# Patient Record
Sex: MALE | Race: WHITE | NOT HISPANIC OR LATINO | Employment: OTHER | URBAN - METROPOLITAN AREA
[De-identification: names, ages, dates, MRNs, and addresses within clinical notes are randomized per-mention and may not be internally consistent; named-entity substitution may affect disease eponyms.]

---

## 2017-01-03 ENCOUNTER — ANTICOAGULATION MONITORING (OUTPATIENT)
Dept: VASCULAR LAB | Facility: MEDICAL CENTER | Age: 67
End: 2017-01-03
Attending: NURSE PRACTITIONER
Payer: MEDICARE

## 2017-01-03 LAB
INR BLD: 3.6 (ref 0.9–1.2)
INR PPP: 3.6 (ref 2–3.5)

## 2017-01-03 PROCEDURE — 85610 PROTHROMBIN TIME: CPT

## 2017-01-03 PROCEDURE — 99212 OFFICE O/P EST SF 10 MIN: CPT

## 2017-01-03 NOTE — MR AVS SNAPSHOT
Rene Chan   1/3/2017 10:30 AM   Anticoagulation Monitoring   MRN: [id number]    Department:  Vascular Medicine   Dept Phone:  [phone]    Description:  Male : 1950   Provider:  [facility] EXAM 5           Allergies as of 1/3/2017     Allergen Noted Reactions    Penicillins 2008       Childhood; does know what the reaction was      Vital Signs     Smoking Status                   Former Smoker           Basic Information     Date Of Birth Sex Race Ethnicity Preferred Language    1950 Male White Non- English      Your appointments     2017 10:30 AM   Established Patient with [facility] EXAM 5   [facility]  (--)    [address] [address]   [phone]            2017 10:30 AM   Established Patient with IHV EXAM 4   [facility]  (--)    [address] [address]   [phone]            2017  1:00 PM   Established Patient with Patricia Damon M.D.   [facility] Med - Internal Medicine (--)    [address]  [address]  [facility] [address] [phone]           You will be receiving a confirmation call a few days before your appointment from our automated call confirmation system.              Problem List              ICD-10-CM Priority Class Noted - Resolved    Chronic anticoagulation Z79.01   2014 - Present    TIA (transient ischemic attack) G45.9   2015 - Present    CAD (coronary artery disease) I25.10   2015 - Present    S/P CABG x 3 Z95.1   2015 - Present    Mixed hyperlipidemia E78.2   2015 - Present    Hx pulmonary embolism Z86.711   2015 - Present    Deep vein thrombosis (DVT) (HCC) I82.409   2015 - Present    Acquired hypothyroidism E03.9   2016 - Present    Gout of foot M10.9   2016 - Present    Colon polyp K63.5   10/6/2016 - Present    Bipolar  disorder in full remission (HCC) F31.70   10/6/2016 - Present    Healthcare maintenance Z00.00   10/6/2016 - Present    Benign non-nodular prostatic hyperplasia with lower urinary tract symptoms N40.1   10/6/2016 - Present      Health Maintenance        Date Due Completion Dates    IMM DTaP/Tdap/Td Vaccine (1 - Tdap) 10/15/1968 ---    IMM ZOSTER VACCINE 10/15/2009 ---    IMM PNEUMOCOCCAL 65+ (ADULT) LOW/MEDIUM RISK SERIES (2 of 2 - PPSV23) 10/6/2017 10/6/2016    COLONOSCOPY 3/29/2020 3/29/2010 (Done)    Override on 3/29/2010: Done ([facility]IC)            Results     POCT Protime      Component    INR    3.6                        Current Immunizations     13-VALENT PCV PREVNAR 10/6/2016    Influenza TIV (IM) 9/5/2014, 10/10/2011    Influenza Vaccine Adult HD 10/6/2016    Pneumococcal Vaccine (UF)Historical Data 1/10/2007      Below and/or attached are the medications your provider expects you to take. Review all of your home medications and newly ordered medications with your provider and/or pharmacist. Follow medication instructions as directed by your provider and/or pharmacist. Please keep your medication list with you and share with your provider. Update the information when medications are discontinued, doses are changed, or new medications (including over-the-counter products) are added; and carry medication information at all times in the event of emergency situations     Allergies:  PENICILLINS - (reactions not documented)               Medications  Valid as of: January 03, 2017 - 10:29 AM    Generic Name Brand Name Tablet Size Instructions for use    Aspirin (Tab) aspirin 81 MG Take 162 mg by mouth every day. QD        Cholecalciferol (Cap) Vitamin D 2000 UNITS Take  by mouth.        Divalproex Sodium (Tablet Delayed Response) DEPAKOTE 250 MG TAKE ONE TABLET BY MOUTH TWICE A DAY        Hydrocodone-Acetaminophen (Tab) NORCO  MG Take 1-2 Tabs by mouth every four hours as needed for Moderate Pain ((Pain Scale  4-6) Give 1 tablet first, may give second tablet after 1 hour if pain still unrelieved).        Levothyroxine Sodium (Tab) SYNTHROID 75 MCG TAKE ONE TABLET BY MOUTH DAILY        Lisinopril (Tab) PRINIVIL 5 MG Take 1 Tab by mouth every day.        MethylPREDNISolone (Tab) MEDROL 4 MG Take 4 mg by mouth every day.        Metoprolol Tartrate (Tab) LOPRESSOR 25 MG Take 1 Tab by mouth 2 Times a Day.        Multiple Vitamins-Minerals (Tab) THERAGRAN-M  Take 1 Tab by mouth every day.        Omega-3 Fatty Acids (Cap) OMEGA 3 FA 1000 MG Take 1,400 mg by mouth every day.        Pravastatin Sodium (Tab) PRAVACHOL 80 MG TAKE ONE TABLET BY MOUTH EVERY EVENING        Tamsulosin HCl (Cap) FLOMAX 0.4 MG Take 0.4 mg by mouth every day.        Warfarin Sodium (Tab) COUMADIN 7.5 MG Take one-half to one (1/2-1) tablet daily as directed by coumadin clinic        Wheat Dextrin   Take  by mouth.        .                 Medicines prescribed today were sent to:     [facility]Y #[id number] - [place] - [address]    [address] [place] [address]    Phone: [phone] Fax: [phone]    Open 24 Hours?: No    [facility] - [facility] [address]    [address] [place] [address]    Phone: [phone] Fax: [phone]    Open 24 Hours?: No      Medication refill instructions:       If your prescription bottle indicates you have medication refills left, it is not necessary to call your provider’s office. Please contact your pharmacy and they will refill your medication.    If your prescription bottle indicates you do not have any refills left, you may request refills at any time through one of the following ways: The online [employer] system (except Urgent Care), by calling your provider’s office, or by asking your pharmacy to contact your provider’s office with a refill request. Medication refills are processed only during regular business hours and may not be available until the next business day.  Your provider may request additional information or to have a follow-up visit with you prior to refilling your medication.   *Please Note: Medication refills are assigned a new Rx number when refilled electronically. Your pharmacy may indicate that no refills were authorized even though a new prescription for the same medication is available at the pharmacy. Please request the medicine by name with the pharmacy before contacting your provider for a refill.        Warfarin Dosing Calendar   January 2017 Details    Sun Mon Tue Wed Thu Fri Sat     1               2               3   3.6   3.75 mg   See details      4      3.75 mg         5      7.5 mg         6      7.5 mg         7      7.5 mg           8      7.5 mg         9      7.5 mg         10      7.5 mg         11      3.75 mg         12      7.5 mg         13      7.5 mg         14      7.5 mg           15      7.5 mg         16      7.5 mg         17      7.5 mg         18      3.75 mg         19      7.5 mg         20      7.5 mg         21      7.5 mg           22      7.5 mg         23      7.5 mg         24      7.5 mg         25      3.75 mg         26      7.5 mg         27      7.5 mg         28      7.5 mg           29      7.5 mg         30      7.5 mg         31      7.5 mg              Date Details   01/03 This INR check   INR: 3.6       Date of next INR:  1/31/2017         How to take your warfarin dose     To take:  3.75 mg Take 0.5 of a 7.5 mg tablet.    To take:  7.5 mg Take 1 of the 7.5 mg tablets.              MyChart Status: Patient Declined

## 2017-01-03 NOTE — PROGRESS NOTES
Anticoagulation Summary as of 1/3/2017     INR goal 2.0-3.0   Selected INR 3.6! (1/3/2017)   Maintenance plan 3.75 mg (7.5 mg x 0.5) on Wed; 7.5 mg (7.5 mg x 1) all other days   Weekly total 48.75 mg   Plan last modified LAMAR Bucio (8/5/2015)   Next INR check 1/31/2017   Target end date Indefinite    Indications   DVT (deep venous thrombosis) (HCC) (Resolved) [I82.409]  Pulmonary embolism [415.19] (Resolved) [I26.99]  Deep vein thrombosis (DVT) (HCC) [I82.409]         Anticoagulation Episode Summary     INR check location [facility]    Preferred lab [facility]    Send INR reminders to     Comments       Anticoagulation Care Providers     Provider Role Specialty Phone number    Patricia Damon M.D. Referring Internal Medicine [phone]    DIANA QuinnD Responsible      [facility] Responsible  [phone]        Anticoagulation Patient Findings      Rene Handy Chan seen in clinic today  INR  supra-therapeutic.    Denies signs/symptoms of bleeding and/or thrombosis.    Denies changes to diet or medications.   Follow up appointment in 4 week(s).      0.5 tab today then continue weekly warfarin dose as noted    Sebastian Novak, DIANAD

## 2017-01-31 ENCOUNTER — ANTICOAGULATION VISIT (OUTPATIENT)
Dept: VASCULAR LAB | Facility: MEDICAL CENTER | Age: 67
End: 2017-01-31
Attending: NURSE PRACTITIONER
Payer: MEDICARE

## 2017-01-31 DIAGNOSIS — I26.99 OTHER PULMONARY EMBOLISM WITHOUT ACUTE COR PULMONALE, UNSPECIFIED CHRONICITY (HCC): ICD-10-CM

## 2017-01-31 LAB
INR BLD: 3.2 (ref 0.9–1.2)
INR PPP: 3.2 (ref 2–3.5)

## 2017-01-31 PROCEDURE — 85610 PROTHROMBIN TIME: CPT

## 2017-01-31 PROCEDURE — 99212 OFFICE O/P EST SF 10 MIN: CPT

## 2017-01-31 NOTE — PROGRESS NOTES
Anticoagulation Summary as of 1/31/2017     INR goal 2.0-3.0   Selected INR 3.2! (1/31/2017)   Maintenance plan 3.75 mg (7.5 mg x 0.5) on Wed, Sat; 7.5 mg (7.5 mg x 1) all other days   Weekly total 45 mg   Plan last modified Sebastian Novak, PREMA (1/31/2017)   Next INR check 2/28/2017   Target end date Indefinite    Indications   DVT (deep venous thrombosis) (CMS-HCC) (Resolved) [I82.409]  Pulmonary embolism [415.19] (Resolved) [I26.99]  Deep vein thrombosis (DVT) (CMS-HCC) [I82.409]         Anticoagulation Episode Summary     INR check location [facility]    Preferred lab [facility]    Send INR reminders to     Comments       Anticoagulation Care Providers     Provider Role Specialty Phone number    Patricia Damon M.D. Referring Internal Medicine [phone]    Laisha Méndez PHARMD Responsible      [facility] Anticoagulation [facility] Responsible  [phone]        Anticoagulation Patient Findings      Rene Chan seen in clinic today  INR supra-therapeutic.    Denies signs/symptoms of bleeding and/or thrombosis.    Denies changes to diet or medications.   Follow up appointment in 4 week(s).    Decrease weekly warfarin dose as noted    Sebastian Novak, PREMA

## 2017-01-31 NOTE — MR AVS SNAPSHOT
Rene Chan   2017 10:30 AM   Anticoagulation Visit   MRN: [id number]    Department:  Vascular Medicine   Dept Phone:  [phone]    Description:  Male : 1950   Provider:  [facility] EXAM 4           Allergies as of 2017     Allergen Noted Reactions    Penicillins 2008       Childhood; does know what the reaction was      Vital Signs     Smoking Status                   Former Smoker           Basic Information     Date Of Birth Sex Race Ethnicity Preferred Language    1950 Male White Non- English      Your appointments     2017  1:00 PM   Established Patient with Patricia Damon M.D.   [facility] - Internal Medicine (--)    [address]  [address]  [facility] [address] [phone]           You will be receiving a confirmation call a few days before your appointment from our automated call confirmation system.            2017 10:30 AM   Established Patient with [facility] EXAM 5   [facility] for Heart and Vascular Health  (--)    [address] [address]   [phone]              Problem List              ICD-10-CM Priority Class Noted - Resolved    Chronic anticoagulation Z79.01   2014 - Present    TIA (transient ischemic attack) G45.9   2015 - Present    CAD (coronary artery disease) I25.10   2015 - Present    S/P CABG x 3 Z95.1   2015 - Present    Mixed hyperlipidemia E78.2   2015 - Present    Hx pulmonary embolism Z86.711   2015 - Present    Deep vein thrombosis (DVT) (CMS-HCC) I82.409   2015 - Present    Acquired hypothyroidism E03.9   2016 - Present    Gout of foot M10.9   2016 - Present    Colon polyp K63.5   10/6/2016 - Present    Bipolar disorder in full remission (CMS-HCC) F31.70   10/6/2016 - Present    Healthcare maintenance Z00.00   10/6/2016 - Present    Benign non-nodular prostatic hyperplasia with lower urinary tract symptoms N40.1    10/6/2016 - Present      Health Maintenance        Date Due Completion Dates    IMM DTaP/Tdap/Td Vaccine (1 - Tdap) 10/15/1968 ---    IMM ZOSTER VACCINE 10/15/2009 ---    IMM PNEUMOCOCCAL 65+ (ADULT) LOW/MEDIUM RISK SERIES (2 of 2 - PPSV23) 10/6/2017 10/6/2016    COLONOSCOPY 3/29/2020 3/29/2010 (Done)    Override on 3/29/2010: Done ([facility]IC)            Results     POCT Protime      Component    INR    3.2                        Current Immunizations     13-VALENT PCV PREVNAR 10/6/2016    Influenza TIV (IM) 9/5/2014, 10/10/2011    Influenza Vaccine Adult HD 10/6/2016    Pneumococcal Vaccine (UF)Historical Data 1/10/2007      Below and/or attached are the medications your provider expects you to take. Review all of your home medications and newly ordered medications with your provider and/or pharmacist. Follow medication instructions as directed by your provider and/or pharmacist. Please keep your medication list with you and share with your provider. Update the information when medications are discontinued, doses are changed, or new medications (including over-the-counter products) are added; and carry medication information at all times in the event of emergency situations     Allergies:  PENICILLINS - (reactions not documented)               Medications  Valid as of: January 31, 2017 - 10:34 AM    Generic Name Brand Name Tablet Size Instructions for use    Aspirin (Tab) aspirin 81 MG Take 162 mg by mouth every day. QD        Cholecalciferol (Cap) Vitamin D 2000 UNITS Take  by mouth.        Divalproex Sodium (Tablet Delayed Response) DEPAKOTE 250 MG TAKE ONE TABLET BY MOUTH TWICE A DAY        Hydrocodone-Acetaminophen (Tab) NORCO  MG Take 1-2 Tabs by mouth every four hours as needed for Moderate Pain ((Pain Scale 4-6) Give 1 tablet first, may give second tablet after 1 hour if pain still unrelieved).        Levothyroxine Sodium (Tab) SYNTHROID 75 MCG TAKE ONE TABLET BY MOUTH DAILY        Lisinopril (Tab) PRINIVIL  5 MG Take 1 Tab by mouth every day.        MethylPREDNISolone (Tab) MEDROL 4 MG Take 4 mg by mouth every day.        Metoprolol Tartrate (Tab) LOPRESSOR 25 MG Take 1 Tab by mouth 2 Times a Day.        Multiple Vitamins-Minerals (Tab) THERAGRAN-M  Take 1 Tab by mouth every day.        Omega-3 Fatty Acids (Cap) OMEGA 3 FA 1000 MG Take 1,400 mg by mouth every day.        Pravastatin Sodium (Tab) PRAVACHOL 80 MG TAKE ONE TABLET BY MOUTH EVERY EVENING        Tamsulosin HCl (Cap) FLOMAX 0.4 MG Take 0.4 mg by mouth every day.        Warfarin Sodium (Tab) COUMADIN 7.5 MG Take one-half to one (1/2-1) tablet daily as directed by coumadin clinic        Wheat Dextrin   Take  by mouth.        .                 Medicines prescribed today were sent to:     [facility]/PHARMACY #[id number] - [place], [place] - [address]    [address] [place] [address]    Phone: [phone] Fax: [phone]    Open 24 Hours?: No    [facility] - [place], [place] - [address]    [address] [place] [address]    Phone: [phone] Fax: [phone]    Open 24 Hours?: No      Medication refill instructions:       If your prescription bottle indicates you have medication refills left, it is not necessary to call your provider’s office. Please contact your pharmacy and they will refill your medication.    If your prescription bottle indicates you do not have any refills left, you may request refills at any time through one of the following ways: The online [employer] system (except Urgent Care), by calling your provider’s office, or by asking your pharmacy to contact your provider’s office with a refill request. Medication refills are processed only during regular business hours and may not be available until the next business day. Your provider may request additional information or to have a follow-up visit with you prior to refilling your medication.   *Please Note: Medication refills are assigned a new Rx number when refilled  electronically. Your pharmacy may indicate that no refills were authorized even though a new prescription for the same medication is available at the pharmacy. Please request the medicine by name with the pharmacy before contacting your provider for a refill.        Warfarin Dosing Calendar   January 2017 Details    Sun Mon Tue Wed Thu Fri Sat     1               2               3               4               5               6               7                 8               9               10               11               12               13               14                 15               16               17               18               19               20               21                 22               23               24               25               26               27               28                 29               30               31   3.2   7.5 mg   See details           Date Details   01/31 This INR check   INR: 3.2               How to take your warfarin dose     To take:  7.5 mg Take 1 of the 7.5 mg tablets.           Warfarin Dosing Calendar   February 2017 Details    Sun Mon Tue Wed Thu Fri Sat        1      3.75 mg         2      7.5 mg         3      7.5 mg         4      3.75 mg           5      7.5 mg         6      7.5 mg         7      7.5 mg         8      3.75 mg         9      7.5 mg         10      7.5 mg         11      3.75 mg           12      7.5 mg         13      7.5 mg         14      7.5 mg         15      3.75 mg         16      7.5 mg         17      7.5 mg         18      3.75 mg           19      7.5 mg         20      7.5 mg         21      7.5 mg         22      3.75 mg         23      7.5 mg         24      7.5 mg         25      3.75 mg           26      7.5 mg         27      7.5 mg         28      7.5 mg              Date Details   No additional details    Date of next INR:  2/28/2017         How to take your warfarin dose     To take:  3.75 mg Take 0.5 of a 7.5 mg  tablet.    To take:  7.5 mg Take 1 of the 7.5 mg tablets.              MyChart Status: Patient Declined

## 2017-02-03 ENCOUNTER — HOSPITAL ENCOUNTER (OUTPATIENT)
Dept: LAB | Facility: MEDICAL CENTER | Age: 67
End: 2017-02-03
Attending: INTERNAL MEDICINE
Payer: MEDICARE

## 2017-02-03 DIAGNOSIS — F31.9 BIPOLAR DEPRESSION (HCC): ICD-10-CM

## 2017-02-03 DIAGNOSIS — M10.9 GOUT OF FOOT, UNSPECIFIED CAUSE, UNSPECIFIED CHRONICITY, UNSPECIFIED LATERALITY: ICD-10-CM

## 2017-02-03 DIAGNOSIS — I25.83 CORONARY ARTERY DISEASE DUE TO LIPID RICH PLAQUE: ICD-10-CM

## 2017-02-03 DIAGNOSIS — E78.2 MIXED HYPERLIPIDEMIA: ICD-10-CM

## 2017-02-03 DIAGNOSIS — E03.9 ACQUIRED HYPOTHYROIDISM: ICD-10-CM

## 2017-02-03 DIAGNOSIS — I25.10 CORONARY ARTERY DISEASE DUE TO LIPID RICH PLAQUE: ICD-10-CM

## 2017-02-03 LAB
ALBUMIN SERPL BCP-MCNC: 4.6 G/DL (ref 3.2–4.9)
ALBUMIN/GLOB SERPL: 1.7 G/DL
ALP SERPL-CCNC: 36 U/L (ref 30–99)
ALT SERPL-CCNC: 11 U/L (ref 2–50)
ANION GAP SERPL CALC-SCNC: 7 MMOL/L (ref 0–11.9)
AST SERPL-CCNC: 19 U/L (ref 12–45)
BILIRUB SERPL-MCNC: 1.9 MG/DL (ref 0.1–1.5)
BUN SERPL-MCNC: 12 MG/DL (ref 8–22)
CALCIUM SERPL-MCNC: 10.2 MG/DL (ref 8.5–10.5)
CHLORIDE SERPL-SCNC: 102 MMOL/L (ref 96–112)
CHOLEST SERPL-MCNC: 192 MG/DL (ref 100–199)
CO2 SERPL-SCNC: 29 MMOL/L (ref 20–33)
CREAT SERPL-MCNC: 1 MG/DL (ref 0.5–1.4)
GLOBULIN SER CALC-MCNC: 2.7 G/DL (ref 1.9–3.5)
GLUCOSE SERPL-MCNC: 92 MG/DL (ref 65–99)
HDLC SERPL-MCNC: 38 MG/DL
LDLC SERPL CALC-MCNC: 120 MG/DL
POTASSIUM SERPL-SCNC: 4.8 MMOL/L (ref 3.6–5.5)
PROT SERPL-MCNC: 7.3 G/DL (ref 6–8.2)
SODIUM SERPL-SCNC: 138 MMOL/L (ref 135–145)
TRIGL SERPL-MCNC: 169 MG/DL (ref 0–149)
TSH SERPL DL<=0.005 MIU/L-ACNC: 3.16 UIU/ML (ref 0.3–3.7)
URATE SERPL-MCNC: 8.3 MG/DL (ref 2.5–8.3)
VALPROATE SERPL-MCNC: 67.5 UG/ML (ref 50–100)

## 2017-02-03 PROCEDURE — 80053 COMPREHEN METABOLIC PANEL: CPT

## 2017-02-03 PROCEDURE — 84550 ASSAY OF BLOOD/URIC ACID: CPT

## 2017-02-03 PROCEDURE — 36415 COLL VENOUS BLD VENIPUNCTURE: CPT

## 2017-02-03 PROCEDURE — 80061 LIPID PANEL: CPT

## 2017-02-03 PROCEDURE — 80164 ASSAY DIPROPYLACETIC ACD TOT: CPT

## 2017-02-03 PROCEDURE — 84443 ASSAY THYROID STIM HORMONE: CPT

## 2017-02-05 RX ORDER — ATORVASTATIN CALCIUM 80 MG/1
80 TABLET, FILM COATED ORAL DAILY
Qty: 90 TAB | Refills: 2 | Status: SHIPPED | OUTPATIENT
Start: 2017-02-05 | End: 2017-11-10 | Stop reason: SDUPTHER

## 2017-02-05 NOTE — PROGRESS NOTES
Quick Note:    Chol increased- with CAD need lower numbers- change to Lipitor- sent to pharmacy  ______

## 2017-02-07 ENCOUNTER — OFFICE VISIT (OUTPATIENT)
Dept: INTERNAL MEDICINE | Facility: MEDICAL CENTER | Age: 67
End: 2017-02-07
Payer: MEDICARE

## 2017-02-07 VITALS
WEIGHT: 193.4 LBS | OXYGEN SATURATION: 95 % | HEIGHT: 68 IN | SYSTOLIC BLOOD PRESSURE: 110 MMHG | TEMPERATURE: 97.8 F | BODY MASS INDEX: 29.31 KG/M2 | DIASTOLIC BLOOD PRESSURE: 72 MMHG | HEART RATE: 52 BPM

## 2017-02-07 DIAGNOSIS — K63.5 COLON POLYP: ICD-10-CM

## 2017-02-07 DIAGNOSIS — M10.9 GOUT OF FOOT, UNSPECIFIED CAUSE, UNSPECIFIED CHRONICITY, UNSPECIFIED LATERALITY: ICD-10-CM

## 2017-02-07 DIAGNOSIS — H61.23 BILATERAL IMPACTED CERUMEN: ICD-10-CM

## 2017-02-07 DIAGNOSIS — F31.70 BIPOLAR DISORDER IN FULL REMISSION, MOST RECENT EPISODE UNSPECIFIED TYPE (HCC): ICD-10-CM

## 2017-02-07 DIAGNOSIS — Z00.00 HEALTHCARE MAINTENANCE: Chronic | ICD-10-CM

## 2017-02-07 DIAGNOSIS — E78.2 MIXED HYPERLIPIDEMIA: ICD-10-CM

## 2017-02-07 DIAGNOSIS — E03.9 ACQUIRED HYPOTHYROIDISM: ICD-10-CM

## 2017-02-07 DIAGNOSIS — I82.4Z9 DEEP VEIN THROMBOSIS (DVT) OF DISTAL VEIN OF LOWER EXTREMITY, UNSPECIFIED CHRONICITY, UNSPECIFIED LATERALITY (HCC): ICD-10-CM

## 2017-02-07 DIAGNOSIS — Z95.1 S/P CABG X 3: ICD-10-CM

## 2017-02-07 DIAGNOSIS — N40.1 BENIGN NON-NODULAR PROSTATIC HYPERPLASIA WITH LOWER URINARY TRACT SYMPTOMS: ICD-10-CM

## 2017-02-07 DIAGNOSIS — Z86.711 HX PULMONARY EMBOLISM: ICD-10-CM

## 2017-02-07 DIAGNOSIS — G45.9 TRANSIENT CEREBRAL ISCHEMIA, UNSPECIFIED TYPE: ICD-10-CM

## 2017-02-07 DIAGNOSIS — Z79.01 CHRONIC ANTICOAGULATION: ICD-10-CM

## 2017-02-07 PROCEDURE — 99214 OFFICE O/P EST MOD 30 MIN: CPT | Performed by: INTERNAL MEDICINE

## 2017-02-07 ASSESSMENT — PATIENT HEALTH QUESTIONNAIRE - PHQ9: CLINICAL INTERPRETATION OF PHQ2 SCORE: 0

## 2017-02-07 NOTE — ASSESSMENT & PLAN NOTE
Patient denies any new neurological symptoms no numbness, weakness, vision, swallow, or speech problems. And is taking meds as prescribed.

## 2017-02-07 NOTE — MR AVS SNAPSHOT
"        Rene Chan   2017 1:00 PM   Office Visit   MRN: [id number]    Department:  [facility] - Internal Med   Dept Phone:  [phone]    Description:  Male : 1950   Provider:  Patricia Damon M.D.           Reason for Visit     Vitamin D Deficiency 1000 units enough?       Allergies as of 2017     Allergen Noted Reactions    Penicillins 2008       Childhood; does know what the reaction was      You were diagnosed with     S/P CABG x 3   [292509]       Healthcare maintenance   [061326]       Mixed hyperlipidemia   [272.2.ICD-9-CM]       Transient cerebral ischemia, unspecified type   [6116553]       Hx pulmonary embolism   [266124]       Deep vein thrombosis (DVT) of distal vein of lower extremity, unspecified chronicity, unspecified laterality (CMS-HCC)   [9180447]       Gout of foot, unspecified cause, unspecified chronicity, unspecified laterality   [2880995]       Chronic anticoagulation   [524586]       Bipolar disorder in full remission, most recent episode unspecified type (CMS-HCC)   [4894160]       Benign non-nodular prostatic hyperplasia with lower urinary tract symptoms   [0725760]       Acquired hypothyroidism   [6086769]       Colon polyp   [476332]       Bilateral impacted cerumen   [041362]         Vital Signs     Blood Pressure Pulse Temperature Height Weight Body Mass Index    110/72 mmHg 52 36.6 °C (97.8 °F) 1.727 m (5' 8\") 87.726 kg (193 lb 6.4 oz) 29.41 kg/m2    Oxygen Saturation Smoking Status                95% Former Smoker          Basic Information     Date Of Birth Sex Race Ethnicity Preferred Language    1950 Male White Non- English      Your appointments     2017 10:30 AM   Established Patient with V EXAM 5   [facility]  (--)    [address] [place] [id number]   [phone]            Aug 14, 2017  1:00 PM   Established Patient with Patricia Damon M.D.   [facility] - Internal [facility] (--)    [address]  [address]  [place] [address] [phone]           You will be receiving a confirmation call a few days before your appointment from our automated call confirmation system.              Problem List              ICD-10-CM Priority Class Noted - Resolved    Chronic anticoagulation Z79.01   11/11/2014 - Present    TIA (transient ischemic attack) G45.9   5/26/2015 - Present    CAD (coronary artery disease) I25.10   6/4/2015 - Present    S/P CABG x 3 Z95.1   6/4/2015 - Present    Mixed hyperlipidemia E78.2   6/24/2015 - Present    Hx pulmonary embolism Z86.711   6/24/2015 - Present    Deep vein thrombosis (DVT) (CMS-HCC) I82.409   9/16/2015 - Present    Acquired hypothyroidism E03.9   9/26/2016 - Present    Gout of foot M10.9   9/26/2016 - Present    Colon polyp K63.5   10/6/2016 - Present    Bipolar disorder in full remission (CMS-HCC) F31.70   10/6/2016 - Present    Healthcare maintenance (Chronic) Z00.00   10/6/2016 - Present    Benign non-nodular prostatic hyperplasia with lower urinary tract symptoms N40.1   10/6/2016 - Present    Bilateral impacted cerumen H61.23   2/7/2017 - Present      Health Maintenance        Date Due Completion Dates    IMM DTaP/Tdap/Td Vaccine (1 - Tdap) 10/15/1968 ---    IMM ZOSTER VACCINE 10/15/2009 ---    IMM PNEUMOCOCCAL 65+ (ADULT) LOW/MEDIUM RISK SERIES (2 of 2 - PPSV23) 10/6/2017 10/6/2016    COLONOSCOPY 3/29/2020 3/29/2010            Current Immunizations     13-VALENT PCV PREVNAR 10/6/2016    Influenza TIV (IM) 9/5/2014, 10/10/2011    Influenza Vaccine Adult HD 10/6/2016    Pneumococcal Vaccine (UF)Historical Data 1/10/2007      Below and/or attached are the medications your provider expects you to take. Review all of your home medications and newly ordered medications with your provider and/or pharmacist. Follow medication instructions as directed by your provider and/or pharmacist. Please keep your medication list  with you and share with your provider. Update the information when medications are discontinued, doses are changed, or new medications (including over-the-counter products) are added; and carry medication information at all times in the event of emergency situations     Allergies:  PENICILLINS - (reactions not documented)               Medications  Valid as of: February 07, 2017 -  1:48 PM    Generic Name Brand Name Tablet Size Instructions for use    Aspirin (Tab) aspirin 81 MG Take 162 mg by mouth every day. QD        Atorvastatin Calcium (Tab) LIPITOR 80 MG Take 1 Tab by mouth every day.        Cholecalciferol (Cap) Vitamin D 2000 UNITS Take  by mouth.        Divalproex Sodium (Tablet Delayed Response) DEPAKOTE 250 MG TAKE ONE TABLET BY MOUTH TWICE A DAY        Levothyroxine Sodium (Tab) SYNTHROID 75 MCG TAKE ONE TABLET BY MOUTH DAILY        Lisinopril (Tab) PRINIVIL 5 MG Take 1 Tab by mouth every day.        Metoprolol Tartrate (Tab) LOPRESSOR 25 MG Take 1 Tab by mouth 2 Times a Day.        Multiple Vitamins-Minerals (Tab) THERAGRAN-M  Take 1 Tab by mouth every day.        Omega-3 Fatty Acids (Cap) OMEGA 3 FA 1000 MG Take 1,400 mg by mouth every day.        Tamsulosin HCl (Cap) FLOMAX 0.4 MG Take 0.4 mg by mouth every day.        Warfarin Sodium (Tab) COUMADIN 7.5 MG Take one-half to one (1/2-1) tablet daily as directed by coumadin clinic        Wheat Dextrin   Take  by mouth.        .                 Medicines prescribed today were sent to:     [id number] - [place] - [id number] [facility] [address]    Phone: [phone] Fax: [phone]    Open 24 Hours?: No    [facility] - [place], [place] - [address]    [address] [place] [address]    Phone: [phone] Fax: [phone]    Open 24 Hours?: No      Medication refill instructions:       If your prescription bottle indicates you have medication refills left, it is not necessary to call your  provider’s office. Please contact your pharmacy and they will refill your medication.    If your prescription bottle indicates you do not have any refills left, you may request refills at any time through one of the following ways: The online [employer] system (except Urgent Care), by calling your provider’s office, or by asking your pharmacy to contact your provider’s office with a refill request. Medication refills are processed only during regular business hours and may not be available until the next business day. Your provider may request additional information or to have a follow-up visit with you prior to refilling your medication.   *Please Note: Medication refills are assigned a new Rx number when refilled electronically. Your pharmacy may indicate that no refills were authorized even though a new prescription for the same medication is available at the pharmacy. Please request the medicine by name with the pharmacy before contacting your provider for a refill.        Your To Do List     Future Labs/Procedures Complete By Expires    CBC WITH DIFFERENTIAL  As directed 2/8/2018    COMP METABOLIC PANEL  As directed 2/8/2018    LIPID PROFILE  As directed 2/8/2018    TSH WITH REFLEX TO FT4  As directed 2/7/2018      Referral     A referral request has been sent to our patient care coordination department. Please allow 3-5 business days for us to process this request and contact you either by phone or mail. If you do not hear from us by the 5th business day, please call us at [phone].        Instructions    1000- 2000 of D is probably fine, also can try Co Q 10 for muscle soreness with statin.     Please get lab work done in 6 months prior to the next visit          [place] [employer] Status: Patient Declined

## 2017-02-07 NOTE — PROGRESS NOTES
Established Patient    Rene Chan is a 66 y.o. male who presents today with the following:    CC: Routine six-month follow-up. Questions regarding vitamin D replacement. Chronic issues are stable. Some decreased hearing    HPI:    Mixed hyperlipidemia  Patient denies any signs/symptoms of cardiovascular disease. Denies chest pain/pressure; denies numbness/weakness or difficulty with speech/swallowing or sudden change in vision.   Just changes to Lipitor- hx myalgias in past- but now on D      S/P CABG x 3  Patient denies chest pain/pressure with exertion. Denies palpitations, edema, orthopnea, PND, CARBALLO.      TIA (transient ischemic attack)  Patient denies any new neurological symptoms no numbness, weakness, vision, swallow, or speech problems. And is taking meds as prescribed.      Hx pulmonary embolism  Denies SOB    Deep vein thrombosis (DVT) (CMS-HCC)  No swelling,     Chronic anticoagulation  No bleeding    Bipolar disorder in full remission (CMS-HCC)  Mood stable x years on current    Benign non-nodular prostatic hyperplasia with lower urinary tract symptoms  Nocturia- better with less fluids late in radha.     Acquired hypothyroidism  Patient is feeling well on their current thyroid dose. The most recent thyroid function tests were normal. No unusual fatigue, skin changes, depression, constipation. Patient is compliant with medication. Takes medication as directed on an empty stomach.        Colon polyp  No change in BMs, no blood/tarry        ROS: As per HPI. Additional pertinent symptoms as noted below.    ROS:  Constitutional ROS: No unexpected change in weight, No weakness, No fatigue  Eye ROS: No recent significant change in vision  Ear ROS: Positive for decreased hearing, bilaterally  Pulmonary ROS: No shortness of breath, No recent change in breathing  Cardiovascular ROS: No chest pain, No edema, No palpitations, No syncope  Gastrointestinal ROS: No abdominal pain, No nausea, vomiting,  diarrhea, or constipation  Psychiatric ROS: No depression, No anxiety    Patient Active Problem List    Diagnosis Date Noted   • Bilateral impacted cerumen 02/07/2017   • Colon polyp 10/06/2016   • Bipolar disorder in full remission (CMS-HCC) 10/06/2016   • Healthcare maintenance 10/06/2016   • Benign non-nodular prostatic hyperplasia with lower urinary tract symptoms 10/06/2016   • Acquired hypothyroidism 09/26/2016   • Gout of foot 09/26/2016   • Deep vein thrombosis (DVT) (CMS-HCC) 09/16/2015   • Mixed hyperlipidemia 06/24/2015   • Hx pulmonary embolism 06/24/2015   • CAD (coronary artery disease) 06/04/2015   • S/P CABG x 3 06/04/2015   • TIA (transient ischemic attack) 05/26/2015   • Chronic anticoagulation 11/11/2014       Social History   Substance Use Topics   • Smoking status: Former Smoker -- 2.00 packs/day for 10 years     Types: Cigarettes     Quit date: 01/01/1976   • Smokeless tobacco: Never Used      Comment: quit x 32 yrs; 2ppd x 7 years   • Alcohol Use: No       Current Outpatient Prescriptions   Medication Sig Dispense Refill   • atorvastatin (LIPITOR) 80 MG tablet Take 1 Tab by mouth every day. 90 Tab 2   • levothyroxine (SYNTHROID) 75 MCG Tab TAKE ONE TABLET BY MOUTH DAILY 90 Tab 2   • divalproex (DEPAKOTE) 250 MG Tablet Delayed Response TAKE ONE TABLET BY MOUTH TWICE A  Tab 2   • tamsulosin (FLOMAX) 0.4 MG capsule Take 0.4 mg by mouth every day.  0   • metoprolol (LOPRESSOR) 25 MG Tab Take 1 Tab by mouth 2 Times a Day. 180 Tab 3   • lisinopril (PRINIVIL) 5 MG Tab Take 1 Tab by mouth every day. 90 Tab 3   • therapeutic multivitamin-minerals (THERAGRAN-M) Tab Take 1 Tab by mouth every day.     • Cholecalciferol (VITAMIN D) 2000 UNITS Cap Take  by mouth.     • warfarin (COUMADIN) 7.5 MG Tab Take one-half to one (1/2-1) tablet daily as directed by coumadin clinic 90 Tab 1   • Wheat Dextrin (BENEFIBER PO) Take  by mouth.     • docosahexanoic acid (OMEGA 3 FA) 1000 MG CAPS Take 1,400 mg by  "mouth every day.     • ASPIRIN 81 MG PO TABS Take 162 mg by mouth every day. QD       No current facility-administered medications for this visit.       /72 mmHg  Pulse 52  Temp(Src) 36.6 °C (97.8 °F)  Ht 1.727 m (5' 8\")  Wt 87.726 kg (193 lb 6.4 oz)  BMI 29.41 kg/m2  SpO2 95%    Physical Exam  General: Well developed, well nourished male, in no distress.  Eyes: Conjuntiva without any obvious injection or erythema.   Ears- canals and TMs cerumen obscured  Mouth- mucous membranes moist, no erythema  Cardiovascular: Heart is regular with no murmurs, no bruits  Lungs: Clear to auscultation bilaterally. No wheezes, rhonci or crackles heard. Respiratory effort is normal.  Abd: Soft, non-tender  Ext: No edema  Other: Patient in and out of chair, on and off exam table without problem or assistance.          Assessment and Plan      1. S/P CABG x 3  No signs/symptoms of CAD. Taking medications as prescribed.         2. Healthcare maintenance  Plan for working on some weight loss. Up-to-date with vision and dental screening.    3. Mixed hyperlipidemia  Lipids are in reasonable range. The patient to continue paying attention to diet and exercise. Patient aware to go to emergency department or call 911 if any signs of cardiovascular disease- chest pain or pressure, sudden numbness or weakness in an arm or leg, sudden loss of ability to talk or swallow.    Just changed to Lipitor due to elevated LDL. History of myalgias after Lipitor for several years in the past. Discussed vitamin D and CoQ10  - COMP METABOLIC PANEL; Future  - LIPID PROFILE; Future    4. Transient cerebral ischemia, unspecified type  Patient has no signs of recent neurovascular event. Risk factors are controlled.        5. Hx pulmonary embolism  No shortness of breath no hemoptysis    6. Deep vein thrombosis (DVT) of distal vein of lower extremity, unspecified chronicity, unspecified laterality (CMS-HCC)  No extremity edema    7. Gout of foot, " unspecified cause, unspecified chronicity, unspecified laterality  Problems only with dietary indiscretions. Has metastases on hand  - CBC WITH DIFFERENTIAL; Future    8. Chronic anticoagulation  No signs of bleeding    9. Bipolar disorder in full remission, most recent episode unspecified type (CMS-HCC)  Stable times he is on current medication    10. Benign non-nodular prostatic hyperplasia with lower urinary tract symptoms  Nocturia symptoms improved with decreased late evening fluids.    11. Acquired hypothyroidism  Thyroid labs are within range. Patient is taking meds as prescribed first thing in morning without food. And has no signs or symptoms of lower hypothyroid.      - TSH WITH REFLEX TO FT4; Future    12. Colon polyp  Colonoscopy due 2018. No signs of bleeding or bowel changes    13. Bilateral impacted cerumen  Affecting hearing will try to get ears irrigated. Has seen ENT for this in the past.      Return in about 6 months (around 8/7/2017).      Signed by: Patricia Damon M.D.    This note was created using voice recognition software. There may be unintended errors in spelling, grammar or content.

## 2017-02-07 NOTE — ASSESSMENT & PLAN NOTE
Patient is feeling well on their current thyroid dose. The most recent thyroid function tests were normal. No unusual fatigue, skin changes, depression, constipation. Patient is compliant with medication. Takes medication as directed on an empty stomach.

## 2017-02-07 NOTE — ASSESSMENT & PLAN NOTE
History same in the past. Now decreased hearing bilaterally. Has been unsuccessful trying to clean it out on his own.

## 2017-02-07 NOTE — PATIENT INSTRUCTIONS
1000- 2000 of D is probably fine, also can try Co Q 10 for muscle soreness with statin.     Please get lab work done in 6 months prior to the next visit

## 2017-02-07 NOTE — ASSESSMENT & PLAN NOTE
Patient denies chest pain/pressure with exertion. Denies palpitations, edema, orthopnea, PND, CARBALLO.

## 2017-02-07 NOTE — ASSESSMENT & PLAN NOTE
Patient denies any signs/symptoms of cardiovascular disease. Denies chest pain/pressure; denies numbness/weakness or difficulty with speech/swallowing or sudden change in vision.   Just changes to Lipitor- hx myalgias in past- but now on D

## 2017-02-16 DIAGNOSIS — Z86.718 HISTORY OF DVT (DEEP VEIN THROMBOSIS): ICD-10-CM

## 2017-02-16 RX ORDER — WARFARIN SODIUM 7.5 MG/1
TABLET ORAL
Qty: 90 TAB | Refills: 1 | Status: SHIPPED | OUTPATIENT
Start: 2017-02-16 | End: 2017-03-28 | Stop reason: SDUPTHER

## 2017-02-28 ENCOUNTER — ANTICOAGULATION VISIT (OUTPATIENT)
Dept: VASCULAR LAB | Facility: MEDICAL CENTER | Age: 67
End: 2017-02-28
Attending: INTERNAL MEDICINE
Payer: MEDICARE

## 2017-02-28 DIAGNOSIS — Z79.01 CHRONIC ANTICOAGULATION: ICD-10-CM

## 2017-02-28 LAB — INR BLD: 1.7 (ref 0.9–1.2)

## 2017-02-28 PROCEDURE — 99212 OFFICE O/P EST SF 10 MIN: CPT | Performed by: NURSE PRACTITIONER

## 2017-02-28 NOTE — PROGRESS NOTES
Anticoagulation Summary as of 2/28/2017     INR goal 2.0-3.0   Selected INR No new INR was available at the time of this encounter.   Maintenance plan 3.75 mg (7.5 mg x 0.5) on Wed; 7.5 mg (7.5 mg x 1) all other days   Weekly total 48.75 mg   Plan last modified Shweta Serna A.P.NPaul (2/28/2017)   Next INR check 3/28/2017   Target end date Indefinite    Indications   DVT (deep venous thrombosis) (CMS-HCC) (Resolved) [I82.409]  Pulmonary embolism [415.19] (Resolved) [I26.99]  Deep vein thrombosis (DVT) (CMS-HCC) [I82.409]         Anticoagulation Episode Summary     INR check location [facility]    Preferred lab [facility]    Send INR reminders to     Comments       Anticoagulation Care Providers     Provider Role Specialty Phone number    Patricia Damon M.D. Referring Internal Medicine [phone]    DIANA QuinnD Responsible      [facility] Responsible  [phone]        Anticoagulation Patient Findings    Pt is subtherapeutic today. Denies any changes in medications or diet. Med rec completed and reviewed. Patient denies any s/sx of bleeding or clotting. Will return to previous dosing schedule as he was stable on this dosage for a long time-- as outlined in calendar. Will follow-up with pt in 1 month.    Shweta PARKER

## 2017-02-28 NOTE — MR AVS SNAPSHOT
Rene Chan   2017 10:30 AM   Anticoagulation Visit   MRN: [id number]    Department:  Vascular Medicine   Dept Phone:  [phone]    Description:  Male : 1950   Provider:  [facility] EXAM 5           Allergies as of 2017     Allergen Noted Reactions    Penicillins 2008       Childhood; does know what the reaction was      Vital Signs     Smoking Status                   Former Smoker           Basic Information     Date Of Birth Sex Race Ethnicity Preferred Language    1950 Male White Non- English      Your appointments     Mar 28, 2017 10:30 AM   Established Patient with [facility] EXAM 4   [facility]  (--)    [address]  [place] [address]   [phone]            Aug 14, 2017  1:00 PM   Established Patient with Patricia Damon M.D.   [facility] (--)    [address]  [address]  [place] [id number] [phone]           You will be receiving a confirmation call a few days before your appointment from our automated call confirmation system.              Problem List              ICD-10-CM Priority Class Noted - Resolved    Chronic anticoagulation Z79.01   2014 - Present    TIA (transient ischemic attack) G45.9   2015 - Present    CAD (coronary artery disease) I25.10   2015 - Present    S/P CABG x 3 Z95.1   2015 - Present    Mixed hyperlipidemia E78.2   2015 - Present    Hx pulmonary embolism Z86.711   2015 - Present    Deep vein thrombosis (DVT) (CMS-HCC) I82.409   2015 - Present    Acquired hypothyroidism E03.9   2016 - Present    Gout of foot M10.9   2016 - Present    Colon polyp K63.5   10/6/2016 - Present    Bipolar disorder in full remission (CMS-HCC) F31.70   10/6/2016 - Present    Healthcare maintenance (Chronic) Z00.00   10/6/2016 - Present    Benign non-nodular prostatic hyperplasia with lower urinary tract  symptoms N40.1   10/6/2016 - Present    Bilateral impacted cerumen H61.23   2/7/2017 - Present      Health Maintenance        Date Due Completion Dates    IMM DTaP/Tdap/Td Vaccine (1 - Tdap) 10/15/1968 ---    IMM ZOSTER VACCINE 10/15/2009 ---    IMM PNEUMOCOCCAL 65+ (ADULT) LOW/MEDIUM RISK SERIES (2 of 2 - PPSV23) 10/6/2017 10/6/2016    COLONOSCOPY 3/29/2020 3/29/2010            Current Immunizations     13-VALENT PCV PREVNAR 10/6/2016    Influenza TIV (IM) 9/5/2014, 10/10/2011    Influenza Vaccine Adult HD 10/6/2016    Pneumococcal Vaccine (UF)Historical Data 1/10/2007      Below and/or attached are the medications your provider expects you to take. Review all of your home medications and newly ordered medications with your provider and/or pharmacist. Follow medication instructions as directed by your provider and/or pharmacist. Please keep your medication list with you and share with your provider. Update the information when medications are discontinued, doses are changed, or new medications (including over-the-counter products) are added; and carry medication information at all times in the event of emergency situations     Allergies:  PENICILLINS - (reactions not documented)               Medications  Valid as of: February 28, 2017 - 10:35 AM    Generic Name Brand Name Tablet Size Instructions for use    Aspirin (Tab) aspirin 81 MG Take 162 mg by mouth every day. QD        Atorvastatin Calcium (Tab) LIPITOR 80 MG Take 1 Tab by mouth every day.        Cholecalciferol (Cap) Vitamin D 2000 UNITS Take  by mouth.        Divalproex Sodium (Tablet Delayed Response) DEPAKOTE 250 MG TAKE ONE TABLET BY MOUTH TWICE A DAY        Levothyroxine Sodium (Tab) SYNTHROID 75 MCG TAKE ONE TABLET BY MOUTH DAILY        Lisinopril (Tab) PRINIVIL 5 MG Take 1 Tab by mouth every day.        Metoprolol Tartrate (Tab) LOPRESSOR 25 MG Take 1 Tab by mouth 2 Times a Day.        Multiple Vitamins-Minerals (Tab) THERAGRAN-M  Take 1 Tab by  mouth every day.        Omega-3 Fatty Acids (Cap) OMEGA 3 FA 1000 MG Take 1,400 mg by mouth every day.        Tamsulosin HCl (Cap) FLOMAX 0.4 MG Take 0.4 mg by mouth every day.        Warfarin Sodium (Tab) COUMADIN 7.5 MG TAKE ONE-HALF TO ONE (1/2-1) TABLET DAILY AS DIRECTED BY COUMADIN CLINIC        Wheat Dextrin   Take  by mouth.        .                 Medicines prescribed today were sent to:     [facility]Y #[id number] - [place], [place] - [address]    [address] [place] [address]    Phone: [phone] Fax: [phone]    Open 24 Hours?: No    [facility] - [place], [place] - [address]    [address] [place] [address]    Phone: [phone] Fax: [phone]    Open 24 Hours?: No      Medication refill instructions:       If your prescription bottle indicates you have medication refills left, it is not necessary to call your provider’s office. Please contact your pharmacy and they will refill your medication.    If your prescription bottle indicates you do not have any refills left, you may request refills at any time through one of the following ways: The online [employer] system (except Urgent Care), by calling your provider’s office, or by asking your pharmacy to contact your provider’s office with a refill request. Medication refills are processed only during regular business hours and may not be available until the next business day. Your provider may request additional information or to have a follow-up visit with you prior to refilling your medication.   *Please Note: Medication refills are assigned a new Rx number when refilled electronically. Your pharmacy may indicate that no refills were authorized even though a new prescription for the same medication is available at the pharmacy. Please request the medicine by name with the pharmacy before contacting your provider for a refill.        Warfarin Dosing Calendar   February 2017 Details    Sun Mon Tue Wed Thu Fri Sat         1               2               3               4                 5               6               7               8               9               10               11                 12               13               14               15               16               17               18                 19               20               21               22               23               24               25                 26               27               28      7.5 mg   See details           Date Details   02/28 This INR check               How to take your warfarin dose     To take:  7.5 mg Take 1 of the 7.5 mg tablets.           Warfarin Dosing Calendar   March 2017 Details    Sun Mon Tue Wed Thu Fri Sat        1      3.75 mg         2      7.5 mg         3      7.5 mg         4      7.5 mg           5      7.5 mg         6      7.5 mg         7      7.5 mg         8      3.75 mg         9      7.5 mg         10      7.5 mg         11      7.5 mg           12      7.5 mg         13      7.5 mg         14      7.5 mg         15      3.75 mg         16      7.5 mg         17      7.5 mg         18      7.5 mg           19      7.5 mg         20      7.5 mg         21      7.5 mg         22      3.75 mg         23      7.5 mg         24      7.5 mg         25      7.5 mg           26      7.5 mg         27      7.5 mg         28      7.5 mg         29               30               31                 Date Details   No additional details    Date of next INR:  3/28/2017         How to take your warfarin dose     To take:  3.75 mg Take 0.5 of a 7.5 mg tablet.    To take:  7.5 mg Take 1 of the 7.5 mg tablets.              MyChart Status: Patient Declined

## 2017-03-08 RX ORDER — METHYLPREDNISOLONE 4 MG/1
TABLET ORAL
Qty: 21 TAB | Refills: 0 | Status: SHIPPED | OUTPATIENT
Start: 2017-03-08 | End: 2017-08-22 | Stop reason: SDUPTHER

## 2017-03-08 NOTE — TELEPHONE ENCOUNTER
Last seen: 02/07/17 by Dr. Damon  Next appt: 08/14/17 with Dr. Damon    Was the patient seen in the last year in this department? Yes   Does patient have an active prescription for medications requested? No   Received Request Via: Pharmacy

## 2017-03-28 ENCOUNTER — ANTICOAGULATION VISIT (OUTPATIENT)
Dept: VASCULAR LAB | Facility: MEDICAL CENTER | Age: 67
End: 2017-03-28
Attending: INTERNAL MEDICINE
Payer: MEDICARE

## 2017-03-28 VITALS — HEART RATE: 57 BPM | SYSTOLIC BLOOD PRESSURE: 114 MMHG | DIASTOLIC BLOOD PRESSURE: 60 MMHG

## 2017-03-28 DIAGNOSIS — Z86.718 HISTORY OF DVT (DEEP VEIN THROMBOSIS): ICD-10-CM

## 2017-03-28 LAB — INR PPP: 2.3 (ref 2–3.5)

## 2017-03-28 PROCEDURE — 85610 PROTHROMBIN TIME: CPT

## 2017-03-28 PROCEDURE — 99211 OFF/OP EST MAY X REQ PHY/QHP: CPT

## 2017-03-28 RX ORDER — WARFARIN SODIUM 7.5 MG/1
7.5 TABLET ORAL DAILY
Qty: 90 TAB | Refills: 1 | Status: SHIPPED | OUTPATIENT
Start: 2017-03-28 | End: 2017-07-25 | Stop reason: SDUPTHER

## 2017-03-28 NOTE — MR AVS SNAPSHOT
Rene Chan   3/28/2017 10:30 AM   Anticoagulation Visit   MRN: [id number]    Department:  Vascular Medicine   Dept Phone:  [phone]    Description:  Male : 1950   Provider:  [facility] EXAM 4           Allergies as of 3/28/2017     Allergen Noted Reactions    Penicillins 2008       Childhood; does know what the reaction was      Vital Signs     Smoking Status                   Former Smoker           Basic Information     Date Of Birth Sex Race Ethnicity Preferred Language    1950 Male White Non- English      Your appointments     May 23, 2017 10:30 AM   Established Patient with [facility] EXAM 4   [facility] for [facility] and [facility]  (--)    [address]  [place] [address]   [phone]            Aug 14, 2017  1:00 PM   Established Patient with Patricia Damon M.D.   [facility] - Internal Medicine (--)    [address]  [address]  [place] [id number] [phone]           You will be receiving a confirmation call a few days before your appointment from our automated call confirmation system.              Problem List              ICD-10-CM Priority Class Noted - Resolved    Chronic anticoagulation Z79.01   2014 - Present    TIA (transient ischemic attack) G45.9   2015 - Present    CAD (coronary artery disease) I25.10   2015 - Present    S/P CABG x 3 Z95.1   2015 - Present    Mixed hyperlipidemia E78.2   2015 - Present    Hx pulmonary embolism Z86.711   2015 - Present    Deep vein thrombosis (DVT) (CMS-HCC) I82.409   2015 - Present    Acquired hypothyroidism E03.9   2016 - Present    Gout of foot M10.9   2016 - Present    Colon polyp K63.5   10/6/2016 - Present    Bipolar disorder in full remission (CMS-HCC) F31.70   10/6/2016 - Present    Healthcare maintenance (Chronic) Z00.00   10/6/2016 - Present    Benign non-nodular prostatic hyperplasia with lower urinary tract  symptoms N40.1   10/6/2016 - Present    Bilateral impacted cerumen H61.23   2/7/2017 - Present      Health Maintenance        Date Due Completion Dates    IMM DTaP/Tdap/Td Vaccine (1 - Tdap) 10/15/1968 ---    IMM ZOSTER VACCINE 10/15/2009 ---    IMM PNEUMOCOCCAL 65+ (ADULT) LOW/MEDIUM RISK SERIES (2 of 2 - PPSV23) 10/6/2017 10/6/2016    COLONOSCOPY 3/29/2020 3/29/2010            Results     POCT Protime      Component    INR    2.3                        Current Immunizations     13-VALENT PCV PREVNAR 10/6/2016    Influenza TIV (IM) 9/5/2014, 10/10/2011    Influenza Vaccine Adult HD 10/6/2016    Pneumococcal Vaccine (UF)Historical Data 1/10/2007      Below and/or attached are the medications your provider expects you to take. Review all of your home medications and newly ordered medications with your provider and/or pharmacist. Follow medication instructions as directed by your provider and/or pharmacist. Please keep your medication list with you and share with your provider. Update the information when medications are discontinued, doses are changed, or new medications (including over-the-counter products) are added; and carry medication information at all times in the event of emergency situations     Allergies:  PENICILLINS - (reactions not documented)               Medications  Valid as of: March 28, 2017 - 10:32 AM    Generic Name Brand Name Tablet Size Instructions for use    Aspirin (Tab) aspirin 81 MG Take 162 mg by mouth every day. QD        Atorvastatin Calcium (Tab) LIPITOR 80 MG Take 1 Tab by mouth every day.        Cholecalciferol (Cap) Vitamin D 2000 UNITS Take  by mouth.        Divalproex Sodium (Tablet Delayed Response) DEPAKOTE 250 MG TAKE ONE TABLET BY MOUTH TWICE A DAY        Levothyroxine Sodium (Tab) SYNTHROID 75 MCG TAKE ONE TABLET BY MOUTH DAILY        Lisinopril (Tab) PRINIVIL 5 MG Take 1 Tab by mouth every day.        MethylPREDNISolone (Tablet Therapy Pack) MEDROL DOSEPAK 4 MG TAKE 6  TABLETS ON DAY 1 AS DIRECTED ON PACKAGE AND DECREASE BY 1 TAB EACH DAY FOR A TOTAL OF 6 DAYS        Metoprolol Tartrate (Tab) LOPRESSOR 25 MG Take 1 Tab by mouth 2 Times a Day.        Multiple Vitamins-Minerals (Tab) THERAGRAN-M  Take 1 Tab by mouth every day.        Omega-3 Fatty Acids (Cap) OMEGA 3 FA 1000 MG Take 1,400 mg by mouth every day.        Tamsulosin HCl (Cap) FLOMAX 0.4 MG Take 0.4 mg by mouth every day.        Warfarin Sodium (Tab) COUMADIN 7.5 MG TAKE ONE-HALF TO ONE (1/2-1) TABLET DAILY AS DIRECTED BY COUMADIN CLINIC        Wheat Dextrin   Take  by mouth.        .                 Medicines prescribed today were sent to:     [facility]/PHARMACY #[id number] - [place], [place] - [address]    [address] [place] [address]    Phone: [phone] Fax: [phone]    Open 24 Hours?: No    [facility] - [place], [place] - [address]    [address] [facility] [address]    Phone: [phone] Fax: [phone]    Open 24 Hours?: No      Medication refill instructions:       If your prescription bottle indicates you have medication refills left, it is not necessary to call your provider’s office. Please contact your pharmacy and they will refill your medication.    If your prescription bottle indicates you do not have any refills left, you may request refills at any time through one of the following ways: The online [employer] system (except Urgent Care), by calling your provider’s office, or by asking your pharmacy to contact your provider’s office with a refill request. Medication refills are processed only during regular business hours and may not be available until the next business day. Your provider may request additional information or to have a follow-up visit with you prior to refilling your medication.   *Please Note: Medication refills are assigned a new Rx number when refilled electronically. Your pharmacy may indicate that no refills were authorized even though a new prescription for  the same medication is available at the pharmacy. Please request the medicine by name with the pharmacy before contacting your provider for a refill.        Warfarin Dosing Calendar March 2017 Details    Sun Mon Tue Wed Thu Fri Sat        1               2               3               4                 5               6               7               8               9               10               11                 12               13               14               15               16               17               18                 19               20               21               22               23               24               25                 26               27               28   2.3   7.5 mg   See details      29      3.75 mg         30      7.5 mg         31      7.5 mg           Date Details   03/28 This INR check   INR: 2.3               How to take your warfarin dose     To take:  3.75 mg Take 0.5 of a 7.5 mg tablet.    To take:  7.5 mg Take 1 of the 7.5 mg tablets.           Warfarin Dosing Calendar April 2017 Details    Sun Mon Tue Wed Thu Fri Sat           1      7.5 mg           2      7.5 mg         3      7.5 mg         4      7.5 mg         5      3.75 mg         6      7.5 mg         7      7.5 mg         8      7.5 mg           9      7.5 mg         10      7.5 mg         11      7.5 mg         12      3.75 mg         13      7.5 mg         14      7.5 mg         15      7.5 mg           16      7.5 mg         17      7.5 mg         18      7.5 mg         19      3.75 mg         20      7.5 mg         21      7.5 mg         22      7.5 mg           23      7.5 mg         24      7.5 mg         25      7.5 mg         26      3.75 mg         27      7.5 mg         28      7.5 mg         29      7.5 mg           30      7.5 mg                Date Details   No additional details            How to take your warfarin dose     To take:  3.75 mg Take 0.5 of a 7.5 mg tablet.    To take:  7.5 mg  Take 1 of the 7.5 mg tablets.           Warfarin Dosing Calendar   May 2017 Details    Sun Mon Tue Wed Thu Fri Sat      1      7.5 mg         2      7.5 mg         3      3.75 mg         4      7.5 mg         5      7.5 mg         6      7.5 mg           7      7.5 mg         8      7.5 mg         9      7.5 mg         10      3.75 mg         11      7.5 mg         12      7.5 mg         13      7.5 mg           14      7.5 mg         15      7.5 mg         16      7.5 mg         17      3.75 mg         18      7.5 mg         19      7.5 mg         20      7.5 mg           21      7.5 mg         22      7.5 mg         23      7.5 mg         24               25               26               27                 28               29               30               31                   Date Details   No additional details    Date of next INR:  5/23/2017         How to take your warfarin dose     To take:  3.75 mg Take 0.5 of a 7.5 mg tablet.    To take:  7.5 mg Take 1 of the 7.5 mg tablets.              MyChart Status: Patient Declined

## 2017-03-28 NOTE — PROGRESS NOTES
Anticoagulation Summary as of 3/28/2017     INR goal 2.0-3.0   Selected INR 2.3 (3/28/2017)   Maintenance plan 3.75 mg (7.5 mg x 0.5) on Wed; 7.5 mg (7.5 mg x 1) all other days   Weekly total 48.75 mg   Plan last modified Shweta Serna A.P.NPaul (2/28/2017)   Next INR check 5/23/2017   Target end date Indefinite    Indications   DVT (deep venous thrombosis) (CMS-HCC) (Resolved) [I82.409]  Pulmonary embolism [415.19] (Resolved) [I26.99]  Deep vein thrombosis (DVT) (CMS-HCC) [I82.409]         Anticoagulation Episode Summary     INR check location [facility]    Preferred lab [facility]    Send INR reminders to     Comments       Anticoagulation Care Providers     Provider Role Specialty Phone number    Patricia Damon M.D. Referring Internal Medicine [phone]    DIANA QuinnD Responsible      [facility] Responsible  [phone]        Anticoagulation Patient Findings      Rene Handy Chan seen in clinic today  INR  therapeutic.    Denies signs/symptoms of bleeding and/or thrombosis.    Denies changes to diet or medications.   Follow up appointment in 8 week(s).    Continue weekly warfarin dose as noted    Sebastian Novak, DIANAD

## 2017-03-29 LAB — INR BLD: 2.3 (ref 0.9–1.2)

## 2017-04-17 ENCOUNTER — OFFICE VISIT (OUTPATIENT)
Dept: URGENT CARE | Facility: CLINIC | Age: 67
End: 2017-04-17
Payer: MEDICARE

## 2017-04-17 VITALS
DIASTOLIC BLOOD PRESSURE: 68 MMHG | RESPIRATION RATE: 14 BRPM | TEMPERATURE: 98.5 F | HEART RATE: 66 BPM | OXYGEN SATURATION: 94 % | SYSTOLIC BLOOD PRESSURE: 110 MMHG | WEIGHT: 192 LBS | BODY MASS INDEX: 29.1 KG/M2 | HEIGHT: 68 IN

## 2017-04-17 DIAGNOSIS — J06.9 ACUTE URI: ICD-10-CM

## 2017-04-17 PROCEDURE — 4040F PNEUMOC VAC/ADMIN/RCVD: CPT | Performed by: NURSE PRACTITIONER

## 2017-04-17 PROCEDURE — G8419 CALC BMI OUT NRM PARAM NOF/U: HCPCS | Performed by: NURSE PRACTITIONER

## 2017-04-17 PROCEDURE — G8598 ASA/ANTIPLAT THER USED: HCPCS | Performed by: NURSE PRACTITIONER

## 2017-04-17 PROCEDURE — 1101F PT FALLS ASSESS-DOCD LE1/YR: CPT | Performed by: NURSE PRACTITIONER

## 2017-04-17 PROCEDURE — 1036F TOBACCO NON-USER: CPT | Performed by: NURSE PRACTITIONER

## 2017-04-17 PROCEDURE — 3017F COLORECTAL CA SCREEN DOC REV: CPT | Performed by: NURSE PRACTITIONER

## 2017-04-17 PROCEDURE — G8432 DEP SCR NOT DOC, RNG: HCPCS | Performed by: NURSE PRACTITIONER

## 2017-04-17 PROCEDURE — 99214 OFFICE O/P EST MOD 30 MIN: CPT | Performed by: NURSE PRACTITIONER

## 2017-04-17 RX ORDER — DOXYCYCLINE HYCLATE 100 MG/1
100 CAPSULE ORAL 2 TIMES DAILY
Qty: 20 CAP | Refills: 0 | Status: SHIPPED | OUTPATIENT
Start: 2017-04-17 | End: 2017-08-22

## 2017-04-17 ASSESSMENT — ENCOUNTER SYMPTOMS
DIARRHEA: 0
MYALGIAS: 1
VOMITING: 0
SHORTNESS OF BREATH: 0
SPUTUM PRODUCTION: 1
HEADACHES: 1
SORE THROAT: 1
COUGH: 1
WEAKNESS: 1
CHILLS: 1
NAUSEA: 0
RHINORRHEA: 1
FEVER: 1

## 2017-04-17 NOTE — PROGRESS NOTES
"Subjective:      Rene Chan is a 66 y.o. male who presents with Fever            HPI Comments: Medications, Allergies and Prior Medical Hx reviewed and updated in [facility].with patient/family today         URI   This is a new problem. The current episode started in the past 7 days. The problem has been unchanged. Maximum temperature: subjective fever. Associated symptoms include congestion, coughing, headaches, rhinorrhea and a sore throat. Pertinent negatives include no chest pain, diarrhea, ear pain, nausea or vomiting. He has tried nothing for the symptoms. The treatment provided no relief.       Review of Systems   Constitutional: Positive for fever, chills and malaise/fatigue.   HENT: Positive for congestion, rhinorrhea and sore throat. Negative for ear discharge and ear pain.    Respiratory: Positive for cough and sputum production. Negative for shortness of breath.    Cardiovascular: Negative for chest pain.   Gastrointestinal: Negative for nausea, vomiting and diarrhea.   Musculoskeletal: Positive for myalgias.   Neurological: Positive for weakness and headaches.          Objective:     /68 mmHg  Pulse 66  Temp(Src) 36.9 °C (98.5 °F)  Resp 14  Ht 1.727 m (5' 8\")  Wt 87.091 kg (192 lb)  BMI 29.20 kg/m2  SpO2 94%     Physical Exam   Constitutional: He appears well-developed and well-nourished. No distress.   HENT:   Head: Normocephalic and atraumatic.   Right Ear: Tympanic membrane and ear canal normal.   Left Ear: Tympanic membrane and ear canal normal.   Nose: Rhinorrhea present.   Mouth/Throat: Uvula is midline and mucous membranes are normal. Posterior oropharyngeal edema and posterior oropharyngeal erythema present. No oropharyngeal exudate.   Eyes: Conjunctivae are normal. Pupils are equal, round, and reactive to light.   Neck: Neck supple.   Cardiovascular: Normal rate and regular rhythm.    Murmur heard.  Pulmonary/Chest: Effort normal and breath sounds normal. No respiratory distress. " He has no wheezes.   Lymphadenopathy:     He has cervical adenopathy.   Neurological: He is alert.   Skin: Skin is warm and dry.   Psychiatric: He has a normal mood and affect. His behavior is normal.   Vitals reviewed.              Assessment/Plan:         1. Acute URI  doxycycline (VIBRAMYCIN) 100 MG Cap       Call coumadin clinic about doxycycline.     Modified Epic generated written imformation provided along with verbal instructions    Rest, Fluids, tylenol, ibuprofen,  humidified air, and otc decongestants  Pt will go to the ER for worsening or changing symptoms as discussed,   Follow-up with your primary care provider or return here if not improving in 5 - 7 days   Discharge instructions discussed with pt/family who verbalize understanding and agreement with poc

## 2017-04-17 NOTE — MR AVS SNAPSHOT
"        Rene Chan   2017 12:15 PM   Office Visit   MRN: [id number]    Department:  [facility]   Dept Phone:  [phone]    Description:  Male : 1950   Provider:  LAMAR Serra           Reason for Visit     Fever x 5 days with aches and cough and cough has been getting worse      Allergies as of 2017     Allergen Noted Reactions    Penicillins 2008       Childhood; does know what the reaction was      You were diagnosed with     Acute URI   [[id number]]         Vital Signs     Blood Pressure Pulse Temperature Respirations Height Weight    110/68 mmHg 66 36.9 °C (98.5 °F) 14 1.727 m (5' 8\") 87.091 kg (192 lb)    Body Mass Index Oxygen Saturation Smoking Status             29.20 kg/m2 94% Former Smoker         Basic Information     Date Of Birth Sex Race Ethnicity Preferred Language    1950 Male White Non- English      Your appointments     May 23, 2017 10:30 AM   Established Patient with V EXAM 4   [facility]  (--)    [address] [facility] [address]   [phone]            Aug 14, 2017  1:00 PM   Established Patient with Patricia Damon M.D.   [facility] - Internal Medicine (--)    [address] [place] [id number] [phone]           You will be receiving a confirmation call a few days before your appointment from our automated call confirmation system.              Problem List              ICD-10-CM Priority Class Noted - Resolved    Chronic anticoagulation Z79.01   2014 - Present    TIA (transient ischemic attack) G45.9   2015 - Present    CAD (coronary artery disease) I25.10   2015 - Present    S/P CABG x 3 Z95.1   2015 - Present    Mixed hyperlipidemia E78.2   2015 - Present    Hx pulmonary embolism Z86.711   2015 - Present    Deep vein thrombosis (DVT) (CMS-[facility]) I82.409   2015 - Present    Acquired " hypothyroidism E03.9   9/26/2016 - Present    Gout of foot M10.9   9/26/2016 - Present    Colon polyp K63.5   10/6/2016 - Present    Bipolar disorder in full remission (CMS-[facility]) F31.70   10/6/2016 - Present    Healthcare maintenance (Chronic) Z00.00   10/6/2016 - Present    Benign non-nodular prostatic hyperplasia with lower urinary tract symptoms N40.1   10/6/2016 - Present    Bilateral impacted cerumen H61.23   2/7/2017 - Present      Health Maintenance        Date Due Completion Dates    IMM DTaP/Tdap/Td Vaccine (1 - Tdap) 10/15/1968 ---    IMM ZOSTER VACCINE 10/15/2009 ---    IMM PNEUMOCOCCAL 65+ (ADULT) LOW/MEDIUM RISK SERIES (2 of 2 - PPSV23) 10/6/2017 10/6/2016    COLONOSCOPY 3/29/2020 3/29/2010            Current Immunizations     13-VALENT PCV PREVNAR 10/6/2016    Influenza TIV (IM) 9/5/2014, 10/10/2011    Influenza Vaccine Adult HD 10/6/2016    Pneumococcal Vaccine (UF)Historical Data 1/10/2007      Below and/or attached are the medications your provider expects you to take. Review all of your home medications and newly ordered medications with your provider and/or pharmacist. Follow medication instructions as directed by your provider and/or pharmacist. Please keep your medication list with you and share with your provider. Update the information when medications are discontinued, doses are changed, or new medications (including over-the-counter products) are added; and carry medication information at all times in the event of emergency situations     Allergies:  PENICILLINS - (reactions not documented)               Medications  Valid as of: April 17, 2017 - 12:53 PM    Generic Name Brand Name Tablet Size Instructions for use    Aspirin (Tab) aspirin 81 MG Take 162 mg by mouth every day. QD        Atorvastatin Calcium (Tab) LIPITOR 80 MG Take 1 Tab by mouth every day.        Cholecalciferol (Cap) Vitamin D 2000 UNITS Take  by mouth.        Divalproex Sodium (Tablet Delayed Response) DEPAKOTE 250 MG TAKE  ONE TABLET BY MOUTH TWICE A DAY        Doxycycline Hyclate (Cap) VIBRAMYCIN 100 MG Take 1 Cap by mouth 2 times a day.        Levothyroxine Sodium (Tab) SYNTHROID 75 MCG TAKE ONE TABLET BY MOUTH DAILY        Lisinopril (Tab) PRINIVIL 5 MG Take 1 Tab by mouth every day.        MethylPREDNISolone (Tablet Therapy Pack) MEDROL DOSEPAK 4 MG TAKE 6 TABLETS ON DAY 1 AS DIRECTED ON PACKAGE AND DECREASE BY 1 TAB EACH DAY FOR A TOTAL OF 6 DAYS        Metoprolol Tartrate (Tab) LOPRESSOR 25 MG Take 1 Tab by mouth 2 Times a Day.        Multiple Vitamins-Minerals (Tab) THERAGRAN-M  Take 1 Tab by mouth every day.        Omega-3 Fatty Acids (Cap) OMEGA 3 FA 1000 MG Take 1,400 mg by mouth every day.        Tamsulosin HCl (Cap) FLOMAX 0.4 MG Take 0.4 mg by mouth every day.        Warfarin Sodium (Tab) COUMADIN 7.5 MG Take 1 Tab by mouth every day.        Wheat Dextrin   Take  by mouth.        .                 Medicines prescribed today were sent to:     [id number] - [place] - [id number] [facility] [address]    Phone: [phone] Fax: [phone]    Open 24 Hours?: No    [facility] - [place], [place] - [address]    [address] [place] [address]    Phone: [phone] Fax: [phone]    Open 24 Hours?: No      Medication refill instructions:       If your prescription bottle indicates you have medication refills left, it is not necessary to call your provider’s office. Please contact your pharmacy and they will refill your medication.    If your prescription bottle indicates you do not have any refills left, you may request refills at any time through one of the following ways: The online [employer] system (except Urgent Care), by calling your provider’s office, or by asking your pharmacy to contact your provider’s office with a refill request. Medication refills are processed only during regular business hours and may not be available until the next business day. Your  "provider may request additional information or to have a follow-up visit with you prior to refilling your medication.   *Please Note: Medication refills are assigned a new Rx number when refilled electronically. Your pharmacy may indicate that no refills were authorized even though a new prescription for the same medication is available at the pharmacy. Please request the medicine by name with the pharmacy before contacting your provider for a refill.        Instructions    Upper Respiratory Infection, Adult  Most upper respiratory infections (URIs) are a viral infection of the air passages leading to the lungs. A URI affects the nose, throat, and upper air passages. The most common type of URI is nasopharyngitis and is typically referred to as \"the common cold.\"  URIs run their course and usually go away on their own. Most of the time, a URI does not require medical attention, but sometimes a bacterial infection in the upper airways can follow a viral infection. This is called a secondary infection. Sinus and middle ear infections are common types of secondary upper respiratory infections.  Bacterial pneumonia can also complicate a URI. A URI can worsen asthma and chronic obstructive pulmonary disease (COPD). Sometimes, these complications can require emergency medical care and may be life threatening.   CAUSES  Almost all URIs are caused by viruses. A virus is a type of germ and can spread from one person to another.   RISKS FACTORS  You may be at risk for a URI if:   · You smoke.    · You have chronic heart or lung disease.  · You have a weakened defense (immune) system.    · You are very young or very old.    · You have nasal allergies or asthma.  · You work in crowded or poorly ventilated areas.  · You work in health care facilities or schools.  SIGNS AND SYMPTOMS   Symptoms typically develop 2-3 days after you come in contact with a cold virus. Most viral URIs last 7-10 days. However, viral URIs from the " influenza virus (flu virus) can last 14-18 days and are typically more severe. Symptoms may include:   · Runny or stuffy (congested) nose.    · Sneezing.    · Cough.    · Sore throat.    · Headache.    · Fatigue.    · Fever.    · Loss of appetite.    · Pain in your forehead, behind your eyes, and over your cheekbones (sinus pain).  · Muscle aches.    DIAGNOSIS   Your health care provider may diagnose a URI by:  · Physical exam.  · Tests to check that your symptoms are not due to another condition such as:  ¨ Strep throat.  ¨ Sinusitis.  ¨ Pneumonia.  ¨ Asthma.  TREATMENT   A URI goes away on its own with time. It cannot be cured with medicines, but medicines may be prescribed or recommended to relieve symptoms. Medicines may help:  · Reduce your fever.  · Reduce your cough.  · Relieve nasal congestion.  HOME CARE INSTRUCTIONS   · Take medicines only as directed by your health care provider.    · Gargle warm saltwater or take cough drops to comfort your throat as directed by your health care provider.  · Use a warm mist humidifier or inhale steam from a shower to increase air moisture. This may make it easier to breathe.  · Drink enough fluid to keep your urine clear or pale yellow.    · Eat soups and other clear broths and maintain good nutrition.    · Rest as needed.    · Return to work when your temperature has returned to normal or as your health care provider advises. You may need to stay home longer to avoid infecting others. You can also use a face mask and careful hand washing to prevent spread of the virus.  · Increase the usage of your inhaler if you have asthma.    · Do not use any tobacco products, including cigarettes, chewing tobacco, or electronic cigarettes. If you need help quitting, ask your health care provider.  PREVENTION   The best way to protect yourself from getting a cold is to practice good hygiene.   · Avoid oral or hand contact with people with cold symptoms.    · Wash your hands often if  contact occurs.    There is no clear evidence that vitamin C, vitamin E, echinacea, or exercise reduces the chance of developing a cold. However, it is always recommended to get plenty of rest, exercise, and practice good nutrition.   SEEK MEDICAL CARE IF:   · You are getting worse rather than better.    · Your symptoms are not controlled by medicine.    · You have chills.  · You have worsening shortness of breath.  · You have brown or red mucus.  · You have yellow or brown nasal discharge.  · You have pain in your face, especially when you bend forward.  · You have a fever.  · You have swollen neck glands.  · You have pain while swallowing.  · You have white areas in the back of your throat.  SEEK IMMEDIATE MEDICAL CARE IF:   · You have severe or persistent:  ¨ Headache.  ¨ Ear pain.  ¨ Sinus pain.  ¨ Chest pain.  · You have chronic lung disease and any of the following:  ¨ Wheezing.  ¨ Prolonged cough.  ¨ Coughing up blood.  ¨ A change in your usual mucus.  · You have a stiff neck.  · You have changes in your:  ¨ Vision.  ¨ Hearing.  ¨ Thinking.  ¨ Mood.  MAKE SURE YOU:   · Understand these instructions.  · Will watch your condition.  · Will get help right away if you are not doing well or get worse.     This information is not intended to replace advice given to you by your health care provider. Make sure you discuss any questions you have with your health care provider.     Document Released: 06/13/2002 Document Revised: 05/03/2016 Document Reviewed: 03/25/2015  [employer] Patient Education ©2016 [employer]            MyChart Status: Patient Declined

## 2017-05-23 ENCOUNTER — ANTICOAGULATION VISIT (OUTPATIENT)
Dept: VASCULAR LAB | Facility: MEDICAL CENTER | Age: 67
End: 2017-05-23
Attending: INTERNAL MEDICINE
Payer: MEDICARE

## 2017-05-23 VITALS — SYSTOLIC BLOOD PRESSURE: 126 MMHG | DIASTOLIC BLOOD PRESSURE: 64 MMHG | HEART RATE: 69 BPM

## 2017-05-23 DIAGNOSIS — I82.90 DEEP VEIN THROMBOSIS (DVT) OF NON-EXTREMITY VEIN, UNSPECIFIED CHRONICITY: ICD-10-CM

## 2017-05-23 LAB — INR PPP: 2.3 (ref 2–3.5)

## 2017-05-23 PROCEDURE — 85610 PROTHROMBIN TIME: CPT

## 2017-05-23 PROCEDURE — 99211 OFF/OP EST MAY X REQ PHY/QHP: CPT | Performed by: NURSE PRACTITIONER

## 2017-05-23 NOTE — PROGRESS NOTES
OP Anticoagulation Service Note    Date: 5/23/2017  Blood Pressure : 126/64 mmHg  Pulse: 69    Plan:  Pt is therapeutic.  Denies any s/s of bleeding or clotting.  Denies any changes in medications or diet. Will continue warfarin dosing as follows.  Follow up appointment in 9 week(s).       Anticoagulation Summary as of 5/23/2017     INR goal 2.0-3.0   Selected INR 2.3 (5/23/2017)   Maintenance plan 3.75 mg (7.5 mg x 0.5) on Wed; 7.5 mg (7.5 mg x 1) all other days   Weekly total 48.75 mg   Plan last modified Shweta Serna, A.P.N. (2/28/2017)   Next INR check 7/25/2017   Target end date Indefinite    Indications   DVT (deep venous thrombosis) (CMS-HCC) (Resolved) [I82.409]  Pulmonary embolism [415.19] (Resolved) [I26.99]  Deep vein thrombosis (DVT) (CMS-HCC) [I82.409]         Anticoagulation Episode Summary     INR check location [facility]    Preferred lab [facility]    Send INR reminders to     Comments       Anticoagulation Care Providers     Provider Role Specialty Phone number    Patricia Damon M.D. Referring Internal Medicine [phone]    DIANA QuinnD Responsible      [facility] Responsible  [phone]            ELENA Cook  [facility]

## 2017-05-23 NOTE — MR AVS SNAPSHOT
Rene Chan   2017 10:30 AM   Anticoagulation Visit   MRN: [id number]    Department:  Vascular Medicine   Dept Phone:  [phone]    Description:  Male : 1950   Provider:  [facility] EXAM 4           Allergies as of 2017     Allergen Noted Reactions    Penicillins 2008       Childhood; does know what the reaction was      You were diagnosed with     Deep vein thrombosis (DVT) of non-extremity vein, unspecified chronicity   [[id number]]         Vital Signs     Blood Pressure Pulse Smoking Status             126/64 mmHg 69 Former Smoker         Basic Information     Date Of Birth Sex Race Ethnicity Preferred Language    1950 Male White Non- English      Your appointments     2017 10:30 AM   Established Patient with [facility] EXAM 4   [facility]  (--)    [address] [address]   [phone]            Aug 14, 2017  1:00 PM   Established Patient with Patricia Damon M.D.   [facility] Internal Medicine (--)    [address] [place] [address]  [address]  [facility] [id number] [phone]           You will be receiving a confirmation call a few days before your appointment from our automated call confirmation system.              Problem List              ICD-10-CM Priority Class Noted - Resolved    Chronic anticoagulation Z79.01   2014 - Present    TIA (transient ischemic attack) G45.9   2015 - Present    CAD (coronary artery disease) I25.10   2015 - Present    S/P CABG x 3 Z95.1   2015 - Present    Mixed hyperlipidemia E78.2   2015 - Present    Hx pulmonary embolism Z86.711   2015 - Present    Deep vein thrombosis (DVT) (CMS-HCC) I82.409   2015 - Present    Acquired hypothyroidism E03.9   2016 - Present    Gout of foot M10.9   2016 - Present    Colon polyp K63.5   10/6/2016 - Present    Bipolar disorder in full remission (CMS-HCC) F31.70   10/6/2016 -  Present    Healthcare maintenance (Chronic) Z00.00   10/6/2016 - Present    Benign non-nodular prostatic hyperplasia with lower urinary tract symptoms N40.1   10/6/2016 - Present    Bilateral impacted cerumen H61.23   2/7/2017 - Present      Health Maintenance        Date Due Completion Dates    IMM DTaP/Tdap/Td Vaccine (1 - Tdap) 10/15/1968 ---    IMM ZOSTER VACCINE 10/15/2009 ---    IMM PNEUMOCOCCAL 65+ (ADULT) LOW/MEDIUM RISK SERIES (2 of 2 - PPSV23) 10/6/2017 10/6/2016    COLONOSCOPY 3/29/2020 3/29/2010            Results     POCT Protime      Component    INR    2.3                        Current Immunizations     13-VALENT PCV PREVNAR 10/6/2016    Influenza TIV (IM) 9/5/2014, 10/10/2011    Influenza Vaccine Adult HD 10/6/2016    Pneumococcal Vaccine (UF)Historical Data 1/10/2007      Below and/or attached are the medications your provider expects you to take. Review all of your home medications and newly ordered medications with your provider and/or pharmacist. Follow medication instructions as directed by your provider and/or pharmacist. Please keep your medication list with you and share with your provider. Update the information when medications are discontinued, doses are changed, or new medications (including over-the-counter products) are added; and carry medication information at all times in the event of emergency situations     Allergies:  PENICILLINS - (reactions not documented)               Medications  Valid as of: May 23, 2017 - 10:37 AM    Generic Name Brand Name Tablet Size Instructions for use    Aspirin (Tab) aspirin 81 MG Take 162 mg by mouth every day. QD        Atorvastatin Calcium (Tab) LIPITOR 80 MG Take 1 Tab by mouth every day.        Cholecalciferol (Cap) Vitamin D 2000 UNITS Take  by mouth.        Divalproex Sodium (Tablet Delayed Response) DEPAKOTE 250 MG TAKE ONE TABLET BY MOUTH TWICE A DAY        Doxycycline Hyclate (Cap) VIBRAMYCIN 100 MG Take 1 Cap by mouth 2 times a day.         Levothyroxine Sodium (Tab) SYNTHROID 75 MCG TAKE ONE TABLET BY MOUTH DAILY        Lisinopril (Tab) PRINIVIL 5 MG Take 1 Tab by mouth every day.        MethylPREDNISolone (Tablet Therapy Pack) MEDROL DOSEPAK 4 MG TAKE 6 TABLETS ON DAY 1 AS DIRECTED ON PACKAGE AND DECREASE BY 1 TAB EACH DAY FOR A TOTAL OF 6 DAYS        Metoprolol Tartrate (Tab) LOPRESSOR 25 MG Take 1 Tab by mouth 2 Times a Day.        Multiple Vitamins-Minerals (Tab) THERAGRAN-M  Take 1 Tab by mouth every day.        Omega-3 Fatty Acids (Cap) OMEGA 3 FA 1000 MG Take 1,400 mg by mouth every day.        Tamsulosin HCl (Cap) FLOMAX 0.4 MG Take 0.4 mg by mouth every day.        Warfarin Sodium (Tab) COUMADIN 7.5 MG Take 1 Tab by mouth every day.        Wheat Dextrin   Take  by mouth.        .                 Medicines prescribed today were sent to:     [facility] #[id number] - [place], [place] - [address]    [address] [place] [address]    Phone: [phone] Fax: [phone]    Open 24 Hours?: No    [facility] - [place], [place] - [address]    [address] [place] [address]    Phone: [phone] Fax: [phone]    Open 24 Hours?: No      Medication refill instructions:       If your prescription bottle indicates you have medication refills left, it is not necessary to call your provider’s office. Please contact your pharmacy and they will refill your medication.    If your prescription bottle indicates you do not have any refills left, you may request refills at any time through one of the following ways: The online [employer] system (except Urgent Care), by calling your provider’s office, or by asking your pharmacy to contact your provider’s office with a refill request. Medication refills are processed only during regular business hours and may not be available until the next business day. Your provider may request additional information or to have a follow-up visit with you prior to refilling your medication.   *Please  Note: Medication refills are assigned a new Rx number when refilled electronically. Your pharmacy may indicate that no refills were authorized even though a new prescription for the same medication is available at the pharmacy. Please request the medicine by name with the pharmacy before contacting your provider for a refill.        Warfarin Dosing Calendar   May 2017 Details    Sun Mon Tue Wed Thu Fri Sat      1               2               3               4               5               6                 7               8               9               10               11               12               13                 14               15               16               17               18               19               20                 21               22               23   2.3   7.5 mg   See details      24      3.75 mg         25      7.5 mg         26      7.5 mg         27      7.5 mg           28      7.5 mg         29      7.5 mg         30      7.5 mg         31      3.75 mg             Date Details   05/23 This INR check   INR: 2.3               How to take your warfarin dose     To take:  3.75 mg Take 0.5 of a 7.5 mg tablet.    To take:  7.5 mg Take 1 of the 7.5 mg tablets.           Warfarin Dosing Calendar   June 2017 Details    Sun Mon Tue Wed Thu Fri Sat         1      7.5 mg         2      7.5 mg         3      7.5 mg           4      7.5 mg         5      7.5 mg         6      7.5 mg         7      3.75 mg         8      7.5 mg         9      7.5 mg         10      7.5 mg           11      7.5 mg         12      7.5 mg         13      7.5 mg         14      3.75 mg         15      7.5 mg         16      7.5 mg         17      7.5 mg           18      7.5 mg         19      7.5 mg         20      7.5 mg         21      3.75 mg         22      7.5 mg         23      7.5 mg         24      7.5 mg           25      7.5 mg         26      7.5 mg         27      7.5 mg         28      3.75 mg          29      7.5 mg         30      7.5 mg           Date Details   No additional details            How to take your warfarin dose     To take:  3.75 mg Take 0.5 of a 7.5 mg tablet.    To take:  7.5 mg Take 1 of the 7.5 mg tablets.           Warfarin Dosing Calendar   July 2017 Details    Sun Mon Tue Wed Thu Fri Sat           1      7.5 mg           2      7.5 mg         3      7.5 mg         4      7.5 mg         5      3.75 mg         6      7.5 mg         7      7.5 mg         8      7.5 mg           9      7.5 mg         10      7.5 mg         11      7.5 mg         12      3.75 mg         13      7.5 mg         14      7.5 mg         15      7.5 mg           16      7.5 mg         17      7.5 mg         18      7.5 mg         19      3.75 mg         20      7.5 mg         21      7.5 mg         22      7.5 mg           23      7.5 mg         24      7.5 mg         25      7.5 mg         26               27               28               29                 30               31                     Date Details   No additional details    Date of next INR:  7/25/2017         How to take your warfarin dose     To take:  3.75 mg Take 0.5 of a 7.5 mg tablet.    To take:  7.5 mg Take 1 of the 7.5 mg tablets.              [employer] Access Code: [email or web address]  Expires: 6/22/2017 10:37 AM    [employer]  A secure, online tool to manage your health information     [employer]® is a secure, online tool that connects you to your personalized health information from the privacy of your home -- day or night - making it very easy for you to manage your healthcare. Once the activation process is completed, you can even access your medical information using the [employer] sayda, which is available for free in the [employer] Sayda store or Google Play store.     [employer] provides the following levels of access (as shown below):   My Chart Features   [facility] Doctor [facility]  Specialists [facility]  Urgent  Care [facility]  Primary  Care  Doctor   Email your healthcare team securely and privately 24/7 X X X    Manage appointments: schedule your next appointment; view details of past/upcoming appointments X      Request prescription refills. X      View recent personal medical records, including lab and immunizations X X X X   View health record, including health history, allergies, medications X X X X   Read reports about your outpatient visits, procedures, consult and ER notes X X X X   See your discharge summary, which is a recap of your hospital and/or ER visit that includes your diagnosis, lab results, and care plan. X X       How to register for [employer]:  1. Go to  [email or web address].  2. Click on the Sign Up Now box, which takes you to the New Member Sign Up page. You will need to provide the following information:  a. Enter your [employer] Access Code exactly as it appears at the top of this page. (You will not need to use this code after you’ve completed the sign-up process. If you do not sign up before the expiration date, you must request a new code.)   b. Enter your date of birth.   c. Enter your home email address.   d. Click Submit, and follow the next screen’s instructions.  3. Create a Play[employer] ID. This will be your [employer] login ID and cannot be changed, so think of one that is secure and easy to remember.  4. Create a [employer] password. You can change your password at any time.  5. Enter your Password Reset Question and Answer. This can be used at a later time if you forget your password.   6. Enter your e-mail address. This allows you to receive e-mail notifications when new information is available in [employer].  7. Click Sign Up. You can now view your health information.    For assistance activating your [employer] account, call [phone]

## 2017-05-24 LAB — INR BLD: 2.3 (ref 0.9–1.2)

## 2017-06-28 ENCOUNTER — TELEPHONE (OUTPATIENT)
Dept: CARDIOLOGY | Facility: MEDICAL CENTER | Age: 67
End: 2017-06-28

## 2017-06-28 DIAGNOSIS — Z00.00 HEALTHCARE MAINTENANCE: Chronic | ICD-10-CM

## 2017-06-28 DIAGNOSIS — R07.89 CHEST TIGHTNESS: ICD-10-CM

## 2017-06-28 NOTE — TELEPHONE ENCOUNTER
----- Message from Ta Wright M.D. sent at 6/28/2017  9:01 AM PDT -----  Regarding: RE: Problem with chest tightness  agreed   ----- Message -----     From: Ilene Braden R.N.     Sent: 6/27/2017   3:53 PM       To: Ta Wright M.D.  Subject: FW: Problem with chest tightness                 He was due back in April for his yearly FV.  He does not have appointment.  TMT?  And scheduled with APN next available which will be end of July and ER precautions?  Thanks.  ----- Message -----     From: Mary Hernandez     Sent: 6/27/2017   2:19 PM       To: Ilene Braden R.N.  Subject: Problem with chest tightness                     CW/Vaishnavi    Patient has been having chest tightness with exertion and wants to discuss having a Treadmill. He can be reached at [phone].      
TMT scheduled and sent to schedulers with note to also schedule FV with APN.  Patient informed and he will avoid exertion until testing is complete.  He denies chest tightness at rest.  
cough

## 2017-06-30 ENCOUNTER — TELEPHONE (OUTPATIENT)
Dept: CARDIOLOGY | Facility: MEDICAL CENTER | Age: 67
End: 2017-06-30

## 2017-06-30 ENCOUNTER — NON-PROVIDER VISIT (OUTPATIENT)
Dept: CARDIOLOGY | Facility: MEDICAL CENTER | Age: 67
End: 2017-06-30
Attending: INTERNAL MEDICINE
Payer: MEDICARE

## 2017-06-30 VITALS
SYSTOLIC BLOOD PRESSURE: 126 MMHG | OXYGEN SATURATION: 93 % | DIASTOLIC BLOOD PRESSURE: 60 MMHG | HEIGHT: 68 IN | BODY MASS INDEX: 28.19 KG/M2 | HEART RATE: 70 BPM | WEIGHT: 186 LBS

## 2017-06-30 DIAGNOSIS — Z00.00 HEALTHCARE MAINTENANCE: Chronic | ICD-10-CM

## 2017-06-30 DIAGNOSIS — R07.89 CHEST TIGHTNESS: ICD-10-CM

## 2017-06-30 DIAGNOSIS — Z95.1 S/P CABG X 3: ICD-10-CM

## 2017-06-30 LAB — TREADMILL STRESS: NORMAL

## 2017-06-30 PROCEDURE — 93015 CV STRESS TEST SUPVJ I&R: CPT | Performed by: INTERNAL MEDICINE

## 2017-06-30 NOTE — TELEPHONE ENCOUNTER
----- Message from Ta Wright M.D. sent at 6/30/2017  3:18 PM PDT -----  Stress test looks good, please let him know     Thank you

## 2017-07-13 ENCOUNTER — OFFICE VISIT (OUTPATIENT)
Dept: CARDIOLOGY | Facility: MEDICAL CENTER | Age: 67
End: 2017-07-13
Payer: MEDICARE

## 2017-07-13 VITALS
DIASTOLIC BLOOD PRESSURE: 62 MMHG | WEIGHT: 194.2 LBS | OXYGEN SATURATION: 94 % | BODY MASS INDEX: 29.43 KG/M2 | HEART RATE: 64 BPM | HEIGHT: 68 IN | SYSTOLIC BLOOD PRESSURE: 118 MMHG

## 2017-07-13 DIAGNOSIS — Z86.711 HX PULMONARY EMBOLISM: ICD-10-CM

## 2017-07-13 DIAGNOSIS — I77.9 CAROTID DISEASE, BILATERAL (HCC): ICD-10-CM

## 2017-07-13 DIAGNOSIS — I25.10 CORONARY ARTERY DISEASE DUE TO LIPID RICH PLAQUE: ICD-10-CM

## 2017-07-13 DIAGNOSIS — E78.2 MIXED HYPERLIPIDEMIA: ICD-10-CM

## 2017-07-13 DIAGNOSIS — I25.83 CORONARY ARTERY DISEASE DUE TO LIPID RICH PLAQUE: ICD-10-CM

## 2017-07-13 DIAGNOSIS — Z95.1 S/P CABG X 3: ICD-10-CM

## 2017-07-13 DIAGNOSIS — I82.90 DEEP VEIN THROMBOSIS (DVT) OF NON-EXTREMITY VEIN, UNSPECIFIED CHRONICITY: ICD-10-CM

## 2017-07-13 DIAGNOSIS — G45.9 TRANSIENT CEREBRAL ISCHEMIA, UNSPECIFIED TYPE: ICD-10-CM

## 2017-07-13 DIAGNOSIS — Z79.01 CHRONIC ANTICOAGULATION: ICD-10-CM

## 2017-07-13 PROCEDURE — 99214 OFFICE O/P EST MOD 30 MIN: CPT | Performed by: NURSE PRACTITIONER

## 2017-07-13 ASSESSMENT — ENCOUNTER SYMPTOMS
FEVER: 0
COUGH: 0
DIZZINESS: 0
ABDOMINAL PAIN: 0
SHORTNESS OF BREATH: 1
PALPITATIONS: 0
CLAUDICATION: 0
PND: 0
ORTHOPNEA: 0
MYALGIAS: 0

## 2017-07-13 NOTE — Clinical Note
[facility]   [address]  [place] [address]  Phone: [phone]  Fax: [phone]              Rene Chan  1950    Encounter Date: 7/13/2017    KAROL Villeda          PROGRESS NOTE:  Subjective:   Rene Chan is a 66 y.o. male who presents today for yearly follow up.    He is a patient of Dr. Wright in our office. 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    He is overall doing well. He continues to have some mild chest discomfort and shortness of breath with exertion. He had a recent treadmill stress test that was negative for ischemia. He thinks it may be due to being out of shape.    He does have some lower extremity edema that is mild and chronic for him.    He has had no episodes of palpitations, dizziness/lightheadedness, or orthopnea.    Past Medical History   Diagnosis Date   • Hypothyroidism    • Psychiatric disorder      bipolar   • CAD (coronary artery disease) 6/4/2015   • Arrhythmia 2011     tia   • Colon polyps    • Gout    • Diabetes mellitus, type II (CMS-HCC)    • Hammer toe    • Impaired fasting glucose    • Bipolar disorder (CMS-HCC)    • Hyperlipidemia    • CAD (coronary artery disease)    • Hypertension    • Clotting disorder (CMS-HCC)      protein S elevation in '12 with recurrent DVT and PE-coumadin lifelong   • Anticoagulation monitoring, special range    • Carotid artery occlusion      L 60-79% occluded; R 59% occluded per pt report; 2011     Past Surgical History   Procedure Laterality Date   • Other abdominal surgery       umbilical hernia repair 2000, appy age 13   • Other orthopedic surgery  1976     L wrist repair    • Recovery  6/1/2015     Procedure:  CATH LAB  C w/POSS MARKIE ICD; 794.31;  Surgeon: Recoveryonly Surgery;  [place] [facility];  Service:    •  Multiple coronary artery bypass endo vein harvest  6/4/2015     Procedure: MULTIPLE CORONARY ARTERY BYPASS Grafting  x 3   ENDO VEIN HARVEST right leg;  Surgeon: Christophe Lemus M.D.;  Location: SURGERY [facility];  Service:    • Appendectomy       Family History   Problem Relation Age of Onset   • Heart Disease Sister 50   • Stroke Sister 55     CVA   • Heart Failure Father      History   Smoking status   • Former Smoker -- 2.00 packs/day for 10 years   • Types: Cigarettes   • Quit date: 01/01/1976   Smokeless tobacco   • Never Used     Comment: quit x 32 yrs; 2ppd x 7 years     Allergies   Allergen Reactions   • Penicillins      Childhood; does know what the reaction was     Outpatient Encounter Prescriptions as of 7/13/2017   Medication Sig Dispense Refill   • warfarin (COUMADIN) 7.5 MG Tab Take 1 Tab by mouth every day. 90 Tab 1   • atorvastatin (LIPITOR) 80 MG tablet Take 1 Tab by mouth every day. 90 Tab 2   • levothyroxine (SYNTHROID) 75 MCG Tab TAKE ONE TABLET BY MOUTH DAILY 90 Tab 2   • divalproex (DEPAKOTE) 250 MG Tablet Delayed Response TAKE ONE TABLET BY MOUTH TWICE A  Tab 2   • metoprolol (LOPRESSOR) 25 MG Tab Take 1 Tab by mouth 2 Times a Day. 180 Tab 3   • lisinopril (PRINIVIL) 5 MG Tab Take 1 Tab by mouth every day. 90 Tab 3   • therapeutic multivitamin-minerals (THERAGRAN-M) Tab Take 1 Tab by mouth every day.     • Cholecalciferol (VITAMIN D) 2000 UNITS Cap Take  by mouth.     • Wheat Dextrin (BENEFIBER PO) Take  by mouth.     • docosahexanoic acid (OMEGA 3 FA) 1000 MG CAPS Take 1,400 mg by mouth every day.     • ASPIRIN 81 MG PO TABS Take 162 mg by mouth every day. QD     • doxycycline (VIBRAMYCIN) 100 MG Cap Take 1 Cap by mouth 2 times a day. 20 Cap 0   • MethylPREDNISolone (MEDROL DOSEPAK) 4 MG Tablet Therapy Pack TAKE 6 TABLETS ON DAY 1 AS DIRECTED ON PACKAGE AND DECREASE BY 1 TAB EACH DAY FOR A TOTAL OF 6 DAYS 21 Tab 0   • tamsulosin (FLOMAX) 0.4 MG capsule Take 0.4 mg by mouth  "every day.  0     No facility-administered encounter medications on file as of 7/13/2017.     Review of Systems   Constitutional: Negative for fever and malaise/fatigue.   Respiratory: Positive for shortness of breath. Negative for cough.         Exertional mild   Cardiovascular: Positive for leg swelling. Negative for chest pain, palpitations, orthopnea, claudication and PND.   Gastrointestinal: Negative for abdominal pain.   Musculoskeletal: Negative for myalgias.   Neurological: Negative for dizziness.   All other systems reviewed and are negative.       Objective:   /62 mmHg  Pulse 64  Ht 1.727 m (5' 8\")  Wt 88.089 kg (194 lb 3.2 oz)  BMI 29.54 kg/m2  SpO2 94%    Physical Exam   Constitutional: He is oriented to person, place, and time. He appears well-developed and well-nourished. No distress.   HENT:   Head: Normocephalic and atraumatic.   Eyes: EOM are normal.   Neck: Normal range of motion. No JVD present.   Cardiovascular: Normal rate, regular rhythm, normal heart sounds and intact distal pulses.    No murmur heard.  Pulses:       Carotid pulses are on the right side with bruit, and on the left side with bruit.  Pulmonary/Chest: Effort normal and breath sounds normal. No respiratory distress. He has no wheezes. He has no rales.   Abdominal: Soft. Bowel sounds are normal.   Musculoskeletal: Normal range of motion. He exhibits edema.   Mild bilateral LE edema   Neurological: He is alert and oriented to person, place, and time.   Skin: Skin is warm and dry.   Psychiatric: He has a normal mood and affect.   Nursing note and vitals reviewed.    2012 ECHO CONCLUSIONS  Bubble study performed.  No patent foramen ovale appears to be present.  Moderate concentric left ventricular hypertrophy.  Normal left ventricular systolic function.  Grade I diastolic dysfunction is present.  Left ventricular ejection fraction is 65% to 70%.  Enlarged right ventricular size.  Otherwise normal.     2015 CAROTID DUPLEX " CONCLUSIONS   Moderate bilateral internal carotid artery stenosis (50-69%).    Flow within both subclavian arteries appears to be within normal limits.    Right vertebral artery could not be visualized.  Left normal.    2017 TREADMILL STRESS   Stress ECG normal ECG response to exercise, no ischemic changes  noted  Frequent PVC noted  Impression: Negative stress ECG for ischemia at a good workload    Lab Results   Component Value Date/Time    CHOLESTEROL, 02/03/2017 08:33 AM    * 02/03/2017 08:33 AM    HDL 38* 02/03/2017 08:33 AM    TRIGLYCERIDES 169* 02/03/2017 08:33 AM       Lab Results   Component Value Date/Time    SODIUM 138 02/03/2017 08:33 AM    POTASSIUM 4.8 02/03/2017 08:33 AM    CHLORIDE 102 02/03/2017 08:33 AM    CO2 29 02/03/2017 08:33 AM    GLUCOSE 92 02/03/2017 08:33 AM    BUN 12 02/03/2017 08:33 AM    CREATININE 1.00 02/03/2017 08:33 AM     Lab Results   Component Value Date/Time    ALKALINE PHOSPHATASE 36 02/03/2017 08:33 AM    AST(SGOT) 19 02/03/2017 08:33 AM    ALT(SGPT) 11 02/03/2017 08:33 AM    TOTAL BILIRUBIN 1.9* 02/03/2017 08:33 AM      Assessment:     1. Coronary artery disease due to lipid rich plaque     2. Chronic anticoagulation     3. Deep vein thrombosis (DVT) of non-extremity vein, unspecified chronicity     4. Hx pulmonary embolism     5. Mixed hyperlipidemia  CAROTID DUPLEX   6. S/P CABG x 3     7. Transient cerebral ischemia, unspecified type     8. Carotid disease, bilateral (CMS-HCC)  CAROTID DUPLEX     Medical Decision Making:  Today's Assessment / Status / Plan:     1. CAD with CABG X3, angina and CARBALLO with exertion remains but mild. Recent negative nuclear imaging. Consider daily anti-anginal if wanted by patient. Continue ASA, metoprolol, lisinopril, and lipitor.    2. DVT and PE with protein S elevation in '12, recurrence. Lifelong coumadin. Coumadin per [facility] with no signs of bleeding.    3. HLD, moderate control. Started on lipitor this year. Repeating labs next  month. LDL goal <70 with significant vascular disease. Consider switch to crestor or PCSK9 inhibitors if LDL not at goal with lipitor.     4. TIA with carotid disease, repeat duplex. If >70%, consider vascular consult. Continue statin and ASA.    If leg swelling continues, consider diuretic therapy and repeat echo for structure and function    FU in clinic in 12 months with CW or sooner for worsening symptoms    Patient verbalizes understanding and agrees with the plan of care.     Collaborating MD: Gucci Siddiqui MD        No Recipients

## 2017-07-13 NOTE — MR AVS SNAPSHOT
"        Rene Chan   2017 10:00 AM   Office Visit   MRN: [id number]    Department:  [facility]   Dept Phone:  [phone]    Description:  Male : 1950   Provider:  KAROL Villeda           Reason for Visit     Follow-Up PP      Allergies as of 2017     Allergen Noted Reactions    Penicillins 2008       Childhood; does know what the reaction was      You were diagnosed with     Coronary artery disease due to lipid rich plaque   [5998162]       Chronic anticoagulation   [991329]       Deep vein thrombosis (DVT) of non-extremity vein, unspecified chronicity   [9851111]       Hx pulmonary embolism   [975720]       Mixed hyperlipidemia   [272.2.ICD-9-CM]       S/P CABG x 3   [104956]       Transient cerebral ischemia, unspecified type   [3600307]       Carotid disease, bilateral (CMS-HCC)   [059688]         Vital Signs     Blood Pressure Pulse Height Weight Body Mass Index Oxygen Saturation    118/62 mmHg 64 1.727 m (5' 8\") 88.089 kg (194 lb 3.2 oz) 29.54 kg/m2 94%    Smoking Status                   Former Smoker           Basic Information     Date Of Birth Sex Race Ethnicity Preferred Language    1950 Male White Non- English      Your appointments     2017  9:15 AM   CAROTID DUPLEX with VASCULAR LAB [facility] EXAM 8   NON-INVASIVE LAB [facility] ([facility])    [address] [id number] [phone]            2017 10:30 AM   Established Patient with [facility] EXAM 4   [facility] [place]  (--)    [address] [address]   [phone]            Aug 14, 2017  1:00 PM   Established Patient with Patricia Damon M.D.   [facility] / [facility] - Internal Medicine (--)    [address]  [address]  [place] [id number] [phone]           You will be receiving a confirmation call a few days before your appointment from our automated call confirmation system.      "      Problem List              ICD-10-CM Priority Class Noted - Resolved    Chronic anticoagulation Z79.01 Medium  11/11/2014 - Present    TIA (transient ischemic attack) G45.9 Medium  5/26/2015 - Present    CAD (coronary artery disease) I25.10 Medium  6/4/2015 - Present    S/P CABG x 3 Z95.1 Medium  6/4/2015 - Present    Mixed hyperlipidemia E78.2 Medium  6/24/2015 - Present    Hx pulmonary embolism Z86.711 Medium  6/24/2015 - Present    Deep vein thrombosis (DVT) (CMS-HCC) I82.409 Medium  9/16/2015 - Present    Acquired hypothyroidism E03.9 Low  9/26/2016 - Present    Gout of foot M10.9 Low  9/26/2016 - Present    Colon polyp K63.5 Low  10/6/2016 - Present    Bipolar disorder in full remission (CMS-HCC) F31.70 Low  10/6/2016 - Present    Healthcare maintenance (Chronic) Z00.00 Low  10/6/2016 - Present    Benign non-nodular prostatic hyperplasia with lower urinary tract symptoms N40.1 Low  10/6/2016 - Present    Bilateral impacted cerumen H61.23 Low  2/7/2017 - Present    Carotid disease, bilateral (CMS-HCC) I77.9 Medium  7/13/2017 - Present      Health Maintenance        Date Due Completion Dates    IMM DTaP/Tdap/Td Vaccine (1 - Tdap) 10/15/1968 ---    IMM ZOSTER VACCINE 10/15/2009 ---    IMM INFLUENZA (1) 9/1/2017 10/6/2016, 9/5/2014, 10/10/2011    IMM PNEUMOCOCCAL 65+ (ADULT) LOW/MEDIUM RISK SERIES (2 of 2 - PPSV23) 10/6/2017 10/6/2016    COLONOSCOPY 3/29/2020 3/29/2010            Current Immunizations     13-VALENT PCV PREVNAR 10/6/2016    Influenza TIV (IM) 9/5/2014, 10/10/2011    Influenza Vaccine Adult HD 10/6/2016    Pneumococcal Vaccine (UF)Historical Data 1/10/2007      Below and/or attached are the medications your provider expects you to take. Review all of your home medications and newly ordered medications with your provider and/or pharmacist. Follow medication instructions as directed by your provider and/or pharmacist. Please keep your medication list with you and share with your provider. Update  the information when medications are discontinued, doses are changed, or new medications (including over-the-counter products) are added; and carry medication information at all times in the event of emergency situations     Allergies:  PENICILLINS - (reactions not documented)               Medications  Valid as of: July 13, 2017 - 10:44 AM    Generic Name Brand Name Tablet Size Instructions for use    Aspirin (Tab) aspirin 81 MG Take 162 mg by mouth every day. QD        Atorvastatin Calcium (Tab) LIPITOR 80 MG Take 1 Tab by mouth every day.        Cholecalciferol (Cap) Vitamin D 2000 UNITS Take  by mouth.        Divalproex Sodium (Tablet Delayed Response) DEPAKOTE 250 MG TAKE ONE TABLET BY MOUTH TWICE A DAY        Doxycycline Hyclate (Cap) VIBRAMYCIN 100 MG Take 1 Cap by mouth 2 times a day.        Levothyroxine Sodium (Tab) SYNTHROID 75 MCG TAKE ONE TABLET BY MOUTH DAILY        Lisinopril (Tab) PRINIVIL 5 MG Take 1 Tab by mouth every day.        MethylPREDNISolone (Tablet Therapy Pack) MEDROL DOSEPAK 4 MG TAKE 6 TABLETS ON DAY 1 AS DIRECTED ON PACKAGE AND DECREASE BY 1 TAB EACH DAY FOR A TOTAL OF 6 DAYS        Metoprolol Tartrate (Tab) LOPRESSOR 25 MG Take 1 Tab by mouth 2 Times a Day.        Multiple Vitamins-Minerals (Tab) THERAGRAN-M  Take 1 Tab by mouth every day.        Omega-3 Fatty Acids (Cap) OMEGA 3 FA 1000 MG Take 1,400 mg by mouth every day.        Tamsulosin HCl (Cap) FLOMAX 0.4 MG Take 0.4 mg by mouth every day.        Warfarin Sodium (Tab) COUMADIN 7.5 MG Take 1 Tab by mouth every day.        Wheat Dextrin   Take  by mouth.        .                 Medicines prescribed today were sent to:     [id number] - [place] - [address]    [address] [place] [address]    Phone: [phone] Fax: [phone]    Open 24 Hours?: No    [facility] - [place], [place] - [address]    [address] [place] [address]    Phone: [phone] Fax: [phone]    Open 24  Hours?: No      Medication refill instructions:       If your prescription bottle indicates you have medication refills left, it is not necessary to call your provider’s office. Please contact your pharmacy and they will refill your medication.    If your prescription bottle indicates you do not have any refills left, you may request refills at any time through one of the following ways: The online [employer] system (except Urgent Care), by calling your provider’s office, or by asking your pharmacy to contact your provider’s office with a refill request. Medication refills are processed only during regular business hours and may not be available until the next business day. Your provider may request additional information or to have a follow-up visit with you prior to refilling your medication.   *Please Note: Medication refills are assigned a new Rx number when refilled electronically. Your pharmacy may indicate that no refills were authorized even though a new prescription for the same medication is available at the pharmacy. Please request the medicine by name with the pharmacy before contacting your provider for a refill.        Your To Do List     Future Labs/Procedures Complete By Expires    CAROTID DUPLEX  As directed 1/13/2018         [employer] Access Code: [email or web address]  Expires: 7/30/2017  1:45 PM    [employer]  A secure, online tool to manage your health information     [employer]® is a secure, online tool that connects you to your personalized health information from the privacy of your home -- day or night - making it very easy for you to manage your healthcare. Once the activation process is completed, you can even access your medical information using the [employer] sayda, which is available for free in the Apple Sayda store or Google Play store.     [employer] provides the following levels of access (as shown below):   My Chart Features   [facility] Doctor [facility]  Specialists  [facility]  Urgent  Care [facility]  Primary Care  Doctor   Email your healthcare team securely and privately 24/7 X X X    Manage appointments: schedule your next appointment; view details of past/upcoming appointments X      Request prescription refills. X      View recent personal medical records, including lab and immunizations X X X X   View health record, including health history, allergies, medications X X X X   Read reports about your outpatient visits, procedures, consult and ER notes X X X X   See your discharge summary, which is a recap of your hospital and/or ER visit that includes your diagnosis, lab results, and care plan. X X       How to register for [employer]:  1. Go to  [email or web address].  2. Click on the Sign Up Now box, which takes you to the New Member Sign Up page. You will need to provide the following information:  a. Enter your [employer] Access Code exactly as it appears at the top of this page. (You will not need to use this code after you’ve completed the sign-up process. If you do not sign up before the expiration date, you must request a new code.)   b. Enter your date of birth.   c. Enter your home email address.   d. Click Submit, and follow the next screen’s instructions.  3. Create a [employer] ID. This will be your [employer] login ID and cannot be changed, so think of one that is secure and easy to remember.  4. Create a [employer] password. You can change your password at any time.  5. Enter your Password Reset Question and Answer. This can be used at a later time if you forget your password.   6. Enter your e-mail address. This allows you to receive e-mail notifications when new information is available in [employer].  7. Click Sign Up. You can now view your health information.    For assistance activating your [employer] account, call [phone]

## 2017-07-13 NOTE — PROGRESS NOTES
Subjective:   Rene Chan is a 66 y.o. male who presents today for yearly follow up.    He is a patient of Dr. Wright in our office. 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    He is overall doing well. He continues to have some mild chest discomfort and shortness of breath with exertion. He had a recent treadmill stress test that was negative for ischemia. He thinks it may be due to being out of shape.    He does have some lower extremity edema that is mild and chronic for him.    He has had no episodes of palpitations, dizziness/lightheadedness, or orthopnea.    Past Medical History   Diagnosis Date   • Hypothyroidism    • Psychiatric disorder      bipolar   • CAD (coronary artery disease) 6/4/2015   • Arrhythmia 2011     tia   • Colon polyps    • Gout    • Diabetes mellitus, type II (CMS-[facility])    • Hammer toe    • Impaired fasting glucose    • Bipolar disorder (CMS-[facility])    • Hyperlipidemia    • CAD (coronary artery disease)    • Hypertension    • Clotting disorder (CMS-[facility])      protein S elevation in '12 with recurrent DVT and PE-coumadin lifelong   • Anticoagulation monitoring, special range    • Carotid artery occlusion      L 60-79% occluded; R 59% occluded per pt report; 2011     Past Surgical History   Procedure Laterality Date   • Other abdominal surgery       umbilical hernia repair 2000, appy age 13   • Other orthopedic surgery  1976     L wrist repair    • Recovery  6/1/2015     Procedure:  CATH LAB  [facility] w[facility]POSS [facility] ICD; 794.31;  Surgeon: [facility];  [place] [facility]  Service:    • Multiple coronary artery bypass endo vein harvest  6/4/2015     Procedure: MULTIPLE CORONARY ARTERY BYPASS Grafting  x 3   ENDO VEIN HARVEST right leg;  Surgeon: Christophe Lemus M.D.;  Location: SURGERY [facility];  Service:    • Appendectomy       Family History    Problem Relation Age of Onset   • Heart Disease Sister 50   • Stroke Sister 55     CVA   • Heart Failure Father      History   Smoking status   • Former Smoker -- 2.00 packs/day for 10 years   • Types: Cigarettes   • Quit date: 01/01/1976   Smokeless tobacco   • Never Used     Comment: quit x 32 yrs; 2ppd x 7 years     Allergies   Allergen Reactions   • Penicillins      Childhood; does know what the reaction was     Outpatient Encounter Prescriptions as of 7/13/2017   Medication Sig Dispense Refill   • warfarin (COUMADIN) 7.5 MG Tab Take 1 Tab by mouth every day. 90 Tab 1   • atorvastatin (LIPITOR) 80 MG tablet Take 1 Tab by mouth every day. 90 Tab 2   • levothyroxine (SYNTHROID) 75 MCG Tab TAKE ONE TABLET BY MOUTH DAILY 90 Tab 2   • divalproex (DEPAKOTE) 250 MG Tablet Delayed Response TAKE ONE TABLET BY MOUTH TWICE A  Tab 2   • metoprolol (LOPRESSOR) 25 MG Tab Take 1 Tab by mouth 2 Times a Day. 180 Tab 3   • lisinopril (PRINIVIL) 5 MG Tab Take 1 Tab by mouth every day. 90 Tab 3   • therapeutic multivitamin-minerals (THERAGRAN-M) Tab Take 1 Tab by mouth every day.     • Cholecalciferol (VITAMIN D) 2000 UNITS Cap Take  by mouth.     • Wheat Dextrin (BENEFIBER PO) Take  by mouth.     • docosahexanoic acid (OMEGA 3 FA) 1000 MG CAPS Take 1,400 mg by mouth every day.     • ASPIRIN 81 MG PO TABS Take 162 mg by mouth every day. QD     • doxycycline (VIBRAMYCIN) 100 MG Cap Take 1 Cap by mouth 2 times a day. 20 Cap 0   • MethylPREDNISolone (MEDROL DOSEPAK) 4 MG Tablet Therapy Pack TAKE 6 TABLETS ON DAY 1 AS DIRECTED ON PACKAGE AND DECREASE BY 1 TAB EACH DAY FOR A TOTAL OF 6 DAYS 21 Tab 0   • tamsulosin (FLOMAX) 0.4 MG capsule Take 0.4 mg by mouth every day.  0     No facility-administered encounter medications on file as of 7/13/2017.     Review of Systems   Constitutional: Negative for fever and malaise/fatigue.   Respiratory: Positive for shortness of breath. Negative for cough.         Exertional mild  "  Cardiovascular: Positive for leg swelling. Negative for chest pain, palpitations, orthopnea, claudication and PND.   Gastrointestinal: Negative for abdominal pain.   Musculoskeletal: Negative for myalgias.   Neurological: Negative for dizziness.   All other systems reviewed and are negative.       Objective:   /62 mmHg  Pulse 64  Ht 1.727 m (5' 8\")  Wt 88.089 kg (194 lb 3.2 oz)  BMI 29.54 kg/m2  SpO2 94%    Physical Exam   Constitutional: He is oriented to person, place, and time. He appears well-developed and well-nourished. No distress.   HENT:   Head: Normocephalic and atraumatic.   Eyes: EOM are normal.   Neck: Normal range of motion. No JVD present.   Cardiovascular: Normal rate, regular rhythm, normal heart sounds and intact distal pulses.    No murmur heard.  Pulses:       Carotid pulses are on the right side with bruit, and on the left side with bruit.  Pulmonary/Chest: Effort normal and breath sounds normal. No respiratory distress. He has no wheezes. He has no rales.   Abdominal: Soft. Bowel sounds are normal.   Musculoskeletal: Normal range of motion. He exhibits edema.   Mild bilateral LE edema   Neurological: He is alert and oriented to person, place, and time.   Skin: Skin is warm and dry.   Psychiatric: He has a normal mood and affect.   Nursing note and vitals reviewed.    2012 ECHO CONCLUSIONS  Bubble study performed.  No patent foramen ovale appears to be present.  Moderate concentric left ventricular hypertrophy.  Normal left ventricular systolic function.  Grade I diastolic dysfunction is present.  Left ventricular ejection fraction is 65% to 70%.  Enlarged right ventricular size.  Otherwise normal.     2015 CAROTID DUPLEX CONCLUSIONS   Moderate bilateral internal carotid artery stenosis (50-69%).    Flow within both subclavian arteries appears to be within normal limits.    Right vertebral artery could not be visualized.  Left normal.    2017 TREADMILL STRESS   Stress ECG normal ECG " response to exercise, no ischemic changes  noted  Frequent PVC noted  Impression: Negative stress ECG for ischemia at a good workload    Lab Results   Component Value Date/Time    CHOLESTEROL, 02/03/2017 08:33 AM    * 02/03/2017 08:33 AM    HDL 38* 02/03/2017 08:33 AM    TRIGLYCERIDES 169* 02/03/2017 08:33 AM       Lab Results   Component Value Date/Time    SODIUM 138 02/03/2017 08:33 AM    POTASSIUM 4.8 02/03/2017 08:33 AM    CHLORIDE 102 02/03/2017 08:33 AM    CO2 29 02/03/2017 08:33 AM    GLUCOSE 92 02/03/2017 08:33 AM    BUN 12 02/03/2017 08:33 AM    CREATININE 1.00 02/03/2017 08:33 AM     Lab Results   Component Value Date/Time    ALKALINE PHOSPHATASE 36 02/03/2017 08:33 AM    AST(SGOT) 19 02/03/2017 08:33 AM    ALT(SGPT) 11 02/03/2017 08:33 AM    TOTAL BILIRUBIN 1.9* 02/03/2017 08:33 AM      Assessment:     1. Coronary artery disease due to lipid rich plaque     2. Chronic anticoagulation     3. Deep vein thrombosis (DVT) of non-extremity vein, unspecified chronicity     4. Hx pulmonary embolism     5. Mixed hyperlipidemia  CAROTID DUPLEX   6. S/P CABG x 3     7. Transient cerebral ischemia, unspecified type     8. Carotid disease, bilateral (CMS-HCC)  CAROTID DUPLEX     Medical Decision Making:  Today's Assessment / Status / Plan:     1. CAD with CABG X3, angina and CARBALLO with exertion remains but mild. Recent negative nuclear imaging. Consider daily anti-anginal if wanted by patient. Continue ASA, metoprolol, lisinopril, and lipitor.    2. DVT and PE with protein S elevation in '12, recurrence. Lifelong coumadin. Coumadin per [facility] with no signs of bleeding.    3. HLD, moderate control. Started on lipitor this year. Repeating labs next month. LDL goal <70 with significant vascular disease. Consider switch to crestor or PCSK9 inhibitors if LDL not at goal with lipitor.     4. TIA with carotid disease, repeat duplex. If >70%, consider vascular consult. Continue statin and ASA.    If leg swelling  continues, consider diuretic therapy and repeat echo for structure and function    FU in clinic in 12 months with CW or sooner for worsening symptoms    Patient verbalizes understanding and agrees with the plan of care.     Collaborating MD: Gucci Siddiqui MD

## 2017-07-18 DIAGNOSIS — I10 ESSENTIAL HYPERTENSION: ICD-10-CM

## 2017-07-18 RX ORDER — LISINOPRIL 5 MG/1
5 TABLET ORAL DAILY
Qty: 90 TAB | Refills: 3 | Status: SHIPPED | OUTPATIENT
Start: 2017-07-18 | End: 2018-07-16 | Stop reason: SDUPTHER

## 2017-07-24 ENCOUNTER — HOSPITAL ENCOUNTER (OUTPATIENT)
Dept: RADIOLOGY | Facility: MEDICAL CENTER | Age: 67
End: 2017-07-24
Attending: NURSE PRACTITIONER
Payer: MEDICARE

## 2017-07-24 DIAGNOSIS — I77.9 CAROTID DISEASE, BILATERAL (HCC): ICD-10-CM

## 2017-07-24 DIAGNOSIS — E78.2 MIXED HYPERLIPIDEMIA: ICD-10-CM

## 2017-07-24 PROCEDURE — 93880 EXTRACRANIAL BILAT STUDY: CPT | Mod: 26 | Performed by: INTERNAL MEDICINE

## 2017-07-24 PROCEDURE — 93880 EXTRACRANIAL BILAT STUDY: CPT

## 2017-07-25 ENCOUNTER — TELEPHONE (OUTPATIENT)
Dept: CARDIOLOGY | Facility: MEDICAL CENTER | Age: 67
End: 2017-07-25

## 2017-07-25 ENCOUNTER — ANTICOAGULATION VISIT (OUTPATIENT)
Dept: VASCULAR LAB | Facility: MEDICAL CENTER | Age: 67
End: 2017-07-25
Attending: INTERNAL MEDICINE
Payer: MEDICARE

## 2017-07-25 DIAGNOSIS — I82.90 DEEP VEIN THROMBOSIS (DVT) OF NON-EXTREMITY VEIN, UNSPECIFIED CHRONICITY: ICD-10-CM

## 2017-07-25 DIAGNOSIS — Z86.718 HISTORY OF DVT (DEEP VEIN THROMBOSIS): ICD-10-CM

## 2017-07-25 LAB
INR BLD: 2.5 (ref 0.9–1.2)
INR PPP: 2.5 (ref 2–3.5)

## 2017-07-25 PROCEDURE — 99212 OFFICE O/P EST SF 10 MIN: CPT

## 2017-07-25 PROCEDURE — 85610 PROTHROMBIN TIME: CPT

## 2017-07-25 RX ORDER — WARFARIN SODIUM 7.5 MG/1
7.5 TABLET ORAL DAILY
Qty: 90 TAB | Refills: 1 | Status: SHIPPED | OUTPATIENT
Start: 2017-07-25 | End: 2018-01-09 | Stop reason: SDUPTHER

## 2017-07-25 NOTE — TELEPHONE ENCOUNTER
----- Message from KAROL Villeda sent at 7/24/2017  1:05 PM PDT -----  Carotids remain moderate. If patient starts to have stroke like symptoms, visual changes, or any issues-refer to vascular, or if he wants we can refer him now to keep an eye on his stenosis. SC

## 2017-07-25 NOTE — PROGRESS NOTES
Anticoagulation Summary as of 7/25/2017     INR goal 2.0-3.0   Selected INR 2.5 (7/25/2017)   Maintenance plan 3.75 mg (7.5 mg x 0.5) on Wed; 7.5 mg (7.5 mg x 1) all other days   Weekly total 48.75 mg   Plan last modified Shweta Serna A.P.NPaul (2/28/2017)   Next INR check 10/17/2017   Target end date Indefinite    Indications   DVT (deep venous thrombosis) (CMS-HCC) (Resolved) [I82.409]  Pulmonary embolism [415.19] (Resolved) [I26.99]  Deep vein thrombosis (DVT) (CMS-HCC) [I82.409]         Anticoagulation Episode Summary     INR check location [facility]    [facility]    Send INR reminders to     Comments       Anticoagulation Care Providers     Provider Role Specialty Phone number    Patricia Damon M.D. Referring Internal Medicine [phone]    DIANA QuinnD Responsible      [facility] Responsible  [phone]        Anticoagulation Patient Findings      Current Outpatient Prescriptions on File Prior to Visit   Medication Sig Dispense Refill   • lisinopril (PRINIVIL) 5 MG Tab Take 1 Tab by mouth every day. 90 Tab 3   • doxycycline (VIBRAMYCIN) 100 MG Cap Take 1 Cap by mouth 2 times a day. 20 Cap 0   • warfarin (COUMADIN) 7.5 MG Tab Take 1 Tab by mouth every day. 90 Tab 1   • MethylPREDNISolone (MEDROL DOSEPAK) 4 MG Tablet Therapy Pack TAKE 6 TABLETS ON DAY 1 AS DIRECTED ON PACKAGE AND DECREASE BY 1 TAB EACH DAY FOR A TOTAL OF 6 DAYS 21 Tab 0   • atorvastatin (LIPITOR) 80 MG tablet Take 1 Tab by mouth every day. 90 Tab 2   • levothyroxine (SYNTHROID) 75 MCG Tab TAKE ONE TABLET BY MOUTH DAILY 90 Tab 2   • divalproex (DEPAKOTE) 250 MG Tablet Delayed Response TAKE ONE TABLET BY MOUTH TWICE A  Tab 2   • tamsulosin (FLOMAX) 0.4 MG capsule Take 0.4 mg by mouth every day.  0   • metoprolol (LOPRESSOR) 25 MG Tab Take 1 Tab by mouth 2 Times a Day. 180 Tab 3   • therapeutic multivitamin-minerals (THERAGRAN-M) Tab Take 1 Tab by mouth every day.     •  Cholecalciferol (VITAMIN D) 2000 UNITS Cap Take  by mouth.     • Wheat Dextrin (BENEFIBER PO) Take  by mouth.     • docosahexanoic acid (OMEGA 3 FA) 1000 MG CAPS Take 1,400 mg by mouth every day.     • ASPIRIN 81 MG PO TABS Take 162 mg by mouth every day. QD       No current facility-administered medications on file prior to visit.       Lab Results   Component Value Date/Time    SODIUM 138 02/03/2017 08:33 AM    POTASSIUM 4.8 02/03/2017 08:33 AM    CHLORIDE 102 02/03/2017 08:33 AM    CO2 29 02/03/2017 08:33 AM    GLUCOSE 92 02/03/2017 08:33 AM    BUN 12 02/03/2017 08:33 AM    CREATININE 1.00 02/03/2017 08:33 AM          Rene Chan seen in clinic today  INR  therapeutic.    Denies signs/symptoms of bleeding and/or thrombosis.    Denies changes to diet or medications.   Follow up appointment in 12 week(s).      Continue weekly warfarin dose as noted       Sebastian Novak, PHARMD

## 2017-07-25 NOTE — TELEPHONE ENCOUNTER
Called patient, advised him of carotid duplex result and PRIMO PARKER's note. Patient verbalized understanding, he does not want to be referred to vascular surgery at this time.    TALON AHUMADA

## 2017-07-25 NOTE — MR AVS SNAPSHOT
Rene Chan   2017 10:30 AM   Anticoagulation Visit   MRN: [id number]    Department:  Vascular Medicine   Dept Phone:  [phone]    Description:  Male : 1950   Provider:  [facility] EXAM 4           Allergies as of 2017     Allergen Noted Reactions    Penicillins 2008       Childhood; does know what the reaction was      You were diagnosed with     Deep vein thrombosis (DVT) of non-extremity vein, unspecified chronicity   [9600555]         Vital Signs     Smoking Status                   Former Smoker           Basic Information     Date Of Birth Sex Race Ethnicity Preferred Language    1950 Male White Non- English      Your appointments     Aug 14, 2017  1:00 PM   Established Patient with Patricia Damon M.D.   [facility] / [facility] Med - Internal Medicine (--)    [address] [place] [address]  [facility] [id number] [phone]           You will be receiving a confirmation call a few days before your appointment from our automated call confirmation system.            Oct 17, 2017 10:30 AM   Established Patient with [facility] EXAM 4   [facility] [place] for Heart and Vascular Health  (--)    [address] [facility] [address]   [phone]              Problem List              ICD-10-CM Priority Class Noted - Resolved    Chronic anticoagulation Z79.01 Medium  2014 - Present    TIA (transient ischemic attack) G45.9 Medium  2015 - Present    CAD (coronary artery disease) I25.10 Medium  2015 - Present    S/P CABG x 3 Z95.1 Medium  2015 - Present    Mixed hyperlipidemia E78.2 Medium  2015 - Present    Hx pulmonary embolism Z86.711 Medium  2015 - Present    Deep vein thrombosis (DVT) (CMS-[facility]) I82.409 Medium  2015 - Present    Acquired hypothyroidism E03.9 Low  2016 - Present    Gout of foot M10.9 Low  2016 - Present    Colon polyp K63.5 Low  10/6/2016 - Present    Bipolar disorder in full remission  (CMS-HCC) F31.70 Low  10/6/2016 - Present    Healthcare maintenance (Chronic) Z00.00 Low  10/6/2016 - Present    Benign non-nodular prostatic hyperplasia with lower urinary tract symptoms N40.1 Low  10/6/2016 - Present    Bilateral impacted cerumen H61.23 Low  2/7/2017 - Present    Carotid disease, bilateral (CMS-HCC) I77.9 Medium  7/13/2017 - Present      Health Maintenance        Date Due Completion Dates    IMM DTaP/Tdap/Td Vaccine (1 - Tdap) 10/15/1968 ---    IMM ZOSTER VACCINE 10/15/2009 ---    IMM INFLUENZA (1) 9/1/2017 10/6/2016, 9/5/2014, 10/10/2011    IMM PNEUMOCOCCAL 65+ (ADULT) LOW/MEDIUM RISK SERIES (2 of 2 - PPSV23) 10/6/2017 10/6/2016    COLONOSCOPY 3/29/2020 3/29/2010            Results     POCT Protime      Component    INR    2.5                        Current Immunizations     13-VALENT PCV PREVNAR 10/6/2016    Influenza TIV (IM) 9/5/2014, 10/10/2011    Influenza Vaccine Adult HD 10/6/2016    Pneumococcal Vaccine (UF)Historical Data 1/10/2007      Below and/or attached are the medications your provider expects you to take. Review all of your home medications and newly ordered medications with your provider and/or pharmacist. Follow medication instructions as directed by your provider and/or pharmacist. Please keep your medication list with you and share with your provider. Update the information when medications are discontinued, doses are changed, or new medications (including over-the-counter products) are added; and carry medication information at all times in the event of emergency situations     Allergies:  PENICILLINS - (reactions not documented)               Medications  Valid as of: July 25, 2017 - 10:31 AM    Generic Name Brand Name Tablet Size Instructions for use    Aspirin (Tab) aspirin 81 MG Take 162 mg by mouth every day. QD        Atorvastatin Calcium (Tab) LIPITOR 80 MG Take 1 Tab by mouth every day.        Cholecalciferol (Cap) Vitamin D 2000 UNITS Take  by mouth.         Divalproex Sodium (Tablet Delayed Response) DEPAKOTE 250 MG TAKE ONE TABLET BY MOUTH TWICE A DAY        Doxycycline Hyclate (Cap) VIBRAMYCIN 100 MG Take 1 Cap by mouth 2 times a day.        Levothyroxine Sodium (Tab) SYNTHROID 75 MCG TAKE ONE TABLET BY MOUTH DAILY        Lisinopril (Tab) PRINIVIL 5 MG Take 1 Tab by mouth every day.        MethylPREDNISolone (Tablet Therapy Pack) MEDROL DOSEPAK 4 MG TAKE 6 TABLETS ON DAY 1 AS DIRECTED ON PACKAGE AND DECREASE BY 1 TAB EACH DAY FOR A TOTAL OF 6 DAYS        Metoprolol Tartrate (Tab) LOPRESSOR 25 MG Take 1 Tab by mouth 2 Times a Day.        Multiple Vitamins-Minerals (Tab) THERAGRAN-M  Take 1 Tab by mouth every day.        Omega-3 Fatty Acids (Cap) OMEGA 3 FA 1000 MG Take 1,400 mg by mouth every day.        Tamsulosin HCl (Cap) FLOMAX 0.4 MG Take 0.4 mg by mouth every day.        Warfarin Sodium (Tab) COUMADIN 7.5 MG Take 1 Tab by mouth every day.        Wheat Dextrin   Take  by mouth.        .                 Medicines prescribed today were sent to:     [facility]/PHARMACY #[id number] - [place], [place] - [address]    [address] [address]    Phone: [phone] Fax: [phone]    Open 24 Hours?: No    [facility] - [place], [place] - [address]    [address] [place] [address]    Phone: [phone] Fax: [phone]    Open 24 Hours?: No      Medication refill instructions:       If your prescription bottle indicates you have medication refills left, it is not necessary to call your provider’s office. Please contact your pharmacy and they will refill your medication.    If your prescription bottle indicates you do not have any refills left, you may request refills at any time through one of the following ways: The online Uzabase system (except Urgent Care), by calling your provider’s office, or by asking your pharmacy to contact your provider’s office with a refill request. Medication refills are processed only during regular business  hours and may not be available until the next business day. Your provider may request additional information or to have a follow-up visit with you prior to refilling your medication.   *Please Note: Medication refills are assigned a new Rx number when refilled electronically. Your pharmacy may indicate that no refills were authorized even though a new prescription for the same medication is available at the pharmacy. Please request the medicine by name with the pharmacy before contacting your provider for a refill.        Warfarin Dosing Calendar   July 2017 Details    Sun Mon Tue Wed Thu Fri Sat           1                 2               3               4               5               6               7               8                 9               10               11               12               13               14               15                 16               17               18               19               20               21               22                 23               24               25   2.5   7.5 mg   See details      26      3.75 mg         27      7.5 mg         28      7.5 mg         29      7.5 mg           30      7.5 mg         31      7.5 mg               Date Details   07/25 This INR check   INR: 2.5               How to take your warfarin dose     To take:  3.75 mg Take 0.5 of a 7.5 mg tablet.    To take:  7.5 mg Take 1 of the 7.5 mg tablets.           Warfarin Dosing Calendar   August 2017 Details    Sun Mon Tue Wed Thu Fri Sat       1      7.5 mg         2      3.75 mg         3      7.5 mg         4      7.5 mg         5      7.5 mg           6      7.5 mg         7      7.5 mg         8      7.5 mg         9      3.75 mg         10      7.5 mg         11      7.5 mg         12      7.5 mg           13      7.5 mg         14      7.5 mg         15      7.5 mg         16      3.75 mg         17      7.5 mg         18      7.5 mg         19      7.5 mg           20      7.5 mg          21      7.5 mg         22      7.5 mg         23      3.75 mg         24      7.5 mg         25      7.5 mg         26      7.5 mg           27      7.5 mg         28      7.5 mg         29      7.5 mg         30      3.75 mg         31      7.5 mg            Date Details   No additional details            How to take your warfarin dose     To take:  3.75 mg Take 0.5 of a 7.5 mg tablet.    To take:  7.5 mg Take 1 of the 7.5 mg tablets.           Warfarin Dosing Calendar September 2017 Details    Sun Mon Tue Wed Thu Fri Sat          1      7.5 mg         2      7.5 mg           3      7.5 mg         4      7.5 mg         5      7.5 mg         6      3.75 mg         7      7.5 mg         8      7.5 mg         9      7.5 mg           10      7.5 mg         11      7.5 mg         12      7.5 mg         13      3.75 mg         14      7.5 mg         15      7.5 mg         16      7.5 mg           17      7.5 mg         18      7.5 mg         19      7.5 mg         20      3.75 mg         21      7.5 mg         22      7.5 mg         23      7.5 mg           24      7.5 mg         25      7.5 mg         26      7.5 mg         27      3.75 mg         28      7.5 mg         29      7.5 mg         30      7.5 mg          Date Details   No additional details            How to take your warfarin dose     To take:  3.75 mg Take 0.5 of a 7.5 mg tablet.    To take:  7.5 mg Take 1 of the 7.5 mg tablets.           Warfarin Dosing Calendar October 2017 Details    Sun Mon Tue Wed Thu Fri Sat     1      7.5 mg         2      7.5 mg         3      7.5 mg         4      3.75 mg         5      7.5 mg         6      7.5 mg         7      7.5 mg           8      7.5 mg         9      7.5 mg         10      7.5 mg         11      3.75 mg         12      7.5 mg         13      7.5 mg         14      7.5 mg           15      7.5 mg         16      7.5 mg         17      7.5 mg         18               19               20                21                 22               23               24               25               26               27               28                 29               30               31                    Date Details   No additional details    Date of next INR:  10/17/2017         How to take your warfarin dose     To take:  3.75 mg Take 0.5 of a 7.5 mg tablet.    To take:  7.5 mg Take 1 of the 7.5 mg tablets.              Cogito Access Code: [email or web address]  Expires: 7/30/2017  1:45 PM    Cogito  A secure, online tool to manage your health information     [employer]’s Cogito® is a secure, online tool that connects you to your personalized health information from the privacy of your home -- day or night - making it very easy for you to manage your healthcare. Once the activation process is completed, you can even access your medical information using the Cogito sayda, which is available for free in the Apple Sayda store or Google Play store.     Cogito provides the following levels of access (as shown below):   My Chart Features   [facility] Primary Care Doctor [facility]  Specialists [facility]  Urgent  [facility]  Primary Care  Doctor   Email your healthcare team securely and privately 24/7 X X X    Manage appointments: schedule your next appointment; view details of past/upcoming appointments X      Request prescription refills. X      View recent personal medical records, including lab and immunizations X X X X   View health record, including health history, allergies, medications X X X X   Read reports about your outpatient visits, procedures, consult and ER notes X X X X   See your discharge summary, which is a recap of your hospital and/or ER visit that includes your diagnosis, lab results, and care plan. X X       How to register for Cogito:  1. Go to  [email or web address].  2. Click on the Sign Up Now box, which takes you to the New Member Sign Up page. You will need to provide the following  information:  a. Enter your Together Mobile Access Code exactly as it appears at the top of this page. (You will not need to use this code after you’ve completed the sign-up process. If you do not sign up before the expiration date, you must request a new code.)   b. Enter your date of birth.   c. Enter your home email address.   d. Click Submit, and follow the next screen’s instructions.  3. Create a Together Mobile ID. This will be your Together Mobile login ID and cannot be changed, so think of one that is secure and easy to remember.  4. Create a Together Mobile password. You can change your password at any time.  5. Enter your Password Reset Question and Answer. This can be used at a later time if you forget your password.   6. Enter your e-mail address. This allows you to receive e-mail notifications when new information is available in Together Mobile.  7. Click Sign Up. You can now view your health information.    For assistance activating your Together Mobile account, call [phone]

## 2017-08-10 DIAGNOSIS — I10 ESSENTIAL HYPERTENSION: ICD-10-CM

## 2017-08-16 ENCOUNTER — HOSPITAL ENCOUNTER (OUTPATIENT)
Dept: LAB | Facility: MEDICAL CENTER | Age: 67
End: 2017-08-16
Attending: INTERNAL MEDICINE
Payer: MEDICARE

## 2017-08-16 ENCOUNTER — TELEPHONE (OUTPATIENT)
Dept: CARDIOLOGY | Facility: MEDICAL CENTER | Age: 67
End: 2017-08-16

## 2017-08-16 ENCOUNTER — TELEPHONE (OUTPATIENT)
Dept: INTERNAL MEDICINE | Facility: MEDICAL CENTER | Age: 67
End: 2017-08-16

## 2017-08-16 DIAGNOSIS — M10.9 GOUT OF FOOT, UNSPECIFIED CAUSE, UNSPECIFIED CHRONICITY, UNSPECIFIED LATERALITY: ICD-10-CM

## 2017-08-16 DIAGNOSIS — E03.9 ACQUIRED HYPOTHYROIDISM: ICD-10-CM

## 2017-08-16 DIAGNOSIS — E78.2 MIXED HYPERLIPIDEMIA: ICD-10-CM

## 2017-08-16 LAB
ALBUMIN SERPL BCP-MCNC: 3.5 G/DL (ref 3.2–4.9)
ALBUMIN/GLOB SERPL: 1.3 G/DL
ALP SERPL-CCNC: 37 U/L (ref 30–99)
ALT SERPL-CCNC: 15 U/L (ref 2–50)
ANION GAP SERPL CALC-SCNC: 7 MMOL/L (ref 0–11.9)
AST SERPL-CCNC: 15 U/L (ref 12–45)
BASOPHILS # BLD AUTO: 1 % (ref 0–1.8)
BASOPHILS # BLD: 0.07 K/UL (ref 0–0.12)
BILIRUB SERPL-MCNC: 1 MG/DL (ref 0.1–1.5)
BUN SERPL-MCNC: 16 MG/DL (ref 8–22)
CALCIUM SERPL-MCNC: 9.1 MG/DL (ref 8.5–10.5)
CHLORIDE SERPL-SCNC: 106 MMOL/L (ref 96–112)
CHOLEST SERPL-MCNC: 133 MG/DL (ref 100–199)
CO2 SERPL-SCNC: 26 MMOL/L (ref 20–33)
CREAT SERPL-MCNC: 0.76 MG/DL (ref 0.5–1.4)
EOSINOPHIL # BLD AUTO: 0.16 K/UL (ref 0–0.51)
EOSINOPHIL NFR BLD: 2.2 % (ref 0–6.9)
ERYTHROCYTE [DISTWIDTH] IN BLOOD BY AUTOMATED COUNT: 46.2 FL (ref 35.9–50)
GFR SERPL CREATININE-BSD FRML MDRD: >60 ML/MIN/1.73 M 2
GLOBULIN SER CALC-MCNC: 2.7 G/DL (ref 1.9–3.5)
GLUCOSE SERPL-MCNC: 98 MG/DL (ref 65–99)
HCT VFR BLD AUTO: 46.1 % (ref 42–52)
HDLC SERPL-MCNC: 34 MG/DL
HGB BLD-MCNC: 15.3 G/DL (ref 14–18)
IMM GRANULOCYTES # BLD AUTO: 0.03 K/UL (ref 0–0.11)
IMM GRANULOCYTES NFR BLD AUTO: 0.4 % (ref 0–0.9)
LDLC SERPL CALC-MCNC: 66 MG/DL
LYMPHOCYTES # BLD AUTO: 2.14 K/UL (ref 1–4.8)
LYMPHOCYTES NFR BLD: 29.3 % (ref 22–41)
MCH RBC QN AUTO: 31.4 PG (ref 27–33)
MCHC RBC AUTO-ENTMCNC: 33.2 G/DL (ref 33.7–35.3)
MCV RBC AUTO: 94.5 FL (ref 81.4–97.8)
MONOCYTES # BLD AUTO: 0.83 K/UL (ref 0–0.85)
MONOCYTES NFR BLD AUTO: 11.4 % (ref 0–13.4)
NEUTROPHILS # BLD AUTO: 4.07 K/UL (ref 1.82–7.42)
NEUTROPHILS NFR BLD: 55.7 % (ref 44–72)
NRBC # BLD AUTO: 0 K/UL
NRBC BLD AUTO-RTO: 0 /100 WBC
PLATELET # BLD AUTO: 242 K/UL (ref 164–446)
PMV BLD AUTO: 11.6 FL (ref 9–12.9)
POTASSIUM SERPL-SCNC: 4.4 MMOL/L (ref 3.6–5.5)
PROT SERPL-MCNC: 6.2 G/DL (ref 6–8.2)
RBC # BLD AUTO: 4.88 M/UL (ref 4.7–6.1)
SODIUM SERPL-SCNC: 139 MMOL/L (ref 135–145)
TRIGL SERPL-MCNC: 163 MG/DL (ref 0–149)
TSH SERPL DL<=0.005 MIU/L-ACNC: 2.5 UIU/ML (ref 0.3–3.7)
WBC # BLD AUTO: 7.3 K/UL (ref 4.8–10.8)

## 2017-08-16 PROCEDURE — 84443 ASSAY THYROID STIM HORMONE: CPT

## 2017-08-16 PROCEDURE — 80061 LIPID PANEL: CPT

## 2017-08-16 PROCEDURE — 85025 COMPLETE CBC W/AUTO DIFF WBC: CPT

## 2017-08-16 PROCEDURE — 36415 COLL VENOUS BLD VENIPUNCTURE: CPT

## 2017-08-16 PROCEDURE — 80053 COMPREHEN METABOLIC PANEL: CPT

## 2017-08-16 NOTE — TELEPHONE ENCOUNTER
----- Message from Ta Wright M.D. sent at 8/16/2017  3:25 PM PDT -----  Labs look good, please let him know     Thank you

## 2017-08-22 ENCOUNTER — OFFICE VISIT (OUTPATIENT)
Dept: INTERNAL MEDICINE | Facility: MEDICAL CENTER | Age: 67
End: 2017-08-22
Payer: MEDICARE

## 2017-08-22 VITALS
TEMPERATURE: 98.1 F | SYSTOLIC BLOOD PRESSURE: 104 MMHG | HEART RATE: 54 BPM | OXYGEN SATURATION: 93 % | DIASTOLIC BLOOD PRESSURE: 62 MMHG | WEIGHT: 196 LBS | HEIGHT: 68 IN | BODY MASS INDEX: 29.7 KG/M2

## 2017-08-22 DIAGNOSIS — M10.9 GOUT OF FOOT, UNSPECIFIED CAUSE, UNSPECIFIED CHRONICITY, UNSPECIFIED LATERALITY: ICD-10-CM

## 2017-08-22 DIAGNOSIS — I82.90 DEEP VEIN THROMBOSIS (DVT) OF NON-EXTREMITY VEIN, UNSPECIFIED CHRONICITY: ICD-10-CM

## 2017-08-22 DIAGNOSIS — G45.9 TRANSIENT CEREBRAL ISCHEMIA, UNSPECIFIED TYPE: ICD-10-CM

## 2017-08-22 DIAGNOSIS — Z86.711 HX PULMONARY EMBOLISM: ICD-10-CM

## 2017-08-22 DIAGNOSIS — Z00.00 HEALTHCARE MAINTENANCE: Chronic | ICD-10-CM

## 2017-08-22 DIAGNOSIS — E78.5 DYSLIPIDEMIA: ICD-10-CM

## 2017-08-22 DIAGNOSIS — I25.83 CORONARY ARTERY DISEASE DUE TO LIPID RICH PLAQUE: ICD-10-CM

## 2017-08-22 DIAGNOSIS — I77.9 CAROTID DISEASE, BILATERAL (HCC): ICD-10-CM

## 2017-08-22 DIAGNOSIS — I25.10 CORONARY ARTERY DISEASE DUE TO LIPID RICH PLAQUE: ICD-10-CM

## 2017-08-22 DIAGNOSIS — Z79.01 CHRONIC ANTICOAGULATION: ICD-10-CM

## 2017-08-22 DIAGNOSIS — D12.6 ADENOMATOUS POLYP OF COLON, UNSPECIFIED PART OF COLON: ICD-10-CM

## 2017-08-22 DIAGNOSIS — N40.1 BENIGN NON-NODULAR PROSTATIC HYPERPLASIA WITH LOWER URINARY TRACT SYMPTOMS: ICD-10-CM

## 2017-08-22 DIAGNOSIS — F31.70 BIPOLAR DISORDER IN FULL REMISSION, MOST RECENT EPISODE UNSPECIFIED TYPE (HCC): ICD-10-CM

## 2017-08-22 DIAGNOSIS — E03.9 ACQUIRED HYPOTHYROIDISM: ICD-10-CM

## 2017-08-22 PROCEDURE — 99214 OFFICE O/P EST MOD 30 MIN: CPT | Performed by: INTERNAL MEDICINE

## 2017-08-22 RX ORDER — METHYLPREDNISOLONE 4 MG/1
TABLET ORAL
Qty: 21 TAB | Refills: 0
Start: 2017-08-22 | End: 2018-05-31

## 2017-08-22 ASSESSMENT — PATIENT HEALTH QUESTIONNAIRE - PHQ9: CLINICAL INTERPRETATION OF PHQ2 SCORE: 0

## 2017-08-22 NOTE — PATIENT INSTRUCTIONS
Colon is due 2018  Get pneumovax with flu shot this fall  Get shingles vaccine  Ask Dr Wright about murmur...

## 2017-08-22 NOTE — ASSESSMENT & PLAN NOTE
Nrl stress test 6/17  Patient denies chest pain/pressure with exertion. Denies palpitations, edema, orthopnea, PND, CARBALLO.

## 2017-08-22 NOTE — ASSESSMENT & PLAN NOTE
Weight up 10 lbs in last year.   Sees eye doc/dentist  Planning to be better about exercise- just joined gym  Diet good.

## 2017-08-22 NOTE — MR AVS SNAPSHOT
"        Rene Chan   2017 3:00 PM   Office Visit   MRN: [id number]    Department:  [facility] Med - Internal Med   Dept Phone:  [phone]    Description:  Male : 1950   Provider:  Patricia Damon M.D.           Reason for Visit     Annual Exam           Allergies as of 2017     Allergen Noted Reactions    Penicillins 2008       Childhood; does know what the reaction was      You were diagnosed with     Coronary artery disease due to lipid rich plaque   [9439063]       Dyslipidemia   [849245]       Carotid disease, bilateral (CMS-HCC)   [596435]       Acquired hypothyroidism   [2618427]       Bipolar disorder in full remission, most recent episode unspecified type (CMS-HCC)   [5463837]       Deep vein thrombosis (DVT) of non-extremity vein, unspecified chronicity   [0981837]       Hx pulmonary embolism   [798563]       Healthcare maintenance   [062627]       Chronic anticoagulation   [874470]       Transient cerebral ischemia, unspecified type   [7338921]       Gout of foot, unspecified cause, unspecified chronicity, unspecified laterality   [0950629]       Benign non-nodular prostatic hyperplasia with lower urinary tract symptoms   [9410139]       Adenomatous polyp of colon, unspecified part of colon   [9352874]         Vital Signs     Blood Pressure Pulse Temperature Height Weight Body Mass Index    104/62 mmHg 54 36.7 °C (98.1 °F) 1.727 m (5' 8\") 88.905 kg (196 lb) 29.81 kg/m2    Oxygen Saturation Smoking Status                93% Former Smoker          Basic Information     Date Of Birth Sex Race Ethnicity Preferred Language    1950 Male White Non- English      Your appointments     Oct 17, 2017 10:30 AM   Established Patient with [facility] EXAM 4   [facility] [place] for Heart and Vascular Health  (--)    [address] [facility] [facility] [address]   [phone]            2018  2:20 PM   Established Patient with Patricia Damon M.D.   [facility] / [facility] Med - Internal Medicine (--)    [address]  [address]  [facility] [address]   [phone]           You will be receiving a confirmation call a few days before your appointment from our automated call confirmation system.            Aug 28, 2018  1:00 PM   Established Patient with Patricia Damon M.D.   [facility] / Critical access hospital - Internal Medicine (--)    [address]  [address]  [facility] [id number] [phone]           You will be receiving a confirmation call a few days before your appointment from our automated call confirmation system.              Problem List              ICD-10-CM Priority Class Noted - Resolved    Chronic anticoagulation Z79.01 Medium  11/11/2014 - Present    TIA (transient ischemic attack) G45.9 Medium  5/26/2015 - Present    CAD (coronary artery disease) I25.10 Medium  6/4/2015 - Present    S/P CABG x 3 Z95.1 Medium  6/4/2015 - Present    Dyslipidemia E78.5 Medium  6/24/2015 - Present    Hx pulmonary embolism Z86.711 Medium  6/24/2015 - Present    Deep vein thrombosis (DVT) (CMS-HCC) I82.409 Medium  9/16/2015 - Present    Acquired hypothyroidism E03.9 Low  9/26/2016 - Present    Gout of foot M10.9 Low  9/26/2016 - Present    Adenomatous polyp of colon D12.6 Low  10/6/2016 - Present    Bipolar disorder in full remission (CMS-HCC) F31.70 Low  10/6/2016 - Present    Healthcare maintenance (Chronic) Z00.00 Low  10/6/2016 - Present    Benign non-nodular prostatic hyperplasia with lower urinary tract symptoms N40.1 Low  10/6/2016 - Present    Bilateral impacted cerumen H61.23 Low  2/7/2017 - Present    Carotid disease, bilateral (CMS-HCC) I77.9 Medium  7/13/2017 - Present      Health Maintenance        Date Due Completion Dates    IMM DTaP/Tdap/Td Vaccine (1 - Tdap) 10/15/1968 ---    IMM ZOSTER VACCINE 10/15/2009 ---    IMM INFLUENZA (1) 9/1/2017 10/6/2016, 9/5/2014, 10/10/2011    IMM PNEUMOCOCCAL 65+ (ADULT) LOW/MEDIUM RISK SERIES (2 of 2 - PPSV23) 10/6/2017  10/6/2016    COLONOSCOPY 3/29/2020 3/29/2010            Current Immunizations     13-VALENT PCV PREVNAR 10/6/2016    Influenza TIV (IM) 9/5/2014, 10/10/2011    Influenza Vaccine Adult HD 10/6/2016    Pneumococcal Vaccine (UF)Historical Data 1/10/2007      Below and/or attached are the medications your provider expects you to take. Review all of your home medications and newly ordered medications with your provider and/or pharmacist. Follow medication instructions as directed by your provider and/or pharmacist. Please keep your medication list with you and share with your provider. Update the information when medications are discontinued, doses are changed, or new medications (including over-the-counter products) are added; and carry medication information at all times in the event of emergency situations     Allergies:  PENICILLINS - (reactions not documented)               Medications  Valid as of: August 22, 2017 -  4:12 PM    Generic Name Brand Name Tablet Size Instructions for use    Aspirin (Tab) aspirin 81 MG Take 162 mg by mouth every day. QD        Atorvastatin Calcium (Tab) LIPITOR 80 MG Take 1 Tab by mouth every day.        Cholecalciferol (Cap) Vitamin D 2000 units Take  by mouth.        Divalproex Sodium (Tablet Delayed Response) DEPAKOTE 250 MG TAKE ONE TABLET BY MOUTH TWICE A DAY        Levothyroxine Sodium (Tab) SYNTHROID 75 MCG TAKE ONE TABLET BY MOUTH DAILY        Lisinopril (Tab) PRINIVIL 5 MG Take 1 Tab by mouth every day.        MethylPREDNISolone (Tablet Therapy Pack) MEDROL DOSEPAK 4 MG TAKE 6 TABLETS ON DAY 1 AS DIRECTED ON PACKAGE AND DECREASE BY 1 TAB EACH DAY FOR A TOTAL OF 6 DAYS        Metoprolol Tartrate (Tab) LOPRESSOR 25 MG Take 1 Tab by mouth 2 Times a Day.        Multiple Vitamins-Minerals (Tab) THERAGRAN-M  Take 1 Tab by mouth every day.        Omega-3 Fatty Acids (Cap) OMEGA 3 FA 1000 MG Take 1,400 mg by mouth every day.        Warfarin Sodium (Tab) COUMADIN 7.5 MG Take 1 Tab by  mouth every day.        .                 Medicines prescribed today were sent to:     [facility]/PHARMACY #[id number] - [place], [place] - [address]    [address] [place] [address]    Phone: [phone] Fax: [phone]    Open 24 Hours?: No    [facility] - [place], [place] - [address]    [address] [place] [address]    Phone: [phone] Fax: [phone]    Open 24 Hours?: No      Medication refill instructions:       If your prescription bottle indicates you have medication refills left, it is not necessary to call your provider’s office. Please contact your pharmacy and they will refill your medication.    If your prescription bottle indicates you do not have any refills left, you may request refills at any time through one of the following ways: The online FlowCo system (except Urgent Care), by calling your provider’s office, or by asking your pharmacy to contact your provider’s office with a refill request. Medication refills are processed only during regular business hours and may not be available until the next business day. Your provider may request additional information or to have a follow-up visit with you prior to refilling your medication.   *Please Note: Medication refills are assigned a new Rx number when refilled electronically. Your pharmacy may indicate that no refills were authorized even though a new prescription for the same medication is available at the pharmacy. Please request the medicine by name with the pharmacy before contacting your provider for a refill.        Instructions    Colon is due 2018  Get pneumovax with flu shot this fall  Get shingles vaccine  Ask Dr Wright about murmur...          FlowCo Access Code: [email or web address]  Expires: 9/15/2017  8:34 AM    FlowCo  A secure, online tool to manage your health information     [employer]’s FlowCo® is a secure, online tool that connects you to your personalized health information from the privacy of  your home -- day or night - making it very easy for you to manage your healthcare. Once the activation process is completed, you can even access your medical information using the Focus IP sayda, which is available for free in the Apple Sayda store or Google Play store.     Focus IP provides the following levels of access (as shown below):   My Chart Features   [facility] Primary Care Doctor [facility]  Specialists [facility]  Urgent  Care Non-[facility]  Primary Care  Doctor   Email your healthcare team securely and privately 24/7 X X X    Manage appointments: schedule your next appointment; view details of past/upcoming appointments X      Request prescription refills. X      View recent personal medical records, including lab and immunizations X X X X   View health record, including health history, allergies, medications X X X X   Read reports about your outpatient visits, procedures, consult and ER notes X X X X   See your discharge summary, which is a recap of your hospital and/or ER visit that includes your diagnosis, lab results, and care plan. X X       How to register for Focus IP:  1. Go to  [email or web address].  2. Click on the Sign Up Now box, which takes you to the New Member Sign Up page. You will need to provide the following information:  a. Enter your Focus IP Access Code exactly as it appears at the top of this page. (You will not need to use this code after you’ve completed the sign-up process. If you do not sign up before the expiration date, you must request a new code.)   b. Enter your date of birth.   c. Enter your home email address.   d. Click Submit, and follow the next screen’s instructions.  3. Create a Focus IP ID. This will be your Focus IP login ID and cannot be changed, so think of one that is secure and easy to remember.  4. Create a Focus IP password. You can change your password at any time.  5. Enter your Password Reset Question and Answer. This can be used at a later time if you forget your  password.   6. Enter your e-mail address. This allows you to receive e-mail notifications when new information is available in MyLikes.  7. Click Sign Up. You can now view your health information.    For assistance activating your MyLikes account, call [phone]

## 2017-08-22 NOTE — PROGRESS NOTES
Established Patient    Rene Chan is a 66 y.o. male who presents today with the following:    CC: Here for annual visit. No acute complaints. Review of chronic medical problems. Aware weight is up over last year    HPI:    CAD (coronary artery disease)  Nrl stress test 6/17  Patient denies chest pain/pressure with exertion. Denies palpitations, edema, orthopnea, PND, CARBALLO.      Dyslipidemia  Patient denies any signs/symptoms of cardiovascular disease. Denies chest pain/pressure; denies numbness/weakness or difficulty with speech/swallowing or sudden change in vision.       Carotid disease, bilateral (CMS-HCC)  Patient denies any new neurological symptoms no numbness, weakness, vision, swallow, or speech problems. And is taking meds as prescribed.      Acquired hypothyroidism  Patient is feeling well on their current thyroid dose. The most recent thyroid function tests were normal. No unusual fatigue, skin changes, depression, constipation. Patient is compliant with medication. Takes medication as directed on an empty stomach.        Bipolar disorder in full remission (CMS-HCC)  Stable x years  Mood stable. Patient denies SI/HI thoughts.       Deep vein thrombosis (DVT) (CMS-HCC)  Denies edema    Hx pulmonary embolism  No SOB, CARBALLO    Healthcare maintenance  Weight up 10 lbs in last year.   Sees eye doc/dentist  Planning to be better about exercise- just joined gym  Diet good.     Chronic anticoagulation  No bleeding/bruising    TIA (transient ischemic attack)  Patient denies any new neurological symptoms no numbness, weakness, vision, swallow, or speech problems. And is taking meds as prescribed.      Gout of foot  Rare attacks- last    Benign non-nodular prostatic hyperplasia with lower urinary tract symptoms  No change in urinary symptom.     Adenomatous polyp of colon  No blood or BM changes.        ROS: As per HPI. Additional pertinent symptoms as noted below.    ROS:  Constitutional ROS: No  unexpected change in weight, No weakness, No fatigue  Eye ROS: No recent significant change in vision  Pulmonary ROS: No shortness of breath, No recent change in breathing  Cardiovascular ROS: No chest pain, No edema, No palpitations, No syncope  Gastrointestinal ROS: No abdominal pain, No nausea, vomiting, diarrhea, or constipation  Psychiatric ROS: No depression, No anxiety    Patient Active Problem List    Diagnosis Date Noted   • Carotid disease, bilateral (CMS-HCC) 07/13/2017     Priority: Medium   • Deep vein thrombosis (DVT) (CMS-HCC) 09/16/2015     Priority: Medium   • Dyslipidemia 06/24/2015     Priority: Medium   • Hx pulmonary embolism 06/24/2015     Priority: Medium   • CAD (coronary artery disease) 06/04/2015     Priority: Medium   • S/P CABG x 3 06/04/2015     Priority: Medium   • TIA (transient ischemic attack) 05/26/2015     Priority: Medium   • Chronic anticoagulation 11/11/2014     Priority: Medium   • Bilateral impacted cerumen 02/07/2017     Priority: Low   • Adenomatous polyp of colon 10/06/2016     Priority: Low   • Bipolar disorder in full remission (CMS-HCC) 10/06/2016     Priority: Low   • Healthcare maintenance 10/06/2016     Priority: Low   • Benign non-nodular prostatic hyperplasia with lower urinary tract symptoms 10/06/2016     Priority: Low   • Acquired hypothyroidism 09/26/2016     Priority: Low   • Gout of foot 09/26/2016     Priority: Low       Social History   Substance Use Topics   • Smoking status: Former Smoker -- 2.00 packs/day for 10 years     Types: Cigarettes     Quit date: 01/01/1976   • Smokeless tobacco: Never Used      Comment: quit x 32 yrs; 2ppd x 7 years   • Alcohol Use: No       Current Outpatient Prescriptions   Medication Sig Dispense Refill   • MethylPREDNISolone (MEDROL DOSEPAK) 4 MG Tablet Therapy Pack TAKE 6 TABLETS ON DAY 1 AS DIRECTED ON PACKAGE AND DECREASE BY 1 TAB EACH DAY FOR A TOTAL OF 6 DAYS 21 Tab 0   • metoprolol (LOPRESSOR) 25 MG Tab Take 1 Tab by  "mouth 2 Times a Day. 180 Tab 3   • warfarin (COUMADIN) 7.5 MG Tab Take 1 Tab by mouth every day. 90 Tab 1   • lisinopril (PRINIVIL) 5 MG Tab Take 1 Tab by mouth every day. 90 Tab 3   • atorvastatin (LIPITOR) 80 MG tablet Take 1 Tab by mouth every day. 90 Tab 2   • levothyroxine (SYNTHROID) 75 MCG Tab TAKE ONE TABLET BY MOUTH DAILY 90 Tab 2   • divalproex (DEPAKOTE) 250 MG Tablet Delayed Response TAKE ONE TABLET BY MOUTH TWICE A  Tab 2   • therapeutic multivitamin-minerals (THERAGRAN-M) Tab Take 1 Tab by mouth every day.     • Cholecalciferol (VITAMIN D) 2000 UNITS Cap Take  by mouth.     • docosahexanoic acid (OMEGA 3 FA) 1000 MG CAPS Take 1,400 mg by mouth every day.     • ASPIRIN 81 MG PO TABS Take 162 mg by mouth every day. QD       No current facility-administered medications for this visit.       /62 mmHg  Pulse 54  Temp(Src) 36.7 °C (98.1 °F)  Ht 1.727 m (5' 8\")  Wt 88.905 kg (196 lb)  BMI 29.81 kg/m2  SpO2 93%    Physical Exam  General: Well developed, well nourished male, in no distress.  Eyes: Conjuntiva without any obvious injection or erythema.   Ears- canals and TMs clear  Mouth- mucous membranes moist, no erythema  Cardiovascular: Heart is regular with 2/6 systolic murmur, best along left upper sternal border, no bruits  Lungs: Clear to auscultation bilaterally. No wheezes, rhonci or crackles heard. Respiratory effort is normal.  Abd: Soft, non-tender  Ext: No edema  Other: Patient in and out of chair, on and off exam table without problem or assistance.          Assessment and Plan    Recent labs were reviewed.  Recent imaging was reviewed  Consultant notes since last visit here were reviewed.       1. Coronary artery disease due to lipid rich plaque  No signs/symptoms of CAD. Taking medications as prescribed.   Of note most of the cardiology notes do not discuss a heart murmur. With the exception of one. Patient has a rare flutter sensation without any associated symptoms., But no " exercise intolerance syncope presyncope, shortness of breath, etc. I have asked him to discuss the heart murmur with his cardiologist at his next visit. I don't think anything needs to be done acutely. He has had some trace MR and trace TR in the past on an echocardiogram      2. Dyslipidemia  Lipids are in reasonable range. The patient to continue paying attention to diet and exercise. Patient aware to go to emergency department or call 911 if any signs of cardiovascular disease- chest pain or pressure, sudden numbness or weakness in an arm or leg, sudden loss of ability to talk or swallow.        3. Carotid disease, bilateral (CMS-[facility])  Less than 70% stenosis. Being followed by cardiology    4. Acquired hypothyroidism  Thyroid labs are within range. Patient is taking meds as prescribed first thing in morning without food. And has no signs or symptoms of lower hypothyroid.      5. Bipolar disorder in full remission, most recent episode unspecified type (CMS-HCC)    Has been stable for many years on current meds    6. Deep vein thrombosis (DVT) of non-extremity vein, unspecified chronicity  Trace edema in the right lower extremity but more since his ankle surgery many years ago. Otherwise no calf pain or swelling    7. Hx pulmonary embolism  No shortness of breath or dyspnea on exertion    8. Healthcare maintenance  Routine screening is up-to-date.  Did discuss vaccines.    9. Chronic anticoagulation  No bleeding or bruising    10. Transient cerebral ischemia, unspecified type  Patient has no signs of recent neurovascular event. Risk factors are controlled.        11. Gout of foot, unspecified cause, unspecified chronicity, unspecified laterality  Rare episodes with dietary indiscretion. Often just one of the Medrol tablets resolves symptoms    12. Benign non-nodular prostatic hyperplasia with lower urinary tract symptoms  Patient stopped the Flomax and no change in his symptoms and denies any significant  problems    13. Adenomatous polyp of colon, unspecified part of colon  Colonoscopy due next year. No blood in his stools or change in bowel habits      Return in about 6 months (around 2/22/2018).      Signed by: Patricia Damon M.D.    This note was created using voice recognition software. There may be unintended errors in spelling, grammar or content.

## 2017-10-17 ENCOUNTER — ANTICOAGULATION VISIT (OUTPATIENT)
Dept: VASCULAR LAB | Facility: MEDICAL CENTER | Age: 67
End: 2017-10-17
Attending: INTERNAL MEDICINE
Payer: MEDICARE

## 2017-10-17 VITALS — HEART RATE: 67 BPM | SYSTOLIC BLOOD PRESSURE: 116 MMHG | DIASTOLIC BLOOD PRESSURE: 48 MMHG

## 2017-10-17 DIAGNOSIS — Z79.01 CHRONIC ANTICOAGULATION: ICD-10-CM

## 2017-10-17 LAB — INR PPP: 2.6 (ref 2–3.5)

## 2017-10-17 PROCEDURE — 99211 OFF/OP EST MAY X REQ PHY/QHP: CPT | Mod: 25

## 2017-10-17 PROCEDURE — 90662 IIV NO PRSV INCREASED AG IM: CPT

## 2017-10-17 PROCEDURE — 85610 PROTHROMBIN TIME: CPT

## 2017-10-17 PROCEDURE — 90471 IMMUNIZATION ADMIN: CPT

## 2017-10-17 NOTE — PROGRESS NOTES
Anticoagulation Summary  As of 10/17/2017    INR goal:   2.0-3.0   TTR:   81.1 % (2.3 y)   Today's INR:   2.6   Maintenance plan:   3.75 mg (7.5 mg x 0.5) on Wed; 7.5 mg (7.5 mg x 1) all other days   Weekly total:   48.75 mg   Plan last modified:   Shweta Serna A.P.NPaul (2/28/2017)   Next INR check:   1/9/2018   Target end date:   Indefinite    Indications    DVT (deep venous thrombosis) (CMS-HCC) (Resolved) [I82.409]  Pulmonary embolism [415.19] (Resolved) [I26.99]  Deep vein thrombosis (DVT) (CMS-HCC) [I82.409]             Anticoagulation Episode Summary     IN[facility] check location:   [facility]    Preferred lab:   [facility]    Send INR reminders to:       Comments:         Anticoagulation Care Providers     Provider Role Specialty Phone number    Patricia Damon M.D. Referring Internal Medicine [phone]    Jona QuinnD Responsible      [facility] Responsible  [phone]        Anticoagulation Patient Findings      HPI:  Rene Chan seen in clinic today, on anticoagulation therapy with warfarin for DVT.   Changes to current medical/health status since last appt: none.   Denies signs/symptoms of bleeding and/or thrombosis since the last appt.    Denies any interval changes to diet  Denies any interval changes to medications since last appt.   Denies any complications or cost restrictions with current therapy.   BP recorded in vitals.  Confirmed dosing regimen.   Pt consented to receiving immunization today.     A/P   INR  therapeutic.   Pt is to continue with current warfarin dosing regimen.     Follow up appointment in 12 week(s).    Hong Brooks, JonaD

## 2017-10-18 LAB — INR BLD: 2.6 (ref 0.9–1.2)

## 2017-11-10 RX ORDER — ATORVASTATIN CALCIUM 80 MG/1
80 TABLET, FILM COATED ORAL DAILY
Qty: 90 TAB | Refills: 0 | Status: SHIPPED | OUTPATIENT
Start: 2017-11-10 | End: 2018-02-09 | Stop reason: SDUPTHER

## 2017-11-10 NOTE — TELEPHONE ENCOUNTER
Last seen: 08/22/17 by Dr. Damon  Next appt: 02/27/18 with Dr. Damon    Was the patient seen in the last year in this department? Yes   Does patient have an active prescription for medications requested? No   Received Request Via: Pharmacy

## 2017-11-15 DIAGNOSIS — Z86.718 HISTORY OF DVT (DEEP VEIN THROMBOSIS): ICD-10-CM

## 2018-01-09 ENCOUNTER — ANTICOAGULATION VISIT (OUTPATIENT)
Dept: VASCULAR LAB | Facility: MEDICAL CENTER | Age: 68
End: 2018-01-09
Attending: INTERNAL MEDICINE
Payer: MEDICARE

## 2018-01-09 VITALS — DIASTOLIC BLOOD PRESSURE: 56 MMHG | SYSTOLIC BLOOD PRESSURE: 126 MMHG | HEART RATE: 64 BPM

## 2018-01-09 DIAGNOSIS — Z86.718 HISTORY OF DVT (DEEP VEIN THROMBOSIS): ICD-10-CM

## 2018-01-09 DIAGNOSIS — I82.409 ACUTE DEEP VEIN THROMBOSIS (DVT) OF LOWER EXTREMITY, UNSPECIFIED LATERALITY, UNSPECIFIED VEIN (HCC): ICD-10-CM

## 2018-01-09 LAB — INR PPP: 2.7 (ref 2–3.5)

## 2018-01-09 PROCEDURE — 85610 PROTHROMBIN TIME: CPT

## 2018-01-09 PROCEDURE — 99211 OFF/OP EST MAY X REQ PHY/QHP: CPT

## 2018-01-09 RX ORDER — WARFARIN SODIUM 7.5 MG/1
TABLET ORAL
Qty: 90 TAB | Refills: 1 | Status: SHIPPED | OUTPATIENT
Start: 2018-01-09 | End: 2018-05-30 | Stop reason: SDUPTHER

## 2018-01-09 NOTE — PROGRESS NOTES
Anticoagulation Summary  As of 1/9/2018    INR goal:   2.0-3.0   TTR:   82.8 % (2.6 y)   Today's INR:   2.7   Maintenance plan:   3.75 mg (7.5 mg x 0.5) on Wed; 7.5 mg (7.5 mg x 1) all other days   Weekly total:   48.75 mg   Plan last modified:   Shweta Serna A.P.NPaul (2/28/2017)   Next INR check:   4/3/2018   Target end date:   Indefinite    Indications    DVT (deep venous thrombosis) (CMS-HCC) (Resolved) [I82.409]  Pulmonary embolism [415.19] (Resolved) [I26.99]  Deep vein thrombosis (DVT) (CMS-HCC) [I82.409]             Anticoagulation Episode Summary     [facility] check location[facility]   [facility]    Preferred lab:   [facility]    Send INR reminders to:       Comments:         Anticoagulation Care Providers     Provider Role Specialty Phone number    Patricia Damon M.D. Referring Internal Medicine [phone]    Jona QuinnD Responsible      [facility]tico[facility] Responsible  [phone]        Anticoagulation Patient Findings      HPI:  Rene Chan seen in clinic today, on anticoagulation therapy with warfarin for DVT.   Changes to current medical/health status since last appt: none.   Denies signs/symptoms of bleeding and/or thrombosis since the last appt.    Denies any interval changes to diet  Denies any interval changes to medications since last appt.   Denies any complications or cost restrictions with current therapy.   BP recorded in vitals.  Confirmed dosing regimen.     A/P   INR  therapeutic.   Pt is to continue with current warfarin dosing regimen.     Follow up appointment in 12 week(s).    Hong Brooks, JonaD

## 2018-01-10 LAB — INR BLD: 2.7 (ref 0.9–1.2)

## 2018-02-09 RX ORDER — ATORVASTATIN CALCIUM 80 MG/1
TABLET, FILM COATED ORAL
Qty: 90 TAB | Refills: 0 | Status: SHIPPED | OUTPATIENT
Start: 2018-02-09 | End: 2018-05-07 | Stop reason: SDUPTHER

## 2018-03-12 ENCOUNTER — OFFICE VISIT (OUTPATIENT)
Dept: INTERNAL MEDICINE | Facility: MEDICAL CENTER | Age: 68
End: 2018-03-12
Payer: MEDICARE

## 2018-03-12 VITALS
SYSTOLIC BLOOD PRESSURE: 114 MMHG | DIASTOLIC BLOOD PRESSURE: 66 MMHG | BODY MASS INDEX: 29.12 KG/M2 | HEART RATE: 54 BPM | TEMPERATURE: 97.2 F | WEIGHT: 192.13 LBS | OXYGEN SATURATION: 96 % | HEIGHT: 68 IN

## 2018-03-12 DIAGNOSIS — L57.0 ACTINIC KERATOSES: ICD-10-CM

## 2018-03-12 DIAGNOSIS — R01.1 MURMUR: ICD-10-CM

## 2018-03-12 DIAGNOSIS — Z79.01 CHRONIC ANTICOAGULATION: ICD-10-CM

## 2018-03-12 DIAGNOSIS — E03.9 ACQUIRED HYPOTHYROIDISM: ICD-10-CM

## 2018-03-12 DIAGNOSIS — Z23 NEED FOR PNEUMOCOCCAL VACCINATION: ICD-10-CM

## 2018-03-12 DIAGNOSIS — I25.10 CORONARY ARTERY DISEASE INVOLVING NATIVE CORONARY ARTERY OF NATIVE HEART WITHOUT ANGINA PECTORIS: ICD-10-CM

## 2018-03-12 DIAGNOSIS — Z13.6 SCREENING FOR AAA (ABDOMINAL AORTIC ANEURYSM): ICD-10-CM

## 2018-03-12 DIAGNOSIS — F31.70 BIPOLAR DISORDER IN FULL REMISSION, MOST RECENT EPISODE UNSPECIFIED TYPE (HCC): ICD-10-CM

## 2018-03-12 DIAGNOSIS — M10.071 IDIOPATHIC GOUT OF RIGHT FOOT, UNSPECIFIED CHRONICITY: ICD-10-CM

## 2018-03-12 DIAGNOSIS — D12.6 ADENOMATOUS POLYP OF COLON, UNSPECIFIED PART OF COLON: ICD-10-CM

## 2018-03-12 DIAGNOSIS — I82.401 DEEP VEIN THROMBOSIS (DVT) OF RIGHT LOWER EXTREMITY, UNSPECIFIED CHRONICITY, UNSPECIFIED VEIN (HCC): ICD-10-CM

## 2018-03-12 DIAGNOSIS — N40.1 BENIGN NON-NODULAR PROSTATIC HYPERPLASIA WITH LOWER URINARY TRACT SYMPTOMS: ICD-10-CM

## 2018-03-12 PROCEDURE — 90732 PPSV23 VACC 2 YRS+ SUBQ/IM: CPT | Performed by: INTERNAL MEDICINE

## 2018-03-12 PROCEDURE — G0009 ADMIN PNEUMOCOCCAL VACCINE: HCPCS | Performed by: INTERNAL MEDICINE

## 2018-03-12 PROCEDURE — 99214 OFFICE O/P EST MOD 30 MIN: CPT | Mod: 25 | Performed by: INTERNAL MEDICINE

## 2018-03-12 NOTE — PATIENT INSTRUCTIONS
I recommend the Shingrix vaccine series to prevent shingles.     I recommend a TDAP (tetanus, diptheria and whooping cough/pertussis vaccine).    Please get the vaccines at a local pharmacy.

## 2018-03-12 NOTE — NON-PROVIDER
Rene Chan is a 67 y.o. male here for a non-provider visit for:   PNEUMOVAX (PPSV23)    Reason for immunization: Overdue/Provider Recommended  Immunization records indicate need for vaccine: Yes, confirmed with Epic  Minimum interval has been met for this vaccine: Yes  ABN completed: Not Indicated    Order and dose verified by: GRISEL MANTILLA Dated  04/24/15 was given to patient: Yes  IAC Questionnaire abnormal. Questionnaire reviewed and administration of injection approved by provider: Dr Thornton    Patient tolerated injection and no adverse effects were observed or reported: Yes    Pt scheduled for next dose in series: Not Indicated

## 2018-03-12 NOTE — PROGRESS NOTES
New Patient to Establish    Reason to establish: New patient to establish    CC: establish care  Chief Complaint   Patient presents with   • Establish Care   • Heart Murmur         HPI:   History was obtained from the patient and a medical record review.     This is 67-year-old male with multiple past medical history including coronary artery disease status post CABG, pulmonary embolism, DVTs, on chronic anticoagulation, and acquired hypothyroidism who is here to establish care. Patient is independent of ADLs and IADLs. No history of falls. He lives with the wife at home.    Today patient has no specific complaints, denies chest pain, shortness of breath, fatigue, orthopnea, paroxysmal nocturnal dyspnea, and palpitations. He  did admit to lower extremity edema which is chronic, long-standing, he uses compression stockings which slightly improves the swelling. Lower extremity edema usually improves in the morning,  increase as  day goes by, and also worse on prolonged sitting. 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  There hasn't been any lower extremity pain, or change in color.     Patient has history of coronary artery disease status post CABG ×3. He is currently on appropriate medications. He routinely sees his cardiologist.  Patient also has a history of DVTs and pulmonary embolism. This occurred about 11 years ago and he attributed this to prolonged sitting when he lost his job at the time. The DVT was in right lower extremity and subsequent PE. He was on anticoagulation for appropriate amount of time and discontinued it. He later developed right upper extremity DVT and was restarted on anticoagulation. He's been on anticoagulation for the past 11 years. His INR has been stable and as a result the routine check of INR is 8 weeks. The last INR was in January 2018  and it was in therapeutic range. Next INR check  Is in 2 weeks. Patient denies history of easy bruisability or bleeding.  Patient also has history of bipolar disorder which is currently stable. He is on divalproex.    ROS:  A complete 14-system review of systems was obtained and is otherwise negative except as stated in the history of present illness, below, and/or the pre-visit questionnaire.      Constitutional: No fever, no chills,  Respiratory: No cough, no hemoptysis,  Cardiovascular: No chest pain, no palpitations, no orthopnea, no PND, + lower extremity swelling  GI  : No nausea, no vomiting, no abdominal pain, no diarrhea, no constipation, no hematochezia  : No dysuria, no urgency, no hesitancy, no nocturia, no frequency, no hematuria  Endocrine: No polyuria, no polydipsia,  Hematology: No easy bruisability, no bleeding  Musculoskeletal: No joint swelling, no joint redness,  Neuro: No seizures, no numbness, no tingling sensation, no extremity weakness, no change in vision, no hearing impairment  Psychiatry: no depression, no suicidal ideation        Past Medical History:   Diagnosis Date   • Anticoagulation monitoring, special range    • Arrhythmia 2011    tia   • Bipolar disorder (CMS-HCC)    • CAD (coronary artery disease) 6/4/2015   • CAD (coronary artery disease)    • Carotid artery occlusion     L 60-79% occluded; R 59% occluded per pt report; 2011   • Clotting disorder (CMS-[facility])     protein S elevation in '12 with recurrent DVT and PE-coumadin lifelong   • Colon polyps    • Diabetes mellitus, type II (CMS-[facility])    • Gout    • Hammer toe    • Hyperlipidemia    • Hypertension    • Hypothyroidism    • Impaired fasting glucose    • Psychiatric disorder     bipolar       Current Outpatient Prescriptions   Medication Sig Dispense Refill   • atorvastatin (LIPITOR) 80 MG tablet TAKE 1 TABLET BY MOUTH EVERY DAY 90 Tab 0   • warfarin (COUMADIN) 7.5 MG Tab TAKE ONE-HALF OR ONE TABLET BY MOUTH DAILY AS  INSTRUCTED. 90 Tab 1   • levothyroxine (SYNTHROID) 75 MCG Tab TAKE ONE TABLET BY MOUTH DAILY 90 Tab 2   • divalproex (DEPAKOTE) 250 MG Tablet Delayed Response TAKE ONE TABLET BY MOUTH TWICE A  Tab 1   • MethylPREDNISolone (MEDROL DOSEPAK) 4 MG Tablet Therapy Pack TAKE 6 TABLETS ON DAY 1 AS DIRECTED ON PACKAGE AND DECREASE BY 1 TAB EACH DAY FOR A TOTAL OF 6 DAYS 21 Tab 0   • metoprolol (LOPRESSOR) 25 MG Tab Take 1 Tab by mouth 2 Times a Day. 180 Tab 3   • lisinopril (PRINIVIL) 5 MG Tab Take 1 Tab by mouth every day. 90 Tab 3   • therapeutic multivitamin-minerals (THERAGRAN-M) Tab Take 1 Tab by mouth every day.     • Cholecalciferol (VITAMIN D) 2000 UNITS Cap Take  by mouth.     • docosahexanoic acid (OMEGA 3 FA) 1000 MG CAPS Take 1,400 mg by mouth every day.     • ASPIRIN 81 MG PO TABS Take 162 mg by mouth every day. QD       No current facility-administered medications for this visit.        Allergies as of 03/12/2018 - Reviewed 03/12/2018   Allergen Reaction Noted   • Penicillins  07/08/2008       Social History     Social History   • Marital status:      Spouse name: N/A   • Number of children: N/A   • Years of education: N/A     Occupational History   • Not on file.     Social History Main Topics   • Smoking status: Former Smoker     Packs/day: 2.00     Years: 10.00     Types: Cigarettes     Quit date: 1/1/1976   • Smokeless tobacco: Never Used      Comment: quit x 32 yrs; 2ppd x 7 years   • Alcohol use No   • Drug use: No      Comment: IV drug use in teens   • Sexual activity: Not on file     Other Topics Concern   • Not on file     Social History Narrative    Lives with wife    Retired cam    Does security part time    ADLs and IADLs intact       Family History   Problem Relation Age of Onset   • Heart Disease Sister 50   • Stroke Sister 55     CVA   • Heart Failure Father        Past Surgical History:   Procedure Laterality Date   • MULTIPLE CORONARY ARTERY BYPASS ENDO VEIN HARVEST   "6/4/2015    Procedure: MULTIPLE CORONARY ARTERY BYPASS Grafting  x 3   ENDO VEIN HARVEST right leg;  Surgeon: Christophe Lemus M.D.;  Location: SURGERY [facility];  Service:    • RECOVERY  6/1/2015    Procedure:  CATH LAB  [facility] w/POSS MARKIE ICD; 794.31;  Surgeon: Recoveryonly Surgery;  Location: SURGERY PRE-POST PROC UNIT [facility];  Service:    • OTHER ORTHOPEDIC SURGERY  1976    L wrist repair    • APPENDECTOMY     • OTHER ABDOMINAL SURGERY      umbilical hernia repair 2000, appy age 13         /65   Pulse (!) 54   Temp 36.2 °C (97.2 °F)   Ht 1.727 m (5' 8\")   Wt 87.1 kg (192 lb 2 oz)   SpO2 96%   BMI 29.21 kg/m²     Physical Exam  General:Elderly male, not in distress,   HEENT: Normocephalic, atraumatic, no jaundice or scleral icterus, no pallor, No cervical lymphadenopathy, no thyromegaly,  No tonsillar exudate, no throat erythyema,  PERLL, EOMI,  Respiratory:lungs clear to auscultation b/l, breath sounds vesicular, air entry adequate b/l,no wheezing, rales or crackles  Cardiac:Regular, rate and rhythm, , S1/S2 present, systolic murmur in aortic area  GI: abdomen non distended, soft, non tender, no organomegaly, bowel sounds normoactive  Neuro: AAOX4,   Msk/Extremities: radial, dorsalis pedis pulses 2+b/l, no joint swelling or redness, ROM intact, 2+ b/l lower  extremity edema  Skin: scaly rash in sun exposed areas and face    Note: I have reviewed all pertinent labs and diagnostic tests associated with this visit with specific comments listed under the assessment and plan below    Anticoagulation Visit on 01/09/2018   Component Date Value Ref Range Status   • INR 01/09/2018 2.7   Final   • POC INR 01/09/2018 2.7* 0.9 - 1.2 Final    Comment: INR - Non-therapeutic Reference Range: 0.9-1.2  INR - Therapeutic Reference Range: 2.0-4.0     Anticoagulation Visit on 10/17/2017   Component Date Value Ref Range Status   • INR 10/17/2017 2.6   Final   • POC INR 10/17/2017 2.6* 0.9 - 1.2 Final    Comment: " INR - Non-therapeutic Reference Range: 0.9-1.2  INR - Therapeutic Reference Range: 2.0-4.0     Hospital Outpatient Visit on 08/16/2017   Component Date Value Ref Range Status   • Sodium 08/16/2017 139  135 - 145 mmol/L Final   • Potassium 08/16/2017 4.4  3.6 - 5.5 mmol/L Final   • Chloride 08/16/2017 106  96 - 112 mmol/L Final   • Co2 08/16/2017 26  20 - 33 mmol/L Final   • Anion Gap 08/16/2017 7.0  0.0 - 11.9 Final   • Glucose 08/16/2017 98  65 - 99 mg/dL Final   • Bun 08/16/2017 16  8 - 22 mg/dL Final   • Creatinine 08/16/2017 0.76  0.50 - 1.40 mg/dL Final   • Calcium 08/16/2017 9.1  8.5 - 10.5 mg/dL Final   • AST(SGOT) 08/16/2017 15  12 - 45 U/L Final   • ALT(SGPT) 08/16/2017 15  2 - 50 U/L Final   • Alkaline Phosphatase 08/16/2017 37  30 - 99 U/L Final   • Total Bilirubin 08/16/2017 1.0  0.1 - 1.5 mg/dL Final   • Albumin 08/16/2017 3.5  3.2 - 4.9 g/dL Final   • Total Protein 08/16/2017 6.2  6.0 - 8.2 g/dL Final   • Globulin 08/16/2017 2.7  1.9 - 3.5 g/dL Final   • A-G Ratio 08/16/2017 1.3  g/dL Final   • Cholesterol,Tot 08/16/2017 133  100 - 199 mg/dL Final   • Triglycerides 08/16/2017 163* 0 - 149 mg/dL Final   • HDL 08/16/2017 34* >=40 mg/dL Final   • LDL 08/16/2017 66  <100 mg/dL Final   • WBC 08/16/2017 7.3  4.8 - 10.8 K/uL Final   • RBC 08/16/2017 4.88  4.70 - 6.10 M/uL Final   • Hemoglobin 08/16/2017 15.3  14.0 - 18.0 g/dL Final   • Hematocrit 08/16/2017 46.1  42.0 - 52.0 % Final   • MCV 08/16/2017 94.5  81.4 - 97.8 fL Final   • MCH 08/16/2017 31.4  27.0 - 33.0 pg Final   • MCHC 08/16/2017 33.2* 33.7 - 35.3 g/dL Final   • RDW 08/16/2017 46.2  35.9 - 50.0 fL Final   • Platelet Count 08/16/2017 242  164 - 446 K/uL Final   • MPV 08/16/2017 11.6  9.0 - 12.9 fL Final   • Neutrophils-Polys 08/16/2017 55.70  44.00 - 72.00 % Final   • Lymphocytes 08/16/2017 29.30  22.00 - 41.00 % Final   • Monocytes 08/16/2017 11.40  0.00 - 13.40 % Final   • Eosinophils 08/16/2017 2.20  0.00 - 6.90 % Final   • Basophils 08/16/2017  1.00  0.00 - 1.80 % Final   • Immature Granulocytes 08/16/2017 0.40  0.00 - 0.90 % Final   • Nucleated RBC 08/16/2017 0.00  /100 WBC Final   • Neutrophils (Absolute) 08/16/2017 4.07  1.82 - 7.42 K/uL Final   • Lymphs (Absolute) 08/16/2017 2.14  1.00 - 4.80 K/uL Final   • Monos (Absolute) 08/16/2017 0.83  0.00 - 0.85 K/uL Final   • Eos (Absolute) 08/16/2017 0.16  0.00 - 0.51 K/uL Final   • Baso (Absolute) 08/16/2017 0.07  0.00 - 0.12 K/uL Final   • Immature Granulocytes (abs) 08/16/2017 0.03  0.00 - 0.11 K/uL Final   • NRBC (Absolute) 08/16/2017 0.00  K/uL Final   • TSH 08/16/2017 2.500  0.300 - 3.700 uIU/mL Final   • GFR If  08/16/2017 >60  >60 mL/min/1.73 m 2 Final   • GFR If Non  08/16/2017 >60  >60 mL/min/1.73 m 2 Final   Anticoagulation Visit on 07/25/2017   Component Date Value Ref Range Status   • INR 07/25/2017 2.5   Final   • POC INR 07/25/2017 2.5* 0.9 - 1.2 Final    Comment: INR - Non-therapeutic Reference Range: 0.9-1.2  INR - Therapeutic Reference Range: 2.0-4.0     Anticoagulation Visit on 05/23/2017   Component Date Value Ref Range Status   • INR 05/23/2017 2.3   Final   • POC INR 05/23/2017 2.3* 0.9 - 1.2 Final    Comment: INR - Non-therapeutic Reference Range: 0.9-1.2  INR - Therapeutic Reference Range: 2.0-4.0     Anticoagulation Visit on 03/28/2017   Component Date Value Ref Range Status   • INR 03/28/2017 2.3   Final   • POC INR 03/28/2017 2.3* 0.9 - 1.2 Final    Comment: INR - Non-therapeutic Reference Range: 0.9-1.2  INR - Therapeutic Reference Range: 2.0-4.0       Carotid US July 2017  CONCLUSIONS   Moderate bilateral internal carotid artery stenosis (50-69%).    Bilateral subclavian and vertebral artery waveforms are antegrade and    waveforms are normal in character and velocity.     Assessment and Plan   67-year-old male with multiple past medical history including coronary artery disease status post CABG, pulmonary embolism, DVTs, on chronic anticoagulation,  and acquired hypothyroidism who is here to establish care, and  independent of ADLs and IADLs w/ no  history of falls.    1. Deep vein thrombosis (DVT) of right lower extremity, unspecified chronicity, unspecified vein (CMS-HCC)  2. Hx of PE  3. Chronic anticoagulation  Has been on warfarin for past 11 yrs, no hx of bleeding  INR has been stable and patient is on 8 week INR schedule  -next INR in 2 weeks      4. Coronary artery disease involving native coronary artery of native heart without angina pectoris    Bradycardia, asymptomatic  No chest pain, palpitations.  He has lower extremity swelling, which is chronic and he uses compression stockings. Wores on prolong standing and sitting. Lungs exam clear  He has systolic ejection in right sternal area, grade 3/6  -continue lisinopril, Lipitor, ASA, BB  -Will do echo to assess the murmur(last echo was 2012)  -will monitor HR and decrease BB if symptomatic  -to continue cardiology follow up  -[facility]    4. Bipolar disorder in full remission, most recent episode unspecified type (CMS-HCC)  Currently asymptomatioc  Continue divalproex  -will check levels of valproic acid     5. Idiopathic gout of right foot, unspecified chronicity  Currently asymptomatic. No evidence of tophi formation  Patient is currently not on colchicine, or allopurinol.  He takes methylprednisolone during flares. Last flare was one year ago  -Will do uric acid level    6. Acquired hypothyroidism  Currently asymptomatic and will continue Synthroid    7. Benign non-nodular prostatic hyperplasia with lower urinary tract symptoms  Patient denies obstructive voiding or irritative urinary symptoms  We'll continue to monitor    8.  transient cerebral ischemias  Currently no residual symptoms. And we'll continue aspirin.    9. Adenomatous polyp of colon, unspecified part of colon  Last colonoscopy was in 2015,  Patient is due for colonoscopy this year, and will be referred to GI next visit    10. Actinic  keratoses  Multiple scaly rashes in sun exposed areas of faces  Referral to dermatology next visit    11. Need for vaccination  Patient received PPSV- 23 vaccine today    12. Healthcare maintenance  He is up-to-date with flu shot, pneumonia vaccines,   Encouraged to get shingrix and Tdap at pharmacy  Colonoscopy referral next visit  Dermatology referral next visit if he has not been able to get himself in, as he did state he will   AAA screen    Followup: No Follow-up on file.  To see Lefty to est next available  Cj Thornton did original note.   Signed by: Ernestine Rm M.D.

## 2018-04-02 ENCOUNTER — HOSPITAL ENCOUNTER (OUTPATIENT)
Dept: CARDIOLOGY | Facility: MEDICAL CENTER | Age: 68
End: 2018-04-02
Attending: STUDENT IN AN ORGANIZED HEALTH CARE EDUCATION/TRAINING PROGRAM
Payer: MEDICARE

## 2018-04-02 LAB
LV EJECT FRACT  99904: 60
LV EJECT FRACT MOD 2C 99903: 65.42
LV EJECT FRACT MOD 4C 99902: 58.68
LV EJECT FRACT MOD BP 99901: 62.38

## 2018-04-02 PROCEDURE — 93306 TTE W/DOPPLER COMPLETE: CPT

## 2018-04-03 ENCOUNTER — ANTICOAGULATION VISIT (OUTPATIENT)
Dept: VASCULAR LAB | Facility: MEDICAL CENTER | Age: 68
End: 2018-04-03
Attending: INTERNAL MEDICINE
Payer: MEDICARE

## 2018-04-03 DIAGNOSIS — Z86.718 HISTORY OF DVT (DEEP VEIN THROMBOSIS): ICD-10-CM

## 2018-04-03 LAB
INR BLD: 3.7 (ref 0.9–1.2)
INR PPP: 3.7 (ref 2–3.5)

## 2018-04-03 PROCEDURE — 99212 OFFICE O/P EST SF 10 MIN: CPT

## 2018-04-03 PROCEDURE — 85610 PROTHROMBIN TIME: CPT

## 2018-04-03 NOTE — PROGRESS NOTES
OP Anticoagulation Service Note    Date: 4/3/2018  There were no vitals filed for this visit.    Anticoagulation Summary  As of 4/3/2018    INR goal:   2.0-3.0   TTR:   78.5 % (2.8 y)   Today's INR:   3.7!   Maintenance plan:   3.75 mg (7.5 mg x 0.5) on Wed; 7.5 mg (7.5 mg x 1) all other days   Weekly total:   48.75 mg   Plan last modified:   Shweta Serna APaulP.NPaul (2/28/2017)   Next INR check:   5/15/2018   Target end date:   Indefinite    Indications    DVT (deep venous thrombosis) (CMS-HCC) (Resolved) [I82.409]  Pulmonary embolism [415.19] (Resolved) [I26.99]  Deep vein thrombosis (DVT) (CMS-HCC) [I82.409]             Anticoagulation Episode Summary     INR check location:   [facility]    Preferred lab:   [facility]    Send INR reminders to:       Comments:         Anticoagulation Care Providers     Provider Role Specialty Phone number    Patricia Damon M.D. Referring Internal Medicine [phone]    Jona QuinnD Responsible      [facility] Responsible  [phone]        Anticoagulation Patient Findings      HPI:   Rene Chan seen in clinic today, they are here today for a INR check on anticoagulation therapy with warfarin because they have a pmh of a dvt    The reason for today's visit is to prevent morbidity and mortality from a blood clot  and to reduce the risk of bleeding while on a anticoagulant.     Reason for today's visit (per our collaborative practice policy) is because their last INR was 2.7  on  1/9.   Intervention at the last visit:  None needed     Additional education provided today regarding reducing bleed risk and dietary constraints:  About bleed risk     Any upcoming procedures:   none    Confirmed warfarin dosing regimen  Interval Changes with foods rich in vitamin K: No  Interval Changes in ETOH:   No  Interval Changes in smoking status:  No  Interval Changes in medication:  No   Cost restriction:  No    S/S of bleeding or  bruising:  No  Signs/symptoms  thrombosis since the last appt:  No  Bleed risk is:  moderate,     3 vitals included with today's appt :No  (BP, HR, weight, ht, RR)     Assessment:   INR  supra-therapeutic.     Possible reason(s) for INR today:   unknown, he might be eating less greens.     Intervention required today to prevent:    They are at a increased risk of bleeding because INR above goal.    They have a TTR of 78.5  which is not at target (TTR target/goal is 100%) and requires close follow up to prevent a adverse event (the lower the TTR the higher risk of clots, strokes, or bleeding).       Plan:  Hold tonight then continue weekly warfarin dose as noted      Follow up:  Follow up appointment in 6 week(s)       Other info:  Pt educated to contact our clinic with any changes in medications or s/s of bleeding or thrombosis    CHEST guidelines recommend frequent INR monitoring at regular intervals (a few days up to a max of 12 weeks) to ensure they are on the proper dose of warfarin and not having any complications from therapy.  INRs can dramatically change over a short time period due to diet, medications, and medical conditions.

## 2018-04-04 ENCOUNTER — PATIENT OUTREACH (OUTPATIENT)
Dept: HEALTH INFORMATION MANAGEMENT | Facility: OTHER | Age: 68
End: 2018-04-04

## 2018-04-04 NOTE — PROGRESS NOTES
Attempt #:1    WebIZ Checked & Epic Updated: yes  HealthConnect Verified: yes  Verify PCP: yes    Communication Preference Obtained: yes     Review Care Team: yes    Annual Wellness Visit Scheduling  1. Scheduling Status: wants to discuss awv with pcp        Care Gap Scheduling (Attempt to Schedule EACH Overdue Care Gap!)  Health Maintenance Due   Topic Date Due   • Annual Wellness Visit  1950   • IMM DTaP/Tdap/Td Vaccine (1 - Tdap) 10/15/1968   • IMM ZOSTER VACCINE  10/15/2009         Prolify Activation: already active  Prolify Sayda: no  Virtual Visits: no  Opt In to Text Messages: no

## 2018-04-19 NOTE — PROGRESS NOTES
Outcome: Left Message    Please transfer to Patient Outreach Team at [phone] when patient returns call.    Attempt # 2

## 2018-04-24 ENCOUNTER — OFFICE VISIT (OUTPATIENT)
Dept: CARDIOLOGY | Facility: MEDICAL CENTER | Age: 68
End: 2018-04-24
Payer: MEDICARE

## 2018-04-24 VITALS
DIASTOLIC BLOOD PRESSURE: 70 MMHG | WEIGHT: 203 LBS | BODY MASS INDEX: 30.77 KG/M2 | HEIGHT: 68 IN | SYSTOLIC BLOOD PRESSURE: 110 MMHG | OXYGEN SATURATION: 94 % | HEART RATE: 68 BPM

## 2018-04-24 DIAGNOSIS — Z86.711 HX PULMONARY EMBOLISM: ICD-10-CM

## 2018-04-24 DIAGNOSIS — I35.0 MILD AORTIC STENOSIS: Chronic | ICD-10-CM

## 2018-04-24 DIAGNOSIS — Z79.01 CHRONIC ANTICOAGULATION: ICD-10-CM

## 2018-04-24 DIAGNOSIS — Z95.1 S/P CABG X 3: ICD-10-CM

## 2018-04-24 DIAGNOSIS — I25.83 CORONARY ARTERY DISEASE DUE TO LIPID RICH PLAQUE: ICD-10-CM

## 2018-04-24 DIAGNOSIS — I25.10 CORONARY ARTERY DISEASE DUE TO LIPID RICH PLAQUE: ICD-10-CM

## 2018-04-24 DIAGNOSIS — G45.8 OTHER SPECIFIED TRANSIENT CEREBRAL ISCHEMIAS: ICD-10-CM

## 2018-04-24 PROCEDURE — 99203 OFFICE O/P NEW LOW 30 MIN: CPT | Performed by: INTERNAL MEDICINE

## 2018-04-24 NOTE — PATIENT INSTRUCTIONS
Please look into the following diets and incorporate them into your diet    FOR TREATMENT OR PREVENTION OF CORONARY ARTERY DISEASE    [facility]    Dr Russell - [place] over [place] (book and documentary)    Dr Manny Rodriguez's Cardiologist    FOR TREATMENT OF BLOOD PRESSURE  DASH DIET - American Heart Association for treatment of HYPERTENSION    KEEP YOUR SODIUM EQUAL TO CALORIES AND NO MORE THAN DOUBLE THE CALORIES FOR A LOW SALT DIET    FOR TREATMENT OF BAD CHOLESTEROL/FATS  REDUCE PROCESSED SUGAR AS MUCH AS POSSIBLE  INCREASE WHOLE GRAINS/VEGETABLES    Lowering total cholesterol and LDL (bad) cholesterol:  - Eat leaner cuts of meat, or eliminate altogether if possible red meat, and frequently substitute fish or chicken.  - Limit saturated fat to no more than 7-10% of total calories no more than 10 g per day is recommended. Some sources of saturated fat include butter, animal fats, hydrogenated vegetable fats and oils, many desserts, whole milk dairy products.  - Replaced saturated fats with polyunsaturated fats and monounsaturated fats. Foods high in monounsaturated fat include nuts, although well, canola oil, avocados, and olives  Limited transfer at (count and processed foods) and replaced with fresh fruits and vegetables  - Recommend nonfat dairy products  - Increase substantially the amount of soluble fiber intake (, legumes such as beans, fruit whole grains.  - Consider nutritional supplements: plant sterile spreads such as Benecol, fish oil,  flaxseed oil, omega-3 acids capsules 1000 mg twice a day,  or viscous fiber such as Metamucil  - Attain ideal weight and regular exercise (at least 30 minutes per day of walking)    Lowering triglycerides:  - Reduce intake of simple sugar: Desserts, candy, pastries, honey, sodas, sugared cereals, yogurt, Gatorade, sports bars, canned fruit, smoothies, fruit juice, coffee drinks  - Reduced intake of refined starches:) Refined  Posta  - Reduce or abstain from alcohol  - Increase omega-3 fatty acids: [place], [place]out, Mackerel, [place], [place] tuna and supplements  - Attain ideal weight and regular exercise (at least 30 minutes per day of walking)      Elevating HDL (good) cholesterol:  - Increase physical activity  - Seasoned foods with garlic and onions  - Increase omega-3 fatty acids and supplements as listed above  - Incorporating appropriate amounts of monounsaturated fats such as nuts, olive oil, canola oil, avocados, olives  - Stop smoking  - Attain ideal weight and regular exercise (at least 30 minutes per day of walking)

## 2018-04-26 NOTE — PROGRESS NOTES
1. Attempt #: 3    2. HealthConnect Verified: yes    3. Verify PCP: yes    5.  Reviewed/Updated the following with patient:       •   Communication Preference Obtained? YES    6. GoodThreads Activation: sent activation code    7. GoodThreads Sayda: no    8. Annual Wellness Visit Scheduling  Scheduling Status:Scheduled      9. Care Gap Scheduling (Attempt to Schedule EACH Overdue Care Gap!)     Health Maintenance Due   Topic Date Due   • Annual Wellness Visit  1950   • IMM DTaP/Tdap/Td Vaccine (1 - Tdap) 10/15/1968        Scheduled patient for Annual Wellness Visit  Patient prefers to discuss Immunizations: TDAP with PCP.    10. Patient was advised: “This is a free wellness visit. The provider will screen for medical conditions to help you stay healthy. If you have other concerns to address you may be asked to discuss these at a separate visit or there may be an additional fee.”

## 2018-04-27 ASSESSMENT — ENCOUNTER SYMPTOMS
CHILLS: 0
WEAKNESS: 0
SHORTNESS OF BREATH: 0
DIZZINESS: 0
SORE THROAT: 0
FALLS: 0
ABDOMINAL PAIN: 0
CLAUDICATION: 0
BLURRED VISION: 0
FOCAL WEAKNESS: 0
COUGH: 0
PND: 0
PALPITATIONS: 0
NAUSEA: 0
BRUISES/BLEEDS EASILY: 0
FEVER: 0

## 2018-04-28 NOTE — PROGRESS NOTES
Chief Complaint   Patient presents with   • Coronary Artery Disease     follow up echo results       Subjective:   Rene Chan is a 67 y.o. 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   • Colon polyps    • Diabetes mellitus, type II (HCC)    • Gout    • Hammer toe    • Hyperlipidemia    • Hypertension    • Hypothyroidism    • Impaired fasting glucose    • Mild aortic stenosis - echo 4/2018    • Psychiatric disorder     bipolar     Past Surgical History:   Procedure Laterality Date   • MULTIPLE CORONARY ARTERY BYPASS ENDO VEIN HARVEST  6/4/2015    Procedure: MULTIPLE CORONARY ARTERY BYPASS Grafting  x 3   ENDO VEIN HARVEST right leg;  Surgeon: Christophe Lemus M.D.;  Location: SURGERY [facility];  Service:    • RECOVERY  6/1/2015    Procedure:  C[facility] LAB  [facility]94.31;  Surgeon: Recoveryonly Surgery;  [place] [facility] [place]:    • OTHER ORTHOPEDIC SURGERY  1976    L wrist repair    • APPENDECTOMY     • OTHER ABDOMINAL SURGERY      umbilical hernia repair 2000, appy age 13     Family History   Problem Relation Age of Onset   • Heart Disease Sister 50   • Stroke Sister 55     CVA   • Heart Failure Father      Social History     Social History   • Marital status:      Spouse name: N/A   • Number of children: N/A   • Years of education: N/A     Occupational History   • Not on file.     Social History Main Topics   • Smoking status: Former Smoker     Packs/day: 2.00      Years: 10.00     Types: Cigarettes     Quit date: 1/1/1976   • Smokeless tobacco: Never Used      Comment: quit x 32 yrs; 2ppd x 7 years   • Alcohol use No   • Drug use: No      Comment: IV drug use in teens   • Sexual activity: Not on file     Other Topics Concern   • Not on file     Social History Narrative    Lives with wife    Retired cam    Does security part time    ADLs and IADLs intact     Allergies   Allergen Reactions   • Penicillins      Childhood; does know what the reaction was     Outpatient Encounter Prescriptions as of 4/24/2018   Medication Sig Dispense Refill   • divalproex (DEPAKOTE) 250 MG Tablet Delayed Response TAKE ONE TABLET BY MOUTH TWICE A  Tab 3   • atorvastatin (LIPITOR) 80 MG tablet TAKE 1 TABLET BY MOUTH EVERY DAY 90 Tab 0   • warfarin (COUMADIN) 7.5 MG Tab TAKE ONE-HALF OR ONE TABLET BY MOUTH DAILY AS INSTRUCTED. 90 Tab 1   • levothyroxine (SYNTHROID) 75 MCG Tab TAKE ONE TABLET BY MOUTH DAILY 90 Tab 2   • metoprolol (LOPRESSOR) 25 MG Tab Take 1 Tab by mouth 2 Times a Day. 180 Tab 3   • lisinopril (PRINIVIL) 5 MG Tab Take 1 Tab by mouth every day. 90 Tab 3   • therapeutic multivitamin-minerals (THERAGRAN-M) Tab Take 1 Tab by mouth every day.     • Cholecalciferol (VITAMIN D) 2000 UNITS Cap Take  by mouth.     • docosahexanoic acid (OMEGA 3 FA) 1000 MG CAPS Take 1,400 mg by mouth every day.     • ASPIRIN 81 MG PO TABS Take 162 mg by mouth every day. QD     • MethylPREDNISolone (MEDROL DOSEPAK) 4 MG Tablet Therapy Pack TAKE 6 TABLETS ON DAY 1 AS DIRECTED ON PACKAGE AND DECREASE BY 1 TAB EACH DAY FOR A TOTAL OF 6 DAYS (Patient taking differently: as needed. TAKE 6 TABLETS ON DAY 1 AS DIRECTED ON PACKAGE AND DECREASE BY 1 TAB EACH DAY FOR A TOTAL OF 6 DAYS) 21 Tab 0     No facility-administered encounter medications on file as of 4/24/2018.      Review of Systems   Constitutional: Negative for chills and fever.   HENT: Negative for sore throat.    Eyes: Negative for blurred  "vision.   Respiratory: Negative for cough and shortness of breath.    Cardiovascular: Negative for chest pain, palpitations, claudication, leg swelling and PND.   Gastrointestinal: Negative for abdominal pain and nausea.   Musculoskeletal: Negative for falls and joint pain.   Skin: Negative for rash.   Neurological: Negative for dizziness, focal weakness and weakness.   Endo/Heme/Allergies: Does not bruise/bleed easily.        Objective:   /70   Pulse 68   Ht 1.727 m (5' 8\")   Wt 92.1 kg (203 lb)   SpO2 94%   BMI 30.87 kg/m²     Physical Exam   Constitutional: No distress.   HENT:   Mouth/Throat: Oropharynx is clear and moist. No oropharyngeal exudate.   Eyes: No scleral icterus.   Neck: No JVD present.   Cardiovascular: Normal rate and normal heart sounds.  Exam reveals no gallop and no friction rub.    No murmur heard.  Pulmonary/Chest: No respiratory distress. He has no wheezes. He has no rales.   Abdominal: Soft. Bowel sounds are normal.   Musculoskeletal: He exhibits no edema.   Neurological: He is alert.   Skin: No rash noted. He is not diaphoretic.   Psychiatric: He has a normal mood and affect.       Assessment:     1. Hx pulmonary embolism     2. S/P CABG x 3     3. Coronary artery disease due to lipid rich plaque     4. Chronic anticoagulation     5. Other specified transient cerebral ischemias     6. Mild aortic stenosis - echo 4/2018         Medical Decision Making:  Today's Assessment / Status / Plan:     It was my pleasure to meet with Mr. Chan.    He has been doing well is on chronic anti-coagulation with prior history of DVT PE that was recurrent    I will see Mr. Chan back in 1 year time and encouraged him to follow up with us over the phone or e-mail using my MyChart as issues arise.    It is my pleasure to participate in the care of Mr. Chan.  Please do not hesitate to contact me with questions or concerns.    Ta Wright MD PhD FAC  Cardiologist [facility]

## 2018-05-15 ENCOUNTER — ANTICOAGULATION VISIT (OUTPATIENT)
Dept: VASCULAR LAB | Facility: MEDICAL CENTER | Age: 68
End: 2018-05-15
Attending: INTERNAL MEDICINE
Payer: MEDICARE

## 2018-05-15 DIAGNOSIS — I82.90 DEEP VEIN THROMBOSIS (DVT) OF NON-EXTREMITY VEIN, UNSPECIFIED CHRONICITY: ICD-10-CM

## 2018-05-15 LAB — INR PPP: 3.1 (ref 2–3.5)

## 2018-05-15 PROCEDURE — 99212 OFFICE O/P EST SF 10 MIN: CPT

## 2018-05-15 PROCEDURE — 85610 PROTHROMBIN TIME: CPT

## 2018-05-15 NOTE — PROGRESS NOTES
OP Anticoagulation Service Note    Date: 5/15/2018  There were no vitals filed for this visit.    Anticoagulation Summary  As of 5/15/2018    INR goal:   2.0-3.0   TTR:   75.4 % (2.9 y)   Today's INR:   3.1!   Warfarin maintenance plan:   3.75 mg (7.5 mg x 0.5) on Wed; 7.5 mg (7.5 mg x 1) all other days   Weekly warfarin total:   48.75 mg   Plan last modified:   Shweta Serna A.P.NPaul (2/28/2017)   Next INR check:   6/26/2018   Target end date:   Indefinite    Indications    DVT (deep venous thrombosis) (HCC) (Resolved) [I82.409]  Pulmonary embolism [415.19] (Resolved) [I26.99]  Deep vein thrombosis (DVT) (HCC) [I82.409]             Anticoagulation Episode Summary     INR check location:   [facility]    Preferred lab:   [facility]    Send INR reminders to:       Comments:         Anticoagulation Care Providers     Provider Role Specialty Phone number    Patricia Damon M.D. Referring Internal Medicine [phone]    Jona QuinnD Responsible      [facility] Responsible  [phone]        Anticoagulation Patient Findings      HPI:   Rene Chan seen in clinic today, they are here today for a INR check on anticoagulation therapy with warfarin because they have a PMH of DVT     The reason for today's visit is to prevent morbidity and mortality from a blood clot  and to reduce the risk of bleeding while on a anticoagulant.     Reason for today's visit (per our collaborative practice policy) is because their last INR was 3.7  on  4/3/2018.   Intervention at the last visit:  Held the dose     Additional education provided today regarding reducing bleed risk and dietary constraints:  About diet     Any upcoming procedures:   none    Confirmed warfarin dosing regimen  Interval Changes with foods rich in vitamin K: No  Interval Changes in ETOH:   No  Interval Changes in smoking status:  No  Interval Changes in medication:  No   Cost restriction:  No    S/S of bleeding  or bruising:  No  Signs/symptoms  thrombosis since the last appt:  No  Bleed risk is:  moderate,     3 vitals included with today's appt :No  (BP, HR, weight, ht, RR)     Assessment:   INR  supra-therapeutic.     Possible reason(s) for INR today:   might be diet     Intervention required today to prevent:    They are at a increased risk of bleeding because INR above goal.    They have a TTR of 75.4  which is not at target (TTR target/goal is 100%) and requires close follow up to prevent a adverse event (the lower the TTR the higher risk of clots, strokes, or bleeding).       Plan:  5mg today then continue weekly warfarin dose as noted      Follow up:  Follow up appointment in 6 week(s)       Other info:  Pt educated to contact our clinic with any changes in medications or s/s of bleeding or thrombosis    CHEST guidelines recommend frequent INR monitoring at regular intervals (a few days up to a max of 12 weeks) to ensure they are on the proper dose of warfarin and not having any complications from therapy.  INRs can dramatically change over a short time period due to diet, medications, and medical conditions.

## 2018-05-24 ENCOUNTER — HOSPITAL ENCOUNTER (EMERGENCY)
Facility: MEDICAL CENTER | Age: 68
End: 2018-05-24
Attending: EMERGENCY MEDICINE
Payer: MEDICARE

## 2018-05-24 VITALS
BODY MASS INDEX: 29.7 KG/M2 | SYSTOLIC BLOOD PRESSURE: 134 MMHG | HEART RATE: 47 BPM | DIASTOLIC BLOOD PRESSURE: 59 MMHG | OXYGEN SATURATION: 97 % | RESPIRATION RATE: 16 BRPM | WEIGHT: 195.33 LBS | TEMPERATURE: 96.6 F

## 2018-05-24 LAB
ALBUMIN SERPL BCP-MCNC: 4.3 G/DL (ref 3.2–4.9)
ALBUMIN/GLOB SERPL: 1.7 G/DL
ALP SERPL-CCNC: 37 U/L (ref 30–99)
ALT SERPL-CCNC: 18 U/L (ref 2–50)
ANION GAP SERPL CALC-SCNC: 8 MMOL/L (ref 0–11.9)
APTT PPP: 31.2 SEC (ref 24.7–36)
AST SERPL-CCNC: 21 U/L (ref 12–45)
BASOPHILS # BLD AUTO: 0.7 % (ref 0–1.8)
BASOPHILS # BLD: 0.05 K/UL (ref 0–0.12)
BILIRUB SERPL-MCNC: 1.6 MG/DL (ref 0.1–1.5)
BUN SERPL-MCNC: 14 MG/DL (ref 8–22)
CALCIUM SERPL-MCNC: 8.9 MG/DL (ref 8.5–10.5)
CHLORIDE SERPL-SCNC: 103 MMOL/L (ref 96–112)
CO2 SERPL-SCNC: 26 MMOL/L (ref 20–33)
CREAT SERPL-MCNC: 0.81 MG/DL (ref 0.5–1.4)
EOSINOPHIL # BLD AUTO: 0.12 K/UL (ref 0–0.51)
EOSINOPHIL NFR BLD: 1.7 % (ref 0–6.9)
ERYTHROCYTE [DISTWIDTH] IN BLOOD BY AUTOMATED COUNT: 44.5 FL (ref 35.9–50)
GLOBULIN SER CALC-MCNC: 2.6 G/DL (ref 1.9–3.5)
GLUCOSE SERPL-MCNC: 77 MG/DL (ref 65–99)
HCT VFR BLD AUTO: 44.3 % (ref 42–52)
HGB BLD-MCNC: 15.3 G/DL (ref 14–18)
IMM GRANULOCYTES # BLD AUTO: 0.03 K/UL (ref 0–0.11)
IMM GRANULOCYTES NFR BLD AUTO: 0.4 % (ref 0–0.9)
INR PPP: 2.42 (ref 0.87–1.13)
LYMPHOCYTES # BLD AUTO: 2.16 K/UL (ref 1–4.8)
LYMPHOCYTES NFR BLD: 31.3 % (ref 22–41)
MCH RBC QN AUTO: 31.6 PG (ref 27–33)
MCHC RBC AUTO-ENTMCNC: 34.5 G/DL (ref 33.7–35.3)
MCV RBC AUTO: 91.5 FL (ref 81.4–97.8)
MONOCYTES # BLD AUTO: 0.68 K/UL (ref 0–0.85)
MONOCYTES NFR BLD AUTO: 9.8 % (ref 0–13.4)
NEUTROPHILS # BLD AUTO: 3.87 K/UL (ref 1.82–7.42)
NEUTROPHILS NFR BLD: 56.1 % (ref 44–72)
NRBC # BLD AUTO: 0 K/UL
NRBC BLD-RTO: 0 /100 WBC
PLATELET # BLD AUTO: 257 K/UL (ref 164–446)
PMV BLD AUTO: 10 FL (ref 9–12.9)
POTASSIUM SERPL-SCNC: 4.1 MMOL/L (ref 3.6–5.5)
PROT SERPL-MCNC: 6.9 G/DL (ref 6–8.2)
PROTHROMBIN TIME: 26 SEC (ref 12–14.6)
RBC # BLD AUTO: 4.84 M/UL (ref 4.7–6.1)
SODIUM SERPL-SCNC: 137 MMOL/L (ref 135–145)
WBC # BLD AUTO: 6.9 K/UL (ref 4.8–10.8)

## 2018-05-24 PROCEDURE — 99284 EMERGENCY DEPT VISIT MOD MDM: CPT

## 2018-05-24 PROCEDURE — 80053 COMPREHEN METABOLIC PANEL: CPT

## 2018-05-24 PROCEDURE — 85025 COMPLETE CBC W/AUTO DIFF WBC: CPT

## 2018-05-24 PROCEDURE — 93971 EXTREMITY STUDY: CPT

## 2018-05-24 PROCEDURE — 85730 THROMBOPLASTIN TIME PARTIAL: CPT

## 2018-05-24 PROCEDURE — 85610 PROTHROMBIN TIME: CPT

## 2018-05-24 ASSESSMENT — PAIN SCALES - WONG BAKER: WONGBAKER_NUMERICALRESPONSE: HURTS A LITTLE MORE

## 2018-05-24 ASSESSMENT — LIFESTYLE VARIABLES: DO YOU DRINK ALCOHOL: NO

## 2018-05-24 ASSESSMENT — PAIN SCALES - GENERAL: PAINLEVEL_OUTOF10: 6

## 2018-05-24 NOTE — DISCHARGE INSTRUCTIONS
Deep Vein Thrombosis  A deep vein thrombosis (DVT) is a blood clot (thrombus) that usually occurs in a deep, larger vein of the lower leg or the pelvis, or in an upper extremity such as the arm. These are dangerous and can lead to serious and even life-threatening complications if the clot travels to the lungs.  A DVT can damage the valves in your leg veins so that instead of flowing upward, the blood pools in the lower leg. This is called post-thrombotic syndrome, and it can result in pain, swelling, discoloration, and sores on the leg.  What are the causes?  A DVT is caused by the formation of a blood clot in your leg, pelvis, or arm. Usually, several things contribute to the formation of blood clots. A clot may develop when:  · Your blood flow slows down.  · Your vein becomes damaged in some way.  · You have a condition that makes your blood clot more easily.  What increases the risk?  A DVT is more likely to develop in:  · People who are older, especially over 60 years of age.  · People who are overweight (obese).  · People who sit or lie still for a long time, such as during long-distance travel (over 4 hours), bed rest, hospitalization, or during recovery from certain medical conditions like a stroke.  · People who do not engage in much physical activity (sedentary lifestyle).  · People who have chronic breathing disorders.  · People who have a personal or family history of blood clots or blood clotting disease.  · People who have peripheral vascular disease (PVD), diabetes, or some types of cancer.  · People who have heart disease, especially if the person had a recent heart attack or has congestive heart failure.  · People who have neurological diseases that affect the legs (leg paresis).  · People who have had a traumatic injury, such as breaking a hip or leg.  · People who have recently had major or lengthy surgery, especially on the hip, knee, or abdomen.  · People who have had a central line placed  inside a large vein.  · People who take medicines that contain the hormone estrogen. These include birth control pills and hormone replacement therapy.  · Pregnancy or during childbirth or the postpartum period.  · Long plane flights (over 8 hours).  What are the signs or symptoms?     Symptoms of a DVT can include:  · Swelling of your leg or arm, especially if one side is much worse.  · Warmth and redness of your leg or arm, especially if one side is much worse.  · Pain in your arm or leg. If the clot is in your leg, symptoms may be more noticeable or worse when you stand or walk.  · A feeling of pins and needles, if the clot is in the arm.  The symptoms of a DVT that has traveled to the lungs (pulmonary embolism, PE) usually start suddenly and include:  · Shortness of breath while active or at rest.  · Coughing or coughing up blood or blood-tinged mucus.  · Chest pain that is often worse with deep breaths.  · Rapid or irregular heartbeat.  · Feeling light-headed or dizzy.  · Fainting.  · Feeling anxious.  · Sweating.  There may also be pain and swelling in a leg if that is where the blood clot started.  These symptoms may represent a serious problem that is an emergency. Do not wait to see if the symptoms will go away. Get medical help right away. Call your local emergency services (911 in the [place] Do not drive yourself to the hospital.   How is this diagnosed?  Your health care provider will take a medical history and perform a physical exam. You may also have other tests, including:  · Blood tests to assess the clotting properties of your blood.  · Imaging tests, such as CT, ultrasound, MRI, X-ray, and other tests to see if you have clots anywhere in your body.  How is this treated?  After a DVT is identified, it can be treated. The type of treatment that you receive depends on many factors, such as the cause of your DVT, your risk for bleeding or developing more clots, and other medical conditions that you  have. Sometimes, a combination of treatments is necessary. Treatment options may be combined and include:  · Monitoring the blood clot with ultrasound.  · Taking medicines by mouth, such as newer blood thinners (anticoagulants), thrombolytics, or warfarin.  · Taking anticoagulant medicine by injection or through an IV tube.  · Wearing compression stockings or using different types of devices.  · Surgery (rare) to remove the blood clot or to place a filter in your abdomen to stop the blood clot from traveling to your lungs.  Treatments for a DVT are often divided into immediate treatment and long-term treatment (up to 3 months after DVT). You can work with your health care provider to choose the treatment program that is best for you.  Follow these instructions at home:  If you are taking a newer oral anticoagulant:  · Take the medicine every single day at the same time each day.  · Understand what foods and drugs interact with this medicine.  · Understand that there are no regular blood tests required when using this medicine.  · Understand the side effects of this medicine, including excessive bruising or bleeding. Ask your health care provider or pharmacist about other possible side effects.  If you are taking warfarin:  · Understand how to take warfarin and know which foods can affect how warfarin works in your body.  · Understand that it is dangerous to take too much or too little warfarin. Too much warfarin increases the risk of bleeding. Too little warfarin continues to allow the risk for blood clots.  · Follow your PT and INR blood testing schedule. The PT and INR results allow your health care provider to adjust your dose of warfarin. It is very important that you have your PT and INR tested as often as told by your health care provider.  · Avoid major changes in your diet, or tell your health care provider before you change your diet. Arrange a visit with a registered dietitian to answer your questions.  Many foods, especially foods that are high in vitamin K, can interfere with warfarin and affect the PT and INR results. Eat a consistent amount of foods that are high in vitamin K, such as:  ¨ Spinach, kale, broccoli, cabbage, markel greens, turnip greens, [place] sprouts, peas, cauliflower, seaweed, and parsley.  ¨ Beef liver and pork liver.  ¨ Green tea.  ¨ Soybean oil.  · Tell your health care provider about any and all medicines, vitamins, and supplements that you take, including aspirin and other over-the-counter anti-inflammatory medicines. Be especially cautious with aspirin and anti-inflammatory medicines. Do not take those before you ask your health care provider if it is safe to do so. This is important because many medicines can interfere with warfarin and affect the PT and INR results.  · Do not start or stop taking any over-the-counter or prescription medicine unless your health care provider or pharmacist tells you to do so.  If you take warfarin, you will also need to do these things:  · Hold pressure over cuts for longer than usual.  · Tell your dentist and other health care providers that you are taking warfarin before you have any procedures in which bleeding may occur.  · Avoid alcohol or drink very small amounts. Tell your health care provider if you change your alcohol intake.  · Do not use tobacco products, including cigarettes, chewing tobacco, and e-cigarettes. If you need help quitting, ask your health care provider.  · Avoid contact sports.  General instructions  · Take over-the-counter and prescription medicines only as told by your health care provider. Anticoagulant medicines can have side effects, including easy bruising and difficulty stopping bleeding. If you are prescribed an anticoagulant, you will also need to do these things:  ¨ Hold pressure over cuts for longer than usual.  ¨ Tell your dentist and other health care providers that you are taking anticoagulants before you have  any procedures in which bleeding may occur.  ¨ Avoid contact sports.  · Wear a medical alert bracelet or carry a medical alert card that says you have had a PE.  · Ask your health care provider how soon you can go back to your normal activities. Stay active to prevent new blood clots from forming.  · Make sure to exercise while traveling or when you have been sitting or standing for a long period of time. It is very important to exercise. Exercise your legs by walking or by tightening and relaxing your leg muscles often. Take frequent walks.  · Wear compression stockings as told by your health care provider to help prevent more blood clots from forming.  · Do not use tobacco products, including cigarettes, chewing tobacco, and e-cigarettes. If you need help quitting, ask your health care provider.  · Keep all follow-up appointments with your health care provider. This is important.  How is this prevented?  Take these actions to decrease your risk of developing another DVT:  · Exercise regularly. For at least 30 minutes every day, engage in:  ¨ Activity that involves moving your arms and legs.  ¨ Activity that encourages good blood flow through your body by increasing your heart rate.  · Exercise your arms and legs every hour during long-distance travel (over 4 hours). Drink plenty of water and avoid drinking alcohol while traveling.  · Avoid sitting or lying in bed for long periods of time without moving your legs.  · Maintain a weight that is appropriate for your height. Ask your health care provider what weight is healthy for you.  · If you are a woman who is over 35 years of age, avoid unnecessary use of medicines that contain estrogen. These include birth control pills.  · Do not smoke, especially if you take estrogen medicines. If you need help quitting, ask your health care provider.  If you are hospitalized, prevention measures may include:  · Early walking after surgery, as soon as your health care provider  says that it is safe.  · Receiving anticoagulants to prevent blood clots. If you cannot take anticoagulants, other options may be available, such as wearing compression stockings or using different types of devices.  Get help right away if:  · You have new or increased pain, swelling, or redness in an arm or leg.  · You have numbness or tingling in an arm or leg.  · You have shortness of breath while active or at rest.  · You have chest pain.  · You have a rapid or irregular heartbeat.  · You feel light-headed or dizzy.  · You cough up blood.  · You notice blood in your vomit, bowel movement, or urine.  These symptoms may represent a serious problem that is an emergency. Do not wait to see if the symptoms will go away. Get medical help right away. Call your local emergency services (911 in the [place] Do not drive yourself to the hospital.   This information is not intended to replace advice given to you by your health care provider. Make sure you discuss any questions you have with your health care provider.  Document Released: 12/18/2006 Document Revised: 05/25/2017 Document Reviewed: 04/13/2016  [employer] Interactive Patient Education © 2017 [employer]

## 2018-05-24 NOTE — ED PROVIDER NOTES
ED Provider Note    CHIEF COMPLAINT  Chief Complaint   Patient presents with   • Leg Pain       HPI  Rene Chan is a 67 y.o. male who presents for evaluation of left leg pain and swelling over the past 2-3 days. The patient denies any trauma. He has a notable history of DVTs as well as a clotting disorder. He has protein S syndrome. He is guarding on Coumadin. He has experienced blood clots in the contralateral leg but never a blood clot left leg. He denies any pleuritic chest pain shortness of breath. He says he feels like the same way he did when he was 1st diagnosed with a DVT. He has had his Coumadin level checked recently and was noted to be 3.2 around 10 days ago. He has no other symptoms no recent trauma no numbness weakness or tingling    REVIEW OF SYSTEMS  See HPI for further details. No high fevers chills muscles weight loss All other systems are negative.     PAST MEDICAL HISTORY  Past Medical History:   Diagnosis Date   • CAD (coronary artery disease) 6/4/2015   • Arrhythmia 2011    tia   • Anticoagulation monitoring, special range    • Bipolar disorder (HCC)    • CAD (coronary artery disease)    • Carotid artery occlusion     L 60-79% occluded; R 59% occluded per pt report; 2011   • Clotting disorder (HCC)     protein S elevation in '12 with recurrent DVT and PE-coumadin lifelong   • Colon polyps    • Diabetes mellitus, type II (HCC)    • Gout    • Hammer toe    • Hyperlipidemia    • Hypertension    • Hypothyroidism    • Impaired fasting glucose    • Mild aortic stenosis - echo 4/2018    • Psychiatric disorder     bipolar       FAMILY HISTORY  History of clotting disorder    SOCIAL HISTORY  Social History     Social History   • Marital status:      Spouse name: N/A   • Number of children: N/A   • Years of education: N/A     Social History Main Topics   • Smoking status: Former Smoker     Packs/day: 2.00     Years: 10.00     Types: Cigarettes     Quit date: 1/1/1976   • Smokeless tobacco:  Never Used      Comment: quit x 32 yrs; 2ppd x 7 years   • Alcohol use No   • Drug use: No      Comment: IV drug use in teens   • Sexual activity: Not on file     Other Topics Concern   • Not on file     Social History Narrative    Lives with wife    Retired cam    Does security part time    ADLs and IADLs intact     Former smoker  SURGICAL HISTORY  Past Surgical History:   Procedure Laterality Date   • MULTIPLE CORONARY ARTERY BYPASS ENDO VEIN HARVEST  6/4/2015    Procedure: MULTIPLE CORONARY ARTERY BYPASS Grafting  x 3   ENDO VEIN HARVEST right leg;  Surgeon: Christophe Lemus M.D.;  Location: SURGERY [facility];  Service:    • RECOVERY  6/1/2015    Procedure:  [facility];  Surgeon: Recoveryonly Surgery;  [place] [facility] [place]  Service:    • OTHER ORTHOPEDIC SURGERY  1976    L wrist repair    • APPENDECTOMY     • OTHER ABDOMINAL SURGERY      umbilical hernia repair 2000, appy age 13       CURRENT MEDICATIONS  Home Medications     Reviewed by Isidro Dominguez R.N. (Registered Nurse) on 05/24/18 at 1344  Med List Status: Complete   Medication Last Dose Status   ASPIRIN 81 MG PO TABS 4/24/2018 Active   atorvastatin (LIPITOR) 80 MG tablet  Active   Cholecalciferol (VITAMIN D) 2000 UNITS Cap 4/24/2018 Active   divalproex (DEPAKOTE) 250 MG Tablet Delayed Response 4/24/2018 Active   docosahexanoic acid (OMEGA 3 FA) 1000 MG CAPS 4/24/2018 Active   levothyroxine (SYNTHROID) 75 MCG Tab 4/24/2018 Active   lisinopril (PRINIVIL) 5 MG Tab 4/24/2018 Active   MethylPREDNISolone (MEDROL DOSEPAK) 4 MG Tablet Therapy Pack 3/12/2018 Active   metoprolol (LOPRESSOR) 25 MG Tab 4/24/2018 Active   therapeutic multivitamin-minerals (THERAGRAN-M) Tab 4/24/2018 Active   warfarin (COUMADIN) 7.5 MG Tab 4/24/2018 Active                ALLERGIES  Allergies   Allergen Reactions   • Penicillins      Childhood; does know what the reaction was       PHYSICAL EXAM  VITAL SIGNS: BP  134/59   Pulse (!) 50   Temp 36.8 °C (98.3 °F)   Resp 16   Wt 88.6 kg (195 lb 5.2 oz)   SpO2 94%   BMI 29.70 kg/m²  Room air O2: 94    Constitutional: Well developed, Well nourished, No acute distress, Non-toxic appearance.   HENT: Normocephalic, Atraumatic, Bilateral external ears normal, Oropharynx moist, No oral exudates, Nose normal.   Eyes: PERRLA, EOMI, Conjunctiva normal, No discharge.   Neck: Normal range of motion, No tenderness, Supple, No stridor.   Cardiovascular: Normal heart rate, Normal rhythm, No murmurs, No rubs, No gallops.   Thorax & Lungs: Normal breath sounds, No respiratory distress, No wheezing, No chest tenderness.   Abdomen: Bowel sounds normal, Soft, No tenderness, No masses, No pulsatile masses.   Skin: Warm, Dry, No erythema, No rash.   Extremities: Left lower extremity is notable for 2+ pitting edema there is tenderness in the calf no palpable cords positive Homans sign herself pedal pulses intact capillary refill is normal compartments are soft no erythema no warmth   Neurologic: Alert & oriented x 3, Normal motor function, Normal sensory function, No focal deficits noted.   Psychiatric: Affect normal, Judgment normal, Mood normal.     PROCEDURES:   Left lower extremity venous duplex imaging.    The following venous structures were evaluated: common femoral, profunda    femoral, greater saphenous, femoral, popliteal , peroneal and posterior    tibial veins.    Serial compression, augmentation maneuvers,  color and spectral Doppler    flow evaluations were performed.      FINDINGS:   Left lower extremity -   Chronic, non occlusive thrombus visualized in the distal popliteal vein    with significant reflux demonstrated.     All other veins of the left lower extremity demonstrate normal flow    dynamics with no evidence of deep vein thrombosis or superficial    thrombophlebitis.      The peroneal and posterior tibial veins are difficult to assess for    compressibility, but flow  response to augmentation is demonstrated.      The lateral, mid calf, area of pain was evaluated and appears unremarkable.    Color filling is demonstrated in the anterior tibial and peroneal veins.     Prior study from 09/30/2008 is available for comparison.    RADIOLOGY/PROCEDURES  Results for orders placed or performed during the hospital encounter of 05/24/18   CBC WITH DIFFERENTIAL   Result Value Ref Range    WBC 6.9 4.8 - 10.8 K/uL    RBC 4.84 4.70 - 6.10 M/uL    Hemoglobin 15.3 14.0 - 18.0 g/dL    Hematocrit 44.3 42.0 - 52.0 %    MCV 91.5 81.4 - 97.8 fL    MCH 31.6 27.0 - 33.0 pg    MCHC 34.5 33.7 - 35.3 g/dL    RDW 44.5 35.9 - 50.0 fL    Platelet Count 257 164 - 446 K/uL    MPV 10.0 9.0 - 12.9 fL    Neutrophils-Polys 56.10 44.00 - 72.00 %    Lymphocytes 31.30 22.00 - 41.00 %    Monocytes 9.80 0.00 - 13.40 %    Eosinophils 1.70 0.00 - 6.90 %    Basophils 0.70 0.00 - 1.80 %    Immature Granulocytes 0.40 0.00 - 0.90 %    Nucleated RBC 0.00 /100 WBC    Neutrophils (Absolute) 3.87 1.82 - 7.42 K/uL    Lymphs (Absolute) 2.16 1.00 - 4.80 K/uL    Monos (Absolute) 0.68 0.00 - 0.85 K/uL    Eos (Absolute) 0.12 0.00 - 0.51 K/uL    Baso (Absolute) 0.05 0.00 - 0.12 K/uL    Immature Granulocytes (abs) 0.03 0.00 - 0.11 K/uL    NRBC (Absolute) 0.00 K/uL   COMP METABOLIC PANEL   Result Value Ref Range    Sodium 137 135 - 145 mmol/L    Potassium 4.1 3.6 - 5.5 mmol/L    Chloride 103 96 - 112 mmol/L    Co2 26 20 - 33 mmol/L    Anion Gap 8.0 0.0 - 11.9    Glucose 77 65 - 99 mg/dL    Bun 14 8 - 22 mg/dL    Creatinine 0.81 0.50 - 1.40 mg/dL    Calcium 8.9 8.5 - 10.5 mg/dL    AST(SGOT) 21 12 - 45 U/L    ALT(SGPT) 18 2 - 50 U/L    Alkaline Phosphatase 37 30 - 99 U/L    Total Bilirubin 1.6 (H) 0.1 - 1.5 mg/dL    Albumin 4.3 3.2 - 4.9 g/dL    Total Protein 6.9 6.0 - 8.2 g/dL    Globulin 2.6 1.9 - 3.5 g/dL    A-G Ratio 1.7 g/dL   PROTHROMBIN TIME   Result Value Ref Range    PT 26.0 (H) 12.0 - 14.6 sec    INR 2.42 (H) 0.87 - 1.13   APTT    Result Value Ref Range    APTT 31.2 24.7 - 36.0 sec   ESTIMATED GFR   Result Value Ref Range    GFR If African American >60 >60 mL/min/1.73 m 2    GFR If Non African American >60 >60 mL/min/1.73 m 2      LE VENOUS DUPLEX             COURSE & MEDICAL DECISION MAKING  Pertinent Labs & Imaging studies reviewed. (See chart for details)  Patient has a therapeutic INR level and no suggestion of infection on exam and white blood cell count is normal. Ultrasound demonstrates chronic clot but no acute DVT and it is only in the popliteal vein. I did not feel that any additional studies are indicated. I counseled the patient to continue taking his Coumadin and he can do warm compresses and gentle massage.    FINAL IMPRESSION  1. 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

## 2018-05-24 NOTE — ED NOTES
Rene Chan discharged via ambulatory with self.  Discharge instructions given and reviewed, patient educated to follow up with PCP, verbalized understanding.  Prescriptions given x 0.  All personal belongings in possession.  No questions at this time.

## 2018-05-24 NOTE — ED TRIAGE NOTES
Pt to triage for left leg pain starting last night. Denies swelling and trauma. Pt on Warfarin for history of PE.   Pt advised to return to triage nurse for any changes or concerns.

## 2018-05-24 NOTE — ED NOTES
Pt wheeled to yellow 54 per tech.  Patient being seen for left lower leg pain that started last night at 0230.  Patient was concerned because it feels like when he had a blood clot.  Patient taking warfarin.  No redness/warmth/swelling noted.  Denies sob.  Agree with triage assessment.  Pt changed into gown.  Chart up for ERP.

## 2018-05-29 ENCOUNTER — TELEPHONE (OUTPATIENT)
Dept: INTERNAL MEDICINE | Facility: MEDICAL CENTER | Age: 68
End: 2018-05-29

## 2018-05-29 NOTE — TELEPHONE ENCOUNTER
1. Caller Name: patient                      Call Back Number: [phone] (home) [phone] (work)      2. Message: [place] [facility] [place] went to ED for leg pain because he was concerned about a blood clot, which he had one in the past. Had ultrasound done but was negative for new clot.  Recommended by Ed to do heat and light  Massage which helps but not getting better.   Spoke to patient, he's not sure what to do for the pain, he doesn't see Dr. Olea to est until Thursday.   Offered patient an appt for acute visit for the leg pain. Scheduled to see Dr. Hameed tomorrow at 11 am.       3. Patient approves office to leave a detailed voicemail/MyChart message: N\A

## 2018-05-30 ENCOUNTER — OFFICE VISIT (OUTPATIENT)
Dept: INTERNAL MEDICINE | Facility: MEDICAL CENTER | Age: 68
End: 2018-05-30
Payer: MEDICARE

## 2018-05-30 ENCOUNTER — HOSPITAL ENCOUNTER (OUTPATIENT)
Dept: LAB | Facility: MEDICAL CENTER | Age: 68
End: 2018-05-30
Attending: STUDENT IN AN ORGANIZED HEALTH CARE EDUCATION/TRAINING PROGRAM
Payer: MEDICARE

## 2018-05-30 VITALS
SYSTOLIC BLOOD PRESSURE: 129 MMHG | BODY MASS INDEX: 29.55 KG/M2 | HEART RATE: 60 BPM | OXYGEN SATURATION: 94 % | TEMPERATURE: 97.6 F | HEIGHT: 68 IN | DIASTOLIC BLOOD PRESSURE: 70 MMHG | WEIGHT: 195 LBS

## 2018-05-30 DIAGNOSIS — I82.532 CHRONIC DEEP VEIN THROMBOSIS (DVT) OF POPLITEAL VEIN OF LEFT LOWER EXTREMITY (HCC): ICD-10-CM

## 2018-05-30 DIAGNOSIS — Z86.718 HISTORY OF DVT (DEEP VEIN THROMBOSIS): ICD-10-CM

## 2018-05-30 DIAGNOSIS — M10.071 IDIOPATHIC GOUT OF RIGHT FOOT, UNSPECIFIED CHRONICITY: ICD-10-CM

## 2018-05-30 DIAGNOSIS — F31.70 BIPOLAR DISORDER IN FULL REMISSION, MOST RECENT EPISODE UNSPECIFIED TYPE (HCC): ICD-10-CM

## 2018-05-30 LAB
ANION GAP SERPL CALC-SCNC: 7 MMOL/L (ref 0–11.9)
BUN SERPL-MCNC: 18 MG/DL (ref 8–22)
CALCIUM SERPL-MCNC: 9.1 MG/DL (ref 8.5–10.5)
CHLORIDE SERPL-SCNC: 104 MMOL/L (ref 96–112)
CO2 SERPL-SCNC: 27 MMOL/L (ref 20–33)
CREAT SERPL-MCNC: 0.78 MG/DL (ref 0.5–1.4)
GLUCOSE SERPL-MCNC: 99 MG/DL (ref 65–99)
POTASSIUM SERPL-SCNC: 4.9 MMOL/L (ref 3.6–5.5)
SODIUM SERPL-SCNC: 138 MMOL/L (ref 135–145)
URATE SERPL-MCNC: 6.4 MG/DL (ref 2.5–8.3)
VALPROATE SERPL-MCNC: 34 UG/ML (ref 50–100)

## 2018-05-30 PROCEDURE — 36415 COLL VENOUS BLD VENIPUNCTURE: CPT

## 2018-05-30 PROCEDURE — 80164 ASSAY DIPROPYLACETIC ACD TOT: CPT

## 2018-05-30 PROCEDURE — 80048 BASIC METABOLIC PNL TOTAL CA: CPT

## 2018-05-30 PROCEDURE — 84550 ASSAY OF BLOOD/URIC ACID: CPT

## 2018-05-30 PROCEDURE — 99213 OFFICE O/P EST LOW 20 MIN: CPT | Mod: GE | Performed by: INTERNAL MEDICINE

## 2018-05-30 RX ORDER — WARFARIN SODIUM 7.5 MG/1
TABLET ORAL
Qty: 90 TAB | Refills: 1 | Status: SHIPPED | OUTPATIENT
Start: 2018-05-30 | End: 2019-02-19

## 2018-05-30 NOTE — PROGRESS NOTES
Coming in for acute left foot lateral shin pain. History of recurrent DVT, hyperlipidemia, bipolar disorder.   No obvious inciting event to cause pain. has been going on a week. Went to the ER and ultrasound did not show an acute DVT. No other concerning signs or symptoms to suggest subcutaneous musculoskeletal process. Other than maybe a simple sprain and/or bruise. We will have him schedule Tylenol continue the heat packs. He is to follow-up if it is not improved within a week or 2. I will be seeing him tomorrow in clinic to establish care .    At the time of the visit, I personally examined the patient and evaluated the patient's medical history, physical examination, laboratory results/studies, and assessment and I discussed the findings and formulated the care plan documented with the resident physician.    Parag Olea M.D.

## 2018-05-30 NOTE — PROGRESS NOTES
Established Patient    Rene presents today with the following:    CC: left leg pain     HPI:   Rene Chan is a pleasant 66 yo man with PMHx of CAD s/p CABG with 3 vessel bypass (6/2015), TIA, protein S deficiency with recurrent DVT and PE (on chronic warfarin anticoagulation), bipolar disorder in remission, hypothyroidism, BPH and gout who presents to clinic for acute onset of left leg pain.     Onset of left leg pain ~1 week ago. He was not doing anything out of ordinary other than a walk in park for 1/2 mile the day before. Pain is mostly in left calf. He presented to ED last week and US of lower extremity revealed a non occlusive chronic thrombus, no acute DVT. He has been adherent to warfarin for many years now, with repeat INR testing every 8 weeks showing therapeutic INR.     Denies claudication symptoms prior to last week -- usually able to walk at least half a mile without needing to rest. Does not remember any trauma or inciting event. Denies any tingling neuropathic pain over skin in left shin. Denies bony tenderness over lateral malleolus. No recent antibiotic use, especially fluoroquinolones. Pain worse with elevating the left leg but denies any sciatica symptoms. Improving pain with heat packs and tylenol as needed.       Patient Active Problem List    Diagnosis Date Noted   • Carotid disease, bilateral (HCC) 07/13/2017     Priority: Medium   • Deep vein thrombosis (DVT) ([facility]) 09/16/2015     Priority: Medium   • Dyslipidemia 06/24/2015     Priority: Medium   • Hx pulmonary embolism 06/24/2015     Priority: Medium   • Coronary artery disease due to lipid rich plaque 06/04/2015     Priority: Medium   • S/P CABG x 3 - 6/2015 06/04/2015     Priority: Medium   • TIA (transient ischemic attack) 05/26/2015     Priority: Medium   • Chronic anticoagulation 11/11/2014     Priority: Medium   • Bilateral impacted cerumen 02/07/2017     Priority: Low   • Adenomatous polyp of colon 10/06/2016     Priority: Low     • Bipolar disorder in full remission (HCC) 10/06/2016     Priority: Low   • Healthcare maintenance 10/06/2016     Priority: Low   • Benign non-nodular prostatic hyperplasia with lower urinary tract symptoms 10/06/2016     Priority: Low   • Acquired hypothyroidism 09/26/2016     Priority: Low   • Gout of foot 09/26/2016     Priority: Low   • Mild aortic stenosis - echo 4/2018    • Need for vaccination 03/12/2018   • Actinic keratoses 03/12/2018       Current Outpatient Prescriptions   Medication Sig Dispense Refill   • warfarin (COUMADIN) 7.5 MG Tab TAKE ONE-HALF OR ONE TABLET BY MOUTH DAILY AS INSTRUCTED. 90 Tab 1   • Coenzyme Q10 200 MG Tab Take  by mouth.     • atorvastatin (LIPITOR) 80 MG tablet TAKE 1 TABLET BY MOUTH EVERY DAY 90 Tab 0   • divalproex (DEPAKOTE) 250 MG Tablet Delayed Response TAKE ONE TABLET BY MOUTH TWICE A  Tab 3   • levothyroxine (SYNTHROID) 75 MCG Tab TAKE ONE TABLET BY MOUTH DAILY 90 Tab 2   • metoprolol (LOPRESSOR) 25 MG Tab Take 1 Tab by mouth 2 Times a Day. 180 Tab 3   • lisinopril (PRINIVIL) 5 MG Tab Take 1 Tab by mouth every day. 90 Tab 3   • therapeutic multivitamin-minerals (THERAGRAN-M) Tab Take 1 Tab by mouth every day.     • Cholecalciferol (VITAMIN D) 2000 UNITS Cap Take  by mouth.     • docosahexanoic acid (OMEGA 3 FA) 1000 MG CAPS Take 1,400 mg by mouth every day.     • ASPIRIN 81 MG PO TABS Take 162 mg by mouth every day. QD     • MethylPREDNISolone (MEDROL DOSEPAK) 4 MG Tablet Therapy Pack TAKE 6 TABLETS ON DAY 1 AS DIRECTED ON PACKAGE AND DECREASE BY 1 TAB EACH DAY FOR A TOTAL OF 6 DAYS (Patient taking differently: as needed. TAKE 6 TABLETS ON DAY 1 AS DIRECTED ON PACKAGE AND DECREASE BY 1 TAB EACH DAY FOR A TOTAL OF 6 DAYS) 21 Tab 0     No current facility-administered medications for this visit.        ROS: Full 14 system ROS has been completed and negative, except as documented above in HPI. See above HPI for pertinent positives.     ROS    /70   Pulse 60   " Temp 36.4 °C (97.6 °F)   Ht 1.727 m (5' 8\")   Wt 88.5 kg (195 lb)   SpO2 94%   BMI 29.65 kg/m²     Physical Exam   Constitutional: He is oriented to person, place, and time. He appears well-developed and well-nourished. No distress.   HENT:   Head: Normocephalic and atraumatic.   Eyes: EOM are normal. Pupils are equal, round, and reactive to light.   Cardiovascular: Normal rate and regular rhythm.    Murmur heard.   Crescendo decrescendo systolic murmur is present with a grade of 2/6   Pulmonary/Chest: Effort normal and breath sounds normal. No respiratory distress. He has no wheezes. He has no rales. He exhibits no tenderness.   Musculoskeletal: Normal range of motion. He exhibits no edema, tenderness or deformity.        Right ankle: Normal. He exhibits normal range of motion.        Left ankle: Normal. He exhibits normal range of motion, no swelling, no ecchymosis, no deformity, no laceration and normal pulse. No tenderness. No lateral malleolus and no medial malleolus tenderness found.   Straight leg raise test negative    Neurological: He is alert and oriented to person, place, and time. No cranial nerve deficit. He exhibits normal muscle tone. Coordination normal.   Reflex Scores:       Patellar reflexes are 1+ on the right side and 1+ on the left side.  Skin: Skin is warm and dry. Capillary refill takes less than 2 seconds. No rash noted. He is not diaphoretic. No erythema. No pallor.   Psychiatric: He has a normal mood and affect. His behavior is normal. Judgment and thought content normal.         Note: I have reviewed all pertinent labs and diagnostic tests associated with this visit with specific comments listed under the assessment and plan below    Assessment and Plan    1. Chronic deep vein thrombosis (DVT) of popliteal vein of left lower extremity (HCC)  - acute left leg pain over lateral shin likely due to musculoskeletal cause. US in ED negative for acute DVT but reveals non occlusive chronic " DVT (INR in 2015 subtherapeutic)   - will hold off on X ray imaging since pain has been going on for only one week with no inciting event or trauma. If pain continues >2-3 weeks, will send for X ray imaging   - unlikely to be herpes zoster over lateral shin, tendonitis, tendon rupture, fracture or sciatica. Straight leg raise test negative.   - ice or heat packs over affected area, over the counter topical analgesics, scheduled acetaminophen 1000 mg q 8-12 hours for 5 days.   - he is returning to clinic to establish care with Dr. Olea tomorrow         Followup: Return if symptoms worsen or fail to improve.  Scheduled appointment in place tomorrow with Dr. Olea to establish care, new PCP     Signed by: Bora Hameed M.D.

## 2018-05-30 NOTE — PATIENT INSTRUCTIONS
Continue heat packs. Use over the counter ointment such as Bengay or Icy Hot pack for topical relief.   Scheduled tylenol 500 mg two pills every 8-12 hours so that you are not chasing the pain

## 2018-05-31 ENCOUNTER — OFFICE VISIT (OUTPATIENT)
Dept: INTERNAL MEDICINE | Facility: MEDICAL CENTER | Age: 68
End: 2018-05-31
Payer: MEDICARE

## 2018-05-31 VITALS
HEIGHT: 68 IN | TEMPERATURE: 98.1 F | WEIGHT: 194.2 LBS | SYSTOLIC BLOOD PRESSURE: 122 MMHG | DIASTOLIC BLOOD PRESSURE: 68 MMHG | OXYGEN SATURATION: 94 % | HEART RATE: 66 BPM | BODY MASS INDEX: 29.43 KG/M2 | RESPIRATION RATE: 20 BRPM

## 2018-05-31 DIAGNOSIS — Z95.1 S/P CABG X 3: ICD-10-CM

## 2018-05-31 DIAGNOSIS — M1A.0710 IDIOPATHIC CHRONIC GOUT OF RIGHT FOOT WITHOUT TOPHUS: ICD-10-CM

## 2018-05-31 DIAGNOSIS — F31.70 BIPOLAR DISORDER IN FULL REMISSION, MOST RECENT EPISODE UNSPECIFIED TYPE (HCC): ICD-10-CM

## 2018-05-31 DIAGNOSIS — Z79.01 CHRONIC ANTICOAGULATION: ICD-10-CM

## 2018-05-31 DIAGNOSIS — E03.9 ACQUIRED HYPOTHYROIDISM: ICD-10-CM

## 2018-05-31 PROCEDURE — 99214 OFFICE O/P EST MOD 30 MIN: CPT | Performed by: INTERNAL MEDICINE

## 2018-05-31 RX ORDER — METHYLPREDNISOLONE 4 MG/1
TABLET ORAL
Qty: 21 TAB | Refills: 0 | Status: CANCELLED | OUTPATIENT
Start: 2018-05-31

## 2018-05-31 RX ORDER — COLCHICINE 0.6 MG/1
0.6 TABLET ORAL DAILY
Qty: 30 TAB | Refills: 0 | Status: SHIPPED | OUTPATIENT
Start: 2018-05-31 | End: 2020-03-31

## 2018-05-31 ASSESSMENT — PAIN SCALES - GENERAL: PAINLEVEL: 6=MODERATE PAIN

## 2018-05-31 NOTE — PATIENT INSTRUCTIONS
Keep talking to your company about ergonomics for your comfort at work. Let me know if you need a letter.     When you see Dr. Wright in the fall ask him about your aspirin. Does he think you need to take it at all, because you are on the warfarin. Also ask him if you can be switched to metoprolol succinate from metoprolol tartrate (once a day versus twice a day medication)    With your gout we use colchicine instead of steroid if you have a flare. These are the directions.     Get your thyroid blood test in August, I will     Day 1 Take On day one take 1.2 mg at the first sign of flare, followed in 1 hour with a single dose of 0.6 mg if you still have pain (maximum: 1.8 mg within 1 hour)  or 0.6 mg 3 times daily on the first day of flare (maximum total dose: 1.8 mg)    Day 2 and thereafter: 0.6 mg once or twice daily until flare resolves.     Do not take for more than a week. Call the clinic if ineffective.     Get you blood work in August.

## 2018-05-31 NOTE — PROGRESS NOTES
Geriatric Clinic Note  Patient: Rene Chan  Age: 67 y.o.   Gender: male  Author: Parag Olea M.D.  PCP: Cj Thornton M.D.    Today's date: 05/31/18  Chief Complaint   Patient presents with   • Establish Care   • Leg Pain       HPI:  History obtained from patient, medical chart.  Coming in to [facility] care. Previous PCP Dr. Damon.   History of coronary artery disease status post CABG, TIA.     Left leg pain   -Patient was seen both in the ED and in our clinic for acute left leg pain that started about one week ago. Workup was essentially negative and included lower extremity ultrasound which ruled out acute DVT and physical exam which was not significant for any cutaneous or obvious muscular issue. Today the patient remembers that he had some recent changes at work with regards to his position and his relation to the computer and desk. He has noted he has started sitting in a different situation as he always keeps his legs elevated at work.   -He says that the pain is much better today. He feels the scheduled Tylenol that was recommended by the resident doctor yesterday has done him well. He is continuing to use icy hot with good relief as well.    CAD/PVDHLD/s/pCABG 3V - 6/2015 -  He tells me that his initial symptoms that resulted in him getting a CABG with some tightness in his chest and subsequent workup led him to get a CABG back in June 2015. Since that time he has had no issues. He denies any paroxysmal nocturnal dyspnea, he says he has some orthopnea with laying flat however that has been stable for years, his lower extremities are not more swollen than usual and his ability to exert himself is stable, he can walk 0.5 miles without getting SOB.   -He takes atorvastatin 80 mg and initially had some lower extremity cramping this medication however feels that with the coenzyme Q10 that he is taking that those side effects are much improved.   Taking fish oil because he thought it was healthy, but  neyda ackerman  Drinks apple cider vinegar and honey and he did not get sick this winter, despite sleeping next to his wife when she was ill.   -He is on twice daily metoprolol and lisinopril for his coronary artery disease.    Bipolar  - on depakote BID, had trough level yesterday symtoms are stable.     Gout -usually right big toe; did happen on left big toe just once;   -diet related  -recent uric acid was 6.4   -takes steroid PRN for gout. Uses it twice a year. Takes less than prescribed. Takes three pills the first day then takes two pills the next day then its gone. He has never tried colchicine      Chronic Conditions:  Protein S Deficiency  -first clot in right leg and went to lung.. Attempted to wean off anticoag after and got her right upper extremity DVT back in March 2012. He has been on warfarin therapy ever since. Laboratory work showed increased functional protein S activity however it is unclear to me when this lab was taken. Nevertheless he should like me beyond lifelong anticoagulation therapy.    ROS:  Gets some superficial bleeding. No melena, hemtochezia,   No dizziness with standing.   A 14 point ROS is negative, other than as stated above.     Past Medical History:   Diagnosis Date   • Anticoagulation monitoring, special range    • Arrhythmia 2011    tia   • Bipolar disorder (HCC)    • CAD (coronary artery disease) 6/4/2015   • CAD (coronary artery disease)    • Carotid artery occlusion     L 60-79% occluded; R 59% occluded per pt report; 2011   • Clotting disorder (HCC)     protein S elevation in '12 with recurrent DVT and PE-coumadin lifelong   • Colon polyps    • Diabetes mellitus, type II (HCC)    • Gout    • Hammer toe    • Hyperlipidemia    • Hypertension    • Hypothyroidism    • Impaired fasting glucose    • Mild aortic stenosis - echo 4/2018    • Psychiatric disorder     bipolar     Social History     Social History   • Marital status:      Spouse name: N/A   • Number of  children: N/A   • Years of education: N/A     Occupational History   • Not on file.     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 90 Tab 1   • Coenzyme Q10 200 MG Tab Take  by mouth.     • atorvastatin (LIPITOR) 80 MG tablet TAKE 1 TABLET BY MOUTH EVERY DAY 90 Tab 0   • divalproex (DEPAKOTE) 250 MG Tablet Delayed Response TAKE ONE TABLET BY MOUTH TWICE A  Tab 3   • levothyroxine (SYNTHROID) 75 MCG Tab TAKE ONE TABLET BY MOUTH DAILY 90 Tab 2   • MethylPREDNISolone (MEDROL DOSEPAK) 4 MG Tablet Therapy Pack TAKE 6 TABLETS ON DAY 1 AS DIRECTED ON PACKAGE AND DECREASE BY 1 TAB EACH DAY FOR A TOTAL OF 6 DAYS (Patient taking differently: as needed. TAKE 6 TABLETS ON DAY 1 AS DIRECTED ON PACKAGE AND DECREASE BY 1 TAB EACH DAY FOR A TOTAL OF 6 DAYS) 21 Tab 0   • metoprolol (LOPRESSOR) 25 MG Tab Take 1 Tab by mouth 2 Times a Day. 180 Tab 3   • lisinopril (PRINIVIL) 5 MG Tab Take 1 Tab by mouth every day. 90 Tab 3   • therapeutic multivitamin-minerals (THERAGRAN-M) Tab Take 1 Tab by mouth every day.     • Cholecalciferol (VITAMIN D) 2000 UNITS Cap Take  by mouth.     • docosahexanoic acid (OMEGA 3 FA) 1000 MG CAPS Take 1,400 mg by mouth every  "day.     • ASPIRIN 81 MG PO TABS Take 162 mg by mouth every day. QD       No current facility-administered medications on file prior to visit.        Physical Exam:  /68   Pulse 66   Temp 36.7 °C (98.1 °F)   Resp 20   Ht 1.727 m (5' 8\")   Wt 88.1 kg (194 lb 3.2 oz)   SpO2 94%   BMI 29.53 kg/m² Body mass index is 29.53 kg/m².    Gen: No acute distress, pleasant  Neck: supple  CV: Regular rate and rhythm,   Lungs: Clear to auscultation bilaterally, normal effort  Abd: Soft, obese  Ext: His left leg appears stable from yesterday. There is no skin deformity, there is no pain to palpation today.  Neuro: No sensory deficits noted  Psych: Does not appear to be responding to internal stimuli  Dementia Screening:  Delirium Screening.    Labs: Reviewed CBC, CMP, uric acid, lipid panel    Lower extremity venous duplex reviewed  Assessment: 67-year-old male coming to establish care     Plan:  Left leg pain - continue current treatment plan with scheduled Tylenol and heat and ice. Patient will give us a call next week if this does not continue to improve it appears it is likely situational and he will contact his work see if they can do any ergonomic changes. He may need to provide a letter for his work changes as this is having significant impact on his life as he had both an ER and acute care visit for this issue.    Coronary artery disease  Peripheral vascular disease  Status post CABG  -We decreased his aspirin from 81 mg twice a day to 81 mg daily he will discuss with cardiologist the need for further use of aspirin as he is on chronic warfarin therapy  -He is on beta blocker and an ACE inhibitor  -He is being followed by cardiology; when he sees them next he'll follow up with about the use of aspirin asked them to consider prescribing him metoprolol once daily, rather than twice daily  -Currently appears to have no signs or symptoms of heart failure and/or unstable angina.    Hyperlipidemia  -Continue statin " "at max dose, we'll ensure decision-making and we'll recheck a lipid panel in 2 years. He will continue to diet and exercise as much as possible. Patient will continue coenzyme Q 10 as it helps like cramps.     Hypothyroidism  -Continue levothyroxine  -Recheck TSH in August, we will call patient results.    Bipolar in full remission  -His recent Depakote level was \"subtherapeutic\" however his symptoms are stable and as such we will not adjust his dose.We will continue this and have yearly LFT and valproic acid level checks. Patient counseled on signs of hepatitis including worsening confusion, jaundice, right upper quadrant pain.    Gout  -We'll ensure decision-making with regards to his when necessary steroid use for his gout flares  -We will attempt to prescribe colchicine at this point. See AVS for patient instructions.       Health Maintenance:  UTD, patient defers AWV for now.   Return in about 6 months (around 11/30/2018) for Long; general follow up.     Parag Olea M.D.  Geriatric Physician    This note was partially dictated with voice recognition software, for any confusion please do not hesitate to contact me.     "

## 2018-06-04 ENCOUNTER — PATIENT MESSAGE (OUTPATIENT)
Dept: INTERNAL MEDICINE | Facility: MEDICAL CENTER | Age: 68
End: 2018-06-04

## 2018-06-04 ENCOUNTER — HOSPITAL ENCOUNTER (EMERGENCY)
Facility: MEDICAL CENTER | Age: 68
End: 2018-06-04
Attending: EMERGENCY MEDICINE
Payer: MEDICARE

## 2018-06-04 VITALS
RESPIRATION RATE: 16 BRPM | SYSTOLIC BLOOD PRESSURE: 140 MMHG | BODY MASS INDEX: 29.5 KG/M2 | HEART RATE: 50 BPM | WEIGHT: 194.67 LBS | OXYGEN SATURATION: 94 % | DIASTOLIC BLOOD PRESSURE: 65 MMHG | HEIGHT: 68 IN | TEMPERATURE: 97.2 F

## 2018-06-04 DIAGNOSIS — M54.32 SCIATICA OF LEFT SIDE: ICD-10-CM

## 2018-06-04 PROCEDURE — 99283 EMERGENCY DEPT VISIT LOW MDM: CPT

## 2018-06-04 RX ORDER — TIZANIDINE 4 MG/1
4 TABLET ORAL 3 TIMES DAILY PRN
Qty: 16 TAB | Refills: 0 | Status: SHIPPED | OUTPATIENT
Start: 2018-06-04 | End: 2018-06-13 | Stop reason: SDUPTHER

## 2018-06-04 RX ORDER — METHYLPREDNISOLONE 4 MG/1
TABLET ORAL
Qty: 1 KIT | Refills: 0 | Status: SHIPPED | OUTPATIENT
Start: 2018-06-04 | End: 2018-08-27

## 2018-06-04 ASSESSMENT — LIFESTYLE VARIABLES: DO YOU DRINK ALCOHOL: NO

## 2018-06-04 ASSESSMENT — PAIN SCALES - GENERAL: PAINLEVEL_OUTOF10: 3

## 2018-06-04 NOTE — ED TRIAGE NOTES
"Chief Complaint   Patient presents with   • Leg Pain     Pt started having pain in his left calf but now pain is up in his left leg to his hip.      Pt seen by PCP and here worked up for DVT ruled out by US.  Pt thinks maybe his work is causing it. Pain is unrelieved by otc medications, hot and cold compresses and massage. Denies injury.   /59   Pulse 69   Temp 36.2 °C (97.2 °F)   Resp 14   Ht 1.727 m (5' 8\")   Wt 88.3 kg (194 lb 10.7 oz)   SpO2 95%   BMI 29.60 kg/m²   Pt placed back in lobby, educated on triage process, and told to inform staff of any change in condition.     "

## 2018-06-04 NOTE — ED PROVIDER NOTES
ED Provider Note    CHIEF COMPLAINT  Chief Complaint   Patient presents with   • Leg Pain     Pt started having pain in his left calf but now pain is up in his left leg to his hip.        HPI  Rene Chan is a 67 y.o. male who presents for constant aching in the lateral part of his left calf for the last 10 days.  It is now going up the lateral side of his left leg towards his buttock.  He denies back pain, trauma.  He has not noticed any swelling or redness.  He is on anticoagulants and states that he has never had a problem with it since he has gotten on them.  He denies chest pain or shortness of breath.  He has had no fevers.  The pain is worse in the morning when he starts walking.  He recently had an ultrasound that showed no acute DVT in the left lower extremity.    REVIEW OF SYSTEMS  See HPI for further details.  No chest pain, shortness of breath    PAST MEDICAL HISTORY  Past Medical History:   Diagnosis Date   • CAD (coronary artery disease) 6/4/2015   • Arrhythmia 2011    tia   • Anticoagulation monitoring, special range    • Bipolar disorder (HCC)    • CAD (coronary artery disease)    • Carotid artery occlusion     L 60-79% occluded; R 59% occluded per pt report; 2011   • Clotting disorder (HCC)     protein S elevation in '12 with recurrent DVT and PE-coumadin lifelong   • Colon polyps    • Diabetes mellitus, type II (HCC)    • Gout    • Hammer toe    • Hyperlipidemia    • Hypertension    • Hypothyroidism    • Impaired fasting glucose    • Mild aortic stenosis - echo 4/2018    • Psychiatric disorder     bipolar       FAMILY HISTORY  Family History   Problem Relation Age of Onset   • Heart Disease Sister 50   • Stroke Sister 55     CVA   • Heart Failure Father        SOCIAL HISTORY  Social History     Social History   • Marital status:      Spouse name: N/A   • Number of children: N/A   • Years of education: N/A     Occupational History   • [employer]       Build house for 35 years     Social  History Main Topics   • Smoking status: Former Smoker     Packs/day: 2.00     Years: 10.00     Types: Cigarettes     Quit date: 1/1/1976   • Smokeless tobacco: Never Used      Comment: quit x 32 yrs; 2ppd x 7 years   • Alcohol use No   • Drug use: No      Comment: IV drug use in teens   • Sexual activity: Not on file     Other Topics Concern   • Not on file     Social History Narrative    Lives with wife    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;  Service:    • RECOVERY  6/1/2015    Procedure:  CATH LAB  [facility] [place] ICD; 794.31;  Surgeon: [facility];  Location: SURGERY PRE-POST PROC UNIT [facility];  Service:    • OTHER ORTHOPEDIC SURGERY  1976    L wrist repair    • ANKLE FUSION     • APPENDECTOMY     • OTHER ABDOMINAL SURGERY      umbilical hernia repair 2000, appy age 13       CURRENT MEDICATIONS  Home Medications     Reviewed by Cheri Lowery R.N. (Registered Nurse) on 06/04/18 at 1126  Med List Status: <None>   Medication Last Dose Status   aspirin 81 MG tablet  Active   atorvastatin (LIPITOR) 80 MG tablet 5/31/2018 Active   Cholecalciferol (VITAMIN D) 2000 UNITS Cap 5/31/2018 Active   Coenzyme Q10 200 MG Tab 5/31/2018 Active   colchicine (COLCRYS) 0.6 MG Tab  Active   divalproex (DEPAKOTE) 250 MG Tablet Delayed Response 5/31/2018 Active   docosahexanoic acid (OMEGA 3 FA) 1000 MG CAPS 5/31/2018 Active   levothyroxine (SYNTHROID) 75 MCG Tab 5/31/2018 Active   lisinopril (PRINIVIL) 5 MG Tab 5/31/2018 Active   metoprolol (LOPRESSOR) 25 MG Tab 5/31/2018 Active   therapeutic multivitamin-minerals (THERAGRAN-M) Tab 5/31/2018 Active   warfarin (COUMADIN) 7.5 MG Tab 5/31/2018  "Active                ALLERGIES  Allergies   Allergen Reactions   • Penicillins      Childhood; does know what the reaction was       PHYSICAL EXAM  VITAL SIGNS: /59   Pulse 69   Temp 36.2 °C (97.2 °F)   Resp 14   Ht 1.727 m (5' 8\")   Wt 88.3 kg (194 lb 10.7 oz)   SpO2 95%   BMI 29.60 kg/m²  @RICRADO[[id number]::@   Constitutional: Well developed, Well nourished, No acute respiratory distress, Non-toxic appearance.   HENT: Normocephalic, Atraumatic, Bilateral external ears normal, Oropharynx clear, mucous membranes are moist.  Eyes: Conjunctiva normal, No discharge. No icterus.  Neck: Normal range of motion. Supple.  Skin: Warm, Dry, No erythema, No rash.,  Ecchymosis  Musculoskeletal: Good range of motion in all major joints. Normal gait.  No tenderness to the left calf, thigh.  No increased girth to the left lower extremity.  Negative Homans sign.  Positive straight leg raise  Neurologic: Alert & oriented x 3. No focal deficits noted.  Sensation is intact to the left foot        COURSE & MEDICAL DECISION MAKING  Pertinent Labs & Imaging studies reviewed. (See chart for details)  Patient appears to have sciatica, not actually calf pain.  I have given him verbal and written instructions on a [place] sit.     The patient will return for new or worsening symptoms and is stable at the time of discharge.    The patient is referred to a primary physician for blood pressure management, diabetic screening, and for all other preventative health concerns.      DISPOSITION:  Patient will be discharged home in stable condition.    FOLLOW UP:  Parag Olea M.D.  [address] [place] [id number] [phone]    Call today  for re-check and possible referral for physical therapy      OUTPATIENT MEDICATIONS:  Discharge Medication List as of 6/4/2018  1:43 PM      START taking these medications    Details   MethylPREDNISolone (MEDROL DOSEPAK) 4 MG Tablet Therapy Pack Use as directed, Disp-1 Kit, R-0, Normal    "   tizanidine (ZANAFLEX) 4 MG Tab Take 1 Tab by mouth 3 times a day as needed (muscle spasm)., Disp-16 Tab, R-0, Print Rx Paper               FINAL IMPRESSION  1. Sciatica of left side        Electronically signed by: Lacy Gonzalez, 6/4/2018 1:42 PM

## 2018-06-04 NOTE — TELEPHONE ENCOUNTER
From: Rene Chan  To: Parag Olea M.D.  Sent: 6/4/2018 2:35 PM PDT  Subject: Prescription Question    The pain in my leg was so bad this morning, I went to the ER. The ER doctor diagnosed Sciatica of left side and suggested a possible referral for physical therapy (PT).   I am requesting that you issue the referral for PT as soon as you are able so I can schedule an appointment as soon as possible.

## 2018-06-04 NOTE — ED NOTES
Pt ambulated to purple 81.  Agree with triage note.  No redness or warmth noted to leg.  ERP to see.

## 2018-06-04 NOTE — TELEPHONE ENCOUNTER
Sorry to hear things are not improving. I have placed the referral to physical therapy.   Parag Olea M.D.

## 2018-06-05 ENCOUNTER — PATIENT OUTREACH (OUTPATIENT)
Dept: HEALTH INFORMATION MANAGEMENT | Facility: OTHER | Age: 68
End: 2018-06-05

## 2018-06-11 ENCOUNTER — PHYSICAL THERAPY (OUTPATIENT)
Dept: PHYSICAL THERAPY | Facility: REHABILITATION | Age: 68
End: 2018-06-11
Attending: INTERNAL MEDICINE
Payer: MEDICARE

## 2018-06-11 DIAGNOSIS — M54.32 LEFT SIDED SCIATICA: ICD-10-CM

## 2018-06-11 PROCEDURE — 97162 PT EVAL MOD COMPLEX 30 MIN: CPT

## 2018-06-11 PROCEDURE — 97012 MECHANICAL TRACTION THERAPY: CPT

## 2018-06-11 ASSESSMENT — ENCOUNTER SYMPTOMS
PAIN LOCATION: L LE
PAIN TIMING: EVERY MORNING
PAIN SCALE AT HIGHEST: 10
PAIN SCALE AT LOWEST: 0
QUALITY: THROBBING
PAIN SCALE: 4
QUALITY: ACHING

## 2018-06-11 NOTE — OP THERAPY EVALUATION
Outpatient Physical Therapy  INITIAL EVALUATION    [facility]  [address]  [address]  [place] [address]  Phone:  [phone]  Fax:  [phone]    Date of Evaluation: 2018    Patient: Rene Chan  YOB: 1950  MRN: [id number]     Referring Provider: Parag Olea M.D.  [address]  [address]  [place] [address]   Referring Diagnosis Sciatica of left side [M54.32]     Time Calculation  Start time: 1530  Stop time: 1650 Time Calculation (min): 80 minutes     Physical Therapy Occurrence Codes    Date of onset of impairment:  18   Date physical therapy care plan established or reviewed:  18   Date physical therapy treatment started:  18          Chief Complaint: Back Pain    Visit Diagnoses     ICD-10-CM   1. Left sided sciatica M54.32         Subjective   History of Present Illness:     History of chief complaint:  Onset of L ankle/foot pain 3 wks ago. Sxs moved up towards L glut over a couple of wks. Pt  Reports even more recent N/T into side of foot and toes.    PMHx: includes old tib/fib fx that didn't heal properly, triple bypass, and hx of clotting (ruled out in ER at onset of current sxs)    Pain:     Current pain ratin    At best pain ratin    At worst pain rating:  10    Location:  L LE    Quality:  Aching and throbbing    Pain timing:  Every morning    Aggravating factors:  Sleeping (akes around 3am every night), sitting w/L foot up, driving, walking, don shoes    Relieving factors:  Topical cream, MHP, steroid dose, mm relaxer, supine    Activity Tolerance:     Current activity tolerance / Recreational activities:  Hiking 5 miles prior to current issue, craft projects, reading, TV. Regularly used gym up to 2 months ago (TM, UE and LE wts)    Work:  Part-time , walks approx 15 min, sits approx 45 min    Patient Goals:     Patient goals for therapy:  Get rid of L LE pain      Past Medical History:    Diagnosis Date   • Anticoagulation monitoring, special range    • Arrhythmia 2011    tia   • Bipolar disorder (HCC)    • CAD (coronary artery disease) 6/4/2015   • CAD (coronary artery disease)    • Carotid artery occlusion     L 60-79% occluded; R 59% occluded per pt report; 2011   • Clotting disorder (HCC)     protein S elevation in '12 with recurrent DVT and PE-coumadin lifelong   • Colon polyps    • Diabetes mellitus, type II (HCC)    • Gout    • Hammer toe    • Hyperlipidemia    • Hypertension    • Hypothyroidism    • Impaired fasting glucose    • Mild aortic stenosis - echo 4/2018    • Psychiatric disorder     bipolar     Past Surgical History:   Procedure Laterality Date   • MULTIPLE CORONARY ARTERY BYPASS ENDO VEIN HARVEST  6/4/2015    Procedure: MULTIPLE CORONARY ARTERY BYPASS Grafting  x 3   ENDO VEIN HARVEST right leg;  Surgeon: Christophe Lemus M.D.;  Location: SURGERY [facility];  Service:    • RECOVERY  6/1/2015    Procedure:  CATH LAB  [facility] w/[facility] [place] ICD; 794.31;  Surgeon: [facility];  Location: SURGERY PRE-POST PROC UNIT [facility];  Service:    • OTHER ORTHOPEDIC SURGERY  1976    L wrist repair    • ANKLE FUSION     • APPENDECTOMY     • OTHER ABDOMINAL SURGERY      umbilical hernia repair 2000, appy age 13         Objective   Observation and functional movement:  Increased T/S kyphosis w/fwd head position    Range of motion and strength:    AROM L/S:  Flex = FT to mid-shin (increased foot and hip sxs)  Ext = 25%  SB = 50% bilaterally (increased hip sxs contralaterally w/R SB)    Sensation and reflexes:     Diminished sensation to LT L L4-S1     DTRs:   quad R=2+, L=1+  HS R = 1, L = unable to elicit    Palpation and joint mobility:     TTP L > R ILL, central and L unilateral L3-S1    Hypomobile L4-S1 centrally    Special tests:      SLR B 30 deg w/pull in lower calf    Gait:      Antalgic pattern w/decreased stance time L LE          Therapeutic Exercises (CPT 28992):     1. HEP,  prone lying x5 min, 3x/day    Therapeutic Treatments and Modalities:     1. Mechanical Traction (CPT 03492), L/S w/MHP, 80/50#, 60/20s, x15 min    Time-based treatments/modalities:  Therapeutic exercise minutes (CPT 89811): 5 minutes       Assessment, Response and Plan:   Assessment details:  67 yr old male w/acute L LE pain. Sxs consistent w/L5/S1 radiculitis. Skilled PT indicated to address the following goals.     Goals:   Short Term Goals:   Pt able to sleep past 3am at least 3 nights/wk  Pt able to sit as needed at work w/o increased c/o foot/hip sxs  Able to don shoes w/50% decreased c/o LE pain  Able to drive w/50% decreased c/o LE pain  Short term goal time span:  2-4 weeks      Long Term Goals:    Pt able to sleep thru the night w/o waking related to LE sxs.  Able to don shoes w/80% decreased c/o LE pain  Able to drive w/80% decreased c/o LE pain  Long term goal time span:  6-8 weeks    Plan:   Therapy options:  Physical therapy treatment to continue  Planned therapy interventions:  Mechanical Traction (CPT 75356), Neuromuscular Re-education (CPT 14471), Therapeutic Exercise (CPT 14887) and E Stim Unattended (CPT 21939)  Frequency:  2x week  Duration in weeks:  6  Plan details:  Cont skilled PT to address balanced L/S mobility and stability. Progress deep L/S stab w/gentle ext bias, IASTM L LE, [facility] tx.      Functional Limitation G-Codes and Severity Modifiers  Raji Jim Low Back Pain and Disability Score: 33.33     Referring provider co-signature:  I have reviewed this plan of care and my co-signature certifies the need for services.      Physician Signature: ________________________________ Date: ______________

## 2018-06-14 ENCOUNTER — PHYSICAL THERAPY (OUTPATIENT)
Dept: PHYSICAL THERAPY | Facility: REHABILITATION | Age: 68
End: 2018-06-14
Attending: INTERNAL MEDICINE
Payer: MEDICARE

## 2018-06-14 DIAGNOSIS — M54.32 LEFT SIDED SCIATICA: ICD-10-CM

## 2018-06-14 PROCEDURE — 97140 MANUAL THERAPY 1/> REGIONS: CPT | Mod: XU

## 2018-06-14 PROCEDURE — 97012 MECHANICAL TRACTION THERAPY: CPT

## 2018-06-14 PROCEDURE — 97110 THERAPEUTIC EXERCISES: CPT

## 2018-06-14 NOTE — OP THERAPY DAILY TREATMENT
Outpatient Physical Therapy  DAILY TREATMENT     [facility] Physical [address]  [address]  [place] [address]  Phone:  [phone]  Fax:  [phone]    Date: 06/14/2018    Patient: Rene Chan  YOB: 1950  MRN: [id number]     Time Calculation  Start time: 1400  Stop time: 1450 Time Calculation (min): 50 minutes     Chief Complaint: Back Pain    Visit #: 2    SUBJECTIVE:  Felt increased pain in L LE into the eveining and next am. Reports improved overall Wed and Th. Currently w/light aching in L glut/thigh and ankle area.    OBJECTIVE:      Therapeutic Exercises (CPT 20407):     1. 3D pelvic tilts, x10 ea    2. Wall ball squats, x20    3. Ski jump, 2x1 min    Therapeutic Treatments and Modalities:     1. Manual Therapy (CPT 47536), Central and L unilateral PAs L3-5, CFM ILL B, Sacral float    2. Mechanical Traction (CPT 71994), L/S w/MHP, 80/50#, 60/20s, x15 min    Time-based treatments/modalities:  Manual therapy minutes (CPT 69909): 15 minutes  Therapeutic exercise minutes (CPT 98592): 15 minutes       ASSESSMENT:   Pt w/centralizing sxs w/ext bias, minimal immediate change w/tx.    PLAN/RECOMMENDATIONS:   Plan for treatment: therapy treatment to continue next visit.  Planned interventions for next visit: E-stim unattended (CPT 31549), manual therapy (CPT 28237), mechanical traction (CPT 89853), neuromuscular re-education (CPT 65674) and therapeutic exercise (CPT 24145). Progress L/S stab exercises, MFR L LE, foam roller

## 2018-06-21 ENCOUNTER — PHYSICAL THERAPY (OUTPATIENT)
Dept: PHYSICAL THERAPY | Facility: REHABILITATION | Age: 68
End: 2018-06-21
Attending: INTERNAL MEDICINE
Payer: MEDICARE

## 2018-06-21 DIAGNOSIS — M54.32 LEFT SIDED SCIATICA: ICD-10-CM

## 2018-06-21 PROCEDURE — 97012 MECHANICAL TRACTION THERAPY: CPT

## 2018-06-21 PROCEDURE — 97110 THERAPEUTIC EXERCISES: CPT

## 2018-06-21 PROCEDURE — 97140 MANUAL THERAPY 1/> REGIONS: CPT

## 2018-06-25 ENCOUNTER — PHYSICAL THERAPY (OUTPATIENT)
Dept: PHYSICAL THERAPY | Facility: REHABILITATION | Age: 68
End: 2018-06-25
Attending: INTERNAL MEDICINE
Payer: MEDICARE

## 2018-06-25 DIAGNOSIS — M54.32 LEFT SIDED SCIATICA: ICD-10-CM

## 2018-06-25 PROCEDURE — 97110 THERAPEUTIC EXERCISES: CPT

## 2018-06-25 PROCEDURE — 97012 MECHANICAL TRACTION THERAPY: CPT

## 2018-06-25 PROCEDURE — 97140 MANUAL THERAPY 1/> REGIONS: CPT

## 2018-06-25 NOTE — OP THERAPY DAILY TREATMENT
Outpatient Physical Therapy  DAILY TREATMENT     [facility] Physical [address].  [address]  [place] [address]  Phone:  [phone]  Fax:  [phone]    Date: 06/25/2018    Patient: Rene Chan  YOB: 1950  MRN: [id number]     Time Calculation  Start time: 1200  Stop time: 1250 Time Calculation (min): 50 minutes     Chief Complaint: Back Pain    Visit #: 4    SUBJECTIVE:  Overall still much better. Reports worst time is first thing in the morning.    OBJECTIVE:      Therapeutic Exercises (CPT 21181):     1. Wall ball squats, x30    2. Supine ext over ball, x3 min    3. Ski jump & alt UE over ball, 3x1 min    4. Prone presses w/sheet and manual pressure at L5/S1, x5 min    Therapeutic Treatments and Modalities:     1. Manual Therapy (CPT 59279), T/S rot mobs, Transverse glides TL jct, Central and L unilateral PAs L4/5, CFM ILL B, Sacral float,, //80% reduction in LE sxs    2. Mechanical Traction (CPT 56034), L/S w/MHP, 90/50#, 60/20s, x15 min    Time-based treatments/modalities:  Manual therapy minutes (CPT 50039): 15 minutes  Therapeutic exercise minutes (CPT 15163): 15 minutes       ASSESSMENT:   Able to centralize sxs best w/AROM ext and central L4/5/S1 PA    PLAN/RECOMMENDATIONS:   Plan for treatment: therapy treatment to continue next visit.  Progress L/S stab exercises, MFR L LE, foam roller

## 2018-06-26 ENCOUNTER — ANTICOAGULATION VISIT (OUTPATIENT)
Dept: VASCULAR LAB | Facility: MEDICAL CENTER | Age: 68
End: 2018-06-26
Attending: INTERNAL MEDICINE
Payer: MEDICARE

## 2018-06-26 VITALS — DIASTOLIC BLOOD PRESSURE: 64 MMHG | SYSTOLIC BLOOD PRESSURE: 117 MMHG | HEART RATE: 53 BPM

## 2018-06-26 DIAGNOSIS — Z79.01 CHRONIC ANTICOAGULATION: ICD-10-CM

## 2018-06-26 LAB
INR BLD: 1.6 (ref 0.9–1.2)
INR PPP: 1.6 (ref 2–3.5)

## 2018-06-26 PROCEDURE — 85610 PROTHROMBIN TIME: CPT

## 2018-06-26 PROCEDURE — 99212 OFFICE O/P EST SF 10 MIN: CPT

## 2018-06-26 NOTE — PROGRESS NOTES
Anticoagulation Summary  As of 6/26/2018    INR goal:   2.0-3.0   TTR:   74.7 % (3 y)   Today's INR:   1.6!   Warfarin maintenance plan:   3.75 mg (7.5 mg x 0.5) on Wed; 7.5 mg (7.5 mg x 1) all other days   Weekly warfarin total:   48.75 mg   Plan last modified:   Shweta Serna A.P.NPaul (2/28/2017)   Next INR check:   7/24/2018   Target end date:   Indefinite    Indications    DVT (deep venous thrombosis) (HCC) (Resolved) [I82.409]  Pulmonary embolism [415.19] (Resolved) [I26.99]  Deep vein thrombosis (DVT) (HCC) [I82.409]             Anticoagulation Episode Summary     [facility]   [facility]    Preferred lab:   [facility]    Send INR reminders to:       Comments:         Anticoagulation Care Providers     Provider Role Specialty Phone number    Patricia Damon M.D. Referring Internal Medicine [phone]    Jona QuinnD Responsible      [facility] Anticoagulation Services Responsible  [phone]        Anticoagulation Patient Findings      HPI:  Rene Chan seen in clinic today, on anticoagulation therapy with warfarin for DVT.  Changes to current medical/health status since last appt: none  Denies signs/symptoms of bleeding and/or thrombosis since the last appt.    Denies any interval changes to diet  Denies any interval changes to medications since last appt.   Denies any complications or cost restrictions with current therapy.   BP recorded in vitals.  Confirmed dosing regimen.   Pt missed a dose recently.     A/P   INR  SUB-therapeutic.   Bolus today then Pt is to continue with current warfarin dosing regimen.     Follow up appointment in 4 week(s).    Hong Brooks, JonaD

## 2018-06-28 ENCOUNTER — PHYSICAL THERAPY (OUTPATIENT)
Dept: PHYSICAL THERAPY | Facility: REHABILITATION | Age: 68
End: 2018-06-28
Attending: INTERNAL MEDICINE
Payer: MEDICARE

## 2018-06-28 DIAGNOSIS — M54.32 LEFT SIDED SCIATICA: ICD-10-CM

## 2018-06-28 PROCEDURE — 97110 THERAPEUTIC EXERCISES: CPT

## 2018-06-28 PROCEDURE — 97140 MANUAL THERAPY 1/> REGIONS: CPT

## 2018-06-28 PROCEDURE — 97012 MECHANICAL TRACTION THERAPY: CPT

## 2018-06-28 NOTE — OP THERAPY DAILY TREATMENT
Outpatient Physical Therapy  DAILY TREATMENT     [facility] Physical [address].  [address]  [place] [address]  Phone:  [phone]  Fax:  [phone]    Date: 06/28/2018    Patient: Rene Chan  YOB: 1950  MRN: [id number]     Time Calculation  Start time: 1200  Stop time: 1250 Time Calculation (min): 50 minutes     Chief Complaint: Back Pain    Visit #: 5    SUBJECTIVE:  Pt reports this am was closest to sx-free so far. Still has occasional tinge at L ankle and glut.    OBJECTIVE:      Therapeutic Exercises (CPT 76289):     1. Supine ext over ball    2. Ski jump & alt UE over ball, x2 min    3. Prone press ups    Therapeutic Treatments and Modalities:     1. Manual Therapy (CPT 14093), IASTM L lat chain, B paraspinals, QL, ILL    2. Mechanical Traction (CPT 86440), L/S w/MHP, 90/50#, 60/20s, x15 min    Time-based treatments/modalities:  Manual therapy minutes (CPT 30609): 20 minutes  Therapeutic exercise minutes (CPT 24042): 10 minutes       ASSESSMENT:   Fully centralized sxs after IASTM.     PLAN/RECOMMENDATIONS:   Plan for treatment: therapy treatment to continue next visit.  Progress L/S stab exercises, foam roller, and IASTM as needed.

## 2018-07-02 ENCOUNTER — APPOINTMENT (OUTPATIENT)
Dept: PHYSICAL THERAPY | Facility: REHABILITATION | Age: 68
End: 2018-07-02
Attending: INTERNAL MEDICINE
Payer: MEDICARE

## 2018-07-05 ENCOUNTER — PHYSICAL THERAPY (OUTPATIENT)
Dept: PHYSICAL THERAPY | Facility: REHABILITATION | Age: 68
End: 2018-07-05
Attending: INTERNAL MEDICINE
Payer: MEDICARE

## 2018-07-05 DIAGNOSIS — M54.32 LEFT SIDED SCIATICA: ICD-10-CM

## 2018-07-05 PROCEDURE — 97110 THERAPEUTIC EXERCISES: CPT

## 2018-07-05 NOTE — OP THERAPY DAILY TREATMENT
Outpatient Physical Therapy  DAILY TREATMENT     [facility] Physical Therapy [address].  [address]  [place] [address]  Phone:  [phone]  Fax:  [phone]    Date: 07/05/2018    Patient: Rene Chan  YOB: 1950  MRN: [id number]     Time Calculation  Start time: 1200  Stop time: 1230 Time Calculation (min): 30 minutes     Chief Complaint: Back Pain    Visit #: 6    SUBJECTIVE:  Pt reports falling over weekend and landing on L hip. Skipped HEP for a couple of days and noticed LE sxs returning. Overall still feeling better and doing HEP again.    OBJECTIVE:      Therapeutic Exercises (CPT 06536):     1. Foam Roller, pec stretch alt UE OH and lat, angels, marching    2. Multifidi alt UE w/blue TB, x20 ea    3. Sidestep multifidi w/blue TB, x10 ea      Time-based treatments/modalities:  Therapeutic exercise minutes (CPT 44040): 30 minutes       ASSESSMENT:   Struggled w/rotational control on foam roller. Pt w/good HEP compliance.    PLAN/RECOMMENDATIONS:   Cont PT 1-2 sessions spreading them out. Progress L/S stab and T/S mobility as indicated.

## 2018-07-16 ENCOUNTER — TELEPHONE (OUTPATIENT)
Dept: PHYSICAL THERAPY | Facility: REHABILITATION | Age: 68
End: 2018-07-16

## 2018-07-16 DIAGNOSIS — I10 ESSENTIAL HYPERTENSION: ICD-10-CM

## 2018-07-16 NOTE — OP THERAPY DISCHARGE SUMMARY
Outpatient Physical Therapy  DISCHARGE SUMMARY NOTE      [facility] Physical Therapy [address].  [address]  [place] [address]  Phone:  [phone]  Fax:  [phone]    Date of Visit: 07/16/2018    Patient: Rene Chan  YOB: 1950  MRN: [id number]     Referring Provider: Parag Olea M.D.   Referring Diagnosis Sciatica of left side [M54.32]     Physical Therapy Occurrence Codes    Date of onset of impairment:  5/21/18   Date physical therapy care plan established or reviewed:  6/11/18   Date physical therapy treatment started:  6/11/18          Your patient is being discharged from Physical Therapy with the following comments:   · Goals met    Comments:  Pt reports improved sleeping and waking w/>90% decreased sxs, driving w/o problem and able to don shoes w/o any issue.     Limitations Remaining:  Occasional neural sxs in am that resolve within steps.    Recommendations:  D/C to JOHN Hurtado PT, DPT    Date: 7/16/2018

## 2018-07-17 ENCOUNTER — APPOINTMENT (OUTPATIENT)
Dept: PHYSICAL THERAPY | Facility: REHABILITATION | Age: 68
End: 2018-07-17
Attending: INTERNAL MEDICINE
Payer: MEDICARE

## 2018-07-17 RX ORDER — LISINOPRIL 5 MG/1
TABLET ORAL
Qty: 90 TAB | Refills: 2 | Status: SHIPPED | OUTPATIENT
Start: 2018-07-17 | End: 2018-08-27 | Stop reason: SDUPTHER

## 2018-07-30 ENCOUNTER — ANTICOAGULATION VISIT (OUTPATIENT)
Dept: VASCULAR LAB | Facility: MEDICAL CENTER | Age: 68
End: 2018-07-30
Attending: INTERNAL MEDICINE
Payer: MEDICARE

## 2018-07-30 DIAGNOSIS — I82.90 DEEP VEIN THROMBOSIS (DVT) OF NON-EXTREMITY VEIN, UNSPECIFIED CHRONICITY: ICD-10-CM

## 2018-07-30 LAB
INR BLD: 5.4 (ref 0.9–1.2)
INR PPP: 5.4 (ref 2–3.5)

## 2018-07-30 PROCEDURE — 99212 OFFICE O/P EST SF 10 MIN: CPT | Performed by: NURSE PRACTITIONER

## 2018-07-30 PROCEDURE — 85610 PROTHROMBIN TIME: CPT

## 2018-07-30 NOTE — PROGRESS NOTES
Anticoagulation Summary  As of 7/30/2018    INR goal:   2.0-3.0   TTR:   73.3 % (3.1 y)   Today's INR:   5.4!   Warfarin maintenance plan:   3.75 mg (7.5 mg x 0.5) on Wed; 7.5 mg (7.5 mg x 1) all other days   Weekly warfarin total:   48.75 mg   Plan last modified:   Shweta Serna A.P.NPaul (2/28/2017)   Next INR check:   8/13/2018   Target end date:   Indefinite    Indications    DVT (deep venous thrombosis) (HCC) (Resolved) [I82.409]  Pulmonary embolism [415.19] (Resolved) [I26.99]  Deep vein thrombosis (DVT) (HCC) [I82.409]             Anticoagulation Episode Summary     [facility]   [facility]    Preferred lab:   [facility] GENERAL    Send INR reminders to:       Comments:         Anticoagulation Care Providers     Provider Role Specialty Phone number    Patricia Damon M.D. Referring Internal Medicine [phone]    Jona QuinnD Responsible      [facility] Anticoagulation Services Responsible  [phone]        Anticoagulation Patient Findings      HPI:  Rene Chan seen in clinic today for follow up on anticoagulation therapy in the presence of DVT, PE hx. Denies any changes to current medical/health status since last appointment. Denies any medication or diet changes. No current symptoms of bleeding or thrombosis reported. Confirmed regimen. No ETOH or cranberries. Taking acetaminophen for sciatica pain, however, this hasn't changed from the previous visit.    A/P:   INR supratherapeutic. HOLD two doses then continue current regimen.    Follow up appointment in 2 week(s).    Next Appointment: Monday, August 13, 2018 at 1:15 pm.     Miya PARKER

## 2018-08-02 RX ORDER — LEVOTHYROXINE SODIUM 0.07 MG/1
TABLET ORAL
Qty: 30 TAB | Refills: 0 | Status: SHIPPED | OUTPATIENT
Start: 2018-08-02 | End: 2018-09-04 | Stop reason: SDUPTHER

## 2018-08-06 ENCOUNTER — HOSPITAL ENCOUNTER (OUTPATIENT)
Dept: LAB | Facility: MEDICAL CENTER | Age: 68
End: 2018-08-06
Attending: INTERNAL MEDICINE
Payer: MEDICARE

## 2018-08-06 DIAGNOSIS — E03.9 ACQUIRED HYPOTHYROIDISM: ICD-10-CM

## 2018-08-06 LAB — TSH SERPL DL<=0.005 MIU/L-ACNC: 1.55 UIU/ML (ref 0.38–5.33)

## 2018-08-06 PROCEDURE — 36415 COLL VENOUS BLD VENIPUNCTURE: CPT

## 2018-08-06 PROCEDURE — 84443 ASSAY THYROID STIM HORMONE: CPT

## 2018-08-13 ENCOUNTER — ANTICOAGULATION VISIT (OUTPATIENT)
Dept: VASCULAR LAB | Facility: MEDICAL CENTER | Age: 68
End: 2018-08-13
Attending: INTERNAL MEDICINE
Payer: MEDICARE

## 2018-08-13 VITALS — DIASTOLIC BLOOD PRESSURE: 63 MMHG | SYSTOLIC BLOOD PRESSURE: 123 MMHG | HEART RATE: 58 BPM

## 2018-08-13 DIAGNOSIS — I82.90 DEEP VEIN THROMBOSIS (DVT) OF NON-EXTREMITY VEIN, UNSPECIFIED CHRONICITY: ICD-10-CM

## 2018-08-13 LAB — INR PPP: 3.1 (ref 2–3.5)

## 2018-08-13 PROCEDURE — 85610 PROTHROMBIN TIME: CPT

## 2018-08-13 PROCEDURE — 99212 OFFICE O/P EST SF 10 MIN: CPT | Performed by: NURSE PRACTITIONER

## 2018-08-13 RX ORDER — ATORVASTATIN CALCIUM 80 MG/1
TABLET, FILM COATED ORAL
Qty: 90 TAB | Refills: 1 | Status: SHIPPED | OUTPATIENT
Start: 2018-08-13 | End: 2018-08-27

## 2018-08-13 NOTE — PROGRESS NOTES
Anticoagulation Summary  As of 8/13/2018    INR goal:   2.0-3.0   TTR:   72.4 % (3.2 y)   Today's INR:   3.1!   Warfarin maintenance plan:   3.75 mg (7.5 mg x 0.5) on Mon, Wed; 7.5 mg (7.5 mg x 1) all other days   Weekly warfarin total:   45 mg   Plan last modified:   Miya Acosta, A.P.N. (8/13/2018)   Next INR check:   9/6/2018   Target end date:   Indefinite    Indications    DVT (deep venous thrombosis) (HCC) (Resolved) [I82.409]  Pulmonary embolism [415.19] (Resolved) [I26.99]  Deep vein thrombosis (DVT) (HCC) [I82.409]             Anticoagulation Episode Summary     [facility]   [facility]    Preferred lab:   [facility] GENERAL    Send INR reminders to:       Comments:         Anticoagulation Care Providers     Provider Role Specialty Phone number    Patricia Damon M.D. Referring Internal Medicine [phone]    Jona QuinnD Responsible      [facility] Anticoagulation Services Responsible  [phone]        Anticoagulation Patient Findings      HPI:  Rene Chan seen in clinic today for follow up on anticoagulation therapy in the presence of DVT, PE hx. Denies any changes to current medical/health status since last appointment. Denies any medication or diet changes. No current symptoms of bleeding or thrombosis reported.    A/P:   INR supratherapeutic. Continue current regimen. BP recorded in vitals.    Follow up appointment in 3 week(s).    Next Appointment: Thursday, September 6, 2018 at 10:30 am.     Miya PARKER

## 2018-08-13 NOTE — TELEPHONE ENCOUNTER
PT SEEN: 5/31/18 WITH Dr. Olea NEXT APPT None   Was the patient seen in the last year in this department? Yes     Does patient have an active prescription for medications requested? No     Received Request Via: Pharmacy

## 2018-08-15 LAB — INR BLD: 3.1 (ref 0.9–1.2)

## 2018-08-27 ENCOUNTER — OFFICE VISIT (OUTPATIENT)
Dept: CARDIOLOGY | Facility: MEDICAL CENTER | Age: 68
End: 2018-08-27
Payer: MEDICARE

## 2018-08-27 VITALS
DIASTOLIC BLOOD PRESSURE: 62 MMHG | HEIGHT: 68 IN | WEIGHT: 191 LBS | BODY MASS INDEX: 28.95 KG/M2 | OXYGEN SATURATION: 93 % | SYSTOLIC BLOOD PRESSURE: 126 MMHG | HEART RATE: 64 BPM

## 2018-08-27 DIAGNOSIS — I10 ESSENTIAL HYPERTENSION: ICD-10-CM

## 2018-08-27 DIAGNOSIS — I25.83 CORONARY ARTERY DISEASE DUE TO LIPID RICH PLAQUE: ICD-10-CM

## 2018-08-27 DIAGNOSIS — I35.0 MILD AORTIC STENOSIS: Chronic | ICD-10-CM

## 2018-08-27 DIAGNOSIS — I25.10 CORONARY ARTERY DISEASE DUE TO LIPID RICH PLAQUE: ICD-10-CM

## 2018-08-27 DIAGNOSIS — E78.5 DYSLIPIDEMIA: ICD-10-CM

## 2018-08-27 DIAGNOSIS — Z86.711 HX PULMONARY EMBOLISM: ICD-10-CM

## 2018-08-27 PROBLEM — I82.501 CHRONIC DEEP VEIN THROMBOSIS (DVT) OF RIGHT LOWER EXTREMITY (HCC): Status: ACTIVE | Noted: 2018-08-27

## 2018-08-27 PROCEDURE — 99214 OFFICE O/P EST MOD 30 MIN: CPT | Performed by: NURSE PRACTITIONER

## 2018-08-27 RX ORDER — ATORVASTATIN CALCIUM 80 MG/1
80 TABLET, FILM COATED ORAL EVERY EVENING
Qty: 90 TAB | Refills: 3 | Status: SHIPPED | OUTPATIENT
Start: 2018-08-27 | End: 2019-02-11 | Stop reason: SDUPTHER

## 2018-08-27 RX ORDER — LISINOPRIL 5 MG/1
5 TABLET ORAL DAILY
Qty: 90 TAB | Refills: 3 | Status: SHIPPED | OUTPATIENT
Start: 2018-08-27 | End: 2019-04-02 | Stop reason: SDUPTHER

## 2018-08-27 ASSESSMENT — ENCOUNTER SYMPTOMS
COUGH: 0
PND: 0
ABDOMINAL PAIN: 0
PALPITATIONS: 0
ORTHOPNEA: 0
MYALGIAS: 0
WEAKNESS: 0
SHORTNESS OF BREATH: 0
DIZZINESS: 0
CLAUDICATION: 0

## 2018-08-27 NOTE — PROGRESS NOTES
Chief Complaint   Patient presents with   • Coronary Artery Disease     follow up       Subjective:   Rene Chan is a 67 y.o. male who presents today to follow-up on coronary artery disease, aortic stenosis and history of thromboembolic events.    He has a history of DVT and pulmonary embolism.  He went off anticoagulation and suffered a DVT in his arm.  He will be on lifelong anticoagulation.    He has a history of coronary artery disease having undergone three-vessel bypass in June 2015.  He denies any anginal symptoms.    He has mild aortic stenosis last echo was done in April 2018.  He appears asymptomatic as he denies any near-syncope or lightheadedness.    He does complain of some sciatica that is improved and also some pain in his right knee.  He is taking Tylenol for this.  He has reduced his exercise since these orthopedic problems are bothering him.    He is retiring at the end of this week from being a .    He tells me he has a cold with some nasal congestion currently.  Otherwise he feels well.    Past Medical History:   Diagnosis Date   • Anticoagulation monitoring, special range    • Bipolar disorder (HCC)    • CAD (coronary artery disease) 6/4/2015   • CAD (coronary artery disease)    • Carotid artery occlusion     L 60-79% occluded; R 59% occluded per pt report; 2011   • Clotting disorder (HCC)     protein S elevation in '12 with recurrent DVT and PE-coumadin lifelong   • Colon polyps    • Diabetes mellitus, type II (HCC)    • Gout    • Hammer toe    • Hyperlipidemia    • Hypertension    • Hypothyroidism    • Impaired fasting glucose    • Mild aortic stenosis - echo 4/2018    • Psychiatric disorder     bipolar     Past Surgical History:   Procedure Laterality Date   • MULTIPLE CORONARY ARTERY BYPASS ENDO VEIN HARVEST  6/4/2015    Procedure: MULTIPLE CORONARY ARTERY BYPASS Grafting  x 3   ENDO VEIN HARVEST right leg;  Surgeon: Christophe Lemus M.D.;  Location: SURGERY [facility]  ORS;  Service:    • RECOVERY  6/1/2015    Procedure:  CATH LAB  [facility] w/POSS MARKIE ICD; 794.31;  Surgeon: Recoveryonly Surgery;  Location: SURGERY PRE-POST PROC UNIT [facility];  Service:    • OTHER ORTHOPEDIC SURGERY  1976    L wrist repair    • ANKLE FUSION     • APPENDECTOMY     • OTHER ABDOMINAL SURGERY      umbilical hernia repair 2000, appy age 13     Family History   Problem Relation Age of Onset   • Heart Disease Sister 50   • Stroke Sister 55        CVA   • Heart Failure Father      Social History     Social History   • Marital status:      Spouse name: N/A   • Number of children: N/A   • Years of education: N/A     Occupational History   • [employer]       Build house for 35 years     Social History Main Topics   • Smoking status: Former Smoker     Packs/day: 2.00     Years: 10.00     Types: Cigarettes     Quit date: 1/1/1976   • Smokeless tobacco: Never Used      Comment: quit x 32 yrs; 2ppd x 7 years   • Alcohol use No   • Drug use: No      Comment: IV drug use in teens   • Sexual activity: Not on file     Other Topics Concern   • Not on file     Social History Narrative    Lives with wife    Retired cam    Does security part time    ADLs and IADLs intact     Allergies   Allergen Reactions   • Penicillins      Childhood; does know what the reaction was     Outpatient Encounter Prescriptions as of 8/27/2018   Medication Sig Dispense Refill   • atorvastatin (LIPITOR) 80 MG tablet Take 1 Tab by mouth every evening. 90 Tab 3   • lisinopril (PRINIVIL) 5 MG Tab Take 1 Tab by mouth every day. 90 Tab 3   • metoprolol (LOPRESSOR) 25 MG Tab Take 1 Tab by mouth 2 Times a Day. 180 Tab 3   • levothyroxine (SYNTHROID) 75 MCG Tab TAKE ONE TABLET BY MOUTH DAILY 30 Tab 0   • aspirin 81 MG tablet Take 1 Tab by mouth every day.  Tab    • colchicine (COLCRYS) 0.6 MG Tab Take 1 Tab by mouth every day. For acute gout; Take 1.2 mg on day one of flare Take 0.6 mg until the flare is gone. See clinic note. 30 Tab 0  "  • warfarin (COUMADIN) 7.5 MG Tab TAKE ONE-HALF OR ONE TABLET BY MOUTH DAILY AS INSTRUCTED. 90 Tab 1   • Coenzyme Q10 200 MG Tab Take  by mouth.     • divalproex (DEPAKOTE) 250 MG Tablet Delayed Response TAKE ONE TABLET BY MOUTH TWICE A  Tab 3   • therapeutic multivitamin-minerals (THERAGRAN-M) Tab Take 1 Tab by mouth every day.     • Cholecalciferol (VITAMIN D) 2000 UNITS Cap Take  by mouth.     • docosahexanoic acid (OMEGA 3 FA) 1000 MG CAPS Take 1,400 mg by mouth every day.     • [DISCONTINUED] atorvastatin (LIPITOR) 80 MG tablet TAKE 1 TABLET BY MOUTH EVERY DAY 90 Tab 1   • [DISCONTINUED] lisinopril (PRINIVIL) 5 MG Tab TAKE ONE TABLET BY MOUTH DAILY 90 Tab 2   • [DISCONTINUED] tizanidine (ZANAFLEX) 4 MG Tab Take 1 Tab by mouth 3 times a day as needed (muscle spasm). (Patient not taking: Reported on 8/27/2018) 90 Tab 0   • [DISCONTINUED] MethylPREDNISolone (MEDROL DOSEPAK) 4 MG Tablet Therapy Pack Use as directed (Patient not taking: Reported on 8/27/2018) 1 Kit 0   • [DISCONTINUED] atorvastatin (LIPITOR) 80 MG tablet TAKE 1 TABLET BY MOUTH EVERY DAY 90 Tab 0   • [DISCONTINUED] metoprolol (LOPRESSOR) 25 MG Tab Take 1 Tab by mouth 2 Times a Day. 180 Tab 3     No facility-administered encounter medications on file as of 8/27/2018.      Review of Systems   Constitutional: Negative for malaise/fatigue.   HENT: Positive for congestion (has cold.).    Respiratory: Negative for cough and shortness of breath.    Cardiovascular: Positive for leg swelling (righth ankle chronic from fx.). Negative for chest pain, palpitations, orthopnea, claudication and PND.   Gastrointestinal: Negative for abdominal pain.   Musculoskeletal: Positive for joint pain (Right knee pain from arthritis.). Negative for myalgias.   Neurological: Negative for dizziness and weakness.        Objective:   /62   Pulse 64   Ht 1.727 m (5' 8\")   Wt 86.6 kg (191 lb)   SpO2 93%   BMI 29.04 kg/m²     Physical Exam   Constitutional: He is " oriented to person, place, and time. He appears well-developed and well-nourished.   HENT:   Head: Normocephalic.   Eyes: EOM are normal.   Neck: Normal range of motion. No JVD present.   Cardiovascular: Normal rate and regular rhythm.  Exam reveals no friction rub.    Murmur heard.   Systolic murmur is present with a grade of 2/6   Pulmonary/Chest: Effort normal.   Abdominal: Soft. Bowel sounds are normal.   Musculoskeletal: He exhibits no edema.   Neurological: He is alert and oriented to person, place, and time.   Skin: Skin is warm and dry.   Psychiatric: He has a normal mood and affect.     April 2, 2018: Transthoracic Echo Report  Mild concentric left ventricular hypertrophy.  Normal left ventricular systolic function.  Left ventricular ejection fraction is visually estimated to be 60%.  Normal diastolic function.  Normal inferior vena cava size and inspiratory collapse.  Mild aortic stenosis. Transvalvular gradients are - Peak: 25 mmHg, Mean: 12 mmHg.  Estimated right ventricular systolic pressure is 39 mmHg.    Assessment:     1. Coronary artery disease due to lipid rich plaque     2. Mild aortic stenosis - echo 4/2018     3. Hx pulmonary embolism     4. Essential hypertension  lisinopril (PRINIVIL) 5 MG Tab    metoprolol (LOPRESSOR) 25 MG Tab   5. Dyslipidemia  COMP METABOLIC PANEL    LIPID PROFILE       Medical Decision Making:  Today's Assessment / Status / Plan:   Coronary artery disease: He underwent bypass in 2015.  He has no anginal symptoms.  Continue on aspirin 81 mg.    Aortic stenosis: He has mild aortic stenosis which appears to be asymptomatic.  We will follow him expectantly with echoes.  I think he can have an echo a year from now.  He will call for the order prior to his follow-up appointment.    History of pulmonary embolism and DVTs: He will be on lifelong warfarin.  He may be a candidate for a fingerstick machine.  He will check with the anticoagulation clinic.    Hypertension: His blood  pressure is excellent in the office today.  I will refill lisinopril.    Dyslipidemia: Last lipids were done in August 2017.  We will do lipids and metabolic panel and adjust accordingly.    He stable enough to follow-up in 1 year with Dr. Ta Wright.  He will follow-up sooner if problems.    Collaborating Provider: Dr. Redd.

## 2018-09-04 RX ORDER — LEVOTHYROXINE SODIUM 0.07 MG/1
TABLET ORAL
Qty: 90 TAB | Refills: 2 | Status: SHIPPED | OUTPATIENT
Start: 2018-09-04 | End: 2019-05-28 | Stop reason: SDUPTHER

## 2018-09-04 NOTE — TELEPHONE ENCOUNTER
Was the patient seen in the last year in this department? Yes  Last seen: 05/31/18 by Dr. Olea  Next appt: NONE      Does patient have an active prescription for medications requested? No     Received Request Via: Pharmacy

## 2018-09-06 ENCOUNTER — ANTICOAGULATION VISIT (OUTPATIENT)
Dept: VASCULAR LAB | Facility: MEDICAL CENTER | Age: 68
End: 2018-09-06
Attending: INTERNAL MEDICINE
Payer: MEDICARE

## 2018-09-06 DIAGNOSIS — I82.501 CHRONIC DEEP VEIN THROMBOSIS (DVT) OF RIGHT LOWER EXTREMITY, UNSPECIFIED VEIN (HCC): ICD-10-CM

## 2018-09-06 LAB — INR PPP: 4.1 (ref 2–3.5)

## 2018-09-06 PROCEDURE — 85610 PROTHROMBIN TIME: CPT

## 2018-09-06 PROCEDURE — 99212 OFFICE O/P EST SF 10 MIN: CPT | Performed by: NURSE PRACTITIONER

## 2018-09-06 NOTE — PROGRESS NOTES
Anticoagulation Summary  As of 9/6/2018    INR goal:   2.0-3.0   TTR:   70.9 % (3.2 y)   Today's INR:   4.1!   Warfarin maintenance plan:   3.75 mg (7.5 mg x 0.5) on Mon, Wed, Fri; 7.5 mg (7.5 mg x 1) all other days   Weekly warfarin total:   41.25 mg   Plan last modified:   Miya Acosta, A.P.N. (9/6/2018)   Next INR check:   9/20/2018   Target end date:   Indefinite    Indications    DVT (deep venous thrombosis) (HCC) (Resolved) [I82.409]  Pulmonary embolism [415.19] (Resolved) [I26.99]  Deep vein thrombosis (DVT) (HCC) [I82.409]             Anticoagulation Episode Summary     [facility]   [facility]    Preferred lab:   [facility] GENERAL    Send INR reminders to:       Comments:         Anticoagulation Care Providers     Provider Role Specialty Phone number    Patricia Damon M.D. Referring Internal Medicine [phone]    Jona QuinnD Responsible      [facility] Anticoagulation Services Responsible  [phone]        Anticoagulation Patient Findings      HPI:  Rene Chan seen in clinic today for follow up on anticoagulation therapy in the presence of DVt, PE. Denies any changes to current medical/health status since last appointment. Denies any medication or diet changes. No current symptoms of bleeding or thrombosis reported. Reviewed his medications.    A/P:   INR supratherapeutic despite dose decrease. Will HOLD tonight then began reduced regimen.     Follow up appointment in 2 week(s).    Next Appointment: Friday , September 21, 2018 at 10:45 am.     Miya PARKER

## 2018-09-11 LAB — INR BLD: 4.1 (ref 0.9–1.2)

## 2018-09-21 ENCOUNTER — TELEPHONE (OUTPATIENT)
Dept: INTERNAL MEDICINE | Facility: MEDICAL CENTER | Age: 68
End: 2018-09-21

## 2018-09-21 ENCOUNTER — ANTICOAGULATION VISIT (OUTPATIENT)
Dept: VASCULAR LAB | Facility: MEDICAL CENTER | Age: 68
End: 2018-09-21
Attending: INTERNAL MEDICINE
Payer: MEDICARE

## 2018-09-21 VITALS — HEART RATE: 60 BPM | SYSTOLIC BLOOD PRESSURE: 126 MMHG | DIASTOLIC BLOOD PRESSURE: 72 MMHG

## 2018-09-21 DIAGNOSIS — I82.501 CHRONIC DEEP VEIN THROMBOSIS (DVT) OF RIGHT LOWER EXTREMITY, UNSPECIFIED VEIN (HCC): ICD-10-CM

## 2018-09-21 LAB
INR BLD: 3.2 (ref 0.9–1.2)
INR PPP: 3.2 (ref 2–3.5)

## 2018-09-21 PROCEDURE — 99212 OFFICE O/P EST SF 10 MIN: CPT | Performed by: PHARMACIST

## 2018-09-21 PROCEDURE — 85610 PROTHROMBIN TIME: CPT

## 2018-09-21 NOTE — TELEPHONE ENCOUNTER
----- Message from Miya Jara, Med Ass't sent at 9/21/2018 11:09 AM PDT -----  Regarding: Dr. Olea-lab order  Contact: [phone]  Patient is wondering if you order labs for him before his next appointment which is 11/6/18?  ThanksAlma (please mail them to him)

## 2018-09-21 NOTE — PROGRESS NOTES
Anticoagulation Summary  As of 9/21/2018    INR goal:   2.0-3.0   TTR:   70.0 % (3.3 y)   Today's INR:   3.2!   Warfarin maintenance plan:   3.75 mg (7.5 mg x 0.5) on Mon, Wed, Fri; 7.5 mg (7.5 mg x 1) all other days   Weekly warfarin total:   41.25 mg   Plan last modified:   Miya Acosta A.P.NPaul (9/6/2018)   Next INR check:   10/5/2018   Target end date:   Indefinite    Indications    DVT (deep venous thrombosis) (HCC) (Resolved) [I82.409]  Pulmonary embolism [415.19] (Resolved) [I26.99]  Deep vein thrombosis (DVT) (HCC) [I82.409]             Anticoagulation Episode Summary     [facility]   [facility]    Preferred lab:   [email or web address] GENERAL    Send INR reminders to:       Comments:         Anticoagulation Care Providers     Provider Role Specialty Phone number    Patricia Damon M.D. Referring Internal Medicine [phone]    Jona LeachD Responsible      [facility] Anticoagulation Services Responsible  [phone]        Anticoagulation Patient Findings      HPI:  Rene Chan seen in clinic today, on anticoagulation therapy with warfarin for DVT  Changes to current medical/health status since last appt: pt was eating chocolates with cranberry concentrate before realizing it.   Denies signs/symptoms of bleeding and/or thrombosis since the last appt.    Denies any interval changes to diet  Denies any interval changes to medications since last appt.   Denies any complications or cost restrictions with current therapy.   BP recorded in vitals.      A/P   INR  is supra-therapeutic.   Will have pt hold his warfarin dose today and then resume his previous warfarin dosing.     Follow up appointment in 2 week(s).    Hollie Santa, PharmD

## 2018-09-24 NOTE — TELEPHONE ENCOUNTER
Parag Olea M.D.   Molly Jade, Med Ass't 3 days ago      Labs after visit.  (Routing comment)

## 2018-10-05 ENCOUNTER — ANTICOAGULATION VISIT (OUTPATIENT)
Dept: VASCULAR LAB | Facility: MEDICAL CENTER | Age: 68
End: 2018-10-05
Attending: INTERNAL MEDICINE
Payer: MEDICARE

## 2018-10-05 DIAGNOSIS — I82.501 CHRONIC DEEP VEIN THROMBOSIS (DVT) OF RIGHT LOWER EXTREMITY, UNSPECIFIED VEIN (HCC): ICD-10-CM

## 2018-10-05 LAB — INR PPP: 1.9 (ref 2–3.5)

## 2018-10-05 PROCEDURE — 99212 OFFICE O/P EST SF 10 MIN: CPT

## 2018-10-05 PROCEDURE — 85610 PROTHROMBIN TIME: CPT

## 2018-10-05 NOTE — PROGRESS NOTES
Anticoagulation Summary  As of 10/5/2018    INR goal:   2.0-3.0   TTR:   70.1 % (3.3 y)   Today's INR:   1.9!   Warfarin maintenance plan:   3.75 mg (7.5 mg x 0.5) on Mon, Wed, Fri; 7.5 mg (7.5 mg x 1) all other days   Weekly warfarin total:   41.25 mg   Plan last modified:   Miya Acosta A.P.NPaul (9/6/2018)   Next INR check:   10/19/2018   Target end date:   Indefinite    Indications    DVT (deep venous thrombosis) (HCC) (Resolved) [I82.409]  Pulmonary embolism [415.19] (Resolved) [I26.99]  Deep vein thrombosis (DVT) (HCC) [I82.409]             Anticoagulation Episode Summary     [facility]   [facility]    Preferred lab:   [facility] GENERAL    Send INR reminders to:       Comments:         Anticoagulation Care Providers     Provider Role Specialty Phone number    Patricia Damon M.D. Referring Internal Medicine [phone]    Jona QuinnD Responsible      [facility] Anticoagulation Services Responsible  [phone]        Anticoagulation Patient Findings      HPI:  Rene Chan seen in clinic today for follow up on anticoagulation therapy in the presence of DVT and PE. Denies any changes to current medical/health status since last appointment. Denies any medication or diet changes. No current symptoms of bleeding or thrombosis reported.    A/P:   INR is sub-therapeutic today at 1.9, pt states that he libby more greens this past week than usual, he will reduce this hence pt instructed to continue current regimen.    Follow up appointment in 2 week(s).    Next Appointment: Friday , 10/19/2018 at 11.15AM     Jona Gooden.D

## 2018-10-09 LAB — INR BLD: 1.9 (ref 0.9–1.2)

## 2018-10-19 ENCOUNTER — ANTICOAGULATION VISIT (OUTPATIENT)
Dept: VASCULAR LAB | Facility: MEDICAL CENTER | Age: 68
End: 2018-10-19
Attending: INTERNAL MEDICINE
Payer: MEDICARE

## 2018-10-19 DIAGNOSIS — Z86.711 HX PULMONARY EMBOLISM: ICD-10-CM

## 2018-10-19 DIAGNOSIS — I82.501 CHRONIC DEEP VEIN THROMBOSIS (DVT) OF RIGHT LOWER EXTREMITY, UNSPECIFIED VEIN (HCC): ICD-10-CM

## 2018-10-19 DIAGNOSIS — Z86.711 HISTORY OF PULMONARY EMBOLISM: ICD-10-CM

## 2018-10-19 LAB
INR BLD: 2.4 (ref 0.9–1.2)
INR PPP: 2.4 (ref 2–3.5)

## 2018-10-19 PROCEDURE — 85610 PROTHROMBIN TIME: CPT

## 2018-10-19 PROCEDURE — 99211 OFF/OP EST MAY X REQ PHY/QHP: CPT

## 2018-10-19 NOTE — PROGRESS NOTES
Anticoagulation Summary  As of 10/19/2018    INR goal:   2.0-3.0   TTR:   70.2 % (3.3 y)   Today's INR:   2.4   Warfarin maintenance plan:   3.75 mg (7.5 mg x 0.5) on Mon, Wed, Fri; 7.5 mg (7.5 mg x 1) all other days   Weekly warfarin total:   41.25 mg   Plan last modified:   Miya Acosta A.P.NPaul (9/6/2018)   Next INR check:   11/9/2018   Target end date:   Indefinite    Indications    DVT (deep venous thrombosis) (HCC) (Resolved) [I82.409]  Pulmonary embolism [415.19] (Resolved) [I26.99]  Deep vein thrombosis (DVT) (HCC) [I82.409]             Anticoagulation Episode Summary     [facility]   [facility]    Preferred lab:   [facility]    Send INR reminders to:       Comments:         Anticoagulation Care Providers     Provider Role Specialty Phone number    Patricia Damon M.D. Referring Internal Medicine [phone]    Jona QuinnD Responsible      [facility] Anticoagulation Services Responsible  [phone]        Anticoagulation Patient Findings  Patient Findings     Negatives:   Signs/symptoms of thrombosis, Signs/symptoms of bleeding, Laboratory test error suspected, Change in health, Change in alcohol use, Change in activity, Upcoming invasive procedure, Emergency department visit, Upcoming dental procedure, Missed doses, Extra doses, Change in medications, Change in diet/appetite, Hospital admission, Bruising, Other complaints          HPI:  Rene Chan seen in clinic today, on anticoagulation therapy with warfarin for hx of DVT/PE  Changes to current medical/health status since last appt: none  Denies signs/symptoms of bleeding and/or thrombosis since the last appt.    Denies any interval changes to diet  Denies any interval changes to medications since last appt.   Denies any complications or cost restrictions with current therapy.   BP recorded in vitals.      A/P   INR therapeutic.   Pt is to continue with current warfarin dosing regimen.    Follow up appointment in  3 week(s).    Nandini Muro, PharmD

## 2018-10-25 ENCOUNTER — ANTICOAGULATION MONITORING (OUTPATIENT)
Dept: MEDICAL GROUP | Facility: MEDICAL CENTER | Age: 68
End: 2018-10-25

## 2018-10-31 ENCOUNTER — HOSPITAL ENCOUNTER (OUTPATIENT)
Dept: LAB | Facility: MEDICAL CENTER | Age: 68
End: 2018-10-31
Attending: NURSE PRACTITIONER
Payer: MEDICARE

## 2018-10-31 DIAGNOSIS — E78.5 DYSLIPIDEMIA: ICD-10-CM

## 2018-10-31 PROCEDURE — 80053 COMPREHEN METABOLIC PANEL: CPT

## 2018-10-31 PROCEDURE — 80061 LIPID PANEL: CPT

## 2018-10-31 PROCEDURE — 36415 COLL VENOUS BLD VENIPUNCTURE: CPT

## 2018-11-01 LAB
ALBUMIN SERPL BCP-MCNC: 4 G/DL (ref 3.2–4.9)
ALBUMIN/GLOB SERPL: 1.7 G/DL
ALP SERPL-CCNC: 39 U/L (ref 30–99)
ALT SERPL-CCNC: 13 U/L (ref 2–50)
ANION GAP SERPL CALC-SCNC: 8 MMOL/L (ref 0–11.9)
AST SERPL-CCNC: 17 U/L (ref 12–45)
BILIRUB SERPL-MCNC: 1.2 MG/DL (ref 0.1–1.5)
BUN SERPL-MCNC: 12 MG/DL (ref 8–22)
CALCIUM SERPL-MCNC: 9.5 MG/DL (ref 8.5–10.5)
CHLORIDE SERPL-SCNC: 105 MMOL/L (ref 96–112)
CHOLEST SERPL-MCNC: 104 MG/DL (ref 100–199)
CO2 SERPL-SCNC: 28 MMOL/L (ref 20–33)
CREAT SERPL-MCNC: 0.8 MG/DL (ref 0.5–1.4)
FASTING STATUS PATIENT QL REPORTED: NORMAL
GLOBULIN SER CALC-MCNC: 2.3 G/DL (ref 1.9–3.5)
GLUCOSE SERPL-MCNC: 95 MG/DL (ref 65–99)
HDLC SERPL-MCNC: 28 MG/DL
LDLC SERPL CALC-MCNC: 51 MG/DL
POTASSIUM SERPL-SCNC: 5.5 MMOL/L (ref 3.6–5.5)
PROT SERPL-MCNC: 6.3 G/DL (ref 6–8.2)
SODIUM SERPL-SCNC: 141 MMOL/L (ref 135–145)
TRIGL SERPL-MCNC: 125 MG/DL (ref 0–149)

## 2018-11-06 ENCOUNTER — OFFICE VISIT (OUTPATIENT)
Dept: INTERNAL MEDICINE | Facility: MEDICAL CENTER | Age: 68
End: 2018-11-06
Payer: MEDICARE

## 2018-11-06 VITALS
SYSTOLIC BLOOD PRESSURE: 114 MMHG | TEMPERATURE: 97.8 F | BODY MASS INDEX: 30.22 KG/M2 | HEART RATE: 56 BPM | DIASTOLIC BLOOD PRESSURE: 64 MMHG | OXYGEN SATURATION: 94 % | WEIGHT: 199.4 LBS | HEIGHT: 68 IN

## 2018-11-06 DIAGNOSIS — Z23 NEED FOR VACCINATION: ICD-10-CM

## 2018-11-06 DIAGNOSIS — Z95.1 S/P CABG X 3: ICD-10-CM

## 2018-11-06 DIAGNOSIS — M25.561 CHRONIC PAIN OF RIGHT KNEE: ICD-10-CM

## 2018-11-06 DIAGNOSIS — F31.70 BIPOLAR DISORDER IN FULL REMISSION, MOST RECENT EPISODE UNSPECIFIED TYPE (HCC): ICD-10-CM

## 2018-11-06 DIAGNOSIS — G89.29 CHRONIC PAIN OF RIGHT KNEE: ICD-10-CM

## 2018-11-06 DIAGNOSIS — I82.501 CHRONIC DEEP VEIN THROMBOSIS (DVT) OF RIGHT LOWER EXTREMITY, UNSPECIFIED VEIN (HCC): ICD-10-CM

## 2018-11-06 PROCEDURE — 90662 IIV NO PRSV INCREASED AG IM: CPT | Performed by: INTERNAL MEDICINE

## 2018-11-06 PROCEDURE — G0008 ADMIN INFLUENZA VIRUS VAC: HCPCS | Performed by: INTERNAL MEDICINE

## 2018-11-06 PROCEDURE — 99214 OFFICE O/P EST MOD 30 MIN: CPT | Mod: 25 | Performed by: INTERNAL MEDICINE

## 2018-11-06 RX ORDER — DIVALPROEX SODIUM 250 MG/1
250 TABLET, DELAYED RELEASE ORAL 2 TIMES DAILY
Qty: 180 TAB | Refills: 3
Start: 2018-11-06 | End: 2019-04-02 | Stop reason: SDUPTHER

## 2018-11-06 NOTE — LETTER
[facility]  Parag Olea M.D.  [address]  [place] [address]  Fax: [phone]   Authorization for Release/Disclosure of   Protected Health Information   Name: NURY VALENCIA : 1950 SSN: [id number]   [place] [facility] [address] Phone:    [phone] (home)    I authorize the entity listed below to release/disclose the PHI below to:   [facility]/Parag Olea M.D. and Parag Olea M.D.   Provider or Entity Name:  [facility] Attn: CHRISTOFER Brown pt is here   Address   City, State, [facility]   Phone:      [phone]   Reason for request: continuity of care   Information to be released:    [  ] LAST COLONOSCOPY,  including any PATH REPORT and follow-up  [  ] LAST FIT/COLOGUARD RESULT [  ] LAST DEXA  [  ] LAST MAMMOGRAM  [  ] LAST PAP  [  ] LAST LABS [  ] RETINA EXAM REPORT  [  ] IMMUNIZATION RECORDS  [  X] Release all info 2018 re: knee       [  ] Check here and initial the line next to each item to release ALL health information INCLUDING  _____ Care and treatment for drug and / or alcohol abuse  _____ HIV testing, infection status, or AIDS  _____ Genetic Testing    DATES OF SERVICE OR TIME PERIOD TO BE DISCLOSED: _____________  I understand and acknowledge that:  * This Authorization may be revoked at any time by you in writing, except if your health information has already been used or disclosed.  * Your health information that will be used or disclosed as a result of you signing this authorization could be re-disclosed by the recipient. If this occurs, your re-disclosed health information may no longer be protected by State or Federal laws.  * You may refuse to sign this Authorization. Your refusal will not affect your ability to obtain treatment.  * This Authorization becomes effective upon signing and will  on (date) __________.      If no date is indicated, this Authorization will  one (1) year from the signature date.    Name: Nury Valencia    Signature:   Date:     11/6/2018       PLEASE FAX REQUESTED RECORDS BACK TO: [phone]

## 2018-11-06 NOTE — NON-PROVIDER
"Rene Chan is a 68 y.o. male here for a non-provider visit for:   FLU    Reason for immunization: Annual Flu Vaccine  Immunization records indicate need for vaccine: Yes, confirmed with [place] [facility]  Minimum interval has been met for this vaccine: Yes  ABN completed: Not Indicated    Order and dose verified by: LYNN  VIS Dated  8/7/15 was given to patient: Yes  All IAC Questionnaire questions were answered \"No.\"    Patient tolerated injection and no adverse effects were observed or reported: Yes    Pt scheduled for next dose in series: Not Indicated    "

## 2018-11-06 NOTE — PROGRESS NOTES
Geriatric Clinic Note  Patient: Rene Chan  Age: 68 y.o.   Gender: male  Author: Parag Olea M.D.  PCP: Parag Olea M.D.    Today's date: 11/06/18  Chief Complaint   Patient presents with   • Results     Labs from Cards    • Knee Pain   • Hypertension     Decrease Metoprolol to qd?        HPI:  History obtained from patient, medical chart    R knee pain for a few months.  It started after an issues with sciatica on the left.   It pops when he gets out of bed. And at times with just mobility  Had some falls with sciatica on left, last fall about three months ago. Nothing was due to right knee per report.    Went to [facility] a few months ago and got a cortisone shot. They said it was arthritis. Had xrrays.   Had previously broken ankle on right leg  Using biccyle when he first starts working out at the gym and then is able to more effectively get on the treadmill with an incline  Knee is getting better over the last few months.   He would like to continue to work on it.    Coq 10 has helped with leg cramps that were thought to be secondary to use of atorvastatin.     Has nocturia 4X per night due to drinking a lot at night.    No current issues with gout    We discussed decreasing her metoprolol to daily.  He recalled our previous conversation and realized that that would be more expensive and he decided to continue decides to continue on metoprolol tartrate twice daily    Some of the history is in the assessment and plan below.    Assessment: 15-year-old male coming in for general follow-up    Plan:  1. Need for vaccination  Routine will vaccinate today.  - INFLUENZA VACCINE, HIGH DOSE (65+ ONLY)    2. Chronic pain of right knee  This is improving, offered physical therapy and potential referral for orthotics as he does have more lateral pain on his knee to see if redistribution weight could be helpful.  Overall he will continue to work at the gym with his current regimen and will let us know if  he would like to physical therapy through my chart. He preivously had bad experiences with orthotics after seeing podiatry for hammer toe on his left foot.  We are requesting records from [facility].    3. Bipolar disorder in full remission, most recent episode unspecified type (HCC)  Presented with erica in the past, currently no issues with depression or erica continues on relatively low-dose Depakote 250 twice daily.  Given stability of symptoms and relatively low dose no indication for checking levels.    4. Chronic deep vein thrombosis (DVT) of right lower extremity, unspecified vein (HCC)  Continues to see [facility]mad[facility] clinic on warfarin.  Recent lapses in INR may have been due to some cranberries and perhaps forgetting a pill. Denies any blleding    5. 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  He is also on omega-3 fatty acids for overall general health and he is taking the coenzyme Q10 to help with myalgias associated with the atorvastatin per his report.    Polypharmacy  -He is taking a few over-the-counter supplementations however they seem to be for symptom management, will continue to address at future visits.    BPH with nocturia  -Patient feels his nocturia is due to increasing fluid intake before bed.  He is okay with this at this current point.  Continue to monitor    Health Maintenance: Offered influenza vaccine encouraged him to get the new shingles vaccine at the pharmacy.    Return in about 6 months (around 5/6/2019) for Long; f/u R knee, .     ROS:  Denies feelings of incomplete voiding, denies any dizziness with standing but endorses some lightheadedness when he is bending all the way down and gets up quickly.  He says this really happens    A 14 point ROS is negative, other than as stated above.     Past Medical History:   Diagnosis Date   • Anticoagulation monitoring, special range    • Bipolar disorder  ([facility])    • CAD (coronary artery disease) 6/4/2015   • CAD (coronary artery disease)    • Carotid artery occlusion     L 60-79% occluded; R 59% occluded per pt report; 2011   • Clotting disorder ([facility])     protein S elevation in '12 with recurrent DVT and PE-coumadin lifelong   • Colon polyps    • Diabetes mellitus, type II ([facility])    • Gout    • Hammer toe    • Hyperlipidemia    • Hypertension    • Hypothyroidism    • Impaired fasting glucose    • Mild aortic stenosis - echo 4/2018    • Psychiatric disorder     bipolar     Social History     Social History   • Marital status:      Spouse name: N/A   • Number of children: N/A   • Years of education: N/A     Occupational History   • [employer]       Build house for 35 years     Social History Main Topics   • Smoking status: Former Smoker     Packs/day: 2.00     Years: 10.00     Types: Cigarettes     Quit date: 1/1/1976   • Smokeless tobacco: Never Used      Comment: quit x 32 yrs; 2ppd x 7 years   • Alcohol use No   • Drug use: No      Comment: IV drug use in teens   • Sexual activity: Not on file     Other Topics Concern   • Not on file     Social History Narrative    Lives with wife    Retired cam    Does security part time    ADLs and IADLs intact     Family History   Problem Relation Age of Onset   • Heart Disease Sister 50   • Stroke Sister 55        CVA   • Heart Failure Father      Allergies: Penicillins  Current Outpatient Prescriptions on File Prior to Visit   Medication Sig Dispense Refill   • levothyroxine (SYNTHROID) 75 MCG Tab TAKE ONE TABLET BY MOUTH DAILY 90 Tab 2   • atorvastatin (LIPITOR) 80 MG tablet Take 1 Tab by mouth every evening. 90 Tab 3   • lisinopril (PRINIVIL) 5 MG Tab Take 1 Tab by mouth every day. 90 Tab 3   • metoprolol (LOPRESSOR) 25 MG Tab Take 1 Tab by mouth 2 Times a Day. 180 Tab 3   • aspirin 81 MG tablet Take 1 Tab by mouth every day.  Tab    • warfarin (COUMADIN) 7.5 MG Tab TAKE ONE-HALF OR ONE TABLET BY MOUTH DAILY  "AS INSTRUCTED. 90 Tab 1   • Coenzyme Q10 200 MG Tab Take  by mouth.     • divalproex (DEPAKOTE) 250 MG Tablet Delayed Response TAKE ONE TABLET BY MOUTH TWICE A  Tab 3   • therapeutic multivitamin-minerals (THERAGRAN-M) Tab Take 1 Tab by mouth every day.     • Cholecalciferol (VITAMIN D) 2000 UNITS Cap Take  by mouth.     • docosahexanoic acid (OMEGA 3 FA) 1000 MG CAPS Take 1,400 mg by mouth every day.     • colchicine (COLCRYS) 0.6 MG Tab Take 1 Tab by mouth every day. For acute gout; Take 1.2 mg on day one of flare Take 0.6 mg until the flare is gone. See clinic note. (Patient not taking: Reported on 11/6/2018) 30 Tab 0     No current facility-administered medications on file prior to visit.        Physical Exam:  /64 (BP Location: Left arm, Patient Position: Sitting, BP Cuff Size: Adult)   Pulse (!) 56   Temp 36.6 °C (97.8 °F)   Ht 1.727 m (5' 8\")   Wt 90.4 kg (199 lb 6.4 oz)   SpO2 94%   BMI 30.32 kg/m² Body mass index is 30.32 kg/m².    Gen: No acute distress, pleasant, alert, oriented to situation  HEENT: Neck supple   CV: Regular rate and rhythm, no obvious murmurs heard, chronic right lower extremity edema noted pitting to the knee  Lungs: Normal effort, clear to auscultation bilateral abd: Obese  Ext: Right knee with crepitus, no medial or lateral tenderness, anterior and posterior drawer test not suggestive of significant laxity,  Neuro: No sensation deficits noted  Psych: Does not appear to be responding to internal stimuli, does not appear hyperactive or depressed    Labs: Reviewed TSH from August 2018, reviewed CMP, CBC, lipid panel, reviewed the labs with the patient.    Parag Olea M.D.  Geriatric Physician    This note was partially dictated with voice recognition software, for any confusion please do not hesitate to contact me.     "

## 2018-11-09 ENCOUNTER — ANTICOAGULATION VISIT (OUTPATIENT)
Dept: VASCULAR LAB | Facility: MEDICAL CENTER | Age: 68
End: 2018-11-09
Attending: INTERNAL MEDICINE
Payer: MEDICARE

## 2018-11-09 VITALS — DIASTOLIC BLOOD PRESSURE: 76 MMHG | HEART RATE: 54 BPM | SYSTOLIC BLOOD PRESSURE: 155 MMHG

## 2018-11-09 DIAGNOSIS — Z79.01 CHRONIC ANTICOAGULATION: ICD-10-CM

## 2018-11-09 LAB
INR BLD: 1.6 (ref 0.9–1.2)
INR PPP: 1.6 (ref 2–3.5)

## 2018-11-09 PROCEDURE — 85610 PROTHROMBIN TIME: CPT

## 2018-11-09 PROCEDURE — 99212 OFFICE O/P EST SF 10 MIN: CPT

## 2018-11-09 NOTE — PROGRESS NOTES
Anticoagulation Summary  As of 11/9/2018    INR goal:   2.0-3.0   TTR:   69.9 % (3.4 y)   Today's INR:   1.6!   Warfarin maintenance plan:   3.75 mg (7.5 mg x 0.5) on Mon, Wed, Fri; 7.5 mg (7.5 mg x 1) all other days   Weekly warfarin total:   41.25 mg   Plan last modified:   Miya Acosta APaulP.NPaul (9/6/2018)   Next INR check:   11/30/2018   Target end date:   Indefinite    Indications    DVT (deep venous thrombosis) (HCC) (Resolved) [I82.409]  Pulmonary embolism [415.19] (Resolved) [I26.99]  Deep vein thrombosis (DVT) (HCC) [I82.409]             Anticoagulation Episode Summary     [facility]   [facility]    Preferred lab:   [facility]    Send INR reminders to:       Comments:         Anticoagulation Care Providers     Provider Role Specialty Phone number    Patricia Damon M.D. Referring Internal Medicine [phone]    Jona QuinnD Responsible      [facility] Anticoagulation Services Responsible  [phone]        Anticoagulation Patient Findings      HPI:  Rene Chan seen in clinic today, on anticoagulation therapy with warfarin for DVT   Changes to current medical/health status since last appt: pt missed dose.   Denies signs/symptoms of bleeding and/or thrombosis since the last appt.    Denies any interval changes to diet  Denies any interval changes to medications since last appt.   Denies any complications or cost restrictions with current therapy.   BP recorded in vitals.  Confirmed dosing regimen.     A/P   INR  SUB-therapeutic.   Bolus today then Pt is to continue with current warfarin dosing regimen.     Follow up appointment in 3 weeks per pt.     Hong Brooks, PharmD

## 2018-11-27 ENCOUNTER — ANTICOAGULATION VISIT (OUTPATIENT)
Dept: VASCULAR LAB | Facility: MEDICAL CENTER | Age: 68
End: 2018-11-27
Attending: INTERNAL MEDICINE
Payer: MEDICARE

## 2018-11-27 DIAGNOSIS — Z79.01 CHRONIC ANTICOAGULATION: ICD-10-CM

## 2018-11-27 LAB — INR PPP: 2.8 (ref 2–3.5)

## 2018-11-27 PROCEDURE — 85610 PROTHROMBIN TIME: CPT

## 2018-11-27 PROCEDURE — 99211 OFF/OP EST MAY X REQ PHY/QHP: CPT

## 2018-11-27 NOTE — PROGRESS NOTES
Anticoagulation Summary  As of 11/27/2018    INR goal:   2.0-3.0   TTR:   69.8 % (3.4 y)   Today's INR:   2.8   Warfarin maintenance plan:   3.75 mg (7.5 mg x 0.5) on Mon, Wed, Fri; 7.5 mg (7.5 mg x 1) all other days   Weekly warfarin total:   41.25 mg   Plan last modified:   Miya Acosta APaulPLESLIE (9/6/2018)   Next INR check:   1/8/2019   Target end date:   Indefinite    Indications    DVT (deep venous thrombosis) (HCC) (Resolved) [I82.409]  Pulmonary embolism [415.19] (Resolved) [I26.99]  Deep vein thrombosis (DVT) (HCC) [I82.409]             Anticoagulation Episode Summary     [facility]   [facility]    Preferred lab:   [facility] GENER[facility]    Send INR reminders to:       Comments:         Anticoagulation Care Providers     Provider Role Specialty Phone number    Patricia Damon M.D. Referring Internal Medicine [phone]    Jona QuinnD Responsible      [facility] Anticoagulation Services Responsible  [phone]        Anticoagulation Patient Findings    INR  therapeutic.   Denies signs/symptoms of bleeding and/or thrombosis.   Denies changes to diet or medications.   Follow up appointment in 6 week(s).    Continue weekly warfarin dose as noted      Sebastian Novak, PharmD

## 2019-01-08 ENCOUNTER — ANTICOAGULATION VISIT (OUTPATIENT)
Dept: VASCULAR LAB | Facility: MEDICAL CENTER | Age: 69
End: 2019-01-08
Attending: INTERNAL MEDICINE
Payer: MEDICARE

## 2019-01-08 VITALS — SYSTOLIC BLOOD PRESSURE: 137 MMHG | DIASTOLIC BLOOD PRESSURE: 66 MMHG | HEART RATE: 61 BPM

## 2019-01-08 DIAGNOSIS — I82.499 DEEP VEIN THROMBOSIS (DVT) OF OTHER VEIN OF LOWER EXTREMITY, UNSPECIFIED CHRONICITY, UNSPECIFIED LATERALITY (HCC): ICD-10-CM

## 2019-01-08 LAB — INR PPP: 2.9 (ref 2–3.5)

## 2019-01-08 PROCEDURE — 99211 OFF/OP EST MAY X REQ PHY/QHP: CPT

## 2019-01-08 PROCEDURE — 85610 PROTHROMBIN TIME: CPT

## 2019-01-08 NOTE — PROGRESS NOTES
Anticoagulation Summary  As of 1/8/2019    INR goal:   2.0-3.0   TTR:   70.8 % (3.6 y)   Today's INR:   2.9   Warfarin maintenance plan:   3.75 mg (7.5 mg x 0.5) on Mon, Wed, Fri; 7.5 mg (7.5 mg x 1) all other days   Weekly warfarin total:   41.25 mg   No change documented:   Kim Everett   Plan last modified:   LAMAR Bucio (9/6/2018)   Next INR check:      Target end date:   Indefinite    Indications    DVT (deep venous thrombosis) (HCC) (Resolved) [I82.409]  Pulmonary embolism [415.19] (Resolved) [I26.99]  Deep vein thrombosis (DVT) (HCC) [I82.409]             Anticoagulation Episode Summary     [facility]    Preferred lab:   [facility] GENERAL    Send INR reminders to:       Comments:         Anticoagulation Care Providers     Provider Role Specialty Phone number    Patricia Damon M.D. Referring Internal Medicine [phone]    Laisha Méndez PharmD Responsible      [facility] Anticoagulation Services Responsible  [phone]        Anticoagulation Patient Findings  Patient Findings     Negatives:   Signs/symptoms of thrombosis, Signs/symptoms of bleeding, Laboratory test error suspected, Change in health, Change in alcohol use, Change in activity, Upcoming invasive procedure, Emergency department visit, Upcoming dental procedure, Missed doses, Extra doses, Change in medications, Change in diet/appetite, Hospital admission, Bruising, Other complaints          HPI:  Rene Chan seen in clinic today, on anticoagulation therapy with warfarin for hx of  DVT and PE.  Changes to current medical/health status since last appt: none  Denies signs/symptoms of bleeding and/or thrombosis since the last appt.    Denies any interval changes to diet  Denies any interval changes to medications since last appt.   Denies any complications or cost restrictions with current therapy.   BP recorded in vitals.  Confirmed current dosing regimen.     Patient's previous INR was  therapeutic at 2.8 on 11/27/18, at which time patient was instructed to continue with current warfarin regimen. He returns to clinic today to recheck INR to ensure it is therapeutic and thus preventing possible clotting and/or bleeding/bruising complications.    A/P   INR is therapeutic today at 2.9.  Patient instructed to continue with the current warfarin dosing regimen, and asked to follow up again in 6 weeks.    Next appt: Tuesday, March 5, 2019  10:45am    Lupe TenorioD

## 2019-01-09 LAB — INR BLD: 2.9 (ref 0.9–1.2)

## 2019-02-08 RX ORDER — ATORVASTATIN CALCIUM 80 MG/1
TABLET, FILM COATED ORAL
Refills: 1 | OUTPATIENT
Start: 2019-02-08

## 2019-02-11 RX ORDER — ATORVASTATIN CALCIUM 80 MG/1
80 TABLET, FILM COATED ORAL EVERY EVENING
Qty: 90 TAB | Refills: 1 | Status: SHIPPED | OUTPATIENT
Start: 2019-02-11 | End: 2020-10-01

## 2019-02-19 DIAGNOSIS — Z86.718 HISTORY OF DVT (DEEP VEIN THROMBOSIS): ICD-10-CM

## 2019-02-19 RX ORDER — WARFARIN SODIUM 7.5 MG/1
TABLET ORAL
Qty: 90 TAB | Refills: 1 | Status: SHIPPED | OUTPATIENT
Start: 2019-02-19 | End: 2019-09-30 | Stop reason: SDUPTHER

## 2019-03-05 ENCOUNTER — ANTICOAGULATION VISIT (OUTPATIENT)
Dept: VASCULAR LAB | Facility: MEDICAL CENTER | Age: 69
End: 2019-03-05
Attending: INTERNAL MEDICINE
Payer: MEDICARE

## 2019-03-05 VITALS — DIASTOLIC BLOOD PRESSURE: 60 MMHG | HEART RATE: 56 BPM | SYSTOLIC BLOOD PRESSURE: 114 MMHG

## 2019-03-05 DIAGNOSIS — Z79.01 CHRONIC ANTICOAGULATION: ICD-10-CM

## 2019-03-05 LAB — INR PPP: 2.2 (ref 2–3.5)

## 2019-03-05 PROCEDURE — 85610 PROTHROMBIN TIME: CPT

## 2019-03-05 PROCEDURE — 99211 OFF/OP EST MAY X REQ PHY/QHP: CPT

## 2019-03-05 NOTE — PROGRESS NOTES
Anticoagulation Summary  As of 3/5/2019    INR goal:   2.0-3.0   TTR:   72.0 % (3.7 y)   INR used for dosin.2 (3/5/2019)   Warfarin maintenance plan:   3.75 mg (7.5 mg x 0.5) every Mon, Wed, Fri; 7.5 mg (7.5 mg x 1) all other days   Weekly warfarin total:   41.25 mg   Plan last modified:   Miya Acosta, A.P.NPaul (2018)   Next INR check:   2019   Target end date:   Indefinite    Indications    DVT (deep venous thrombosis) (HCC) (Resolved) [I82.409]  Pulmonary embolism [415.19] (Resolved) [I26.99]  Deep vein thrombosis (DVT) (HCC) [I82.409]             Anticoagulation Episode Summary     [facility]   [facility]    Preferred lab:   [facility]AL    Send INR reminders to:       Comments:         Anticoagulation Care Providers     Provider Role Specialty Phone number    Patricia Damon M.D. Referring Internal Medicine [phone]    Jona QuinnD Responsible      [facility] Anticoagulation Services Responsible  [phone]        Anticoagulation Patient Findings      HPI:  Rene Chan seen in clinic today, on anticoagulation therapy with warfarin for DVT.   Changes to current medical/health status since last appt: none  Denies signs/symptoms of bleeding and/or thrombosis since the last appt.    Denies any interval changes to diet  Denies any interval changes to medications since last appt.   Denies any complications or cost restrictions with current therapy.   BP recorded in vitals.  Confirmed dosing regimen.     A/P   INR  therapeutic.   Pt is to continue with current warfarin dosing regimen.     Follow up appointment in 8 week(s).    Hong Brooks, JonaD

## 2019-03-06 LAB — INR BLD: 2.2 (ref 0.9–1.2)

## 2019-04-02 DIAGNOSIS — I10 ESSENTIAL HYPERTENSION: ICD-10-CM

## 2019-04-02 RX ORDER — DIVALPROEX SODIUM 250 MG/1
250 TABLET, DELAYED RELEASE ORAL 2 TIMES DAILY
Qty: 60 TAB | Refills: 5 | Status: SHIPPED | OUTPATIENT
Start: 2019-04-02 | End: 2020-10-01

## 2019-04-02 RX ORDER — LISINOPRIL 5 MG/1
5 TABLET ORAL DAILY
Qty: 100 TAB | Refills: 0 | Status: SHIPPED | OUTPATIENT
Start: 2019-04-02 | End: 2020-01-13

## 2019-04-02 NOTE — TELEPHONE ENCOUNTER
Has a refill being shipped from mail order pharmacy but is out.       Was the patient seen in the last year in this department? Yes  Pt last seen on: 11/06/2018 by Dr. Olea  Next appt on: 05/14/2019       Does patient have an active prescription for medications requested? No     Received Request Via: Patient

## 2019-05-07 ENCOUNTER — ANTICOAGULATION VISIT (OUTPATIENT)
Dept: VASCULAR LAB | Facility: MEDICAL CENTER | Age: 69
End: 2019-05-07
Attending: INTERNAL MEDICINE
Payer: MEDICARE

## 2019-05-07 VITALS — SYSTOLIC BLOOD PRESSURE: 119 MMHG | DIASTOLIC BLOOD PRESSURE: 62 MMHG | HEART RATE: 56 BPM

## 2019-05-07 DIAGNOSIS — I82.499 DEEP VEIN THROMBOSIS (DVT) OF OTHER VEIN OF LOWER EXTREMITY, UNSPECIFIED CHRONICITY, UNSPECIFIED LATERALITY (HCC): ICD-10-CM

## 2019-05-07 LAB
INR BLD: 1.3 (ref 0.9–1.2)
INR PPP: 1.3 (ref 2–3.5)

## 2019-05-07 PROCEDURE — 85610 PROTHROMBIN TIME: CPT

## 2019-05-07 PROCEDURE — 99212 OFFICE O/P EST SF 10 MIN: CPT

## 2019-05-07 NOTE — PROGRESS NOTES
Anticoagulation Summary  As of 2019    INR goal:   2.0-3.0   TTR:   69.8 % (3.9 y)   INR used for dosin.30! (2019)   Warfarin maintenance plan:   3.75 mg (7.5 mg x 0.5) every Mon, Wed, Fri; 7.5 mg (7.5 mg x 1) all other days   Weekly warfarin total:   41.25 mg   Plan last modified:   Miya Acosta A.PPaulNPaul (2018)   Next INR check:   2019   Target end date:   Indefinite    Indications    DVT (deep venous thrombosis) (HCC) (Resolved) [I82.409]  Pulmonary embolism [415.19] (Resolved) [I26.99]  Deep vein thrombosis (DVT) (HCC) [I82.409]             Anticoagulation Episode Summary     [facility]   [facility]    Preferred lab:   [facility] GENERAL    Send INR reminders to:       Comments:         Anticoagulation Care Providers     Provider Role Specialty Phone number    Patricia Damon M.D. Referring Internal Medicine [phone]    Laisha Méndez PharmD Responsible      [facility] Anticoagulation Services Responsible  [phone]        Anticoagulation Patient Findings  Patient Findings     Negatives:   Signs/symptoms of thrombosis, Signs/symptoms of bleeding, Laboratory test error suspected, Change in health, Change in alcohol use, Change in activity, Upcoming invasive procedure, Emergency department visit, Upcoming dental procedure, Missed doses, Extra doses, Change in medications, Change in diet/appetite, Hospital admission, Bruising, Other complaints          HPI:  Rene Chan seen in clinic today, on anticoagulation therapy with warfarin for DVT.  Changes to current medical/health status since last appt: none  Denies signs/symptoms of bleeding and/or thrombosis since the last appt.    Denies any interval changes to diet  Denies any interval changes to medications since last appt.   Denies any complications or cost restrictions with current therapy.   BP recorded in vitals.  Confirmed current dosing regimen. Patient has been traveling and does think he may have missed a  few doses.     Patient's previous INR was therapeutic at 2.2 on 3/5/19, at which time patient was instructed to continue with current warfarin regimen. He returns to clinic today to recheck INR to ensure it is therapeutic and thus preventing possible clotting and/or bleeding/bruising complications.    A/P   INR is SUB-therapeutic today at 1.3.  Patient will BOLUS with 11.25mg TONIGHT, then 7.5mg TOMORROW, then will resume his current dosing regimen. Patient will follow up again in 3 weeks.     Next appt: Tuesday, May 28, 2019  10:30am    Lupe TenorioD

## 2019-05-14 ENCOUNTER — OFFICE VISIT (OUTPATIENT)
Dept: INTERNAL MEDICINE | Facility: MEDICAL CENTER | Age: 69
End: 2019-05-14
Payer: MEDICARE

## 2019-05-14 VITALS
HEIGHT: 68 IN | BODY MASS INDEX: 30.06 KG/M2 | TEMPERATURE: 97.6 F | OXYGEN SATURATION: 94 % | SYSTOLIC BLOOD PRESSURE: 102 MMHG | DIASTOLIC BLOOD PRESSURE: 64 MMHG | HEART RATE: 53 BPM | WEIGHT: 198.38 LBS

## 2019-05-14 DIAGNOSIS — E03.9 ACQUIRED HYPOTHYROIDISM: ICD-10-CM

## 2019-05-14 DIAGNOSIS — I25.10 CORONARY ARTERY DISEASE DUE TO LIPID RICH PLAQUE: ICD-10-CM

## 2019-05-14 DIAGNOSIS — D12.6 ADENOMATOUS POLYP OF COLON, UNSPECIFIED PART OF COLON: ICD-10-CM

## 2019-05-14 DIAGNOSIS — F31.70 BIPOLAR DISORDER IN FULL REMISSION, MOST RECENT EPISODE UNSPECIFIED TYPE (HCC): ICD-10-CM

## 2019-05-14 DIAGNOSIS — I82.501 CHRONIC DEEP VEIN THROMBOSIS (DVT) OF RIGHT LOWER EXTREMITY, UNSPECIFIED VEIN (HCC): ICD-10-CM

## 2019-05-14 DIAGNOSIS — N40.1 BENIGN NON-NODULAR PROSTATIC HYPERPLASIA WITH LOWER URINARY TRACT SYMPTOMS: ICD-10-CM

## 2019-05-14 DIAGNOSIS — I25.83 CORONARY ARTERY DISEASE DUE TO LIPID RICH PLAQUE: ICD-10-CM

## 2019-05-14 DIAGNOSIS — M1A.0710 IDIOPATHIC CHRONIC GOUT OF RIGHT FOOT WITHOUT TOPHUS: ICD-10-CM

## 2019-05-14 PROCEDURE — 99214 OFFICE O/P EST MOD 30 MIN: CPT | Performed by: INTERNAL MEDICINE

## 2019-05-14 NOTE — PROGRESS NOTES
Geriatric Clinic Note  Patient: Rene Chan  Age: 68 y.o.   Gender: male  Author: Parag Olea M.D.  PCP: Parag Olea M.D.    Today's date: 05/14/19  Chief Complaint   Patient presents with   • Gout   • Hypothyroidism   • Medication Refill     Levothyroxine depakote       HPI:  History obtained from patient, medical chart.    No issues with scitica that we discussed from last visit.     Took one dose of colchine for on episode of gout. Just watching diet. Right big toe was the source of pain.     Urinating is acceptable, nocturia correlates with his evening fluid intake.   No lightheadedness with standing.  No falls in last six months.     We dicussed CVD risk and HLD. No history of MI or CVA. There is chart history of TIA and he has had a CABG. No orthopnea, PND, CARBALLO, LE swelling.      He will discuss continued use of his omega 3 FA with his cardiologist at follow up in July.     He denies any manic or depressive symptoms.   We discussed finding that my outpatient practice was no longer having new clinic slots and that we recommend to find a new PCP.    Okay to stop MV.     He thinks his low INR was due to one missed dose of warfarin. Denies any other issues with med adherence.     He discussed that the GI doctor requested he follow up for colonoscopy. I reviewed the 2015 results and noted the removal of >3 polyps.     Patient will have pharmacy contact office for refills. He would like to get them through is mail order pharmacy. Discussed how he can potentially transfer active Rx with refills from one pharmacy to another, but we can send to any pharmacy he prefers.     Assessment: 67 y/o M coming in for general follow up.     Plan:  1. Acquired hypothyroidism  continue L4 dose.   - TSH WITH REFLEX TO FT4; Future    2. Coronary artery disease due to lipid rich plaque  History of CABG X3 6/2015; presented with angina.   On cardiac package. VS at goal, no significant side effects of meds noted on  ROS.  Follows with cardiology.   Continues on atorvastatin with last LDL <70.   Previous discussion suggest coq10 has been helpful for myalgias.   Repeat lipid panel in October  Patient is on asa and warfarin, this does increase risk for bleeding. As his he gets older, we may want to consider stopping this.   - CBC WITH DIFFERENTIAL; Future  - Comp Metabolic Panel; Future    3. Idiopathic chronic gout of right foot without tophus  One noted exacerbation since last visit; not on any urate lowering therapy. Patient prefers diet and exercise to control symptoms. Defer checking uric acid level for now as patient would like to hold off management and PRN use of colchicine was efficacious for him. Patient was previously using PRN steroid for his flares.     4. Bipolar disorder in full remission, most recent episode unspecified type (HCC)  Stable on valproic acid 250 mg BID. Relatively small dose, no signs of toxicity. No signs of erica or depression. Continue current dose, defer checking trough level for now. Chronic use of this medication can increase risk for osteoporosis.     5. Chronic deep vein thrombosis (DVT) of right lower extremity, unspecified vein (HCC)  continues on life long warfarin, no recent falls. INR is liable in recent past. CTM.     6. Benign non-nodular prostatic hyperplasia with lower urinary tract symptoms  Continues to have nocturia; CTM    7. Adenomatous polyp of colon, unspecified part of colon  Patient will schedule follow up colonoscopy, updated our records.     Polypharmacy - patient will stop his MV. He has good access to food.     Health Maintenance: continues to on wait list of shingles vaccine.   Immunizations:   Immunization History   Administered Date(s) Administered   • Influenza Seasonal Injectable 09/05/2014   • Influenza TIV (IM) 10/10/2011, 09/05/2014   • Influenza Vaccine Adult HD 10/06/2016, 10/17/2017, 11/06/2018   • Pneumococcal Conjugate Vaccine (Prevnar/PCV-13) 10/06/2016   •  Pneumococcal Vaccine (UF)Historical Data 01/10/2007   • Pneumococcal polysaccharide vaccine (PPSV-23) 03/12/2018, 03/23/2018   • Tdap Vaccine 03/23/2018, 05/10/2018     Colonoscopy: getting repeat scope.   Return in about 6 months (around 11/14/2019).     ROS:    A 14 point ROS is negative, other than as stated above.     Past Medical History:   Diagnosis Date   • Anticoagulation monitoring, special range    • Bipolar disorder (HCC)    • CAD (coronary artery disease) 6/4/2015   • CAD (coronary artery disease)    • Carotid artery occlusion     L 60-79% occluded; R 59% occluded per pt report; 2011   • Clotting disorder (HCC)     protein S elevation in '12 with recurrent DVT and PE-coumadin lifelong   • Colon polyps    • Diabetes mellitus, type II (HCC)    • Gout    • Hammer toe    • Hyperlipidemia    • Hypertension    • Hypothyroidism    • Impaired fasting glucose    • Mild aortic stenosis - echo 4/2018    • Psychiatric disorder     bipolar     Social History     Social History   • Marital status:      Spouse name: N/A   • Number of children: N/A   • Years of education: N/A     Occupational History   • [employer]       Build house for 35 years     Social History Main Topics   • Smoking status: Former Smoker     Packs/day: 2.00     Years: 10.00     Types: Cigarettes     Quit date: 1/1/1976   • Smokeless tobacco: Never Used      Comment: quit x 32 yrs; 2ppd x 7 years   • Alcohol use No   • Drug use: No      Comment: IV drug use in teens   • Sexual activity: Not on file     Other Topics Concern   • Not on file     Social History Narrative    Lives with wife    Retired cam    Does security part time    ADLs and IADLs intact     Family History   Problem Relation Age of Onset   • Heart Disease Sister 50   • Stroke Sister 55        CVA   • Heart Failure Father      Allergies: Penicillins  Current Outpatient Prescriptions on File Prior to Visit   Medication Sig Dispense Refill   • divalproex (DEPAKOTE) 250 MG  "Tablet Delayed Response Take 1 Tab by mouth 2 Times a Day. 60 Tab 5   • lisinopril (PRINIVIL) 5 MG Tab Take 1 Tab by mouth every day. 100 Tab 0   • warfarin (COUMADIN) 7.5 MG Tab TAKE ONE-HALF OR ONE TABLET BY MOUTH DAILY AS INSTRUCTED. 90 Tab 1   • atorvastatin (LIPITOR) 80 MG tablet Take 1 Tab by mouth every evening. 90 Tab 1   • levothyroxine (SYNTHROID) 75 MCG Tab TAKE ONE TABLET BY MOUTH DAILY 90 Tab 2   • metoprolol (LOPRESSOR) 25 MG Tab Take 1 Tab by mouth 2 Times a Day. 180 Tab 3   • aspirin 81 MG tablet Take 1 Tab by mouth every day.  Tab    • colchicine (COLCRYS) 0.6 MG Tab Take 1 Tab by mouth every day. For acute gout; Take 1.2 mg on day one of flare Take 0.6 mg until the flare is gone. See clinic note. (Patient taking differently: Take 0.6 mg by mouth 1 time daily as needed. For acute gout; Take 1.2 mg on day one of flare Take 0.6 mg until the flare is gone. See clinic note.) 30 Tab 0   • Coenzyme Q10 200 MG Tab Take  by mouth every day.     • therapeutic multivitamin-minerals (THERAGRAN-M) Tab Take 1 Tab by mouth every day.     • Cholecalciferol (VITAMIN D) 2000 UNITS Cap Take 1,000 Units by mouth 2 Times a Day.     • docosahexanoic acid (OMEGA 3 FA) 1000 MG CAPS Take 1,000 mg by mouth every day.       No current facility-administered medications on file prior to visit.        Physical Exam:  /64 (BP Location: Left arm, Patient Position: Sitting, BP Cuff Size: Adult)   Pulse (!) 53   Temp 36.4 °C (97.6 °F) (Temporal)   Ht 1.727 m (5' 8\")   Wt 90 kg (198 lb 6 oz)   SpO2 94%   BMI 30.16 kg/m² Body mass index is 30.16 kg/m².    Gen:NAD, pleasant  HEENT: neck supple  CV:RRR +for systolic murmur at LUSB, no gallops or rubs  Lungs: CAB, normal effort  Abd:soft  Ext:arthric changes noted to hands bilaterally  Neuro:normal gait.   Psych: Does not appear to be responding to internal stimuli      Parag Olea M.D.  Geriatric Physician    This note was partially dictated with voice " recognition software, for any confusion please do not hesitate to contact me.

## 2019-05-14 NOTE — PATIENT INSTRUCTIONS
Please schedule your colonscopy    Try and go on a walk everyday after dinner.     You are okay to stop the multivitamin.

## 2019-05-16 ENCOUNTER — HOSPITAL ENCOUNTER (OUTPATIENT)
Dept: LAB | Facility: MEDICAL CENTER | Age: 69
End: 2019-05-16
Attending: INTERNAL MEDICINE
Payer: MEDICARE

## 2019-05-16 DIAGNOSIS — I25.83 CORONARY ARTERY DISEASE DUE TO LIPID RICH PLAQUE: ICD-10-CM

## 2019-05-16 DIAGNOSIS — I25.10 CORONARY ARTERY DISEASE DUE TO LIPID RICH PLAQUE: ICD-10-CM

## 2019-05-16 DIAGNOSIS — E03.9 ACQUIRED HYPOTHYROIDISM: ICD-10-CM

## 2019-05-16 LAB
ALBUMIN SERPL BCP-MCNC: 3.8 G/DL (ref 3.2–4.9)
ALBUMIN/GLOB SERPL: 1.7 G/DL
ALP SERPL-CCNC: 34 U/L (ref 30–99)
ALT SERPL-CCNC: 12 U/L (ref 2–50)
ANION GAP SERPL CALC-SCNC: 6 MMOL/L (ref 0–11.9)
AST SERPL-CCNC: 15 U/L (ref 12–45)
BASOPHILS # BLD AUTO: 0.9 % (ref 0–1.8)
BASOPHILS # BLD: 0.06 K/UL (ref 0–0.12)
BILIRUB SERPL-MCNC: 0.9 MG/DL (ref 0.1–1.5)
BUN SERPL-MCNC: 15 MG/DL (ref 8–22)
CALCIUM SERPL-MCNC: 8.6 MG/DL (ref 8.5–10.5)
CHLORIDE SERPL-SCNC: 106 MMOL/L (ref 96–112)
CO2 SERPL-SCNC: 29 MMOL/L (ref 20–33)
CREAT SERPL-MCNC: 0.82 MG/DL (ref 0.5–1.4)
EOSINOPHIL # BLD AUTO: 0.23 K/UL (ref 0–0.51)
EOSINOPHIL NFR BLD: 3.6 % (ref 0–6.9)
ERYTHROCYTE [DISTWIDTH] IN BLOOD BY AUTOMATED COUNT: 47.8 FL (ref 35.9–50)
FASTING STATUS PATIENT QL REPORTED: NORMAL
GLOBULIN SER CALC-MCNC: 2.3 G/DL (ref 1.9–3.5)
GLUCOSE SERPL-MCNC: 93 MG/DL (ref 65–99)
HCT VFR BLD AUTO: 45.4 % (ref 42–52)
HGB BLD-MCNC: 15 G/DL (ref 14–18)
IMM GRANULOCYTES # BLD AUTO: 0.01 K/UL (ref 0–0.11)
IMM GRANULOCYTES NFR BLD AUTO: 0.2 % (ref 0–0.9)
LYMPHOCYTES # BLD AUTO: 2.41 K/UL (ref 1–4.8)
LYMPHOCYTES NFR BLD: 37.5 % (ref 22–41)
MCH RBC QN AUTO: 32.1 PG (ref 27–33)
MCHC RBC AUTO-ENTMCNC: 33 G/DL (ref 33.7–35.3)
MCV RBC AUTO: 97 FL (ref 81.4–97.8)
MONOCYTES # BLD AUTO: 0.71 K/UL (ref 0–0.85)
MONOCYTES NFR BLD AUTO: 11 % (ref 0–13.4)
NEUTROPHILS # BLD AUTO: 3.01 K/UL (ref 1.82–7.42)
NEUTROPHILS NFR BLD: 46.8 % (ref 44–72)
NRBC # BLD AUTO: 0 K/UL
NRBC BLD-RTO: 0 /100 WBC
PLATELET # BLD AUTO: 252 K/UL (ref 164–446)
PMV BLD AUTO: 11.3 FL (ref 9–12.9)
POTASSIUM SERPL-SCNC: 4.8 MMOL/L (ref 3.6–5.5)
PROT SERPL-MCNC: 6.1 G/DL (ref 6–8.2)
RBC # BLD AUTO: 4.68 M/UL (ref 4.7–6.1)
SODIUM SERPL-SCNC: 141 MMOL/L (ref 135–145)
TSH SERPL DL<=0.005 MIU/L-ACNC: 3.11 UIU/ML (ref 0.38–5.33)
WBC # BLD AUTO: 6.4 K/UL (ref 4.8–10.8)

## 2019-05-16 PROCEDURE — 84443 ASSAY THYROID STIM HORMONE: CPT

## 2019-05-16 PROCEDURE — 85025 COMPLETE CBC W/AUTO DIFF WBC: CPT

## 2019-05-16 PROCEDURE — 36415 COLL VENOUS BLD VENIPUNCTURE: CPT

## 2019-05-16 PROCEDURE — 80053 COMPREHEN METABOLIC PANEL: CPT

## 2019-05-28 ENCOUNTER — ANTICOAGULATION VISIT (OUTPATIENT)
Dept: VASCULAR LAB | Facility: MEDICAL CENTER | Age: 69
End: 2019-05-28
Attending: INTERNAL MEDICINE
Payer: MEDICARE

## 2019-05-28 VITALS — SYSTOLIC BLOOD PRESSURE: 116 MMHG | DIASTOLIC BLOOD PRESSURE: 54 MMHG | HEART RATE: 56 BPM

## 2019-05-28 DIAGNOSIS — Z79.01 CHRONIC ANTICOAGULATION: ICD-10-CM

## 2019-05-28 LAB — INR PPP: 2.9 (ref 2–3.5)

## 2019-05-28 PROCEDURE — 99211 OFF/OP EST MAY X REQ PHY/QHP: CPT

## 2019-05-28 PROCEDURE — 85610 PROTHROMBIN TIME: CPT

## 2019-05-28 RX ORDER — LEVOTHYROXINE SODIUM 0.07 MG/1
TABLET ORAL
Qty: 90 TAB | Refills: 0 | Status: SHIPPED | OUTPATIENT
Start: 2019-05-28 | End: 2020-10-01

## 2019-05-28 NOTE — PROGRESS NOTES
Anticoagulation Summary  As of 2019    INR goal:   2.0-3.0   TTR:   69.6 % (3.9 y)   INR used for dosin.90 (2019)   Warfarin maintenance plan:   3.75 mg (7.5 mg x 0.5) every Mon, Wed, Fri; 7.5 mg (7.5 mg x 1) all other days   Weekly warfarin total:   41.25 mg   Plan last modified:   Miya Acosta, A.P.NPaul (2018)   Next INR check:   2019   Target end date:   Indefinite    Indications    DVT (deep venous thrombosis) (HCC) (Resolved) [I82.409]  Pulmonary embolism [415.19] (Resolved) [I26.99]  Deep vein thrombosis (DVT) (HCC) [I82.409]             Anticoagulation Episode Summary     INR check location:   [facility]    Preferred lab:   [email or web address] GENER[facility]    Send INR reminders to:       Comments:         Anticoagulation Care Providers     Provider Role Specialty Phone number    Patricia Damon M.D. Referring Internal Medicine [phone]    Jona QuinnD Responsible      [facility] Anticoagulation Services Responsible  [phone]        Anticoagulation Patient Findings      HPI:  Rene Chan seen in clinic today, on anticoagulation therapy with warfarin for DVT.   Changes to current medical/health status since last appt: none  Denies signs/symptoms of bleeding and/or thrombosis since the last appt.    Denies any interval changes to diet  Denies any interval changes to medications since last appt.   Denies any complications or cost restrictions with current therapy.   BP recorded in vitals.  Confirmed dosing regimen.     A/P   INR  therapeutic.   Pt is to continue with current warfarin dosing regimen.   Last INR was low due to missed doses, return to usual INR interval.     Follow up appointment in 8 week(s).    Hong Brooks, JonaD

## 2019-05-28 NOTE — TELEPHONE ENCOUNTER
Was the patient seen in the last year in this department? Yes  Pt last seen on: 05/14/19 by Dr. Olea  Next appt on: none     Does patient have an active prescription for medications requested? No     Received Request Via: Pharmacy

## 2019-05-29 LAB — INR BLD: 2.9 (ref 0.9–1.2)

## 2019-07-23 ENCOUNTER — ANTICOAGULATION VISIT (OUTPATIENT)
Dept: VASCULAR LAB | Facility: MEDICAL CENTER | Age: 69
End: 2019-07-23
Attending: INTERNAL MEDICINE
Payer: MEDICARE

## 2019-07-23 VITALS — SYSTOLIC BLOOD PRESSURE: 129 MMHG | HEART RATE: 60 BPM | DIASTOLIC BLOOD PRESSURE: 60 MMHG

## 2019-07-23 DIAGNOSIS — Z79.01 CHRONIC ANTICOAGULATION: ICD-10-CM

## 2019-07-23 LAB
INR BLD: 1.7 (ref 0.9–1.2)
INR PPP: 1.7 (ref 2–3.5)

## 2019-07-23 PROCEDURE — 85610 PROTHROMBIN TIME: CPT

## 2019-07-23 PROCEDURE — 99212 OFFICE O/P EST SF 10 MIN: CPT

## 2019-07-23 NOTE — PROGRESS NOTES
Anticoagulation Summary  As of 2019    INR goal:   2.0-3.0   TTR:   69.8 % (4.1 y)   INR used for dosin.70! (2019)   Warfarin maintenance plan:   3.75 mg (7.5 mg x 0.5) every Mon, Fri; 7.5 mg (7.5 mg x 1) all other days   Weekly warfarin total:   45 mg   Plan last modified:   Hong Brooks PharmD (2019)   Next INR check:   2019   Target end date:   Indefinite    Indications    DVT (deep venous thrombosis) (HCC) (Resolved) [I82.409]  Pulmonary embolism [415.19] (Resolved) [I26.99]  Deep vein thrombosis (DVT) (HCC) [I82.409]             Anticoagulation Episode Summary     INR check location[facility]   [facility]    Preferred lab:   [facility]    Send INR reminders to:       Comments:         Anticoagulation Care Providers     Provider Role Specialty Phone number    Patricia Damon M.D. Referring Internal Medicine [phone]    Laisha Méndez PharmD Responsible      [facility] Anticoagulation Services Responsible  [phone]        Anticoagulation Patient Findings      HPI:  Rene Chan seen in clinic today, on anticoagulation therapy with warfarin for DVT  Changes to current medical/health status since last appt: none  Denies signs/symptoms of bleeding and/or thrombosis since the last appt.    Denies any interval changes to medications since last appt.   Denies any complications or cost restrictions with current therapy.   BP recorded in vitals.  Pt has been eating more green vegetables and would like to try to make this change more permanent.     A/P   INR sub-therapeutic.   Give bolus dose today of 11.25 mg.  Increased regimen by 9%    Follow up appointment in 2 week(s).    Hong Brooks, PharmD

## 2019-08-06 ENCOUNTER — ANTICOAGULATION VISIT (OUTPATIENT)
Dept: VASCULAR LAB | Facility: MEDICAL CENTER | Age: 69
End: 2019-08-06
Attending: INTERNAL MEDICINE
Payer: MEDICARE

## 2019-08-06 VITALS
SYSTOLIC BLOOD PRESSURE: 130 MMHG | DIASTOLIC BLOOD PRESSURE: 65 MMHG | WEIGHT: 198 LBS | BODY MASS INDEX: 30.11 KG/M2 | HEART RATE: 50 BPM

## 2019-08-06 DIAGNOSIS — Z79.01 CHRONIC ANTICOAGULATION: ICD-10-CM

## 2019-08-06 LAB
INR BLD: 1.7 (ref 0.9–1.2)
INR PPP: 1.7 (ref 2–3.5)

## 2019-08-06 PROCEDURE — 99211 OFF/OP EST MAY X REQ PHY/QHP: CPT

## 2019-08-06 PROCEDURE — 85610 PROTHROMBIN TIME: CPT

## 2019-08-06 NOTE — PROGRESS NOTES
OP Anticoagulation Service Note    Date: 2019    Anticoagulation Summary  As of 2019    INR goal:   2.0-3.0   TTR:   69.2 % (4.1 y)   INR used for dosin.70! (2019)   Warfarin maintenance plan:   3.75 mg (7.5 mg x 0.5) every Mon; 7.5 mg (7.5 mg x 1) all other days   Weekly warfarin total:   48.75 mg   Plan last modified:   Jona JoD (2019)   Next INR check:   2019   Target end date:   Indefinite    Indications    DVT (deep venous thrombosis) (HCC) (Resolved) [I82.409]  Pulmonary embolism [415.19] (Resolved) [I26.99]  Deep vein thrombosis (DVT) (HCC) [I82.409]             Anticoagulation Episode Summary     INR check location:   Anticoagulation Clinic    Preferred lab:   [email or web address] GENERAL    Send INR reminders to:       Comments:         Anticoagulation Care Providers     Provider Role Specialty Phone number    Patricia Damon M.D. Referring Internal Medicine [phone]    Jona QuinnD Responsible      [facility] Anticoagulation Services Responsible  [phone]        Anticoagulation Patient Findings      HPI:   Rene Chan seen in clinic today, they are here today for a INR check on anticoagulation therapy     The reason for today's visit is to prevent morbidity and mortality from a blood clot or stroke and to reduce the risk of bleeding while on a anticoagulant.     Dose the patient in the medical group have a [facility] primary care provider and proper insurance?  Parag Olea M.D.  [address] [place] [phone]    Additional education provided today regarding reducing bleed risk and dietary constraints:  About how vitamin K and foods work with warfarin and the bleeding risk on a anticoagulant     Any upcoming procedures:   none    Confirmed warfarin dosing regimen  Interval Changes with foods rich in vitamin K: No  Interval Changes in ETOH:   No  Interval Changes in smoking status:  No  Interval Changes in medication:  No   Cost  restriction:  No  S/S of bleeding or bruising:  No  Signs/symptoms  thrombosis since the last appt:  No  Bleed risk is:  moderate,       Assessment:   INR  sub-therapeutic.     Medications reviewed and updated--    3 vitals included with today's appt :  (BP, HR, weight, ht, RR)   Vitals:    08/06/19 1054   BP: 130/65   Pulse: (!) 50   Weight: 89.8 kg (198 lb)         Plan:  Increase weekly warfarin dose as noted      Follow up:  Follow up appointment in 2 week(s)       Other info:  Pt educated to contact our clinic with any changes in medications or s/s of bleeding or thrombosis    CHEST guidelines recommend frequent INR monitoring at regular intervals (a few days up to a max of 12 weeks) to ensure they are on the proper dose of warfarin and not having any complications from therapy.  INRs can dramatically change over a short time period due to diet, medications, and medical conditions.     PAOLA JoS., Pharm.D., [facility], [facility]    This note was created using voice recognition software (Dragon). The accuracy of the dictation is limited by the abilities of the software. I have reviewed the note prior to signing, however some errors in grammar and context are still possible. If you have any questions related to this note please do not hesitate to contact our office.

## 2019-08-20 ENCOUNTER — ANTICOAGULATION VISIT (OUTPATIENT)
Dept: VASCULAR LAB | Facility: MEDICAL CENTER | Age: 69
End: 2019-08-20
Attending: INTERNAL MEDICINE
Payer: MEDICARE

## 2019-08-20 VITALS — HEART RATE: 59 BPM | SYSTOLIC BLOOD PRESSURE: 121 MMHG | DIASTOLIC BLOOD PRESSURE: 56 MMHG

## 2019-08-20 DIAGNOSIS — Z79.01 CHRONIC ANTICOAGULATION: ICD-10-CM

## 2019-08-20 LAB — INR PPP: 1.8 (ref 2–3.5)

## 2019-08-20 PROCEDURE — 85610 PROTHROMBIN TIME: CPT

## 2019-08-20 PROCEDURE — 99213 OFFICE O/P EST LOW 20 MIN: CPT

## 2019-08-20 NOTE — PROGRESS NOTES
Anticoagulation Summary  As of 2019    INR goal:   2.0-3.0   TTR:   68.5 % (4.2 y)   INR used for dosin.80! (2019)   Warfarin maintenance plan:   3.75 mg (7.5 mg x 0.5) every Mon; 7.5 mg (7.5 mg x 1) all other days   Weekly warfarin total:   48.75 mg   Plan last modified:   Jona JoD (2019)   Next INR check:      Target end date:   Indefinite    Indications    DVT (deep venous thrombosis) (HCC) (Resolved) [I82.409]  Pulmonary embolism [415.19] (Resolved) [I26.99]  Deep vein thrombosis (DVT) (HCC) [I82.409]             Anticoagulation Episode Summary     INR check location:   Anticoagulation Clinic    Preferred lab:   [facility] GENERAL    Send INR reminders to:       Comments:         Anticoagulation Care Providers     Provider Role Specialty Phone number    Patricia Damon M.D. Referring Internal Medicine [phone]    Jona QuinnD Responsible      [facility] Anticoagulation Services Responsible  [phone]        Anticoagulation Patient Findings  Patient Findings     Negatives:   Signs/symptoms of thrombosis, Signs/symptoms of bleeding, Laboratory test error suspected, Change in health, Change in alcohol use, Change in activity, Upcoming invasive procedure, Emergency department visit, Upcoming dental procedure, Missed doses, Extra doses, Change in medications, Change in diet/appetite, Hospital admission, Bruising, Other complaints          History of Present Illness: follow up appointment for chronic anticoagulation with the high risk medication, warfarin for DVT/PT    Last INR was out of range, dosage adjusted: pt remains sub therapeutic today.  Pt missed a dose then double dosed this past week. Will bolus dose with 11.25mg tonight, then   Pt is scheduled for routine colonoscopy 2019 and will require lovenox bridging as outlined below     -  Last dose of warfarin  - No Warfarin, Lovenox 120mg sub q at dinner time   - No Warfarin,  Lovenox 120mg sub q at dinner time  September 1- No Warfarin, Lovenox 120mg sub q at dinner time  September 2 - No blood thinner   September 3 - Procedure day no blood thinner  September 4 -Warfarin 7.5mg AND Lovenox 120mg sub q at dinner time  September 5 - Warfarin 7.5mg AND Lovenox 120mg sub q at dinner time  September 6 - Warfarin 7.5mg AND Lovenox 120mg sub q at dinner time  September 7 - Warfarin 7.5mg AND Lovenox 120mg sub q at dinner time  September 8 - Warfarin 7.5mg AND Lovenox 120mg sub q at dinner time  September 9 TEST INR     Laisha Méndez, Clinical Pharmacist, CDE, CACP

## 2019-09-09 ENCOUNTER — ANTICOAGULATION VISIT (OUTPATIENT)
Dept: VASCULAR LAB | Facility: MEDICAL CENTER | Age: 69
End: 2019-09-09
Attending: INTERNAL MEDICINE
Payer: MEDICARE

## 2019-09-09 VITALS — SYSTOLIC BLOOD PRESSURE: 134 MMHG | HEART RATE: 59 BPM | DIASTOLIC BLOOD PRESSURE: 52 MMHG

## 2019-09-09 DIAGNOSIS — I82.90 DEEP VEIN THROMBOSIS (DVT) OF NON-EXTREMITY VEIN, UNSPECIFIED CHRONICITY: ICD-10-CM

## 2019-09-09 LAB — INR PPP: 2.3 (ref 2–3.5)

## 2019-09-09 PROCEDURE — 85610 PROTHROMBIN TIME: CPT

## 2019-09-09 PROCEDURE — 99211 OFF/OP EST MAY X REQ PHY/QHP: CPT | Performed by: NURSE PRACTITIONER

## 2019-09-09 NOTE — PROGRESS NOTES
Anticoagulation Summary  As of 2019    INR goal:   2.0-3.0   TTR:   68.4 % (4.2 y)   INR used for dosin.30 (2019)   Warfarin maintenance plan:   3.75 mg (7.5 mg x 0.5) every Mon; 7.5 mg (7.5 mg x 1) all other days   Weekly warfarin total:   48.75 mg   Plan last modified:   Jona JoD (2019)   Next INR check:   2019   Target end date:   Indefinite    Indications    DVT (deep venous thrombosis) (HCC) (Resolved) [I82.409]  Pulmonary embolism [415.19] (Resolved) [I26.99]  Deep vein thrombosis (DVT) (HCC) [I82.409]             Anticoagulation Episode Summary     INR check location:   Anticoagulation Clinic    Preferred lab:   [facility] GENERAL    Send INR reminders to:       Comments:         Anticoagulation Care Providers     Provider Role Specialty Phone number    Patricia Damon M.D. Referring Internal Medicine [phone]    Jona QuinnD Responsible      [facility] Anticoagulation Services Responsible  [phone]        Anticoagulation Patient Findings      HPI:  Rene Chan seen in clinic today for follow up on anticoagulation therapy in the presence of DVT, PE hx.   Colonoscopy on 9/3/19. He is bridging with once daily enoxaparin.  Denies any medication or diet changes.   No current symptoms of bleeding or thrombosis reported.    A/P:   INR therapeutic. He can stop Lovenox.  Continue current regimen.   BP recorded in vitals.    Follow up appointment in 3 week(s) per pt's request.    Next Appointment:  at 10:30 am.    Miya PARKER

## 2019-09-11 DIAGNOSIS — I10 ESSENTIAL HYPERTENSION: ICD-10-CM

## 2019-09-11 LAB — INR BLD: 2.3 (ref 0.9–1.2)

## 2019-09-13 ENCOUNTER — TELEPHONE (OUTPATIENT)
Dept: CARDIOLOGY | Facility: MEDICAL CENTER | Age: 69
End: 2019-09-13

## 2019-09-13 DIAGNOSIS — I35.0 MILD AORTIC STENOSIS: ICD-10-CM

## 2019-09-13 NOTE — TELEPHONE ENCOUNTER
"Upon chart review, per APN's last OV note, \"Aortic stenosis: He has mild aortic stenosis which appears to be asymptomatic.  We will follow him expectantly with echoes.  I think he can have an echo a year from now.  He will call for the order prior to his follow-up appointment.\"    Echo ordered.    Returned pt call and reviewed APN recommendations and findings.  He verbalizes understanding and is appreciative of information given.  Call successfully transferred to schedulers.  "

## 2019-09-13 NOTE — TELEPHONE ENCOUNTER
SM      Patient left a message on the voicemail today. He said he was told he needs an echo before his November appt with Dr. Wright. He tried to schedule it but there is no order. He can be reached at [phone].

## 2019-09-17 ENCOUNTER — HOSPITAL ENCOUNTER (OUTPATIENT)
Dept: CARDIOLOGY | Facility: MEDICAL CENTER | Age: 69
End: 2019-09-17
Attending: NURSE PRACTITIONER
Payer: MEDICARE

## 2019-09-17 DIAGNOSIS — I35.0 MILD AORTIC STENOSIS: ICD-10-CM

## 2019-09-17 LAB
LV EJECT FRACT  99904: 60
LV EJECT FRACT MOD 2C 99903: 58.22
LV EJECT FRACT MOD 4C 99902: 58.96
LV EJECT FRACT MOD BP 99901: 57.84

## 2019-09-17 PROCEDURE — 93306 TTE W/DOPPLER COMPLETE: CPT

## 2019-09-17 PROCEDURE — 93306 TTE W/DOPPLER COMPLETE: CPT | Mod: 26 | Performed by: INTERNAL MEDICINE

## 2019-09-30 ENCOUNTER — ANTICOAGULATION VISIT (OUTPATIENT)
Dept: VASCULAR LAB | Facility: MEDICAL CENTER | Age: 69
End: 2019-09-30
Attending: INTERNAL MEDICINE
Payer: MEDICARE

## 2019-09-30 DIAGNOSIS — I82.90 DEEP VEIN THROMBOSIS (DVT) OF NON-EXTREMITY VEIN, UNSPECIFIED CHRONICITY: ICD-10-CM

## 2019-09-30 DIAGNOSIS — Z86.718 HISTORY OF DVT (DEEP VEIN THROMBOSIS): ICD-10-CM

## 2019-09-30 LAB
INR BLD: 4.1 (ref 0.9–1.2)
INR PPP: 4.1 (ref 2–3.5)

## 2019-09-30 PROCEDURE — 85610 PROTHROMBIN TIME: CPT

## 2019-09-30 PROCEDURE — 99212 OFFICE O/P EST SF 10 MIN: CPT

## 2019-09-30 RX ORDER — WARFARIN SODIUM 7.5 MG/1
TABLET ORAL
Qty: 90 TAB | Refills: 1 | Status: SHIPPED | OUTPATIENT
Start: 2019-09-30 | End: 2020-06-01

## 2019-09-30 NOTE — PROGRESS NOTES
Anticoagulation Summary  As of 2019    INR goal:   2.0-3.0   TTR:   68.0 % (4.3 y)   INR used for dosin.10! (2019)   Warfarin maintenance plan:   3.75 mg (7.5 mg x 0.5) every Mon, Fri; 7.5 mg (7.5 mg x 1) all other days   Weekly warfarin total:   45 mg   Plan last modified:   Marlene Bishop PharmD (2019)   Next INR check:   10/21/2019   Target end date:   Indefinite    Indications    DVT (deep venous thrombosis) (HCC) (Resolved) [I82.409]  Pulmonary embolism [415.19] (Resolved) [I26.99]  Deep vein thrombosis (DVT) (HCC) [I82.409]             Anticoagulation Episode Summary     INR check location:   Anticoagulation Clinic    Preferred lab:   [facility] GENERAL    Send INR reminders to:       Comments:         Anticoagulation Care Providers     Provider Role Specialty Phone number    Patricia Damon M.D. Referring Internal Medicine [phone]    Laisha Méndez PharmD Responsible      [facility] Anticoagulation Services Responsible  [phone]        Anticoagulation Patient Findings    HPI:  Rene Murrell Rocio, on anticoagulation therapy with warfarin for VTE.  Changes to current medical/health status since last appt: none  Denies signs/symptoms of bleeding and/or thrombosis since the last appt.    Denies any interval changes to medications since last appt.   Denies any complications or cost restrictions with current therapy.     Pt has been eating less salad lately.     A/P   INR is SUPRA-therapeutic at 4.1.  Pt is to hold warfarin today.   Decreased overall regimen by ~7.7% to account for the new change in diet.  Pt to contact clinic for any s/s of unusual bleeding, bruising, clotting, or any changes to diet or medication.     Next INR in 3 weeks per patient preference.    Marlene Bishop, PharmD

## 2019-10-15 DIAGNOSIS — Z79.01 CHRONIC ANTICOAGULATION: ICD-10-CM

## 2019-10-21 ENCOUNTER — ANTICOAGULATION VISIT (OUTPATIENT)
Dept: VASCULAR LAB | Facility: MEDICAL CENTER | Age: 69
End: 2019-10-21
Attending: INTERNAL MEDICINE
Payer: MEDICARE

## 2019-10-21 VITALS — SYSTOLIC BLOOD PRESSURE: 130 MMHG | DIASTOLIC BLOOD PRESSURE: 52 MMHG | HEART RATE: 55 BPM

## 2019-10-21 DIAGNOSIS — I82.90 DEEP VEIN THROMBOSIS (DVT) OF NON-EXTREMITY VEIN, UNSPECIFIED CHRONICITY: ICD-10-CM

## 2019-10-21 LAB — INR PPP: 2.7 (ref 2–3.5)

## 2019-10-21 PROCEDURE — 85610 PROTHROMBIN TIME: CPT

## 2019-10-21 PROCEDURE — 99211 OFF/OP EST MAY X REQ PHY/QHP: CPT | Performed by: NURSE PRACTITIONER

## 2019-10-21 NOTE — PROGRESS NOTES
Anticoagulation Summary  As of 10/21/2019    INR goal:   2.0-3.0   TTR:   67.4 % (4.3 y)   INR used for dosin.70 (10/21/2019)   Warfarin maintenance plan:   3.75 mg (7.5 mg x 0.5) every Mon, Fri; 7.5 mg (7.5 mg x 1) all other days   Weekly warfarin total:   45 mg   Plan last modified:   Jona JacksonD (2019)   Next INR check:   2019   Target end date:   Indefinite    Indications    DVT (deep venous thrombosis) (HCC) (Resolved) [I82.409]  Pulmonary embolism [415.19] (Resolved) [I26.99]  Deep vein thrombosis (DVT) (HCC) [I82.409]             Anticoagulation Episode Summary     INR check location:   Anticoagulation Clinic    Preferred lab:   [facility] GENERAL    Send INR reminders to:       Comments:         Anticoagulation Care Providers     Provider Role Specialty Phone number    Patricia Damon M.D. Referring Internal Medicine [phone]    Jona QuinnD Responsible      [facility] Anticoagulation Services Responsible  [phone]        Anticoagulation Patient Findings      HPI:  Rene Chan seen in clinic today for follow up on anticoagulation therapy in the presence of DVT, PE.   Denies any changes to current medical/health status since last appointment.   Denies any medication or diet changes.   No current symptoms of bleeding or thrombosis reported.    A/P:   INR therapeutic.   Continue current regimen.   BP recorded in vitals.    Follow up appointment in 5 week(s).    Next Appointment:  at 10:30 am.    Miya PARKER

## 2019-10-22 LAB — INR BLD: 2.7 (ref 0.9–1.2)

## 2019-11-20 ENCOUNTER — OFFICE VISIT (OUTPATIENT)
Dept: CARDIOLOGY | Facility: MEDICAL CENTER | Age: 69
End: 2019-11-20
Payer: MEDICARE

## 2019-11-20 VITALS
DIASTOLIC BLOOD PRESSURE: 52 MMHG | BODY MASS INDEX: 30.92 KG/M2 | WEIGHT: 204 LBS | HEIGHT: 68 IN | SYSTOLIC BLOOD PRESSURE: 140 MMHG | OXYGEN SATURATION: 95 % | HEART RATE: 58 BPM

## 2019-11-20 DIAGNOSIS — I25.10 CORONARY ARTERY DISEASE DUE TO LIPID RICH PLAQUE: ICD-10-CM

## 2019-11-20 DIAGNOSIS — Z95.1 S/P CABG X 3: ICD-10-CM

## 2019-11-20 DIAGNOSIS — I35.0 MILD AORTIC STENOSIS: Chronic | ICD-10-CM

## 2019-11-20 DIAGNOSIS — I65.23 BILATERAL CAROTID ARTERY STENOSIS: ICD-10-CM

## 2019-11-20 DIAGNOSIS — G45.9 TIA (TRANSIENT ISCHEMIC ATTACK): ICD-10-CM

## 2019-11-20 DIAGNOSIS — E78.5 DYSLIPIDEMIA: ICD-10-CM

## 2019-11-20 DIAGNOSIS — I25.83 CORONARY ARTERY DISEASE DUE TO LIPID RICH PLAQUE: ICD-10-CM

## 2019-11-20 DIAGNOSIS — Z79.01 CHRONIC ANTICOAGULATION: ICD-10-CM

## 2019-11-20 PROCEDURE — 99214 OFFICE O/P EST MOD 30 MIN: CPT | Performed by: INTERNAL MEDICINE

## 2019-11-20 ASSESSMENT — ENCOUNTER SYMPTOMS
FALLS: 0
BRUISES/BLEEDS EASILY: 0
FEVER: 0
BLURRED VISION: 0
DIZZINESS: 0
ABDOMINAL PAIN: 0
CHILLS: 0
PND: 0
SHORTNESS OF BREATH: 0
WEAKNESS: 0
SORE THROAT: 0
COUGH: 0
CLAUDICATION: 0
PALPITATIONS: 0
NAUSEA: 0
FOCAL WEAKNESS: 0

## 2019-11-21 NOTE — PROGRESS NOTES
Chief Complaint   Patient presents with   • Coronary Artery Disease     follow up       Subjective:   Rene Chan is a 69 y.o. male who presents today for follow-up of his history of CABG and carotid disease    He is been doing well over the past year no major health concerns he is on chronic anticoagulation for prior history of PEs and recurrent DVT    Establish with new primary care    Past Medical History:   Diagnosis Date   • Anticoagulation monitoring, special range    • Bipolar disorder (HCC)    • CAD (coronary artery disease) 6/4/2015   • CAD (coronary artery disease)    • Carotid artery occlusion     L 60-79% occluded; R 59% occluded per pt report; 2011   • Clotting disorder (HCC)     protein S elevation in '12 with recurrent DVT and PE-coumadin lifelong   • Colon polyps    • Diabetes mellitus, type II (HCC)    • Gout    • Hammer toe    • Hyperlipidemia    • Hypertension    • Hypothyroidism    • Impaired fasting glucose    • Mild aortic stenosis - echo 4/2018    • Psychiatric disorder     bipolar     Past Surgical History:   Procedure Laterality Date   • MULTIPLE CORONARY ARTERY BYPASS ENDO VEIN HARVEST  6/4/2015    Procedure: MULTIPLE CORONARY ARTERY BYPASS Grafting  x 3   ENDO VEIN HARVEST right leg;  Surgeon: Christophe Lemus M.D.;  Location: SURGERY [facility];  Service:    • RECOVERY  6/1/2015    Procedure:  CATH LAB  [facility] [place] ICD; 794.31;  Surgeon: [facility];  Location: SURGERY PRE-POST PROC UNIT [facility] [place];  Service:    • OTHER ORTHOPEDIC SURGERY  1976    L wrist repair    • ANKLE FUSION     • APPENDECTOMY     • OTHER ABDOMINAL SURGERY      umbilical hernia repair 2000, appy age 13     Family History   Problem Relation Age of Onset   • Heart Disease Sister 50   • Stroke Sister 55        CVA   • Heart Failure Father      Social History     Socioeconomic History   • Marital status:      Spouse name: Not on file   • Number of children: Not on file   • Years of education:  Not on file   • Highest education level: Not on file   Occupational History   • Occupation: Cam      Comment: Build house for 35 years   Social Needs   • Financial resource strain: Not on file   • Food insecurity:     Worry: Not on file     Inability: Not on file   • Transportation needs:     Medical: Not on file     Non-medical: Not on file   Tobacco Use   • Smoking status: Former Smoker     Packs/day: 2.00     Years: 10.00     Pack years: 20.00     Types: Cigarettes     Last attempt to quit: 1976     Years since quittin.9   • Smokeless tobacco: Never Used   • Tobacco comment: quit x 32 yrs; 2ppd x 7 years   Substance and Sexual Activity   • Alcohol use: No   • Drug use: No     Comment: IV drug use in teens   • Sexual activity: Not on file   Lifestyle   • Physical activity:     Days per week: Not on file     Minutes per session: Not on file   • Stress: Not on file   Relationships   • Social connections:     Talks on phone: Not on file     Gets together: Not on file     Attends Zoroastrianism service: Not on file     Active member of club or organization: Not on file     Attends meetings of clubs or organizations: Not on file     Relationship status: Not on file   • Intimate partner violence:     Fear of current or ex partner: Not on file     Emotionally abused: Not on file     Physically abused: Not on file     Forced sexual activity: Not on file   Other Topics Concern   • Not on file   Social History Narrative    Lives with wife    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 90 Tab 1   • metoprolol (LOPRESSOR) 25 MG Tab Take 1 Tab by mouth 2 Times a Day. Needs to be seen for further refills. Thank you 200  "Tab 0   • levothyroxine (SYNTHROID) 75 MCG Tab TAKE ONE TABLET BY MOUTH DAILY 90 Tab 0   • divalproex (DEPAKOTE) 250 MG Tablet Delayed Response Take 1 Tab by mouth 2 Times a Day. (Patient taking differently: Take 250 mg by mouth 3 times a day.) 60 Tab 5   • lisinopril (PRINIVIL) 5 MG Tab Take 1 Tab by mouth every day. 100 Tab 0   • atorvastatin (LIPITOR) 80 MG tablet Take 1 Tab by mouth every evening. 90 Tab 1   • aspirin 81 MG tablet Take 1 Tab by mouth every day.  Tab    • colchicine (COLCRYS) 0.6 MG Tab Take 1 Tab by mouth every day. For acute gout; Take 1.2 mg on day one of flare Take 0.6 mg until the flare is gone. See clinic note. (Patient taking differently: Take 0.6 mg by mouth 1 time daily as needed. For acute gout; Take 1.2 mg on day one of flare Take 0.6 mg until the flare is gone. See clinic note.) 30 Tab 0   • Coenzyme Q10 200 MG Tab Take  by mouth every day.     • Cholecalciferol (VITAMIN D) 2000 UNITS Cap Take 1,000 Units by mouth 2 Times a Day.     • docosahexanoic acid (OMEGA 3 FA) 1000 MG CAPS Take 1,000 mg by mouth every day.       No facility-administered encounter medications on file as of 11/20/2019.      Review of Systems   Constitutional: Negative for chills and fever.   HENT: Negative for sore throat.    Eyes: Negative for blurred vision.   Respiratory: Negative for cough and shortness of breath.    Cardiovascular: Negative for chest pain, palpitations, claudication, leg swelling and PND.   Gastrointestinal: Negative for abdominal pain and nausea.   Musculoskeletal: Negative for falls and joint pain.   Skin: Negative for rash.   Neurological: Negative for dizziness, focal weakness and weakness.   Endo/Heme/Allergies: Does not bruise/bleed easily.        Objective:   /52 (BP Location: Left arm, Patient Position: Sitting)   Pulse (!) 58   Ht 1.727 m (5' 8\")   Wt 92.5 kg (204 lb)   SpO2 95%   BMI 31.02 kg/m²     Physical Exam   Constitutional: No distress.   HENT: "   Mouth/Throat: Oropharynx is clear and moist. No oropharyngeal exudate.   Eyes: No scleral icterus.   Neck: No JVD present.   Cardiovascular: Normal rate. Exam reveals no gallop and no friction rub.   Murmur heard.  Pulmonary/Chest: No respiratory distress. He has no wheezes. He has no rales.   Abdominal: Soft. Bowel sounds are normal.   Musculoskeletal:         General: No edema.   Neurological: He is alert.   Skin: No rash noted. He is not diaphoretic.   Psychiatric: He has a normal mood and affect.     We reviewed in person the most recent labs  Recent Results (from the past 4032 hour(s))   POCT Protime    Collection Time: 07/23/19 12:00 AM   Result Value Ref Range    INR 1.70    Point Of Care INR    Collection Time: 07/23/19 10:45 AM   Result Value Ref Range    POC INR 1.7 (H) 0.9 - 1.2   POCT Protime    Collection Time: 08/06/19 12:00 AM   Result Value Ref Range    INR 1.70    Point Of Care INR    Collection Time: 08/06/19 10:49 AM   Result Value Ref Range    POC INR 1.7 (H) 0.9 - 1.2   POCT Protime    Collection Time: 08/20/19 12:00 AM   Result Value Ref Range    INR 1.80    POCT Protime    Collection Time: 09/09/19 12:00 AM   Result Value Ref Range    INR 2.30    Point Of Care INR    Collection Time: 09/09/19 10:32 AM   Result Value Ref Range    POC INR 2.3 (H) 0.9 - 1.2   EC-ECHOCARDIOGRAM COMPLETE W/O CONT    Collection Time: 09/17/19  6:09 PM   Result Value Ref Range    Eject.Frac. MOD BP 57.84     Eject.Frac. MOD 4C 58.96     Eject.Frac. MOD 2C 58.22     Left Ventrical Ejection Fraction 60    POCT Protime    Collection Time: 09/30/19 12:00 AM   Result Value Ref Range    INR 4.10    Point Of Care INR    Collection Time: 09/30/19 10:28 AM   Result Value Ref Range    POC INR 4.1 (H) 0.9 - 1.2   POCT Protime    Collection Time: 10/21/19 12:00 AM   Result Value Ref Range    INR 2.70    Point Of Care INR    Collection Time: 10/21/19 10:41 AM   Result Value Ref Range    POC INR 2.7 (H) 0.9 - 1.2        Assessment:     1. TIA (transient ischemic attack)     2. S/P CABG x 3 - 6/2015     3. Dyslipidemia     4. Coronary artery disease due to lipid rich plaque     5. Chronic anticoagulation     6. Bilateral carotid artery stenosis     7. Mild aortic stenosis - echo 4/2018         Medical Decision Making:  Today's Assessment / Status / Plan:     It was my pleasure to meet with Mr. Chan.    Reviewed the images of his echocardiogram he has mild aortic stenosis overall this is likely to progress but would recommend repeat evaluation perhaps in 3-5 years    Blood pressure is well controlled.      12/04/19  He understands the risks and benefits of chronic anticoagulation  I will see Mr. Chan back in 1 year time and encouraged him to follow up with us over the phone or electronically using my [employer] as issues arise.    It is my pleasure to participate in the care of Mr. Chan.  Please do not hesitate to contact me with questions or concerns.    Ta Wright MD PhD [facility]  Cardiologist [facility]    Please note that this dictation was created using voice recognition software. I have worked with consultants from the vendor as well as technical experts from [facility] to optimize the interface. I have made every reasonable attempt to correct obvious errors, but I expect that there are errors of grammar and possibly content I did not discover before finalizing the note.

## 2019-11-25 ENCOUNTER — APPOINTMENT (OUTPATIENT)
Dept: VASCULAR LAB | Facility: MEDICAL CENTER | Age: 69
End: 2019-11-25
Payer: MEDICARE

## 2019-12-05 ENCOUNTER — OFFICE VISIT (OUTPATIENT)
Dept: URGENT CARE | Facility: CLINIC | Age: 69
End: 2019-12-05
Payer: MEDICARE

## 2019-12-05 VITALS
WEIGHT: 197 LBS | TEMPERATURE: 98.3 F | DIASTOLIC BLOOD PRESSURE: 70 MMHG | OXYGEN SATURATION: 98 % | HEIGHT: 68 IN | HEART RATE: 57 BPM | SYSTOLIC BLOOD PRESSURE: 138 MMHG | BODY MASS INDEX: 29.86 KG/M2

## 2019-12-05 DIAGNOSIS — J06.9 VIRAL UPPER RESPIRATORY TRACT INFECTION: ICD-10-CM

## 2019-12-05 DIAGNOSIS — J22 LOWER RESP. TRACT INFECTION: ICD-10-CM

## 2019-12-05 DIAGNOSIS — H61.21 IMPACTED CERUMEN OF RIGHT EAR: ICD-10-CM

## 2019-12-05 DIAGNOSIS — I10 ESSENTIAL HYPERTENSION: ICD-10-CM

## 2019-12-05 PROCEDURE — 99214 OFFICE O/P EST MOD 30 MIN: CPT | Performed by: NURSE PRACTITIONER

## 2019-12-05 RX ORDER — CODEINE PHOSPHATE AND GUAIFENESIN 10; 100 MG/5ML; MG/5ML
5 SOLUTION ORAL EVERY 6 HOURS PRN
Qty: 140 ML | Refills: 0 | Status: SHIPPED | OUTPATIENT
Start: 2019-12-05 | End: 2019-12-12

## 2019-12-05 RX ORDER — BENZONATATE 100 MG/1
100 CAPSULE ORAL 3 TIMES DAILY PRN
Qty: 60 CAP | Refills: 0 | Status: SHIPPED | OUTPATIENT
Start: 2019-12-05 | End: 2020-01-06

## 2019-12-05 RX ORDER — AZITHROMYCIN 250 MG/1
TABLET, FILM COATED ORAL
Qty: 6 TAB | Refills: 0 | Status: SHIPPED | OUTPATIENT
Start: 2019-12-05 | End: 2020-01-06

## 2019-12-05 ASSESSMENT — ENCOUNTER SYMPTOMS
COUGH: 1
RHINORRHEA: 1
FEVER: 1

## 2019-12-05 ASSESSMENT — COPD QUESTIONNAIRES: COPD: 0

## 2019-12-05 NOTE — PROGRESS NOTES
Subjective:     Rene Chan is a 69 y.o. male who presents for Cough (x 10 days, fever, chest congestion)      Monday sore throat, swollen glands at first. Cough with flem, propping up on headboard was better. Had fever initially. Better during the day. Mucinex. Slight SOB with cough. Sore throat improving. Cough drops. Flew from [place] yesterday. Was wheezing at night. Rt ear did not equalize during flight yesterday.     Cough   This is a new problem. The current episode started in the past 7 days. The problem has been waxing and waning. The cough is productive of sputum. Associated symptoms include ear congestion, a fever, rhinorrhea, a sore throat, shortness of breath and wheezing. Pertinent negatives include no ear pain, hemoptysis, postnasal drip or rash. The symptoms are aggravated by lying down. The treatment provided mild relief. His past medical history is significant for bronchitis. There is no history of asthma, COPD or pneumonia.       Past Medical History:   Diagnosis Date   • Anticoagulation monitoring, special range    • Bipolar disorder (HCC)    • CAD (coronary artery disease) 6/4/2015   • CAD (coronary artery disease)    • Carotid artery occlusion     L 60-79% occluded; R 59% occluded per pt report; 2011   • Clotting disorder (HCC)     protein S elevation in '12 with recurrent DVT and PE-coumadin lifelong   • Colon polyps    • Diabetes mellitus, type II (HCC)    • Gout    • Hammer toe    • Hyperlipidemia    • Hypertension    • Hypothyroidism    • Impaired fasting glucose    • Mild aortic stenosis - echo 4/2018    • Psychiatric disorder     bipolar       Past Surgical History:   Procedure Laterality Date   • MULTIPLE CORONARY ARTERY BYPASS ENDO VEIN HARVEST  6/4/2015    Procedure: MULTIPLE CORONARY ARTERY BYPASS Grafting  x 3   ENDO VEIN HARVEST right leg;  Surgeon: Christophe Lemus M.D.;  Location: SURGERY [facility];  Service:    • RECOVERY  6/1/2015    Procedure:  [facility]94[facility];  Surgeon: [facility];  [place] [facility] [place]  Service:    • OTHER ORTHOPEDIC SURGERY      L wrist repair    • ANKLE FUSION     • APPENDECTOMY     • OTHER ABDOMINAL SURGERY      umbilical hernia repair , appy age 13       Social History     Socioeconomic History   • Marital status:      Spouse name: Not on file   • Number of children: Not on file   • Years of education: Not on file   • Highest education level: Not on file   Occupational History   • Occupation: [employer]      Comment: Build house for 35 years   Social Needs   • Financial resource strain: Not on file   • Food insecurity:     Worry: Not on file     Inability: Not on file   • Transportation needs:     Medical: Not on file     Non-medical: Not on file   Tobacco Use   • Smoking status: Former Smoker     Packs/day: 2.00     Years: 10.00     Pack years: 20.00     Types: Cigarettes     Last attempt to quit: 1976     Years since quittin.9   • Smokeless tobacco: Never Used   • Tobacco comment: quit x 32 yrs; 2ppd x 7 years   Substance and Sexual Activity   • Alcohol use: No   • Drug use: No     Comment: IV drug use in teens   • Sexual activity: Not Currently     Partners: Female   Lifestyle   • Physical activity:     Days per week: Not on file     Minutes per session: Not on file   • Stress: Not on file   Relationships   • Social connections:     Talks on phone: Not on file     Gets together: Not on file     Attends Spiritism service: Not on file     Active member of club or organization: Not on file     Attends meetings of clubs or organizations: Not on file     Relationship status: Not on file   • Intimate partner violence:     Fear of current or ex partner: Not on file     Emotionally abused: Not on file     Physically abused: Not on file     Forced sexual activity: Not on file   Other Topics Concern   • Not on file   Social History Narrative    Lives with wife    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   HENT: Positive for congestion, rhinorrhea and sore throat. Negative for ear discharge, ear pain and postnasal drip.    Respiratory: Positive for cough, sputum production, shortness of breath and wheezing. Negative for hemoptysis and stridor.    Cardiovascular: Negative for palpitations.   Skin: Negative for rash.   All other systems reviewed and are negative.       Objective:   /70   Pulse (!) 57   Temp 36.8 °C (98.3 °F)   Ht 1.727 m (5' 8\")   Wt 89.4 kg (197 lb)   SpO2 98%   BMI 29.95 kg/m²     Physical Exam  Vitals signs reviewed.   Constitutional:       General: He is not in acute distress.     Appearance: He is well-developed.   HENT:      Head: Normocephalic and atraumatic.      Right Ear: External ear normal. No drainage, swelling or tenderness. There is impacted cerumen.      Left Ear: Tympanic membrane, ear canal and external ear normal.      Ears:      Comments: Rt ear irrigation, no erythema. TM intact.     Nose: Mucosal edema present.      Right Sinus: No maxillary sinus tenderness or frontal sinus tenderness.      Left Sinus: No maxillary sinus tenderness or frontal sinus tenderness.      Mouth/Throat:      Mouth: Mucous membranes are moist. No oral lesions.      Pharynx: Oropharynx is clear. Uvula midline.   Eyes:      Conjunctiva/sclera: Conjunctivae normal.      Pupils: Pupils are equal, round, and reactive to light.   Neck:      Musculoskeletal: Normal range of motion.   Cardiovascular:      Rate and Rhythm: Normal rate.   Pulmonary:      Effort: Pulmonary effort is normal. No accessory muscle usage, " prolonged expiration, respiratory distress or retractions.      Breath sounds: Normal breath sounds. No stridor. No decreased breath sounds, wheezing, rhonchi or rales.   Musculoskeletal: Normal range of motion.   Skin:     General: Skin is warm and dry.      Findings: No rash.   Neurological:      General: No focal deficit present.      Mental Status: He is alert and oriented to person, place, and time.      GCS: GCS eye subscore is 4. GCS verbal subscore is 5. GCS motor subscore is 6.   Psychiatric:         Mood and Affect: Mood normal.         Speech: Speech normal.         Behavior: Behavior normal.         Thought Content: Thought content normal.         Judgment: Judgment normal.         Assessment/Plan:   1. Viral upper respiratory tract infection  - benzonatate (TESSALON) 100 MG Cap; Take 1 Cap by mouth 3 times a day as needed for Cough.  Dispense: 60 Cap; Refill: 0  - guaifenesin-codeine (ROBITUSSIN AC) Solution oral solution; Take 5 mL by mouth every 6 hours as needed for Cough for up to 7 days.  Dispense: 140 mL; Refill: 0    2. Lower resp. tract infection  - azithromycin (ZITHROMAX) 250 MG Tab; Take 500 mg day one, 250 mg days 2-5  Dispense: 6 Tab; Refill: 0  -Contingent ABX. 4 day coumadin clinic for recheck.     3. Impacted cerumen of right ear  -Ear irrigation.    -Discussed viral etiology of URI.     Symptomatic care.  -Oral hydration and rest.   -Over the counter expectorant as directed; Guaifenesin (Mucinex).  --May take over the counter pseudoephedrine for nasal congestion. Can increase your blood pressure.  -Tylenol or ibuprofen for pain and fever as directed.   -Saline nasal spray as a decongestant.    Follow up for shortness of breath, fevers, elevated heart rate, weakness, prolonged cough, chest pain, ear pain or drainage, or any other concerns.    Differential diagnosis, natural history, supportive care, and indications for immediate follow-up discussed.

## 2019-12-06 NOTE — PATIENT INSTRUCTIONS
" Symptomatic care.  -Oral hydration and rest.   -Over the counter expectorant as directed; Guaifenesin (Mucinex).  -Tylenol or ibuprofen for pain and fever as directed.   -Saline nasal spray as a decongestant.    Follow up for shortness of breath, fevers, elevated heart rate, weakness, prolonged cough, chest pain, or any other concerns.    Upper Respiratory Infection, Adult  Most upper respiratory infections (URIs) are caused by a virus. A URI affects the nose, throat, and upper air passages. The most common type of URI is often called \"the common cold.\"  Follow these instructions at home:  · Take medicines only as told by your doctor.  · Gargle warm saltwater or take cough drops to comfort your throat as told by your doctor.  · Use a warm mist humidifier or inhale steam from a shower to increase air moisture. This may make it easier to breathe.  · Drink enough fluid to keep your pee (urine) clear or pale yellow.  · Eat soups and other clear broths.  · Have a healthy diet.  · Rest as needed.  · Go back to work when your fever is gone or your doctor says it is okay.  ¨ You may need to stay home longer to avoid giving your URI to others.  ¨ You can also wear a face mask and wash your hands often to prevent spread of the virus.  · Use your inhaler more if you have asthma.  · Do not use any tobacco products, including cigarettes, chewing tobacco, or electronic cigarettes. If you need help quitting, ask your doctor.  Contact a doctor if:  · You are getting worse, not better.  · Your symptoms are not helped by medicine.  · You have chills.  · You are getting more short of breath.  · You have brown or red mucus.  · You have yellow or brown discharge from your nose.  · You have pain in your face, especially when you bend forward.  · You have a fever.  · You have puffy (swollen) neck glands.  · You have pain while swallowing.  · You have white areas in the back of your throat.  Get help right away if:  · You have very bad or " constant:  ¨ Headache.  ¨ Ear pain.  ¨ Pain in your forehead, behind your eyes, and over your cheekbones (sinus pain).  ¨ Chest pain.  · You have long-lasting (chronic) lung disease and any of the following:  ¨ Wheezing.  ¨ Long-lasting cough.  ¨ Coughing up blood.  ¨ A change in your usual mucus.  · You have a stiff neck.  · You have changes in your:  ¨ Vision.  ¨ Hearing.  ¨ Thinking.  ¨ Mood.  This information is not intended to replace advice given to you by your health care provider. Make sure you discuss any questions you have with your health care provider.  Document Released: 06/05/2009 Document Revised: 08/20/2017 Document Reviewed: 03/25/2015  [employer] Interactive Patient Education © 2017 [employer]    Cough, Adult  Coughing is a reflex that clears your throat and your airways. Coughing helps to heal and protect your lungs. It is normal to cough occasionally, but a cough that happens with other symptoms or lasts a long time may be a sign of a condition that needs treatment. A cough may last only 2-3 weeks (acute), or it may last longer than 8 weeks (chronic).  What are the causes?  Coughing is commonly caused by:  · Breathing in substances that irritate your lungs.  · A viral or bacterial respiratory infection.  · Allergies.  · Asthma.  · Postnasal drip.  · Smoking.  · Acid backing up from the stomach into the esophagus (gastroesophageal reflux).  · Certain medicines.  · Chronic lung problems, including COPD (or rarely, lung cancer).  · Other medical conditions such as heart failure.  Follow these instructions at home:  Pay attention to any changes in your symptoms. Take these actions to help with your discomfort:  · Take medicines only as told by your health care provider.  ¨ If you were prescribed an antibiotic medicine, take it as told by your health care provider. Do not stop taking the antibiotic even if you start to feel better.  ¨ Talk with your health care provider before you take a cough  suppressant medicine.  · Drink enough fluid to keep your urine clear or pale yellow.  · If the air is dry, use a cold steam vaporizer or humidifier in your bedroom or your home to help loosen secretions.  · Avoid anything that causes you to cough at work or at home.  · If your cough is worse at night, try sleeping in a semi-upright position.  · Avoid cigarette smoke. If you smoke, quit smoking. If you need help quitting, ask your health care provider.  · Avoid caffeine.  · Avoid alcohol.  · Rest as needed.  Contact a health care provider if:  · You have new symptoms.  · You cough up pus.  · Your cough does not get better after 2-3 weeks, or your cough gets worse.  · You cannot control your cough with suppressant medicines and you are losing sleep.  · You develop pain that is getting worse or pain that is not controlled with pain medicines.  · You have a fever.  · You have unexplained weight loss.  · You have night sweats.  Get help right away if:  · You cough up blood.  · You have difficulty breathing.  · Your heartbeat is very fast.  This information is not intended to replace advice given to you by your health care provider. Make sure you discuss any questions you have with your health care provider.  Document Released: 06/15/2012 Document Revised: 05/25/2017 Document Reviewed: 02/24/2016  [employer] Interactive Patient Education © 2017 [employer]

## 2019-12-09 ASSESSMENT — ENCOUNTER SYMPTOMS
SHORTNESS OF BREATH: 1
WHEEZING: 1
PALPITATIONS: 0
SPUTUM PRODUCTION: 1
HEMOPTYSIS: 0
SORE THROAT: 1
STRIDOR: 0

## 2019-12-11 ENCOUNTER — ANTICOAGULATION VISIT (OUTPATIENT)
Dept: VASCULAR LAB | Facility: MEDICAL CENTER | Age: 69
End: 2019-12-11
Attending: INTERNAL MEDICINE
Payer: MEDICARE

## 2019-12-11 DIAGNOSIS — Z79.01 CHRONIC ANTICOAGULATION: ICD-10-CM

## 2019-12-11 LAB
INR BLD: 4.1 (ref 0.9–1.2)
INR PPP: 4.1 (ref 2–3.5)

## 2019-12-11 PROCEDURE — 99212 OFFICE O/P EST SF 10 MIN: CPT | Performed by: PHARMACIST

## 2019-12-11 PROCEDURE — 85610 PROTHROMBIN TIME: CPT

## 2019-12-11 NOTE — PROGRESS NOTES
Anticoagulation Summary  As of 2019    INR goal:   2.0-3.0   TTR:   66.0 % (4.5 y)   INR used for dosin.10! (2019)   Warfarin maintenance plan:   3.75 mg (7.5 mg x 0.5) every Mon, Fri; 7.5 mg (7.5 mg x 1) all other days   Weekly warfarin total:   45 mg   Plan last modified:   Jona JacksonD (2019)   Next INR check:   2019   Target end date:   Indefinite    Indications    DVT (deep venous thrombosis) (HCC) (Resolved) [I82.409]  Pulmonary embolism [415.19] (Resolved) [I26.99]  Deep vein thrombosis (DVT) (HCC) [I82.409]             Anticoagulation Episode Summary     INR check location:   Anticoagulation Clinic    Preferred lab:   [facility] GENERAL    Send INR reminders to:       Comments:         Anticoagulation Care Providers     Provider Role Specialty Phone number    Patricia Damon M.D. Referring Internal Medicine [phone]    Laisha Méndez PharmD Responsible      [facility] Anticoagulation Services Responsible  [phone]                Anticoagulation Patient Findings      HPI:  Rene Chan seen in clinic today, on anticoagulation therapy with warfarin for VTE  Changes to current medical/health status since last appt: pt had a death in the family so was traveling, so was eating differently, then got a cold which he's on azithromycin for 2 more days.  Denies signs/symptoms of bleeding and/or thrombosis since the last appt.    Denies any interval changes to diet  Denies any interval changes to medications since last appt.   Denies any complications or cost restrictions with current therapy.   BP declined.       A/P   INR  is supra-therapeutic.   Will have pt hold his warfarin dose today and then tomorrow resume back to his normal dosing.     Follow up appointment in 2 week(s).    Hollie Santa, PharmD

## 2019-12-20 ENCOUNTER — HOSPITAL ENCOUNTER (OUTPATIENT)
Dept: LAB | Facility: MEDICAL CENTER | Age: 69
End: 2019-12-20
Attending: PSYCHIATRY & NEUROLOGY
Payer: MEDICARE

## 2019-12-20 LAB
25(OH)D3 SERPL-MCNC: 42 NG/ML (ref 30–100)
ALBUMIN SERPL BCP-MCNC: 3.6 G/DL (ref 3.2–4.9)
ALBUMIN/GLOB SERPL: 1.4 G/DL
ALP SERPL-CCNC: 37 U/L (ref 30–99)
ALT SERPL-CCNC: 13 U/L (ref 2–50)
ANION GAP SERPL CALC-SCNC: 7 MMOL/L (ref 0–11.9)
AST SERPL-CCNC: 17 U/L (ref 12–45)
BILIRUB SERPL-MCNC: 0.6 MG/DL (ref 0.1–1.5)
BUN SERPL-MCNC: 11 MG/DL (ref 8–22)
CALCIUM SERPL-MCNC: 8.6 MG/DL (ref 8.5–10.5)
CHLORIDE SERPL-SCNC: 105 MMOL/L (ref 96–112)
CHOLEST SERPL-MCNC: 140 MG/DL (ref 100–199)
CO2 SERPL-SCNC: 24 MMOL/L (ref 20–33)
CREAT SERPL-MCNC: 0.74 MG/DL (ref 0.5–1.4)
ERYTHROCYTE [DISTWIDTH] IN BLOOD BY AUTOMATED COUNT: 45.7 FL (ref 35.9–50)
GLOBULIN SER CALC-MCNC: 2.6 G/DL (ref 1.9–3.5)
GLUCOSE SERPL-MCNC: 115 MG/DL (ref 65–99)
HCT VFR BLD AUTO: 44.3 % (ref 42–52)
HDLC SERPL-MCNC: 31 MG/DL
HGB BLD-MCNC: 14.7 G/DL (ref 14–18)
LDLC SERPL CALC-MCNC: 36 MG/DL
MCH RBC QN AUTO: 31.8 PG (ref 27–33)
MCHC RBC AUTO-ENTMCNC: 33.2 G/DL (ref 33.7–35.3)
MCV RBC AUTO: 95.9 FL (ref 81.4–97.8)
PLATELET # BLD AUTO: 226 K/UL (ref 164–446)
PMV BLD AUTO: 11.6 FL (ref 9–12.9)
POTASSIUM SERPL-SCNC: 4.5 MMOL/L (ref 3.6–5.5)
PROT SERPL-MCNC: 6.2 G/DL (ref 6–8.2)
RBC # BLD AUTO: 4.62 M/UL (ref 4.7–6.1)
SODIUM SERPL-SCNC: 136 MMOL/L (ref 135–145)
TRIGL SERPL-MCNC: 365 MG/DL (ref 0–149)
TSH SERPL DL<=0.005 MIU/L-ACNC: 2.83 UIU/ML (ref 0.38–5.33)
VALPROATE SERPL-MCNC: 68.7 UG/ML (ref 50–100)
WBC # BLD AUTO: 6.8 K/UL (ref 4.8–10.8)

## 2019-12-20 PROCEDURE — 85027 COMPLETE CBC AUTOMATED: CPT

## 2019-12-20 PROCEDURE — 82306 VITAMIN D 25 HYDROXY: CPT

## 2019-12-20 PROCEDURE — 80061 LIPID PANEL: CPT

## 2019-12-20 PROCEDURE — 80164 ASSAY DIPROPYLACETIC ACD TOT: CPT

## 2019-12-20 PROCEDURE — 80053 COMPREHEN METABOLIC PANEL: CPT

## 2019-12-20 PROCEDURE — 84443 ASSAY THYROID STIM HORMONE: CPT

## 2019-12-20 PROCEDURE — 36415 COLL VENOUS BLD VENIPUNCTURE: CPT

## 2019-12-27 ENCOUNTER — TELEPHONE (OUTPATIENT)
Dept: VASCULAR LAB | Facility: MEDICAL CENTER | Age: 69
End: 2019-12-27

## 2019-12-28 NOTE — TELEPHONE ENCOUNTER
Pt NO SHOWED his appointment for anticoagulation services.  Routed to MA to reschedule    Sage Klein, Jona.D

## 2019-12-30 ENCOUNTER — TELEPHONE (OUTPATIENT)
Dept: VASCULAR LAB | Facility: MEDICAL CENTER | Age: 69
End: 2019-12-30

## 2020-01-06 ENCOUNTER — ANTICOAGULATION VISIT (OUTPATIENT)
Dept: VASCULAR LAB | Facility: MEDICAL CENTER | Age: 70
End: 2020-01-06
Attending: INTERNAL MEDICINE
Payer: MEDICARE

## 2020-01-06 VITALS — SYSTOLIC BLOOD PRESSURE: 128 MMHG | DIASTOLIC BLOOD PRESSURE: 69 MMHG | HEART RATE: 58 BPM

## 2020-01-06 DIAGNOSIS — I82.90 DEEP VEIN THROMBOSIS (DVT) OF NON-EXTREMITY VEIN, UNSPECIFIED CHRONICITY: ICD-10-CM

## 2020-01-06 LAB
INR BLD: 3.9 (ref 0.9–1.2)
INR PPP: 3.9 (ref 2–3.5)

## 2020-01-06 PROCEDURE — 85610 PROTHROMBIN TIME: CPT

## 2020-01-06 PROCEDURE — 99212 OFFICE O/P EST SF 10 MIN: CPT | Performed by: NURSE PRACTITIONER

## 2020-01-06 NOTE — PROGRESS NOTES
Anticoagulation Summary  As of 1/6/2020    INR goal:   2.0-3.0   TTR:   64.9 % (4.6 y)   INR used for dosing:   3.90! (1/6/2020)   Warfarin maintenance plan:   3.75 mg (7.5 mg x 0.5) every Mon, Wed, Fri; 7.5 mg (7.5 mg x 1) all other days   Weekly warfarin total:   41.25 mg   Plan last modified:   Miya Acosta, A.P.N. (1/6/2020)   Next INR check:   1/21/2020   Target end date:   Indefinite    Indications    DVT (deep venous thrombosis) (HCC) (Resolved) [I82.409]  Pulmonary embolism [415.19] (Resolved) [I26.99]  Deep vein thrombosis (DVT) (HCC) [I82.409]             Anticoagulation Episode Summary     INR check location:   Anticoagulation Clinic    Preferred lab:   [facility] GENERAL    Send INR reminders to:       Comments:         Anticoagulation Care Providers     Provider Role Specialty Phone number    Patricia Damon M.D. Referring Internal Medicine [phone]    Jona QuinnD Responsible      [facility] Anticoagulation Services Responsible  [phone]        Anticoagulation Patient Findings      HPI:  Rene Chan seen in clinic today for follow up on anticoagulation therapy in the presence of DVT, PE hx.   Denies any changes to current medical/health status since last appointment.   Denies any medication or diet changes.   No current symptoms of bleeding or thrombosis reported.    A/P:   INR supratherapeutic. INR trending high. Will decrease regimen.   BP recorded in vitals.    Follow up appointment in 2 week(s).    Next Appointment: Tuesday, Jan 21, 2020 at 2:00 pm.    Miya PARKER

## 2020-01-10 DIAGNOSIS — I10 ESSENTIAL HYPERTENSION: ICD-10-CM

## 2020-01-13 RX ORDER — LISINOPRIL 5 MG/1
5 TABLET ORAL DAILY
Qty: 100 TAB | Refills: 3 | Status: SHIPPED
Start: 2020-01-13 | End: 2020-10-01

## 2020-01-14 ENCOUNTER — HOSPITAL ENCOUNTER (OUTPATIENT)
Dept: LAB | Facility: MEDICAL CENTER | Age: 70
End: 2020-01-14
Attending: INTERNAL MEDICINE
Payer: MEDICARE

## 2020-01-14 ENCOUNTER — HOSPITAL ENCOUNTER (OUTPATIENT)
Dept: LAB | Facility: MEDICAL CENTER | Age: 70
End: 2020-01-14
Attending: STUDENT IN AN ORGANIZED HEALTH CARE EDUCATION/TRAINING PROGRAM
Payer: MEDICARE

## 2020-01-14 LAB
ALBUMIN SERPL BCP-MCNC: 3.5 G/DL (ref 3.2–4.9)
ALBUMIN/GLOB SERPL: 1.5 G/DL
ALP SERPL-CCNC: 30 U/L (ref 30–99)
ALT SERPL-CCNC: 12 U/L (ref 2–50)
ANION GAP SERPL CALC-SCNC: 8 MMOL/L (ref 0–11.9)
AST SERPL-CCNC: 13 U/L (ref 12–45)
BASOPHILS # BLD AUTO: 1 % (ref 0–1.8)
BASOPHILS # BLD: 0.08 K/UL (ref 0–0.12)
BILIRUB SERPL-MCNC: 0.8 MG/DL (ref 0.1–1.5)
BUN SERPL-MCNC: 14 MG/DL (ref 8–22)
CALCIUM SERPL-MCNC: 8.5 MG/DL (ref 8.5–10.5)
CHLORIDE SERPL-SCNC: 105 MMOL/L (ref 96–112)
CHOLEST SERPL-MCNC: 127 MG/DL (ref 100–199)
CO2 SERPL-SCNC: 28 MMOL/L (ref 20–33)
CREAT SERPL-MCNC: 0.84 MG/DL (ref 0.5–1.4)
EOSINOPHIL # BLD AUTO: 0.17 K/UL (ref 0–0.51)
EOSINOPHIL NFR BLD: 2.2 % (ref 0–6.9)
ERYTHROCYTE [DISTWIDTH] IN BLOOD BY AUTOMATED COUNT: 47.7 FL (ref 35.9–50)
FASTING STATUS PATIENT QL REPORTED: NORMAL
GLOBULIN SER CALC-MCNC: 2.3 G/DL (ref 1.9–3.5)
GLUCOSE SERPL-MCNC: 99 MG/DL (ref 65–99)
HCT VFR BLD AUTO: 44.8 % (ref 42–52)
HDLC SERPL-MCNC: 29 MG/DL
HGB BLD-MCNC: 14.6 G/DL (ref 14–18)
IMM GRANULOCYTES # BLD AUTO: 0.04 K/UL (ref 0–0.11)
IMM GRANULOCYTES NFR BLD AUTO: 0.5 % (ref 0–0.9)
LDLC SERPL CALC-MCNC: 63 MG/DL
LYMPHOCYTES # BLD AUTO: 2.51 K/UL (ref 1–4.8)
LYMPHOCYTES NFR BLD: 32.6 % (ref 22–41)
MCH RBC QN AUTO: 31.7 PG (ref 27–33)
MCHC RBC AUTO-ENTMCNC: 32.6 G/DL (ref 33.7–35.3)
MCV RBC AUTO: 97.2 FL (ref 81.4–97.8)
MONOCYTES # BLD AUTO: 0.92 K/UL (ref 0–0.85)
MONOCYTES NFR BLD AUTO: 11.9 % (ref 0–13.4)
NEUTROPHILS # BLD AUTO: 3.99 K/UL (ref 1.82–7.42)
NEUTROPHILS NFR BLD: 51.8 % (ref 44–72)
NRBC # BLD AUTO: 0 K/UL
NRBC BLD-RTO: 0 /100 WBC
PLATELET # BLD AUTO: 287 K/UL (ref 164–446)
PMV BLD AUTO: 10.3 FL (ref 9–12.9)
POTASSIUM SERPL-SCNC: 4.5 MMOL/L (ref 3.6–5.5)
PROT SERPL-MCNC: 5.8 G/DL (ref 6–8.2)
RBC # BLD AUTO: 4.61 M/UL (ref 4.7–6.1)
SODIUM SERPL-SCNC: 141 MMOL/L (ref 135–145)
TRIGL SERPL-MCNC: 177 MG/DL (ref 0–149)
URATE SERPL-MCNC: 7.4 MG/DL (ref 2.5–8.3)
WBC # BLD AUTO: 7.7 K/UL (ref 4.8–10.8)

## 2020-01-14 PROCEDURE — 84550 ASSAY OF BLOOD/URIC ACID: CPT

## 2020-01-14 PROCEDURE — 80061 LIPID PANEL: CPT

## 2020-01-14 PROCEDURE — 36415 COLL VENOUS BLD VENIPUNCTURE: CPT

## 2020-01-14 PROCEDURE — 80053 COMPREHEN METABOLIC PANEL: CPT

## 2020-01-14 PROCEDURE — 85025 COMPLETE CBC W/AUTO DIFF WBC: CPT

## 2020-01-21 ENCOUNTER — ANTICOAGULATION VISIT (OUTPATIENT)
Dept: VASCULAR LAB | Facility: MEDICAL CENTER | Age: 70
End: 2020-01-21
Attending: INTERNAL MEDICINE
Payer: MEDICARE

## 2020-01-21 VITALS — SYSTOLIC BLOOD PRESSURE: 101 MMHG | HEART RATE: 62 BPM | DIASTOLIC BLOOD PRESSURE: 50 MMHG

## 2020-01-21 DIAGNOSIS — Z79.01 CHRONIC ANTICOAGULATION: ICD-10-CM

## 2020-01-21 LAB
INR BLD: 2.7 (ref 0.9–1.2)
INR PPP: 2.7 (ref 2–3.5)

## 2020-01-21 PROCEDURE — 85610 PROTHROMBIN TIME: CPT

## 2020-01-21 NOTE — PROGRESS NOTES
Anticoagulation Summary  As of 2020    INR goal:   2.0-3.0   TTR:   64.6 % (4.6 y)   INR used for dosin.70 (2020)   Warfarin maintenance plan:   3.75 mg (7.5 mg x 0.5) every Mon, Wed, Fri; 7.5 mg (7.5 mg x 1) all other days   Weekly warfarin total:   41.25 mg   Plan last modified:   MISTI BucioPLESLIE (2020)   Next INR check:   2020   Target end date:   Indefinite    Indications    DVT (deep venous thrombosis) (HCC) (Resolved) [I82.409]  Pulmonary embolism [415.19] (Resolved) [I26.99]  Deep vein thrombosis (DVT) (HCC) [I82.409]             Anticoagulation Episode Summary     INR check location:   Anticoagulation Clinic    Preferred lab:   [facility] GENERAL    Send INR reminders to:       Comments:         Anticoagulation Care Providers     Provider Role Specialty Phone number    Patricia Damon M.D. Referring Internal Medicine [phone]    Laisha Méndez PharmD Responsible      [facility] Anticoagulation Services Responsible  [phone]        Anticoagulation Patient Findings          History of Present Illness: follow up appointment for chronic anticoagulation with the high risk medication, warfarin for DVT/PE    Last INR was out of range, dosage adjusted: pt is back at goal.  Pt was on a short course of abx. Continue current dosing regimen.  Follow up in 4 weeks, to reduce the risk of adverse events related to this high risk medication, warfarin.    Laisha Méndez Clinical Pharmacist

## 2020-02-20 ENCOUNTER — ANTICOAGULATION VISIT (OUTPATIENT)
Dept: VASCULAR LAB | Facility: MEDICAL CENTER | Age: 70
End: 2020-02-20
Attending: INTERNAL MEDICINE
Payer: MEDICARE

## 2020-02-20 DIAGNOSIS — I82.90 DEEP VEIN THROMBOSIS (DVT) OF NON-EXTREMITY VEIN, UNSPECIFIED CHRONICITY: ICD-10-CM

## 2020-02-20 LAB
INR BLD: 1.8 (ref 0.9–1.2)
INR PPP: 1.8 (ref 2–3.5)

## 2020-02-20 PROCEDURE — 85610 PROTHROMBIN TIME: CPT

## 2020-02-20 PROCEDURE — 99212 OFFICE O/P EST SF 10 MIN: CPT | Performed by: NURSE PRACTITIONER

## 2020-02-20 NOTE — PROGRESS NOTES
Anticoagulation Summary  As of 2020    INR goal:   2.0-3.0   TTR:   64.8 % (4.7 y)   INR used for dosin.80! (2020)   Warfarin maintenance plan:   3.75 mg (7.5 mg x 0.5) every Mon, Wed, Fri; 7.5 mg (7.5 mg x 1) all other days   Weekly warfarin total:   41.25 mg   Plan last modified:   Miya Acosta, A.P.N. (2020)   Next INR check:   3/19/2020   Target end date:   Indefinite    Indications    DVT (deep venous thrombosis) (HCC) (Resolved) [I82.409]  Pulmonary embolism [415.19] (Resolved) [I26.99]  Deep vein thrombosis (DVT) (HCC) [I82.409]             Anticoagulation Episode Summary     INR check location:   Anticoagulation Clinic    Preferred lab:   [facility] GENERAL    Send INR reminders to:       Comments:         Anticoagulation Care Providers     Provider Role Specialty Phone number    Patricia Damon M.D. Referring Internal Medicine [phone]    Jona QuinnD Responsible      [facility] Anticoagulation Services Responsible  [phone]        Anticoagulation Patient Findings      HPI:  Rene Chan seen in clinic today for follow up on anticoagulation therapy in the presence of DVT, PE hx.   Denies any changes to current medical/health status since last appointment.   Denies any medication or diet changes.   No current symptoms of bleeding or thrombosis reported.  Missed a dose by mistake recently.    A/P:   INR subtherapeutic. No recent VTEs. Will increase one dose then continue current regimen.     Follow up appointment in 4 week(s) per pt's preference.    Next Appointment:  at 10:15 am.    Miya PARKER

## 2020-03-20 ENCOUNTER — ANTICOAGULATION VISIT (OUTPATIENT)
Dept: VASCULAR LAB | Facility: MEDICAL CENTER | Age: 70
End: 2020-03-20
Attending: INTERNAL MEDICINE
Payer: MEDICARE

## 2020-03-20 DIAGNOSIS — I82.90 DEEP VEIN THROMBOSIS (DVT) OF NON-EXTREMITY VEIN, UNSPECIFIED CHRONICITY: ICD-10-CM

## 2020-03-20 LAB
INR BLD: 1.8 (ref 0.9–1.2)
INR PPP: 1.8 (ref 2–3.5)

## 2020-03-20 PROCEDURE — 85610 PROTHROMBIN TIME: CPT

## 2020-03-20 PROCEDURE — 99212 OFFICE O/P EST SF 10 MIN: CPT

## 2020-03-20 NOTE — PROGRESS NOTES
Anticoagulation Summary  As of 3/20/2020    INR goal:   2.0-3.0   TTR:   63.7 % (4.8 y)   INR used for dosin.80! (3/20/2020)   Warfarin maintenance plan:   3.75 mg (7.5 mg x 0.5) every Mon, Wed, Fri; 7.5 mg (7.5 mg x 1) all other days   Weekly warfarin total:   41.25 mg   Plan last modified:   Miya Acosta A.P.NPaul (2020)   Next INR check:   2020   Target end date:   Indefinite    Indications    DVT (deep venous thrombosis) (HCC) (Resolved) [I82.409]  Pulmonary embolism [415.19] (Resolved) [I26.99]  Deep vein thrombosis (DVT) (HCC) [I82.409]             Anticoagulation Episode Summary     INR check location:   Anticoagulation Clinic    Preferred lab:   [facility] GENERAL    Send INR reminders to:       Comments:         Anticoagulation Care Providers     Provider Role Specialty Phone number    Patricia Damon M.D. Referring Internal Medicine [phone]    Jona QuinnD Responsible      [facility] Anticoagulation Services Responsible  [phone]            Anticoagulation Patient Findings  Patient Findings     Positives:   Change in health (getting over a cold), Missed doses    Negatives:   Signs/symptoms of thrombosis, Signs/symptoms of bleeding, Laboratory test error suspected, Change in alcohol use, Change in activity, Upcoming invasive procedure, Emergency department visit, Upcoming dental procedure, Extra doses, Change in medications, Change in diet/appetite, Hospital admission, Bruising, Other complaints          HPI:  Rene Handy Rocio seen in clinic today, on anticoagulation therapy with warfarin for hx of DVT/PE  Denies signs/symptoms of bleeding and/or thrombosis since the last appt.    Denies any interval changes to diet  Denies any interval changes to medications since last appt.   Denies any complications or cost restrictions with current therapy.   Verified current warfarin dosing schedule.   BP check declined      A/P   INR  sub-therapeutic.   Bolus x 1 day, then continue  regimen    Follow up appointment in 4 week(s) per pt preference.    Nandini Muro, JonaD

## 2020-03-31 ENCOUNTER — OFFICE VISIT (OUTPATIENT)
Dept: CARDIOLOGY | Facility: MEDICAL CENTER | Age: 70
End: 2020-03-31
Payer: MEDICARE

## 2020-03-31 VITALS
HEART RATE: 54 BPM | OXYGEN SATURATION: 95 % | BODY MASS INDEX: 30.77 KG/M2 | SYSTOLIC BLOOD PRESSURE: 134 MMHG | HEIGHT: 68 IN | DIASTOLIC BLOOD PRESSURE: 54 MMHG | WEIGHT: 203 LBS

## 2020-03-31 DIAGNOSIS — I35.0 MILD AORTIC STENOSIS: Chronic | ICD-10-CM

## 2020-03-31 DIAGNOSIS — I25.83 CORONARY ARTERY DISEASE DUE TO LIPID RICH PLAQUE: ICD-10-CM

## 2020-03-31 DIAGNOSIS — I65.23 BILATERAL CAROTID ARTERY STENOSIS: ICD-10-CM

## 2020-03-31 DIAGNOSIS — E78.5 DYSLIPIDEMIA: ICD-10-CM

## 2020-03-31 DIAGNOSIS — Z95.1 S/P CABG X 3: ICD-10-CM

## 2020-03-31 DIAGNOSIS — Z79.01 CHRONIC ANTICOAGULATION: ICD-10-CM

## 2020-03-31 DIAGNOSIS — I25.10 CORONARY ARTERY DISEASE DUE TO LIPID RICH PLAQUE: ICD-10-CM

## 2020-03-31 PROCEDURE — 99214 OFFICE O/P EST MOD 30 MIN: CPT | Performed by: INTERNAL MEDICINE

## 2020-03-31 RX ORDER — PREDNISONE 10 MG/1
10 TABLET ORAL 2 TIMES DAILY PRN
COMMUNITY
End: 2022-05-31

## 2020-03-31 ASSESSMENT — ENCOUNTER SYMPTOMS
SORE THROAT: 0
SHORTNESS OF BREATH: 0
ABDOMINAL PAIN: 0
NAUSEA: 0
PALPITATIONS: 0
DIZZINESS: 1
CLAUDICATION: 0
CHILLS: 0
FOCAL WEAKNESS: 0
BLURRED VISION: 0
FALLS: 0
COUGH: 0
PND: 0
WEAKNESS: 0
FEVER: 0
BRUISES/BLEEDS EASILY: 0

## 2020-03-31 ASSESSMENT — FIBROSIS 4 INDEX: FIB4 SCORE: 0.92

## 2020-03-31 NOTE — PATIENT INSTRUCTIONS
Reduce and stop metoprolol to 1 daily for two weeks then 1 every other day for two weeks then STOP    This should help reduce dizziness

## 2020-03-31 NOTE — PROGRESS NOTES
Chief Complaint   Patient presents with   • Coronary Artery Disease     follow up       Subjective:   Rene Chan is a 69 y.o. 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   • Colon polyps    • Diabetes mellitus, type II (HCC)    • Gout    • Hammer toe    • Hyperlipidemia    • Hypertension    • Hypothyroidism    • Impaired fasting glucose    • Mild aortic stenosis - echo 4/2018    • Psychiatric disorder     bipolar     Past Surgical History:   Procedure Laterality Date   • MULTIPLE CORONARY ARTERY BYPASS ENDO VEIN HARVEST  6/4/2015    Procedure: MULTIPLE CORONARY ARTERY BYPASS Grafting  x 3   ENDO VEIN HARVEST right leg;  Surgeon: Christophe Lemus M.D.;  Location: SURGERY [facility];  Service:    • RECOVERY  6/1/2015    Procedure:  [facility];  Surgeon: Recoveryonly Surgery;  [place] [facility]  Service:    • OTHER ORTHOPEDIC SURGERY  1976    L wrist repair    • ANKLE FUSION     • APPENDECTOMY     • OTHER ABDOMINAL SURGERY      umbilical hernia repair 2000, appy age 13     Family History   Problem Relation Age of Onset   • Heart Disease Sister 50   • Stroke Sister 55        CVA   • Heart Failure Father      Social History     Socioeconomic History   • Marital status:      Spouse name: Not on file   • Number of children: Not on file   • Years of education: Not on file   • Highest education level: Not on file   Occupational History   • Occupation: [employer]       Comment: Build house for 35 years   Social Needs   • Financial resource strain: Not on file   • Food insecurity     Worry: Not on file     Inability: Not on file   • Transportation needs     Medical: Not on file     Non-medical: Not on file   Tobacco Use   • Smoking status: Former Smoker     Packs/day: 2.00     Years: 10.00     Pack years: 20.00     Types: Cigarettes     Last attempt to quit: 1976     Years since quittin.2   • Smokeless tobacco: Never Used   • Tobacco comment: quit x 32 yrs; 2ppd x 7 years   Substance and Sexual Activity   • Alcohol use: No   • Drug use: No     Comment: IV drug use in teens   • Sexual activity: Not Currently     Partners: Female   Lifestyle   • Physical activity     Days per week: Not on file     Minutes per session: Not on file   • Stress: Not on file   Relationships   • Social connections     Talks on phone: Not on file     Gets together: Not on file     Attends Sabianism service: Not on file     Active member of club or organization: Not on file     Attends meetings of clubs or organizations: Not on file     Relationship status: Not on file   • Intimate partner violence     Fear of current or ex partner: Not on file     Emotionally abused: Not on file     Physically abused: Not on file     Forced sexual activity: Not on file   Other Topics Concern   • Not on file   Social History Narrative    Lives with wife    Retired cam    Does security part time    ADLs and IADLs intact     Allergies   Allergen Reactions   • Penicillins      Childhood; does know what the reaction was     Outpatient Encounter Medications as of 3/31/2020   Medication Sig Dispense Refill   • predniSONE (DELTASONE) 10 MG Tab Take 10 mg by mouth 2 times a day as needed. For gout     • lisinopril (PRINIVIL) 5 MG Tab Take 1 Tab by mouth every day. 100 Tab 3   • metoprolol (LOPRESSOR) 25 MG Tab Take 1 Tab by mouth 2 Times a Day. 200 Tab 3   • warfarin (COUMADIN) 7.5 MG Tab TAKE ONE-HALF OR ONE TABLET  "BY MOUTH DAILY AS INSTRUCTED. 90 Tab 1   • levothyroxine (SYNTHROID) 75 MCG Tab TAKE ONE TABLET BY MOUTH DAILY 90 Tab 0   • divalproex (DEPAKOTE) 250 MG Tablet Delayed Response Take 1 Tab by mouth 2 Times a Day. (Patient taking differently: Take 250 mg by mouth 3 times a day.) 60 Tab 5   • atorvastatin (LIPITOR) 80 MG tablet Take 1 Tab by mouth every evening. 90 Tab 1   • aspirin 81 MG tablet Take 1 Tab by mouth every day.  Tab    • Coenzyme Q10 200 MG Tab Take  by mouth every day.     • Cholecalciferol (VITAMIN D) 2000 UNITS Cap Take 1,000 Units by mouth 2 Times a Day.     • docosahexanoic acid (OMEGA 3 FA) 1000 MG CAPS Take 1,000 mg by mouth every day.     • [DISCONTINUED] colchicine (COLCRYS) 0.6 MG Tab Take 1 Tab by mouth every day. For acute gout; Take 1.2 mg on day one of flare Take 0.6 mg until the flare is gone. See clinic note. (Patient not taking: Reported on 3/31/2020) 30 Tab 0     No facility-administered encounter medications on file as of 3/31/2020.      Review of Systems   Constitutional: Negative for chills and fever.   HENT: Negative for sore throat.    Eyes: Negative for blurred vision.   Respiratory: Negative for cough and shortness of breath.    Cardiovascular: Negative for chest pain, palpitations, claudication, leg swelling and PND.   Gastrointestinal: Negative for abdominal pain and nausea.   Musculoskeletal: Positive for joint pain. Negative for falls.   Skin: Negative for rash.   Neurological: Positive for dizziness. Negative for focal weakness and weakness.   Endo/Heme/Allergies: Does not bruise/bleed easily.        Objective:   /54 (BP Location: Left arm, Patient Position: Sitting)   Pulse (!) 54   Ht 1.727 m (5' 8\")   Wt 92.1 kg (203 lb)   SpO2 95%   BMI 30.87 kg/m²     Physical Exam   Constitutional: No distress.   HENT:   Mouth/Throat: Oropharynx is clear and moist. No oropharyngeal exudate.   Eyes: No scleral icterus.   Neck: No JVD present.   Cardiovascular: Normal " rate and normal heart sounds. Exam reveals no gallop and no friction rub.   No murmur heard.  Pulmonary/Chest: No respiratory distress. He has no wheezes. He has no rales.   Abdominal: Soft. Bowel sounds are normal.   Musculoskeletal:         General: No edema.   Neurological: He is alert.   Skin: No rash noted. He is not diaphoretic.   Psychiatric: He has a normal mood and affect.         We reviewed in person the most recent labs  Recent Results (from the past 4032 hour(s))   POCT Protime    Collection Time: 10/21/19 12:00 AM   Result Value Ref Range    INR 2.70    Point Of Care INR    Collection Time: 10/21/19 10:41 AM   Result Value Ref Range    POC INR 2.7 (H) 0.9 - 1.2   POCT Protime    Collection Time: 12/11/19 12:00 AM   Result Value Ref Range    INR 4.10    Point Of Care INR    Collection Time: 12/11/19  3:15 PM   Result Value Ref Range    POC INR 4.1 (H) 0.9 - 1.2   CBC WITHOUT DIFFERENTIAL    Collection Time: 12/20/19  7:07 AM   Result Value Ref Range    WBC 6.8 4.8 - 10.8 K/uL    RBC 4.62 (L) 4.70 - 6.10 M/uL    Hemoglobin 14.7 14.0 - 18.0 g/dL    Hematocrit 44.3 42.0 - 52.0 %    MCV 95.9 81.4 - 97.8 fL    MCH 31.8 27.0 - 33.0 pg    MCHC 33.2 (L) 33.7 - 35.3 g/dL    RDW 45.7 35.9 - 50.0 fL    Platelet Count 226 164 - 446 K/uL    MPV 11.6 9.0 - 12.9 fL   ESTIMATED GFR    Collection Time: 12/20/19  7:07 AM   Result Value Ref Range    GFR If African American >60 >60 mL/min/1.73 m 2    GFR If Non African American >60 >60 mL/min/1.73 m 2   Comp Metabolic Panel    Collection Time: 12/20/19  7:07 AM   Result Value Ref Range    Sodium 136 135 - 145 mmol/L    Potassium 4.5 3.6 - 5.5 mmol/L    Chloride 105 96 - 112 mmol/L    Co2 24 20 - 33 mmol/L    Anion Gap 7.0 0.0 - 11.9    Glucose 115 (H) 65 - 99 mg/dL    Bun 11 8 - 22 mg/dL    Creatinine 0.74 0.50 - 1.40 mg/dL    Calcium 8.6 8.5 - 10.5 mg/dL    AST(SGOT) 17 12 - 45 U/L    ALT(SGPT) 13 2 - 50 U/L    Alkaline Phosphatase 37 30 - 99 U/L    Total Bilirubin 0.6  0.1 - 1.5 mg/dL    Albumin 3.6 3.2 - 4.9 g/dL    Total Protein 6.2 6.0 - 8.2 g/dL    Globulin 2.6 1.9 - 3.5 g/dL    A-G Ratio 1.4 g/dL   Lipid Profile    Collection Time: 12/20/19  7:07 AM   Result Value Ref Range    Cholesterol,Tot 140 100 - 199 mg/dL    Triglycerides 365 (H) 0 - 149 mg/dL    HDL 31 (A) >=40 mg/dL    LDL 36 <100 mg/dL   VALPROIC ACID    Collection Time: 12/20/19  7:07 AM   Result Value Ref Range    Valproic Acid 68.7 50.0 - 100.0 ug/mL   VITAMIN D,25 HYDROXY    Collection Time: 12/20/19  7:07 AM   Result Value Ref Range    25-Hydroxy   Vitamin D 25 42 30 - 100 ng/mL   TSH    Collection Time: 12/20/19  7:07 AM   Result Value Ref Range    TSH 2.830 0.380 - 5.330 uIU/mL   POCT Protime    Collection Time: 01/06/20 12:00 AM   Result Value Ref Range    INR 3.90    Point Of Care INR    Collection Time: 01/06/20  2:41 PM   Result Value Ref Range    POC INR 3.9 (H) 0.9 - 1.2   CBC WITH DIFFERENTIAL    Collection Time: 01/14/20  7:26 AM   Result Value Ref Range    WBC 7.7 4.8 - 10.8 K/uL    RBC 4.61 (L) 4.70 - 6.10 M/uL    Hemoglobin 14.6 14.0 - 18.0 g/dL    Hematocrit 44.8 42.0 - 52.0 %    MCV 97.2 81.4 - 97.8 fL    MCH 31.7 27.0 - 33.0 pg    MCHC 32.6 (L) 33.7 - 35.3 g/dL    RDW 47.7 35.9 - 50.0 fL    Platelet Count 287 164 - 446 K/uL    MPV 10.3 9.0 - 12.9 fL    Neutrophils-Polys 51.80 44.00 - 72.00 %    Lymphocytes 32.60 22.00 - 41.00 %    Monocytes 11.90 0.00 - 13.40 %    Eosinophils 2.20 0.00 - 6.90 %    Basophils 1.00 0.00 - 1.80 %    Immature Granulocytes 0.50 0.00 - 0.90 %    Nucleated RBC 0.00 /100 WBC    Neutrophils (Absolute) 3.99 1.82 - 7.42 K/uL    Lymphs (Absolute) 2.51 1.00 - 4.80 K/uL    Monos (Absolute) 0.92 (H) 0.00 - 0.85 K/uL    Eos (Absolute) 0.17 0.00 - 0.51 K/uL    Baso (Absolute) 0.08 0.00 - 0.12 K/uL    Immature Granulocytes (abs) 0.04 0.00 - 0.11 K/uL    NRBC (Absolute) 0.00 K/uL   ESTIMATED GFR    Collection Time: 01/14/20  7:26 AM   Result Value Ref Range    GFR If   American >60 >60 mL/min/1.73 m 2    GFR If Non African American >60 >60 mL/min/1.73 m 2   Comp Metabolic Panel    Collection Time: 01/14/20  7:26 AM   Result Value Ref Range    Sodium 141 135 - 145 mmol/L    Potassium 4.5 3.6 - 5.5 mmol/L    Chloride 105 96 - 112 mmol/L    Co2 28 20 - 33 mmol/L    Anion Gap 8.0 0.0 - 11.9    Glucose 99 65 - 99 mg/dL    Bun 14 8 - 22 mg/dL    Creatinine 0.84 0.50 - 1.40 mg/dL    Calcium 8.5 8.5 - 10.5 mg/dL    AST(SGOT) 13 12 - 45 U/L    ALT(SGPT) 12 2 - 50 U/L    Alkaline Phosphatase 30 30 - 99 U/L    Total Bilirubin 0.8 0.1 - 1.5 mg/dL    Albumin 3.5 3.2 - 4.9 g/dL    Total Protein 5.8 (L) 6.0 - 8.2 g/dL    Globulin 2.3 1.9 - 3.5 g/dL    A-G Ratio 1.5 g/dL   Lipid Profile    Collection Time: 01/14/20  7:26 AM   Result Value Ref Range    Cholesterol,Tot 127 100 - 199 mg/dL    Triglycerides 177 (H) 0 - 149 mg/dL    HDL 29 (A) >=40 mg/dL    LDL 63 <100 mg/dL   FASTING STATUS    Collection Time: 01/14/20  7:26 AM   Result Value Ref Range    Fasting Status Fasting    URIC ACID    Collection Time: 01/14/20  7:29 AM   Result Value Ref Range    Uric Acid 7.4 2.5 - 8.3 mg/dL   POCT Protime    Collection Time: 01/21/20 12:00 AM   Result Value Ref Range    INR 2.70    Point Of Care INR    Collection Time: 01/21/20  1:59 PM   Result Value Ref Range    POC INR 2.7 (H) 0.9 - 1.2   POCT Protime    Collection Time: 02/20/20 12:00 AM   Result Value Ref Range    INR 1.80    Point Of Care INR    Collection Time: 02/20/20 10:24 AM   Result Value Ref Range    POC INR 1.8 (H) 0.9 - 1.2   POCT Protime    Collection Time: 03/20/20 12:00 AM   Result Value Ref Range    INR 1.80    Point Of Care INR    Collection Time: 03/20/20 10:29 AM   Result Value Ref Range    POC INR 1.8 (H) 0.9 - 1.2       Assessment:     1. Mild aortic stenosis - echo 4/2018     2. S/P CABG x 3 - 6/2015     3. Dyslipidemia     4. Coronary artery disease due to lipid rich plaque     5. Chronic anticoagulation     6. Bilateral carotid  artery stenosis         Medical Decision Making:  Today's Assessment / Status / Plan:     It was my pleasure to meet with Mr. Chan.    Blood pressure is well controlled.    Given orthostatics and low resting RH try to reduce and stop metoprolol    He is on appropriate statin.    I will see Mr. Chan back in 1 year time and encouraged him to follow up with us over the phone or electronically using my MyChart as issues arise.    It is my pleasure to participate in the care of Mr. Chan.  Please do not hesitate to contact me with questions or concerns.    Ta Wright MD PhD FAC  Cardiologist [facility]    Please note that this dictation was created using voice recognition software. There may be errors I did not discover before finalizing the note.

## 2020-04-16 ENCOUNTER — ANTICOAGULATION VISIT (OUTPATIENT)
Dept: VASCULAR LAB | Facility: MEDICAL CENTER | Age: 70
End: 2020-04-16
Attending: INTERNAL MEDICINE
Payer: MEDICARE

## 2020-04-16 DIAGNOSIS — I82.90 DEEP VEIN THROMBOSIS (DVT) OF NON-EXTREMITY VEIN, UNSPECIFIED CHRONICITY: ICD-10-CM

## 2020-04-16 LAB — INR PPP: 4.1 (ref 2–3.5)

## 2020-04-16 PROCEDURE — 85610 PROTHROMBIN TIME: CPT

## 2020-04-16 PROCEDURE — 99212 OFFICE O/P EST SF 10 MIN: CPT | Performed by: NURSE PRACTITIONER

## 2020-04-16 NOTE — PROGRESS NOTES
Anticoagulation Summary  As of 2020    INR goal:   2.0-3.0   TTR:   63.4 % (4.8 y)   INR used for dosin.10! (2020)   Warfarin maintenance plan:   3.75 mg (7.5 mg x 0.5) every Mon, Wed, Fri; 7.5 mg (7.5 mg x 1) all other days   Weekly warfarin total:   41.25 mg   Plan last modified:   Miya Acosta, A.P.N. (2020)   Next INR check:   2020   Target end date:   Indefinite    Indications    DVT (deep venous thrombosis) (HCC) (Resolved) [I82.409]  Pulmonary embolism [415.19] (Resolved) [I26.99]  Deep vein thrombosis (DVT) (HCC) [I82.409]             Anticoagulation Episode Summary     INR check location:   Anticoagulation Clinic    Preferred lab:   [facility] GENERAL    Send INR reminders to:       Comments:         Anticoagulation Care Providers     Provider Role Specialty Phone number    Patricia Damon M.D. Referring Internal Medicine [phone]    Jona QuinnD Responsible      [facility] Anticoagulation Services Responsible  [phone]        Anticoagulation Patient Findings      HPI:  Rene Chan seen in clinic today for follow up on anticoagulation therapy in the presence of DVT, PE hx.   Denies any changes to current medical/health status since last appointment.   He ate less greens this week than normal but plans to resume eating his normal diet. Denies any medication changes.   No current symptoms of bleeding or thrombosis reported.    A/P:   INR supratherapeutic.   HOLD one dose then continue current regimen.     Follow up appointment in 3 week(s) per pt's preference.    Next Appointment: Thursday, May 7, 2020 at 11:00 am.    Miya PARKER

## 2020-05-07 ENCOUNTER — ANTICOAGULATION VISIT (OUTPATIENT)
Dept: VASCULAR LAB | Facility: MEDICAL CENTER | Age: 70
End: 2020-05-07
Attending: INTERNAL MEDICINE
Payer: MEDICARE

## 2020-05-07 DIAGNOSIS — I82.90 DEEP VEIN THROMBOSIS (DVT) OF NON-EXTREMITY VEIN, UNSPECIFIED CHRONICITY: ICD-10-CM

## 2020-05-07 LAB — INR PPP: 3.4 (ref 2–3.5)

## 2020-05-07 PROCEDURE — 85610 PROTHROMBIN TIME: CPT

## 2020-05-07 PROCEDURE — 99212 OFFICE O/P EST SF 10 MIN: CPT

## 2020-05-07 NOTE — PROGRESS NOTES
Anticoagulation Summary  As of 5/7/2020    INR goal:   2.0-3.0   TTR:   62.6 % (4.9 y)   INR used for dosing:   3.40! (5/7/2020)   Warfarin maintenance plan:   7.5 mg (7.5 mg x 1) every Mon, Wed, Fri; 3.75 mg (7.5 mg x 0.5) all other days   Weekly warfarin total:   37.5 mg   Plan last modified:   Laisha Méndez (5/7/2020)   Next INR check:   5/21/2020   Target end date:   Indefinite    Indications    DVT (deep venous thrombosis) (HCC) (Resolved) [I82.409]  Pulmonary embolism [415.19] (Resolved) [I26.99]  Deep vein thrombosis (DVT) (HCC) [I82.409]             Anticoagulation Episode Summary     INR check location:   Anticoagulation Clinic    Preferred lab:   [facility]    Send INR reminders to:       Comments:         Anticoagulation Care Providers     Provider Role Specialty Phone number    Patricia Damon M.D. Referring Internal Medicine [phone]    Laisha Méndez PharmD Responsible      [facility] Anticoagulation Services Responsible  [phone]        Anticoagulation Patient Findings          History of Present Illness: follow up appointment for chronic anticoagulation with the high risk medication, warfarin for DVT    Last INR was out of range, dosage adjusted: pt remains supra therapeutic trending downward.  Will further decrease weekly dose by ~10%.. Follow up in 2 weeks, to reduce the risk of adverse events related to this high risk medication, warfarin.      Pt declines vitals today    Laisha Méndez, Clinical Pharmacist

## 2020-05-21 ENCOUNTER — ANTICOAGULATION VISIT (OUTPATIENT)
Dept: VASCULAR LAB | Facility: MEDICAL CENTER | Age: 70
End: 2020-05-21
Attending: INTERNAL MEDICINE
Payer: MEDICARE

## 2020-05-21 DIAGNOSIS — I82.90 DEEP VEIN THROMBOSIS (DVT) OF NON-EXTREMITY VEIN, UNSPECIFIED CHRONICITY: ICD-10-CM

## 2020-05-21 LAB — INR PPP: 1.9 (ref 2–3.5)

## 2020-05-21 PROCEDURE — 85610 PROTHROMBIN TIME: CPT

## 2020-05-21 PROCEDURE — 99212 OFFICE O/P EST SF 10 MIN: CPT | Performed by: NURSE PRACTITIONER

## 2020-05-21 NOTE — PROGRESS NOTES
Anticoagulation Summary  As of 2020    INR goal:   2.0-3.0   TTR:   62.7 % (4.9 y)   INR used for dosin.90! (2020)   Warfarin maintenance plan:   7.5 mg (7.5 mg x 1) every Mon, Wed, Fri; 3.75 mg (7.5 mg x 0.5) all other days   Weekly warfarin total:   37.5 mg   Plan last modified:   Laisha Méndez (2020)   Next INR check:   2020   Target end date:   Indefinite    Indications    DVT (deep venous thrombosis) (HCC) (Resolved) [I82.409]  Pulmonary embolism [415.19] (Resolved) [I26.99]  Deep vein thrombosis (DVT) (HCC) [I82.409]             Anticoagulation Episode Summary     INR check location:   Anticoagulation Clinic    Preferred lab:   [facility]    Send INR reminders to:       Comments:         Anticoagulation Care Providers     Provider Role Specialty Phone number    Patricia Damon M.D. Referring Internal Medicine [phone]    Laisha Méndez, PharmD Responsible      [facility] Anticoagulation Services Responsible  [phone]        Anticoagulation Patient Findings      HPI:  Rene Chan seen in clinic today for follow up on anticoagulation therapy in the presence of DVT, PE hx.   Denies any changes to current medical/health status since last appointment.   Denies any medication or diet changes.   No current symptoms of bleeding or thrombosis reported.    A/P:   INR slightly subtherapeutic.   Will increase tonight then continue current regimen.     Follow up appointment in 3 week(s) per pt's request.    Next Appointment: Thursday, 2020 at 11:30 am.    Miya PARKER

## 2020-06-01 DIAGNOSIS — Z86.718 HISTORY OF DVT (DEEP VEIN THROMBOSIS): ICD-10-CM

## 2020-06-01 RX ORDER — WARFARIN SODIUM 7.5 MG/1
TABLET ORAL
Qty: 90 TAB | Refills: 1 | Status: SHIPPED | OUTPATIENT
Start: 2020-06-01 | End: 2020-10-01

## 2020-06-11 ENCOUNTER — ANTICOAGULATION VISIT (OUTPATIENT)
Dept: VASCULAR LAB | Facility: MEDICAL CENTER | Age: 70
End: 2020-06-11
Attending: INTERNAL MEDICINE
Payer: MEDICARE

## 2020-06-11 DIAGNOSIS — I82.90 DEEP VEIN THROMBOSIS (DVT) OF NON-EXTREMITY VEIN, UNSPECIFIED CHRONICITY: ICD-10-CM

## 2020-06-11 LAB — INR PPP: 2.3 (ref 2–3.5)

## 2020-06-11 PROCEDURE — 85610 PROTHROMBIN TIME: CPT

## 2020-06-11 PROCEDURE — 99211 OFF/OP EST MAY X REQ PHY/QHP: CPT | Performed by: NURSE PRACTITIONER

## 2020-06-11 NOTE — PROGRESS NOTES
Anticoagulation Summary  As of 2020    INR goal:   2.0-3.0   TTR:   62.8 % (5 y)   INR used for dosin.30 (2020)   Warfarin maintenance plan:   7.5 mg (7.5 mg x 1) every Mon, Wed, Fri; 3.75 mg (7.5 mg x 0.5) all other days   Weekly warfarin total:   37.5 mg   Plan last modified:   Laisha Méndez (2020)   Next INR check:   2020   Target end date:   Indefinite    Indications    DVT (deep venous thrombosis) (HCC) (Resolved) [I82.409]  Pulmonary embolism [415.19] (Resolved) [I26.99]  Deep vein thrombosis (DVT) (HCC) [I82.409]             Anticoagulation Episode Summary     INR check location:   Anticoagulation Clinic    Preferred lab:   [facility]    Send INR reminders to:       Comments:         Anticoagulation Care Providers     Provider Role Specialty Phone number    Patricia Damon M.D. Referring Internal Medicine [phone]    Laisha Méndez, PharmD Responsible      [facility] Anticoagulation Services Responsible  [phone]        Anticoagulation Patient Findings      HPI:  Rene Chan seen in clinic today for follow up on anticoagulation therapy in the presence of DVT, PE hx.   Denies any changes to current medical/health status since last appointment.   His metoprolol dose was decreased. Denies any diet changes.   No current symptoms of bleeding or thrombosis reported.    A/P:   INR therapeutic.   Continue current regimen.     Follow up appointment in 4 week(s).    Next Appointment:  at 10:30 am.    Miya PARKER

## 2020-07-09 ENCOUNTER — ANTICOAGULATION VISIT (OUTPATIENT)
Dept: VASCULAR LAB | Facility: MEDICAL CENTER | Age: 70
End: 2020-07-09
Attending: INTERNAL MEDICINE
Payer: MEDICARE

## 2020-07-09 VITALS — SYSTOLIC BLOOD PRESSURE: 141 MMHG | DIASTOLIC BLOOD PRESSURE: 70 MMHG | HEART RATE: 51 BPM

## 2020-07-09 DIAGNOSIS — I82.90 DEEP VEIN THROMBOSIS (DVT) OF NON-EXTREMITY VEIN, UNSPECIFIED CHRONICITY: ICD-10-CM

## 2020-07-09 LAB
INR BLD: 1.2 (ref 0.9–1.2)
INR PPP: 1.2 (ref 2–3.5)

## 2020-07-09 PROCEDURE — 99212 OFFICE O/P EST SF 10 MIN: CPT | Performed by: NURSE PRACTITIONER

## 2020-07-09 PROCEDURE — 85610 PROTHROMBIN TIME: CPT

## 2020-07-09 NOTE — PROGRESS NOTES
Anticoagulation Summary  As of 2020    INR goal:   2.0-3.0   TTR:   62.3 % (5.1 y)   INR used for dosin.20! (2020)   Warfarin maintenance plan:   3.75 mg (7.5 mg x 0.5) every Sun, Tue, Thu; 7.5 mg (7.5 mg x 1) all other days   Weekly warfarin total:   41.25 mg   Plan last modified:   Miya Acosta, A.P.N. (2020)   Next INR check:   2020   Target end date:   Indefinite    Indications    DVT (deep venous thrombosis) (HCC) (Resolved) [I82.409]  Pulmonary embolism [415.19] (Resolved) [I26.99]  Deep vein thrombosis (DVT) (HCC) [I82.409]             Anticoagulation Episode Summary     INR check location:   Anticoagulation Clinic    Preferred lab:   [facility]    Send INR reminders to:       Comments:         Anticoagulation Care Providers     Provider Role Specialty Phone number    Patricia Damon M.D. Referring Internal Medicine [phone]    Jona QuinnD Responsible      [facility] Anticoagulation Services Responsible  [phone]        Anticoagulation Patient Findings      HPI:  Rene Chan seen in clinic today for follow up on anticoagulation therapy in the presence of DVT, PE.   Denies any changes to current medical/health status since last appointment.   Reports eating a lot more salads with dark greens lately. He will back off a bit but plan to stay consistent. Denies any medication changes.   No current symptoms of bleeding or thrombosis reported.    A/P:   INR subtherapeutic. No recent VTEs. Will increase regimen and reminded pt to be consistent with greens.   BP recorded in vitals.    Follow up appointment in 2 week(s) per pt's request. He knows to go to the ER for any s/sx of recurrent VTEs .    Next Appointment:  at 10:30 am.      Miya PARKER

## 2020-07-28 ENCOUNTER — ANTICOAGULATION VISIT (OUTPATIENT)
Dept: VASCULAR LAB | Facility: MEDICAL CENTER | Age: 70
End: 2020-07-28
Attending: INTERNAL MEDICINE
Payer: MEDICARE

## 2020-07-28 DIAGNOSIS — I82.90 DEEP VEIN THROMBOSIS (DVT) OF NON-EXTREMITY VEIN, UNSPECIFIED CHRONICITY: ICD-10-CM

## 2020-07-28 LAB — INR PPP: 1.3 (ref 2–3.5)

## 2020-07-28 PROCEDURE — 85610 PROTHROMBIN TIME: CPT

## 2020-07-28 PROCEDURE — 99212 OFFICE O/P EST SF 10 MIN: CPT

## 2020-07-28 NOTE — PROGRESS NOTES
Anticoagulation Summary  As of 2020    INR goal:   2.0-3.0   TTR:   61.6 % (5.1 y)   INR used for dosin.30! (2020)   Warfarin maintenance plan:   3.75 mg (7.5 mg x 0.5) every Sun, Thu; 7.5 mg (7.5 mg x 1) all other days   Weekly warfarin total:   45 mg   Plan last modified:   Jona JaneD (2020)   Next INR check:   2020   Priority:   Acute   Target end date:   Indefinite    Indications    DVT (deep venous thrombosis) (HCC) (Resolved) [I82.409]  Pulmonary embolism [415.19] (Resolved) [I26.99]  Deep vein thrombosis (DVT) (HCC) [I82.409]             Anticoagulation Episode Summary     INR check location:   Anticoagulation Clinic    Preferred lab:   [facility]    Send INR reminders to:       Comments:         Anticoagulation Care Providers     Provider Role Specialty Phone number    Patricia Damon M.D. Referring Internal Medicine [phone]    Jona QuinnD Responsible      [facility] Anticoagulation Services Responsible  [phone]                Anticoagulation Patient Findings  Patient Findings     Positives:   Change in diet/appetite (Pt has been eating more salads, but fewer than last visit. Encouraged pt to stay consistent.)    Negatives:   Signs/symptoms of thrombosis, Signs/symptoms of bleeding, Laboratory test error suspected, Change in health, Change in alcohol use, Change in activity, Upcoming invasive procedure, Emergency department visit, Upcoming dental procedure, Missed doses, Extra doses, Change in medications, Hospital admission, Bruising, Other complaints          HPI:  Rene Chan seen in clinic today, on anticoagulation therapy with warfarin for PE, DVT.  Changes to current medical/health status since last appt: noted above  Denies signs/symptoms of bleeding and/or thrombosis since the last appt.    Denies any interval changes to medications since last appt.   Denies any complications or cost restrictions with current therapy.    Verified current warfarin dosing schedule.   BP recorded in vitals. Denied      A/P   INR  sub-therapeutic.   Pt to bolus today with 11.25 mg then increase weekly regimen to 3.75 mg on Sunday and Thursday then 7.5 mg ROW.    Follow up appointment in 1 week(s).    Ovi Platt, PharmD Candidate    Jona JaneD

## 2020-07-29 LAB — INR BLD: 1.3 (ref 0.9–1.2)

## 2020-08-06 ENCOUNTER — ANTICOAGULATION VISIT (OUTPATIENT)
Dept: VASCULAR LAB | Facility: MEDICAL CENTER | Age: 70
End: 2020-08-06
Attending: INTERNAL MEDICINE
Payer: MEDICARE

## 2020-08-06 VITALS — SYSTOLIC BLOOD PRESSURE: 111 MMHG | HEART RATE: 57 BPM | DIASTOLIC BLOOD PRESSURE: 56 MMHG

## 2020-08-06 DIAGNOSIS — I82.90 DEEP VEIN THROMBOSIS (DVT) OF NON-EXTREMITY VEIN, UNSPECIFIED CHRONICITY: ICD-10-CM

## 2020-08-06 LAB — INR PPP: 2.9 (ref 2–3.5)

## 2020-08-06 PROCEDURE — 85610 PROTHROMBIN TIME: CPT

## 2020-08-06 PROCEDURE — 99211 OFF/OP EST MAY X REQ PHY/QHP: CPT

## 2020-08-06 NOTE — PROGRESS NOTES
Anticoagulation Summary  As of 2020    INR goal:  2.0-3.0   TTR:  61.6 % (5.1 y)   INR used for dosin.90 (2020)   Warfarin maintenance plan:  3.75 mg (7.5 mg x 0.5) every Sun, Thu; 7.5 mg (7.5 mg x 1) all other days   Weekly warfarin total:  45 mg   Plan last modified:  Jona JaneD (2020)   Next INR check:  2020   Priority:  Acute   Target end date:  Indefinite    Indications    DVT (deep venous thrombosis) (HCC) (Resolved) [I82.409]  Pulmonary embolism [415.19] (Resolved) [I26.99]  Deep vein thrombosis (DVT) (HCC) [I82.409]             Anticoagulation Episode Summary     INR check location:  Anticoagulation Clinic    Preferred lab:  [facility] GENERAL    Send INR reminders to:      Comments:        Anticoagulation Care Providers     Provider Role Specialty Phone number    Patricia Damon M.D. Referring Internal Medicine [phone]    Jona QuinnD Responsible      [facility] Anticoagulation Services Responsible  [phone]                Anticoagulation Patient Findings  Patient Findings     Negatives:  Signs/symptoms of thrombosis, Signs/symptoms of bleeding, Laboratory test error suspected, Change in health, Change in alcohol use, Change in activity, Upcoming invasive procedure, Emergency department visit, Upcoming dental procedure, Missed doses, Extra doses, Change in medications, Change in diet/appetite, Hospital admission, Bruising, Other complaints          HPI:  Rene Chan seen in clinic today, on anticoagulation therapy with warfarin for DVT,PE  Changes to current medical/health status since last appt: none  Denies signs/symptoms of bleeding and/or thrombosis since the last appt.    Denies any interval changes to diet  Denies any interval changes to medications since last appt.   Denies any complications or cost restrictions with current therapy.   Verified current warfarin dosing schedule.   BP recorded in vitals.       A/P   INR  therapeutic at  2.9.   Pt is to continue with current warfarin dosing regimen.      Follow up appointment in 2 week(s).    Nataliia Huber PharmD candidate  Jona JaneD

## 2020-08-07 LAB — INR BLD: 2.9 (ref 0.9–1.2)

## 2020-08-20 ENCOUNTER — ANTICOAGULATION VISIT (OUTPATIENT)
Dept: VASCULAR LAB | Facility: MEDICAL CENTER | Age: 70
End: 2020-08-20
Attending: INTERNAL MEDICINE
Payer: MEDICARE

## 2020-08-20 VITALS — SYSTOLIC BLOOD PRESSURE: 139 MMHG | DIASTOLIC BLOOD PRESSURE: 60 MMHG | HEART RATE: 61 BPM

## 2020-08-20 DIAGNOSIS — I82.90 DEEP VEIN THROMBOSIS (DVT) OF NON-EXTREMITY VEIN, UNSPECIFIED CHRONICITY: ICD-10-CM

## 2020-08-20 LAB
INR BLD: 3.7 (ref 0.9–1.2)
INR PPP: 3.8 (ref 2–3.5)

## 2020-08-20 PROCEDURE — 85610 PROTHROMBIN TIME: CPT

## 2020-08-20 PROCEDURE — 99212 OFFICE O/P EST SF 10 MIN: CPT | Performed by: NURSE PRACTITIONER

## 2020-08-20 NOTE — PROGRESS NOTES
Anticoagulation Summary  As of 8/20/2020    INR goal:  2.0-3.0   TTR:  61.2 % (5.2 y)   INR used for dosing:  3.80 (8/20/2020)   Warfarin maintenance plan:  3.75 mg (7.5 mg x 0.5) every Sun, Tue, Thu; 7.5 mg (7.5 mg x 1) all other days   Weekly warfarin total:  41.25 mg   Plan last modified:  Miya Acosta, A.P.N. (8/20/2020)   Next INR check:  9/3/2020   Priority:  Acute   Target end date:  Indefinite    Indications    DVT (deep venous thrombosis) (HCC) (Resolved) [I82.409]  Pulmonary embolism [415.19] (Resolved) [I26.99]  Deep vein thrombosis (DVT) (HCC) [I82.409]             Anticoagulation Episode Summary     INR check location:  Anticoagulation Clinic    Preferred lab:  [facility] GENERAL    Send INR reminders to:      Comments:        Anticoagulation Care Providers     Provider Role Specialty Phone number    Patricia Damon M.D. Referring Internal Medicine [phone]    Jona QuinnD Responsible      [facility] Anticoagulation Services Responsible  [phone]        Anticoagulation Patient Findings      HPI:  Rene Chan seen in clinic today for follow up on anticoagulation therapy in the presence of DVT, PE.   Denies any changes to current medical/health status since last appointment.   Denies any medication or diet changes.   No current symptoms of bleeding or thrombosis reported.    A/P:   INR supratherapeutic. INR trending up. Will decrease regimen.   BP recorded in vitals.    Follow up appointment in 2 week(s).    Next Appointment: Thursday, Sept 3, 2020 at 10:00 am.    Miya PARKER

## 2020-08-31 ENCOUNTER — HOSPITAL ENCOUNTER (OUTPATIENT)
Dept: LAB | Facility: MEDICAL CENTER | Age: 70
End: 2020-08-31
Attending: PSYCHIATRY & NEUROLOGY
Payer: MEDICARE

## 2020-08-31 LAB
25(OH)D3 SERPL-MCNC: 38 NG/ML (ref 30–100)
ALBUMIN SERPL BCP-MCNC: 3.8 G/DL (ref 3.2–4.9)
ALBUMIN/GLOB SERPL: 1.5 G/DL
ALP SERPL-CCNC: 42 U/L (ref 30–99)
ALT SERPL-CCNC: 18 U/L (ref 2–50)
ANION GAP SERPL CALC-SCNC: 9 MMOL/L (ref 7–16)
AST SERPL-CCNC: 13 U/L (ref 12–45)
BILIRUB SERPL-MCNC: 0.7 MG/DL (ref 0.1–1.5)
BUN SERPL-MCNC: 15 MG/DL (ref 8–22)
CALCIUM SERPL-MCNC: 9 MG/DL (ref 8.5–10.5)
CHLORIDE SERPL-SCNC: 104 MMOL/L (ref 96–112)
CHOLEST SERPL-MCNC: 156 MG/DL (ref 100–199)
CO2 SERPL-SCNC: 27 MMOL/L (ref 20–33)
CREAT SERPL-MCNC: 0.74 MG/DL (ref 0.5–1.4)
GLOBULIN SER CALC-MCNC: 2.5 G/DL (ref 1.9–3.5)
GLUCOSE SERPL-MCNC: 115 MG/DL (ref 65–99)
HCT VFR BLD AUTO: 47.1 % (ref 42–52)
HDLC SERPL-MCNC: 38 MG/DL
LDLC SERPL CALC-MCNC: 77 MG/DL
POTASSIUM SERPL-SCNC: 4.7 MMOL/L (ref 3.6–5.5)
PROT SERPL-MCNC: 6.3 G/DL (ref 6–8.2)
SODIUM SERPL-SCNC: 140 MMOL/L (ref 135–145)
TRIGL SERPL-MCNC: 207 MG/DL (ref 0–149)
TSH SERPL DL<=0.005 MIU/L-ACNC: 3.43 UIU/ML (ref 0.38–5.33)
VALPROATE SERPL-MCNC: 52.5 UG/ML (ref 50–100)

## 2020-08-31 PROCEDURE — 85014 HEMATOCRIT: CPT

## 2020-08-31 PROCEDURE — 84443 ASSAY THYROID STIM HORMONE: CPT

## 2020-08-31 PROCEDURE — 36415 COLL VENOUS BLD VENIPUNCTURE: CPT

## 2020-08-31 PROCEDURE — 80053 COMPREHEN METABOLIC PANEL: CPT

## 2020-08-31 PROCEDURE — 82306 VITAMIN D 25 HYDROXY: CPT

## 2020-08-31 PROCEDURE — 80164 ASSAY DIPROPYLACETIC ACD TOT: CPT

## 2020-08-31 PROCEDURE — 80061 LIPID PANEL: CPT

## 2020-09-03 ENCOUNTER — ANTICOAGULATION VISIT (OUTPATIENT)
Dept: VASCULAR LAB | Facility: MEDICAL CENTER | Age: 70
End: 2020-09-03
Attending: INTERNAL MEDICINE
Payer: MEDICARE

## 2020-09-03 DIAGNOSIS — I82.90 DEEP VEIN THROMBOSIS (DVT) OF NON-EXTREMITY VEIN, UNSPECIFIED CHRONICITY: ICD-10-CM

## 2020-09-03 LAB — INR PPP: 3.1 (ref 2–3.5)

## 2020-09-03 PROCEDURE — 85610 PROTHROMBIN TIME: CPT

## 2020-09-03 PROCEDURE — 99212 OFFICE O/P EST SF 10 MIN: CPT

## 2020-09-03 NOTE — PROGRESS NOTES
Anticoagulation Summary  As of 9/3/2020    INR goal:  2.0-3.0   TTR:  60.8 % (5.2 y)   INR used for dosing:  3.10 (9/3/2020)   Warfarin maintenance plan:  7.5 mg (7.5 mg x 1) every Mon, Wed, Fri; 3.75 mg (7.5 mg x 0.5) all other days   Weekly warfarin total:  37.5 mg   Plan last modified:  Tiburcio Baker PharmD (9/3/2020)   Next INR check:  9/24/2020   Priority:  Acute   Target end date:  Indefinite    Indications    DVT (deep venous thrombosis) (HCC) (Resolved) [I82.409]  Pulmonary embolism [415.19] (Resolved) [I26.99]  Deep vein thrombosis (DVT) (HCC) [I82.409]             Anticoagulation Episode Summary     INR check location:  Anticoagulation Clinic    Preferred lab:  [facility] GENERAL    Send INR reminders to:      Comments:        Anticoagulation Care Providers     Provider Role Specialty Phone number    Patricia Damon M.D. Referring Internal Medicine [phone]    Laisha Méndez PharmD Responsible      [facility] Anticoagulation Services Responsible  [phone]        Anticoagulation Patient Findings      HPI:  Rene Chan seen in clinic today, on anticoagulation therapy with warfarin for hx of DVT and PE  Changes to current medical/health status since last appt: None  Denies signs/symptoms of bleeding and/or thrombosis since the last appt.    Denies any interval changes to diet.  Denies any interval changes to medications since last appt.   Denies any complications or cost restrictions with current therapy.   BP declined.    A/P   INR  SUPRA-therapeutic.   Given INR is still slightly supra therapeutic, will have pt eat extra greens tonight and begin a newly decreased regimen of 7.5mg M/W/F and 3.75 mg ROW.    Follow up appointment in 3 week(s) per pt.    Tiburcio Baker PharmD

## 2020-09-04 LAB — INR BLD: 3.1 (ref 0.9–1.2)

## 2020-09-23 ENCOUNTER — TELEPHONE (OUTPATIENT)
Dept: VASCULAR LAB | Facility: MEDICAL CENTER | Age: 70
End: 2020-09-23

## 2020-09-23 NOTE — TELEPHONE ENCOUNTER
Pt states he was positive for COVID-19 last week and is inquiring when is the next time he can be scheduled to come in for his INR check.    Per current [facility] protocol, pt is ok to be seen if it has been more than 21 days since sx onset or positive test + 14 days since sx resolution.     Sx onset was on 9/11. Pt is ok to be seen anytime after 10/1 as long as he is asymptomatic. Pt verbalized understanding.    Scheduled INR check for 10/8.          Nandini Muro, JonaD     details… Alert & oriented; no sensory, motor or coordination deficits, normal reflexes

## 2020-09-24 ENCOUNTER — APPOINTMENT (OUTPATIENT)
Dept: VASCULAR LAB | Facility: MEDICAL CENTER | Age: 70
End: 2020-09-24
Attending: INTERNAL MEDICINE
Payer: MEDICARE

## 2020-10-01 ENCOUNTER — APPOINTMENT (OUTPATIENT)
Dept: RADIOLOGY | Facility: MEDICAL CENTER | Age: 70
End: 2020-10-01
Attending: EMERGENCY MEDICINE
Payer: MEDICARE

## 2020-10-01 ENCOUNTER — HOSPITAL ENCOUNTER (OUTPATIENT)
Facility: MEDICAL CENTER | Age: 70
End: 2020-10-03
Attending: EMERGENCY MEDICINE | Admitting: INTERNAL MEDICINE
Payer: MEDICARE

## 2020-10-01 DIAGNOSIS — R07.9 CHEST PAIN, UNSPECIFIED TYPE: ICD-10-CM

## 2020-10-01 LAB
ALBUMIN SERPL BCP-MCNC: 3.8 G/DL (ref 3.2–4.9)
ALBUMIN/GLOB SERPL: 1.5 G/DL
ALP SERPL-CCNC: 50 U/L (ref 30–99)
ALT SERPL-CCNC: 26 U/L (ref 2–50)
ANION GAP SERPL CALC-SCNC: 12 MMOL/L (ref 7–16)
AST SERPL-CCNC: 31 U/L (ref 12–45)
BASOPHILS # BLD AUTO: 0.6 % (ref 0–1.8)
BASOPHILS # BLD: 0.06 K/UL (ref 0–0.12)
BILIRUB SERPL-MCNC: 1.2 MG/DL (ref 0.1–1.5)
BUN SERPL-MCNC: 16 MG/DL (ref 8–22)
CALCIUM SERPL-MCNC: 9.4 MG/DL (ref 8.5–10.5)
CHLORIDE SERPL-SCNC: 100 MMOL/L (ref 96–112)
CO2 SERPL-SCNC: 25 MMOL/L (ref 20–33)
COVID ORDER STATUS COVID19: NORMAL
CREAT SERPL-MCNC: 0.86 MG/DL (ref 0.5–1.4)
EKG IMPRESSION: NORMAL
EOSINOPHIL # BLD AUTO: 0.11 K/UL (ref 0–0.51)
EOSINOPHIL NFR BLD: 1.1 % (ref 0–6.9)
ERYTHROCYTE [DISTWIDTH] IN BLOOD BY AUTOMATED COUNT: 47.7 FL (ref 35.9–50)
GLOBULIN SER CALC-MCNC: 2.6 G/DL (ref 1.9–3.5)
GLUCOSE SERPL-MCNC: 144 MG/DL (ref 65–99)
HCT VFR BLD AUTO: 44.3 % (ref 42–52)
HGB BLD-MCNC: 14.7 G/DL (ref 14–18)
IMM GRANULOCYTES # BLD AUTO: 0.09 K/UL (ref 0–0.11)
IMM GRANULOCYTES NFR BLD AUTO: 0.9 % (ref 0–0.9)
INR PPP: 1.83 (ref 0.87–1.13)
LYMPHOCYTES # BLD AUTO: 2.11 K/UL (ref 1–4.8)
LYMPHOCYTES NFR BLD: 22 % (ref 22–41)
MCH RBC QN AUTO: 31.7 PG (ref 27–33)
MCHC RBC AUTO-ENTMCNC: 33.2 G/DL (ref 33.7–35.3)
MCV RBC AUTO: 95.5 FL (ref 81.4–97.8)
MONOCYTES # BLD AUTO: 0.94 K/UL (ref 0–0.85)
MONOCYTES NFR BLD AUTO: 9.8 % (ref 0–13.4)
NEUTROPHILS # BLD AUTO: 6.3 K/UL (ref 1.82–7.42)
NEUTROPHILS NFR BLD: 65.6 % (ref 44–72)
NRBC # BLD AUTO: 0 K/UL
NRBC BLD-RTO: 0 /100 WBC
PLATELET # BLD AUTO: 361 K/UL (ref 164–446)
PMV BLD AUTO: 10.2 FL (ref 9–12.9)
POTASSIUM SERPL-SCNC: 4 MMOL/L (ref 3.6–5.5)
PROT SERPL-MCNC: 6.4 G/DL (ref 6–8.2)
PROTHROMBIN TIME: 21.7 SEC (ref 12–14.6)
RBC # BLD AUTO: 4.64 M/UL (ref 4.7–6.1)
SARS-COV-2 RNA RESP QL NAA+PROBE: DETECTED
SODIUM SERPL-SCNC: 137 MMOL/L (ref 135–145)
SPECIMEN SOURCE: ABNORMAL
TROPONIN T SERPL-MCNC: 13 NG/L (ref 6–19)
TROPONIN T SERPL-MCNC: 22 NG/L (ref 6–19)
WBC # BLD AUTO: 9.6 K/UL (ref 4.8–10.8)

## 2020-10-01 PROCEDURE — G0378 HOSPITAL OBSERVATION PER HR: HCPCS

## 2020-10-01 PROCEDURE — 85025 COMPLETE CBC W/AUTO DIFF WBC: CPT

## 2020-10-01 PROCEDURE — 80053 COMPREHEN METABOLIC PANEL: CPT

## 2020-10-01 PROCEDURE — 71045 X-RAY EXAM CHEST 1 VIEW: CPT

## 2020-10-01 PROCEDURE — 700102 HCHG RX REV CODE 250 W/ 637 OVERRIDE(OP): Performed by: STUDENT IN AN ORGANIZED HEALTH CARE EDUCATION/TRAINING PROGRAM

## 2020-10-01 PROCEDURE — 93005 ELECTROCARDIOGRAM TRACING: CPT | Performed by: EMERGENCY MEDICINE

## 2020-10-01 PROCEDURE — 93005 ELECTROCARDIOGRAM TRACING: CPT

## 2020-10-01 PROCEDURE — C9803 HOPD COVID-19 SPEC COLLECT: HCPCS | Performed by: STUDENT IN AN ORGANIZED HEALTH CARE EDUCATION/TRAINING PROGRAM

## 2020-10-01 PROCEDURE — 84484 ASSAY OF TROPONIN QUANT: CPT

## 2020-10-01 PROCEDURE — A9270 NON-COVERED ITEM OR SERVICE: HCPCS | Performed by: STUDENT IN AN ORGANIZED HEALTH CARE EDUCATION/TRAINING PROGRAM

## 2020-10-01 PROCEDURE — 99285 EMERGENCY DEPT VISIT HI MDM: CPT

## 2020-10-01 PROCEDURE — 85610 PROTHROMBIN TIME: CPT

## 2020-10-01 PROCEDURE — U0003 INFECTIOUS AGENT DETECTION BY NUCLEIC ACID (DNA OR RNA); SEVERE ACUTE RESPIRATORY SYNDROME CORONAVIRUS 2 (SARS-COV-2) (CORONAVIRUS DISEASE [COVID-19]), AMPLIFIED PROBE TECHNIQUE, MAKING USE OF HIGH THROUGHPUT TECHNOLOGIES AS DESCRIBED BY CMS-2020-01-R: HCPCS

## 2020-10-01 RX ORDER — LISINOPRIL 5 MG/1
5 TABLET ORAL EVERY EVENING
COMMUNITY
End: 2021-03-05

## 2020-10-01 RX ORDER — ATORVASTATIN CALCIUM 80 MG/1
80 TABLET, FILM COATED ORAL DAILY
COMMUNITY
End: 2022-04-11

## 2020-10-01 RX ORDER — WARFARIN SODIUM 7.5 MG/1
3.75-7.5 TABLET ORAL DAILY
COMMUNITY
End: 2021-03-04 | Stop reason: SDUPTHER

## 2020-10-01 RX ORDER — ATORVASTATIN CALCIUM 80 MG/1
80 TABLET, FILM COATED ORAL DAILY
Status: DISCONTINUED | OUTPATIENT
Start: 2020-10-02 | End: 2020-10-03 | Stop reason: HOSPADM

## 2020-10-01 RX ORDER — LEVOTHYROXINE SODIUM 0.07 MG/1
75 TABLET ORAL EVERY EVENING
Status: DISCONTINUED | OUTPATIENT
Start: 2020-10-01 | End: 2020-10-03 | Stop reason: HOSPADM

## 2020-10-01 RX ORDER — BISACODYL 10 MG
10 SUPPOSITORY, RECTAL RECTAL
Status: DISCONTINUED | OUTPATIENT
Start: 2020-10-01 | End: 2020-10-03 | Stop reason: HOSPADM

## 2020-10-01 RX ORDER — WARFARIN SODIUM 5 MG/1
5 TABLET ORAL ONCE
Status: COMPLETED | OUTPATIENT
Start: 2020-10-01 | End: 2020-10-01

## 2020-10-01 RX ORDER — POLYETHYLENE GLYCOL 3350 17 G/17G
1 POWDER, FOR SOLUTION ORAL
Status: DISCONTINUED | OUTPATIENT
Start: 2020-10-01 | End: 2020-10-03 | Stop reason: HOSPADM

## 2020-10-01 RX ORDER — LEVOTHYROXINE SODIUM 0.07 MG/1
75 TABLET ORAL EVERY EVENING
COMMUNITY
End: 2021-10-07 | Stop reason: SDUPTHER

## 2020-10-01 RX ORDER — DIVALPROEX SODIUM 250 MG/1
250-500 TABLET, DELAYED RELEASE ORAL 3 TIMES DAILY
COMMUNITY
End: 2021-10-11 | Stop reason: SDUPTHER

## 2020-10-01 RX ORDER — AMOXICILLIN 250 MG
2 CAPSULE ORAL 2 TIMES DAILY
Status: DISCONTINUED | OUTPATIENT
Start: 2020-10-01 | End: 2020-10-03 | Stop reason: HOSPADM

## 2020-10-01 RX ADMIN — WARFARIN SODIUM 5 MG: 5 TABLET ORAL at 23:48

## 2020-10-01 ASSESSMENT — ENCOUNTER SYMPTOMS
ABDOMINAL PAIN: 0
SHORTNESS OF BREATH: 1
FEVER: 0
DIARRHEA: 0
VOMITING: 0

## 2020-10-01 ASSESSMENT — FIBROSIS 4 INDEX: FIB4 SCORE: 0.75

## 2020-10-01 NOTE — ED TRIAGE NOTES
Chief Complaint   Patient presents with   • Chest Pain     x2 weeks intermittently; pain has not gone away since this AM   • Dizziness     today     Pt ambulatory to triage for above complaint. Pt states that 3 weeks ago he TESTED POSITIVE for COVID. Pt states that he had symptoms for about x3 days (sore throat, fever, muscle pain, and headache) then they went away. Pt has hx of CABG in 2015.    Pt is alert/oriented and follows commands. Pt speaking in full sentences and responds appropriately to questions. No acute distress noted in triage and respirations are even and unlabored.     Pt placed in [facility] and educated on triage process. Pt encouraged to alert staff for any changes in condition.

## 2020-10-01 NOTE — LETTER
10/5/2020               Rene Chan  [address] [address]        Dear Rene (MR#[id number]),      This letter is to inform you that your COVID-19 test result is POSITIVE.  This means that the virus that causes COVID-19 was found in your sample.      A review of your test during your recent visit requires that we notify you of the following:    Your nasal swab was POSITIVE for the novel coronavirus (COVID-19).     The Health Department will be in contact with you soon.      You are encouraged to continue to quarantine and self-isolate according to the CDC guidance unless otherwise directed by the Health Department.  Per the CDC, you should continue to quarantine until: At least 3 days (72 hours) have passed since your fever resolved without the use of fever-reducing medications and you had improvement in your cough and shortness of breath.  You should also remain quarantined until at least 10 days have passed since your symptoms first appeared.    Once any symptoms have resolved, it may be possible to donate plasma to help others that are currently ill with COVID-19. To learn more and apply, please contact the  at [phone] or via e-mail at [email or web address].    For any further questions regarding COVID-19, please contact the [facility] at [phone].  Thank you for your cooperation in the matter.      Sincerely,      The [facility] Care Team

## 2020-10-02 LAB
ALBUMIN SERPL BCP-MCNC: 3.6 G/DL (ref 3.2–4.9)
ALBUMIN/GLOB SERPL: 1.5 G/DL
ALP SERPL-CCNC: 46 U/L (ref 30–99)
ALT SERPL-CCNC: 24 U/L (ref 2–50)
ANION GAP SERPL CALC-SCNC: 11 MMOL/L (ref 7–16)
AST SERPL-CCNC: 22 U/L (ref 12–45)
BASOPHILS # BLD AUTO: 0.8 % (ref 0–1.8)
BASOPHILS # BLD: 0.06 K/UL (ref 0–0.12)
BILIRUB SERPL-MCNC: 0.9 MG/DL (ref 0.1–1.5)
BUN SERPL-MCNC: 14 MG/DL (ref 8–22)
CALCIUM SERPL-MCNC: 8.9 MG/DL (ref 8.5–10.5)
CHLORIDE SERPL-SCNC: 103 MMOL/L (ref 96–112)
CO2 SERPL-SCNC: 25 MMOL/L (ref 20–33)
CREAT SERPL-MCNC: 0.83 MG/DL (ref 0.5–1.4)
EKG IMPRESSION: NORMAL
EOSINOPHIL # BLD AUTO: 0.13 K/UL (ref 0–0.51)
EOSINOPHIL NFR BLD: 1.8 % (ref 0–6.9)
ERYTHROCYTE [DISTWIDTH] IN BLOOD BY AUTOMATED COUNT: 47.6 FL (ref 35.9–50)
GLOBULIN SER CALC-MCNC: 2.4 G/DL (ref 1.9–3.5)
GLUCOSE SERPL-MCNC: 88 MG/DL (ref 65–99)
HCT VFR BLD AUTO: 44.9 % (ref 42–52)
HGB BLD-MCNC: 15 G/DL (ref 14–18)
IMM GRANULOCYTES # BLD AUTO: 0.05 K/UL (ref 0–0.11)
IMM GRANULOCYTES NFR BLD AUTO: 0.7 % (ref 0–0.9)
INR PPP: 1.69 (ref 0.87–1.13)
LYMPHOCYTES # BLD AUTO: 2.25 K/UL (ref 1–4.8)
LYMPHOCYTES NFR BLD: 30.9 % (ref 22–41)
MAGNESIUM SERPL-MCNC: 2 MG/DL (ref 1.5–2.5)
MCH RBC QN AUTO: 31.6 PG (ref 27–33)
MCHC RBC AUTO-ENTMCNC: 33.4 G/DL (ref 33.7–35.3)
MCV RBC AUTO: 94.5 FL (ref 81.4–97.8)
MONOCYTES # BLD AUTO: 0.87 K/UL (ref 0–0.85)
MONOCYTES NFR BLD AUTO: 12 % (ref 0–13.4)
NEUTROPHILS # BLD AUTO: 3.91 K/UL (ref 1.82–7.42)
NEUTROPHILS NFR BLD: 53.8 % (ref 44–72)
NRBC # BLD AUTO: 0 K/UL
NRBC BLD-RTO: 0 /100 WBC
PLATELET # BLD AUTO: 335 K/UL (ref 164–446)
PMV BLD AUTO: 10.5 FL (ref 9–12.9)
POTASSIUM SERPL-SCNC: 4.2 MMOL/L (ref 3.6–5.5)
PROT SERPL-MCNC: 6 G/DL (ref 6–8.2)
PROTHROMBIN TIME: 20.4 SEC (ref 12–14.6)
RBC # BLD AUTO: 4.75 M/UL (ref 4.7–6.1)
SODIUM SERPL-SCNC: 139 MMOL/L (ref 135–145)
TROPONIN T SERPL-MCNC: 21 NG/L (ref 6–19)
WBC # BLD AUTO: 7.3 K/UL (ref 4.8–10.8)

## 2020-10-02 PROCEDURE — 83735 ASSAY OF MAGNESIUM: CPT

## 2020-10-02 PROCEDURE — 99225 PR SUBSEQUENT OBSERVATION CARE,LEVEL II: CPT | Performed by: INTERNAL MEDICINE

## 2020-10-02 PROCEDURE — 93010 ELECTROCARDIOGRAM REPORT: CPT | Performed by: INTERNAL MEDICINE

## 2020-10-02 PROCEDURE — A9270 NON-COVERED ITEM OR SERVICE: HCPCS | Performed by: INTERNAL MEDICINE

## 2020-10-02 PROCEDURE — 80053 COMPREHEN METABOLIC PANEL: CPT

## 2020-10-02 PROCEDURE — 84484 ASSAY OF TROPONIN QUANT: CPT

## 2020-10-02 PROCEDURE — 97161 PT EVAL LOW COMPLEX 20 MIN: CPT

## 2020-10-02 PROCEDURE — A9270 NON-COVERED ITEM OR SERVICE: HCPCS | Performed by: STUDENT IN AN ORGANIZED HEALTH CARE EDUCATION/TRAINING PROGRAM

## 2020-10-02 PROCEDURE — 85025 COMPLETE CBC W/AUTO DIFF WBC: CPT

## 2020-10-02 PROCEDURE — 85610 PROTHROMBIN TIME: CPT

## 2020-10-02 PROCEDURE — 700102 HCHG RX REV CODE 250 W/ 637 OVERRIDE(OP): Performed by: INTERNAL MEDICINE

## 2020-10-02 PROCEDURE — 700102 HCHG RX REV CODE 250 W/ 637 OVERRIDE(OP): Performed by: STUDENT IN AN ORGANIZED HEALTH CARE EDUCATION/TRAINING PROGRAM

## 2020-10-02 PROCEDURE — G0378 HOSPITAL OBSERVATION PER HR: HCPCS

## 2020-10-02 PROCEDURE — 36415 COLL VENOUS BLD VENIPUNCTURE: CPT

## 2020-10-02 PROCEDURE — 93005 ELECTROCARDIOGRAM TRACING: CPT | Performed by: STUDENT IN AN ORGANIZED HEALTH CARE EDUCATION/TRAINING PROGRAM

## 2020-10-02 RX ORDER — METOPROLOL SUCCINATE 25 MG/1
12.5 TABLET, EXTENDED RELEASE ORAL
Status: DISCONTINUED | OUTPATIENT
Start: 2020-10-02 | End: 2020-10-03 | Stop reason: HOSPADM

## 2020-10-02 RX ORDER — WARFARIN SODIUM 7.5 MG/1
7.5 TABLET ORAL
Status: DISCONTINUED | OUTPATIENT
Start: 2020-10-02 | End: 2020-10-02

## 2020-10-02 RX ORDER — LISINOPRIL 5 MG/1
5 TABLET ORAL EVERY EVENING
Status: DISCONTINUED | OUTPATIENT
Start: 2020-10-02 | End: 2020-10-03 | Stop reason: HOSPADM

## 2020-10-02 RX ORDER — DIVALPROEX SODIUM 250 MG/1
250 TABLET, DELAYED RELEASE ORAL EVERY MORNING
Status: DISCONTINUED | OUTPATIENT
Start: 2020-10-02 | End: 2020-10-03 | Stop reason: HOSPADM

## 2020-10-02 RX ORDER — ASPIRIN 81 MG/1
81 TABLET, CHEWABLE ORAL DAILY
Status: DISCONTINUED | OUTPATIENT
Start: 2020-10-02 | End: 2020-10-03 | Stop reason: HOSPADM

## 2020-10-02 RX ORDER — WARFARIN SODIUM 7.5 MG/1
3.75 TABLET ORAL
Status: DISCONTINUED | OUTPATIENT
Start: 2020-10-03 | End: 2020-10-02

## 2020-10-02 RX ORDER — WARFARIN SODIUM 7.5 MG/1
7.5 TABLET ORAL
Status: COMPLETED | OUTPATIENT
Start: 2020-10-02 | End: 2020-10-02

## 2020-10-02 RX ORDER — DIVALPROEX SODIUM 500 MG/1
500 TABLET, DELAYED RELEASE ORAL
Status: DISCONTINUED | OUTPATIENT
Start: 2020-10-02 | End: 2020-10-03 | Stop reason: HOSPADM

## 2020-10-02 RX ADMIN — ASPIRIN 81 MG: 81 TABLET, CHEWABLE ORAL at 05:36

## 2020-10-02 RX ADMIN — ATORVASTATIN CALCIUM 80 MG: 80 TABLET, FILM COATED ORAL at 05:36

## 2020-10-02 RX ADMIN — METOPROLOL SUCCINATE 12.5 MG: 25 TABLET, EXTENDED RELEASE ORAL at 16:53

## 2020-10-02 RX ADMIN — DIVALPROEX SODIUM 500 MG: 500 TABLET, DELAYED RELEASE ORAL at 20:00

## 2020-10-02 RX ADMIN — LISINOPRIL 5 MG: 5 TABLET ORAL at 16:53

## 2020-10-02 RX ADMIN — WARFARIN SODIUM 7.5 MG: 7.5 TABLET ORAL at 16:54

## 2020-10-02 RX ADMIN — DIVALPROEX SODIUM 250 MG: 250 TABLET, DELAYED RELEASE ORAL at 16:54

## 2020-10-02 RX ADMIN — LEVOTHYROXINE SODIUM 75 MCG: 0.07 TABLET ORAL at 16:53

## 2020-10-02 RX ADMIN — LEVOTHYROXINE SODIUM 75 MCG: 0.07 TABLET ORAL at 01:16

## 2020-10-02 ASSESSMENT — COGNITIVE AND FUNCTIONAL STATUS - GENERAL
SUGGESTED CMS G CODE MODIFIER MOBILITY: CH
MOBILITY SCORE: 24
DAILY ACTIVITIY SCORE: 24
SUGGESTED CMS G CODE MODIFIER MOBILITY: CH
SUGGESTED CMS G CODE MODIFIER DAILY ACTIVITY: CH
MOBILITY SCORE: 24

## 2020-10-02 ASSESSMENT — LIFESTYLE VARIABLES
ALCOHOL_USE: NO
CONSUMPTION TOTAL: NEGATIVE
EVER FELT BAD OR GUILTY ABOUT YOUR DRINKING: NO
ALCOHOL_USE: NO
TOTAL SCORE: 0
TOTAL SCORE: 0
HAVE YOU EVER FELT YOU SHOULD CUT DOWN ON YOUR DRINKING: NO
HAVE YOU EVER FELT YOU SHOULD CUT DOWN ON YOUR DRINKING: NO
TOTAL SCORE: 0
ON A TYPICAL DAY WHEN YOU DRINK ALCOHOL HOW MANY DRINKS DO YOU HAVE: 0
AVERAGE NUMBER OF DAYS PER WEEK YOU HAVE A DRINK CONTAINING ALCOHOL: 0
TOTAL SCORE: 0
TOTAL SCORE: 0
CONSUMPTION TOTAL: INCOMPLETE
HAVE PEOPLE ANNOYED YOU BY CRITICIZING YOUR DRINKING: NO
HAVE PEOPLE ANNOYED YOU BY CRITICIZING YOUR DRINKING: NO
HOW MANY TIMES IN THE PAST YEAR HAVE YOU HAD 5 OR MORE DRINKS IN A DAY: 0
EVER FELT BAD OR GUILTY ABOUT YOUR DRINKING: NO
EVER HAD A DRINK FIRST THING IN THE MORNING TO STEADY YOUR NERVES TO GET RID OF A HANGOVER: NO
EVER HAD A DRINK FIRST THING IN THE MORNING TO STEADY YOUR NERVES TO GET RID OF A HANGOVER: NO
TOTAL SCORE: 0

## 2020-10-02 ASSESSMENT — CHA2DS2 SCORE
HYPERTENSION: YES
HYPERTENSION: YES
VASCULAR DISEASE: NO
DIABETES: NO
AGE 65 TO 74: YES
CHA2DS2 VASC SCORE: 1
SEX: MALE
AGE 75 OR GREATER: NO
SEX: MALE
AGE 65 TO 74: YES
AGE 75 OR GREATER: NO
VASCULAR DISEASE: NO
CHF OR LEFT VENTRICULAR DYSFUNCTION: NO
AGE 75 OR GREATER: NO
PRIOR STROKE OR TIA OR THROMBOEMBOLISM: NO
DIABETES: YES
CHF OR LEFT VENTRICULAR DYSFUNCTION: NO
CHA2DS2 VASC SCORE: 4
CHF OR LEFT VENTRICULAR DYSFUNCTION: NO
CHA2DS2 VASC SCORE: 3
HYPERTENSION: YES
PRIOR STROKE OR TIA OR THROMBOEMBOLISM: YES

## 2020-10-02 ASSESSMENT — PATIENT HEALTH QUESTIONNAIRE - PHQ9
4. FEELING TIRED OR HAVING LITTLE ENERGY: NOT AT ALL
6. FEELING BAD ABOUT YOURSELF - OR THAT YOU ARE A FAILURE OR HAVE LET YOURSELF OR YOUR FAMILY DOWN: NOT AL ALL
1. LITTLE INTEREST OR PLEASURE IN DOING THINGS: NOT AT ALL
9. THOUGHTS THAT YOU WOULD BE BETTER OFF DEAD, OR OF HURTING YOURSELF: NOT AT ALL
8. MOVING OR SPEAKING SO SLOWLY THAT OTHER PEOPLE COULD HAVE NOTICED. OR THE OPPOSITE, BEING SO FIGETY OR RESTLESS THAT YOU HAVE BEEN MOVING AROUND A LOT MORE THAN USUAL: NOT AT ALL
SUM OF ALL RESPONSES TO PHQ QUESTIONS 1-9: 0
2. FEELING DOWN, DEPRESSED, IRRITABLE, OR HOPELESS: NOT AT ALL
3. TROUBLE FALLING OR STAYING ASLEEP OR SLEEPING TOO MUCH: NOT AT ALL
SUM OF ALL RESPONSES TO PHQ9 QUESTIONS 1 AND 2: 0
7. TROUBLE CONCENTRATING ON THINGS, SUCH AS READING THE NEWSPAPER OR WATCHING TELEVISION: NOT AT ALL
5. POOR APPETITE OR OVEREATING: NOT AT ALL

## 2020-10-02 ASSESSMENT — ENCOUNTER SYMPTOMS
HEADACHES: 0
DIZZINESS: 0
BLURRED VISION: 0
DOUBLE VISION: 0
VOMITING: 0
SHORTNESS OF BREATH: 1
DIAPHORESIS: 1
BACK PAIN: 1
SHORTNESS OF BREATH: 0
FEVER: 0
HEARTBURN: 0
FEVER: 1
COUGH: 0
CHILLS: 0
ORTHOPNEA: 1
CONSTIPATION: 0
WEAKNESS: 0
ABDOMINAL PAIN: 0
DIARRHEA: 0
SPUTUM PRODUCTION: 0
BLOOD IN STOOL: 0
NAUSEA: 0
PALPITATIONS: 0
EYE DISCHARGE: 0

## 2020-10-02 ASSESSMENT — FIBROSIS 4 INDEX: FIB4 SCORE: 1.18

## 2020-10-02 ASSESSMENT — GAIT ASSESSMENTS
DISTANCE (FEET): 80
GAIT LEVEL OF ASSIST: SUPERVISED

## 2020-10-02 NOTE — DISCHARGE PLANNING
Anticipated Discharge Disposition: TBD    Action: Per chart review, pt oriented, on room air, ECHO and PT/OT eval ordered, pending.    Barriers to Discharge: Medical clearance, PT/OT eval and recommendations    Plan: Will continue to follow and assist with discharge planning needs.

## 2020-10-02 NOTE — ASSESSMENT & PLAN NOTE
Patient is on chronic anticoagulation for history of DVT and PE. INR goal of 2-3.  Initial INR found to be 1.83  warfarin per pharmacy.

## 2020-10-02 NOTE — PROGRESS NOTES
Inpatient Anticoagulation Service Note    Date: 10/2/2020    Reason for Anticoagulation: Deep Vein Thrombosis, Pulmonary Embolism   Target INR: 2.0 to 3.0  [id number] VASc Score: 3  HAS-BLED Score: 3   Hemoglobin Value: 14.7  Hematocrit Value: 44.3  Lab Platelet Value: 361    INR from last 7 days     Date/Time INR Value    10/01/20 1630  (!) 1.83        Dose from last 7 days     Date/Time Dose (mg)    10/01/20 2349  5        Significant Interactions: Aspirin, Statin, Corticosteroids  Bridge Therapy: No     Reversal Agent Administered: Not Applicable  Comments: Patient admitted 10/1 for chest pain.  COVID positive today, but asymtpomatic (initial dx ~3 weeks ago).  PMH of CAD, Coronary Artery Bypass vein harvest, carotid occlusion, Colon polyps, T2DM, hyperlipidemia, HTN, Aortic stenosis.  Seen at our Anticoagulation clinic w/ last home dose 7.5mg MWF, 3.75mg ROW (37.5mg/week) w/ last INR in AC clinic 9/3 = 3.10.  PMH of labile INR (mostly subtherapeutic) with weekly dose ranges of 37.5-45mg.  DDI from home meds noted, only atorvastatin and ASA continued inpatient.  Applicable labs and notes negative for overt concerns of bleeding.      Last dose taken at home  afternoon.  5mg x1 given inpatient 10/1 @2349 following slightly subtherapeutic INR of 1.83 (although drawn at 1630, close to normal dose due).      Plan:  Resume Warfarin 7.5mg MWF, 3.75 ROW today.  Pharmacy will continue to monitor.  Education Material Provided?: No (Chronic Warfarin patient.)  Pharmacist suggested discharge dosin.5mg MWF & 3.75mg ROW w/ follow up with Anticoagulation Clinic within 72 hours of discharge.       Ricky Romero, PharmD

## 2020-10-02 NOTE — THERAPY
Physical Therapy   Initial Evaluation     Patient Name: Rene Chan  Age:  69 y.o., Sex:  male  Medical Record #: [id number]  Today's Date: 10/2/2020          Assessment  Patient is 69 y.o. male admitted for chest pain workup with hx of CABG in 2015 and COVID-19+ presenting to PT without gross mobility impairment. Pt reported he had been self-quarantining at home and was recently asymptomatic but his activity tolerance has diminished. Discussed resuming activity and safe progression with pacing, HR monitoring, and Sp02 monitoring. Pt appeared to internalize all information provided and was able to implement during evaluation. Gait distance was limited to in-room but pt completed x80' with drop in Sp02 from 96% to 90% on RA and HR increase from 68bpm to 78bpm. At this time, pt does not require further acute PT intervention and appears functionally capable of return home.     Plan    Recommend Physical Therapy for Evaluation only     DC Equipment Recommendations: None  Discharge Recommendations: Anticipate that the patient will have no further physical therapy needs after discharge from the hospital          Objective       10/02/20 1555   Prior Living Situation   Prior Services None   Housing / Facility 1 Story House   Steps Into Home 1   Equipment Owned None   Lives with - Patient's Self Care Capacity Spouse   Prior Level of Functional Mobility   Bed Mobility Independent   Transfer Status Independent   Ambulation Independent   Distance Ambulation (Feet)   (community)   Assistive Devices Used None   Stairs Independent   Comments pt reports he has progressively become less active since getting COVID.    Gait Analysis   Gait Level Of Assist Supervised   Assistive Device None   Distance (Feet) 80   Comments in-room only. Sp02 to 90% HR to 78bpm from 67bpm and 96%.    Bed Mobility    Supine to Sit Independent   Scooting Independent   Rolling Independent   Functional Mobility   Sit to Stand Independent   Bed, Chair,  Wheelchair Transfer Independent   Transfer Method Stand Step   Anticipated Discharge Equipment and Recommendations   DC Equipment Recommendations None   Discharge Recommendations Anticipate that the patient will have no further physical therapy needs after discharge from the hospital

## 2020-10-02 NOTE — PROGRESS NOTES
2 RN Skin Check      2 RN skin check complete with Nataliia.   Devices in place tele box.  Skin assessed under devices yes.   - All skin is CDI.   Confirmed pressure ulcers found on n/a.  New potential pressure ulcers noted on n/a. Wound consult placed n/a.  The following interventions in place pillows and blankets. Pt can turn himself in bed.

## 2020-10-02 NOTE — ED PROVIDER NOTES
"ED Provider Note    Scribed for Bart Howard M.D. by Jonn Lua. 10/1/2020, 5:10 PM.    Primary care provider: Edwina Le M.D.  Means of arrival: Walk-in  History obtained from: Patient  History limited by: None    CHIEF COMPLAINT  Chief Complaint   Patient presents with   • Chest Pain     x2 weeks intermittently; pain has not gone away since this AM   • Dizziness     today       HPI  Rene Chan is a 69 y.o. male who presents to the Emergency Department for acute, intermittent chest pain onset 3 weeks ago. Patient describes his chest pain as \"discomfort\" and located to his sternum. He reports feeling a similar pain for the last couple weeks, however this morning the pain was more pronounced and did continued longer than normal. Additionally, he has a history of CAD and states his pain today felt similar. He follows with Dr. Wright with Cardiology. His chest discomfort somewhat improved with sitting up. Patient has associated shortness of breath, but denies any recent fevers, vomiting, diarrhea, or abdominal pain. He notes that 3 weeks ago he was diagnosed with COVID and experienced 3 days ago sore, throat, fever, and shortness of breath at that time.    REVIEW OF SYSTEMS  Review of Systems   Constitutional: Negative for fever.   Respiratory: Positive for shortness of breath.    Cardiovascular: Positive for chest pain.   Gastrointestinal: Negative for abdominal pain, diarrhea and vomiting.   All other systems reviewed and are negative.      PAST MEDICAL HISTORY   has a past medical history of Anticoagulation monitoring, special range, Bipolar disorder (HCC), CAD (coronary artery disease) (6/4/2015), Carotid artery occlusion, Clotting disorder (HCC), Colon polyps, Diabetes mellitus, type II (HCC), Gout, Hammer toe, Hyperlipidemia, Hypertension, Hypothyroidism, Impaired fasting glucose, and Mild aortic stenosis - echo 4/2018.    SURGICAL HISTORY   has a past surgical history that includes other " "abdominal surgery; other orthopedic surgery (); recovery (2015); multiple coronary artery bypass endo vein harvest (2015); appendectomy; and ankle fusion.    SOCIAL HISTORY  Social History     Tobacco Use   • Smoking status: Former Smoker     Packs/day: 2.00     Years: 10.00     Pack years: 20.00     Types: Cigarettes     Quit date: 1976     Years since quittin.7   • Smokeless tobacco: Never Used   • Tobacco comment: quit x 32 yrs; 2ppd x 7 years   Substance Use Topics   • Alcohol use: No   • Drug use: No     Comment: IV drug use in teens      Social History     Substance and Sexual Activity   Drug Use No    Comment: IV drug use in teens       FAMILY HISTORY  Family History   Problem Relation Age of Onset   • Heart Disease Sister 50   • Stroke Sister 55        CVA   • Heart Failure Father        CURRENT MEDICATIONS  Current Outpatient Medications   Medication Instructions   • aspirin 81 mg, Oral, DAILY, QD   • atorvastatin (LIPITOR) 80 mg, Oral, EVERY EVENING   • Coenzyme Q10 200 MG Tab Oral, DAILY   • divalproex (DEPAKOTE) 250 mg, Oral, 2 TIMES DAILY   • docosahexanoic acid (OMEGA 3 FA) 1,000 mg, Oral, DAILY   • levothyroxine (SYNTHROID) 75 MCG Tab TAKE ONE TABLET BY MOUTH DAILY   • lisinopril (PRINIVIL) 5 mg, Oral, DAILY   • metoprolol (LOPRESSOR) 25 mg, Oral, 2 TIMES DAILY   • predniSONE (DELTASONE) 10 mg, Oral, 2 TIMES DAILY PRN, For gout    • Vitamin D 1,000 Units, Oral, 2 TIMES DAILY   • warfarin (COUMADIN) 7.5 MG Tab Take one-half to one tablet by mouth daily or as directed by anticoagulation clinic         ALLERGIES  Allergies   Allergen Reactions   • Penicillins      Childhood; does know what the reaction was       PHYSICAL EXAM  VITAL SIGNS: /67   Pulse 62   Temp 36.1 °C (97 °F) (Temporal)   Resp 12   Ht 1.727 m (5' 8\")   Wt 94.3 kg (208 lb)   SpO2 92%   BMI 31.63 kg/m²     Constitutional:   No acute distress  HENT:  Moist mucous membranes  Eyes:  No conjunctivitis or " icterus  Neck:  trachea is midline, no palpable thyroid  Lymphatic:  No cervical lymphadenopathy  Cardiovascular:  Regular rate and rhythm, no murmurs  Thorax & Lungs: Sternotomy scar present.  Normal breath sounds, no rhonchi  Abdomen:  Soft, Non-tender  Skin:.  no rash  Back:  Non-tender, no CVA tenderness  Extremities:   no pretibial edema  Vascular:  symmetric radial pulse  Neurologic:  Normal gross motor      LABS  Labs Reviewed   CBC WITH DIFFERENTIAL - Abnormal; Notable for the following components:       Result Value    RBC 4.64 (*)     MCHC 33.2 (*)     Monos (Absolute) 0.94 (*)     All other components within normal limits   COMP METABOLIC PANEL - Abnormal; Notable for the following components:    Glucose 144 (*)     All other components within normal limits   TROPONIN - Abnormal; Notable for the following components:    Troponin T 22 (*)     All other components within normal limits   PROTHROMBIN TIME - Abnormal; Notable for the following components:    PT 21.7 (*)     INR 1.83 (*)     All other components within normal limits    Narrative:     Indicate which anticoagulants the patient is on:->COUMADIN   ESTIMATED GFR   COVID/SARS COV-2    Narrative:     Is patient being admitted?->Yes  Does this patient meet criteria for Rush/Cepheid per [facility]  Inpatient Workflow? (See workflow link below)->Yes  Expected turn around time?->Rush (Cepheid 2-4 hours)  Is this the patients First SARS CoV-2 test?->No  Is this patient employed in healthcare?->No  Is the patient symptomatic as defined by the CDC?->No  Is the patient hospitalized?->No  Is the patient a resident in a congregate care setting?->No  Is the patient pregnant?->No   SARS-COV-2, PCR (IN-HOUSE)    Narrative:     Is patient being admitted?->Yes  Does this patient meet criteria for Rush/Cepheid per [facility]  Inpatient Workflow? (See workflow link below)->Yes  Expected turn around time?->Rush (Cepheid 2-4 hours)  Is this the patients First SARS CoV-2  test?->No  Is this patient employed in healthcare?->No  Is the patient symptomatic as defined by the CDC?->No  Is the patient hospitalized?->No  Is the patient a resident in a congregate care setting?->No  Is the patient pregnant?->No     All labs reviewed by me.  Results for orders placed or performed during the hospital encounter of 10/01/20   EKG (NOW)   Result Value Ref Range    Report       [facility] Emergency Dept.    Test Date:  2020-10-01  Pt Name:    NURY VALENCIA                  Department: ER  MRN:        [id number]                      Room:  Gender:     Male                         Technician: [id number]  :        1950                   Requested By:ER TRIAGE PROTOCOL  Order #:    [id number]                    Reading MD: JUDY ESPANA MD    Measurements  Intervals                                Axis  Rate:       63                           P:          50  ME:         208                          QRS:        -3  QRSD:       92                           T:          81  QT:         396  QTc:        406    Interpretive Statements  SINUS RHYTHM  ABNRM R PROG, CONSIDER ASMI OR LEAD PLACEMENT  BASELINE WANDER IN LEAD(S) V1,V4  Compared to ECG 2015 16:19:09  ST (T wave) deviation no longer present  Myocardial infarct finding still present  Electronically Signed On 10-1-2020 21:02:58 PDT by JUDY ESPANA MD           RADIOLOGY  DX-CHEST-PORTABLE (1 VIEW)   Final Result      1.  There is no acute cardiopulmonary process.        The radiologist's interpretation of all radiological studies have been reviewed by me.    COURSE & MEDICAL DECISION MAKING  Pertinent Labs & Imaging studies reviewed. (See chart for details)    5:10 PM - Patient seen and examined at bedside. Ordered DX-chest, estimated GFR, CBC w/ diff, CMP, troponin, and EKG to evaluate his symptoms. The differential diagnoses include but are not limited to: Myocardial infarction    6:17 PM - I discussed the patient's  case and the above findings with Dr. Smalls (Internal Medicine) who agrees to have the patient evaluated for hospitalization.     Medical Decision Making:   The patient presents with chest pain that he has had intermittently worsened today it feels today like it is heart attack type pain.  He did have slight elevation of his troponin.  He does have an moderate risk heart score at 6.  He did have slight increase in his troponin.  I have contacted the hospitalist for admission    DISPOSITION:  Patient will be hospitalized by hospitalist in guarded condition.    FINAL IMPRESSION  1. Chest pain, unspecified type          I, Jonn Lua (Lynn), am scribing for, and in the presence of, Bart Howard M.D..    Electronically signed by: Jonn Lua (Lynn), 10/1/2020    IBart M.D. personally performed the services described in this documentation, as scribed by Jonn Lua in my presence, and it is both accurate and complete. C.    The note accurately reflects work and decisions made by me.  Bart Howard M.D.  10/1/2020  9:08 PM

## 2020-10-02 NOTE — PROGRESS NOTES
Triage Note    Rene Chan is a 69 y.o. male with Hx CAD s/p CABG who presented with complaints of 3 weeks of sternal chest pain.  He follows with cardiologist, Dr. Wright.  Troponin very mildly elevated at 22.      Discussed with Dr. Howard.  I requested [facility], Dr. Davis evaluate this patient for admission.

## 2020-10-02 NOTE — SENIOR ADMIT NOTE
"Senior Admit Note                              Chief complaint: Chest pain     Brief HPI:  Rene Chan is a 69 y.o. male with Hx of TIA, carotid stenosis, hypothyroidism, pulmonary embolism on chronic warfarin, CABG in 2015, bipolar, dyslipidemia, hypertension  who presents on 10/2/2020 with complaints of     Chest pain ongoing for about 6 weeks with exacerbation over the past 2 days.  Chest pain described as lasting about 30 seconds to 1 minute in the past with the latter episodes lasting about 2 to 3 minutes, nonradiating, no clear association with exertion, in fact patient stated that moving around helped with the chest pain when he had a chest pain earlier this morning, no positional changes  described as sharp at the back of the sternum, about 4 out of 10 in intensity with a very last episode noted this morning lasting about 8 minutes, without any associated nausea vomiting sweating shortness of breath or lightheadedness.  Patient has had chest pain prior to his CABG, which was similar but more in intensity and exertional.  Review of systems also positive for shortness of breath lately especially when trying to sleep, runny nose.     No chest pain currently.     In the ED, vitals were stable except for occasional bradycardia.    CBC : wnl , CMP: wnl   CXR: Wnl   EKG : No acute changes.     On exam:     Vitals:    10/01/20 2156 10/01/20 2325 10/02/20 0201 10/02/20 0401   BP:  150/72  121/67   Pulse: 62 62  62   Resp:  16  16   Temp:  36.1 °C (97 °F)  36.2 °C (97.2 °F)   TempSrc:  Temporal  Temporal   SpO2: 95% 94%  95%   Weight:   94.3 kg (207 lb 14.3 oz)    Height:         Body mass index is 31.61 kg/m².  /67   Pulse 62   Temp 36.2 °C (97.2 °F) (Temporal)   Resp 16   Ht 1.727 m (5' 8\")   Wt 94.3 kg (207 lb 14.3 oz)   SpO2 95%   BMI 31.61 kg/m²   O2 therapy: Pulse Oximetry: 95 %, O2 (LPM): 0, O2 Delivery Device: None - Room Air    Gen/Neuro: NAD, Moving all extremities,, no focal " deficits,A&Ox3  Heart/Lungs: RRR, no MGR, CTAB  Abdo/Pelvis: Soft NDNT  Skin/Ext: No rashes/erythema.  edema, no cyanosis, not tender, pulses intact, cap refill <2 sec    Problem list:     #Chest pain   -in the setting of recent COVID infection   -Subtherapeutic INR on warfarin   -Non cardiac characteristics of chest pain.     #Chronic anticoagulation   -For history of PE    #Hypothyroidism         Plan:   Admit to telemetry   Trend troponin and EKG   Resume warfarin   PE suspicion is low .   Continue home aspirin and statin.       DVT prophylaxis: on warfarin and aspirin  Code status: DNAR/DNI    For complete details, please refer to H&P by Dr. Hailee Davis M.D.

## 2020-10-02 NOTE — H&P
History & Physical Note    Date of Admission: 10/2/2020  Admission Status: Observation-Outpatient  [facility] Team: [facility] IM [facility] Team  Attending: Dr. Rapp  Senior Resident: Dr. Pizano  Intern: Dr. Kemp  Contact Number: [phone]    Chief Complaint: Worsening intermittent chest pain    History of Present Illness (HPI): Patient is a 70 y/o M w/ a PMH of pulmonary embolism in 2007 on chronic anticoagulation w/ warfarin, HTN, hypothyroidism, HLD, CAD w/ triple bypass surgery in 2015, COVID-19, gout and bipolar disorder who presents to the hospital with complaint of worsening chest pain at rest. Patient says that he has been having intermittent episodes of chest for 6 weeks, which occur both with rest and with exertion. Patient says that his chest pain would present as sharp pain radiating to his back and would last 30-60 seconds at a time. Patient says that he experienced another episode of sharp chest pain radiating to his back this morning while he was sitting in his chair. He says that this episode lasted 2-3 minutes and was associated with feeling warm like had a fever and some nausea without vomiting. He denied any shortness of breath, changes in vision/hearing, palpitations or loss of consciousness with this episode. Patient says that a similar episode occurred later on in the day, which lasted 4-5 minutes, after he and his wife finished eating fast food. Patient was in his car and had to get out and walk around, which helped improve his chest pain. Pt says that these episodes felt similar to when he was found to have CAD in 2015, which required triple bypass surgery. Patient was visiting the hospital to run errands and decided to come to the ED to get evaluated.    Patient was recently diagnosed with COVID-19 3 weeks ago and reports that his infection was associated with similar chest pain radiating to the back in addition to fever, sore throat, runny nose and headaches. He reports that the past 6 weeks he has  been having some shortness of breath at night when he lays down, which he says is improved with using his pillows to elevate his head.      Review of Systems:   Review of Systems   Constitutional: Positive for diaphoresis and fever. Negative for chills.   HENT: Negative for ear discharge, ear pain, hearing loss and nosebleeds.    Eyes: Negative for blurred vision, double vision and discharge.   Respiratory: Positive for shortness of breath. Negative for cough and sputum production.    Cardiovascular: Positive for chest pain and orthopnea. Negative for palpitations and leg swelling.   Gastrointestinal: Negative for blood in stool, constipation, diarrhea, heartburn, melena, nausea and vomiting.   Genitourinary: Negative for dysuria, frequency and hematuria.   Musculoskeletal: Positive for back pain.   Skin: Negative for itching and rash.   Neurological: Negative for dizziness, weakness and headaches.       Past Medical History:   Past Medical History was reviewed with patient.   has a past medical history of Anticoagulation monitoring, special range, Bipolar disorder ([facility]), CAD (coronary artery disease) (6/4/2015), Carotid artery occlusion, Clotting disorder ([facility]), Colon polyps, Diabetes mellitus, type II ([facility]), Gout, Hammer toe, Hyperlipidemia, Hypertension, Hypothyroidism, Impaired fasting glucose, and Mild aortic stenosis - echo 4/2018.    Past Surgical History: Past Surgical History was reviewed with patient.   has a past surgical history that includes other abdominal surgery; other orthopedic surgery (1976); recovery (6/1/2015); multiple coronary artery bypass endo vein harvest (6/4/2015); appendectomy; and ankle fusion.    Medications: Medications have been reviewed with patient.  Prior to Admission Medications   Prescriptions Last Dose Informant Patient Reported? Taking?   COENZYME Q10 PO 10/1/2020 at 0830 Patient Yes No   Sig: Take 1 Tab by mouth every morning.   Cholecalciferol (VITAMIN D) 2000 UNITS Cap  9/30/2020 at pm Patient Yes No   Sig: Take 1,000 Units by mouth every evening.   Multiple Vitamins-Minerals (CENTRUM PO) 10/1/2020 at 0830 Patient Yes Yes   Sig: Take 1 Tab by mouth every morning.   aspirin 81 MG tablet 9/30/2020 at afternoon Patient Yes No   Sig: Take 1 Tab by mouth every day. QD   atorvastatin (LIPITOR) 80 MG tablet 9/30/2020 at afternoon Patient Yes Yes   Sig: Take 80 mg by mouth every day.   divalproex (DEPAKOTE) 250 MG Tablet Delayed Response 10/1/2020 at 0830 Patient Yes Yes   Sig: Take 250-500 mg by mouth 2 Times a Day. 250mg in the morning and 500mg in the evening   docosahexanoic acid (OMEGA 3 FA) 1000 MG CAPS 10/1/2020 at 0830 Patient Yes No   Sig: Take 1,000 mg by mouth every day.   levothyroxine (SYNTHROID) 75 MCG Tab 9/30/2020 at pm Patient Yes Yes   Sig: Take 75 mcg by mouth every evening.   lisinopril (PRINIVIL) 5 MG Tab 9/30/2020 at pm Patient Yes Yes   Sig: Take 5 mg by mouth every evening.   metoprolol (LOPRESSOR) 25 MG Tab 10/1/2020 at 0830 Patient Yes Yes   Sig: Take 12.5 mg by mouth every morning.   predniSONE (DELTASONE) 10 MG Tab 9/27/2020 at prn Patient Yes No   Sig: Take 10 mg by mouth 2 times a day as needed (gout).   warfarin (COUMADIN) 7.5 MG Tab 9/30/2020 at afternoon Patient Yes Yes   Sig: Take 3.75-7.5 mg by mouth every day. 7.5mg on Monday, Wednesday, & Friday   3.75mg all other days      Facility-Administered Medications: None        Allergies: Allergies have been reviewed with patient.  Allergies   Allergen Reactions   • Penicillins Unspecified     Childhood; does know what the reaction was       Family History:   family history includes Heart Disease (age of onset: 50) in his sister; Heart Failure in his father; Stroke (age of onset: 55) in his sister.     Social History:   Tobacco: Patient reports remote history of smoking. Quit 30> years ago.  Alcohol: Denies any recent use.  Recreational drugs (illegal and prescription):  Reports remote history of cocaine,  marijuana, speed.  Activity Level: Reports independence with ADLs and IADLs  Living situation:  Currently living with his wife  Primary Care Provider: lavinia Le M.D.    Vitals:  Temp:  [36.1 °C (97 °F)-36.2 °C (97.2 °F)] 36.2 °C (97.2 °F)  Pulse:  [53-62] 62  Resp:  [12-24] 16  BP: (120-172)/(60-78) 121/67  SpO2:  [92 %-95 %] 95 %    Physical Exam  Constitutional:       General: He is not in acute distress.     Appearance: He is not ill-appearing, toxic-appearing or diaphoretic.   HENT:      Head: Normocephalic and atraumatic.      Mouth/Throat:      Pharynx: Oropharynx is clear. No oropharyngeal exudate or posterior oropharyngeal erythema.   Eyes:      General: No scleral icterus.        Right eye: No discharge.         Left eye: No discharge.      Conjunctiva/sclera: Conjunctivae normal.   Cardiovascular:      Rate and Rhythm: Normal rate and regular rhythm.      Pulses: Normal pulses.      Heart sounds: Murmur present. No friction rub. No gallop.    Pulmonary:      Effort: No respiratory distress.      Breath sounds: No stridor. No wheezing or rhonchi.   Abdominal:      General: Abdomen is flat. Bowel sounds are normal. There is no distension.      Palpations: Abdomen is soft. There is no mass.      Tenderness: There is no abdominal tenderness. There is no guarding.      Hernia: No hernia is present.   Musculoskeletal:         General: No swelling or deformity.      Right lower leg: No edema.      Left lower leg: No edema.   Skin:     Coloration: Skin is not jaundiced.      Findings: Bruising and rash present. No erythema or lesion.   Neurological:      Mental Status: He is oriented to person, place, and time.   Psychiatric:         Mood and Affect: Mood normal.         Thought Content: Thought content normal.         Labs: Please see results section    EKG:   Results for orders placed or performed during the hospital encounter of 10/01/20   EKG (NOW)   Result Value Ref Range    Report        [facility] Emergency Dept.    Test Date:  2020-10-01  Pt Name:    NURY VALENCIA                  Department: ER  MRN:        [id number]                      Room:  Gender:     Male                         Technician: [id number]  :        1950                   Requested By:ER TRIAGE PROTOCOL  Order #:    [id number]                    Reading MD: JUDY ESPANA MD    Measurements  Intervals                                Axis  Rate:       63                           P:          50  NH:         208                          QRS:        -3  QRSD:       92                           T:          81  QT:         396  QTc:        406    Interpretive Statements  SINUS RHYTHM  ABNRM R PROG, CONSIDER ASMI OR LEAD PLACEMENT  BASELINE WANDER IN LEAD(S) V1,V4  Compared to ECG 2015 16:19:09  ST (T wave) deviation no longer present  Myocardial infarct finding still present  Electronically Signed On 10-1-2020 21:02:58 PDT by JUDY ESPANA MD     EKG   Result Value Ref Range    Report       [facility]    Test Date:  2020-10-02  Pt Name:    NURY VALENCIA                  Department: [facility]  MRN:        [id number]                      Room:       S171  Gender:     Male                         Technician: [id number]  :        1950                   Requested By:SULTAN SHAYAN ORDONEZ  Order #:    [id number]                    Reading MD:    Measurements  Intervals                                Axis  Rate:       57                           P:          24  NH:         229                          QRS:        4  QRSD:       95                           T:          84  QT:         425  QTc:        414    Interpretive Statements  SINUS BRADYCARDIA  ATRIAL PREMATURE COMPLEX  PROLONGED NH INTERVAL  Compared to ECG 10/01/2020 15:29:05  Atrial premature complex(es) now present  First degree AV block now present  Sinus rhythm no longer present  Myocardial infarct finding no longer present               Imaging:    DX-CHEST-PORTABLE (1 VIEW)   Final Result      1.  There is no acute cardiopulmonary process.          Previous Data Review: reviewed    Problem Representation:     * Unstable angina (HCC)- (present on admission)  Assessment & Plan  Patient presents to the hospital with a 6 week history of intermittent chest pain and 1 day history of atypical chest pain.    -Initial EKG showed no signs of ST elevation.  -Troponin mildly elevated at 22.  -Patient denied any chest pain during the encounter.  -Will order repeat EKG's and troponin levels for further evaluation.  -Patient is hemodynamically stable.  -Restarted home aspirin 81mg daily.    Deep vein thrombosis (DVT) (HCC)- (present on admission)  Assessment & Plan  Pt has history of chronic DVTs. No sign of acute DVT on exam.    -Continue to follow    Hx pulmonary embolism- (present on admission)  Assessment & Plan  Patient has history of PE I 2007.    -Is currently on chronic anticoagulation with warfarin.  -Patient is currently hemodynamically stable.  -Low suspicion of PE.   -D-dimer likely to be elevated with COVID diagnosis. Consider CTPE for further evaluation.    Dyslipidemia- (present on admission)  Assessment & Plan  Patient has history of DLD.    -Restarted home atorvastatin 80mg daily.    Chronic anticoagulation- (present on admission)  Assessment & Plan  Patient is on chronic anticoagulation for history of DVT and PE. INR goal of 2-3.    -Initial INR found to be 1.83  -Will restart warfarin per pharmacy.    Acquired hypothyroidism- (present on admission)  Assessment & Plan  Patient reports history of hypothyroidism.    -Will restart home levothyroxine.

## 2020-10-02 NOTE — PROGRESS NOTES
Triage officer note    [facility] internal medicine resident Dr. Arnold requested this transfer to hospitalist service due to COVID-19 positive.    69-year-old male with past medical history of CABG in 2015 PE on warfarin presented to the hospital with complaint of chest pain.  He found to have slightly elevated troponin.    I requested Dr. Slater to evaluate this patient.

## 2020-10-02 NOTE — ED NOTES
Pharmacy Medication Reconciliation      Medication reconciliation updated and complete per pt at bedside  Allergies have been verified and updated   No oral ABX within the last 14 days  Pt home pharmacy:[facility]

## 2020-10-02 NOTE — PROGRESS NOTES
Hospital Medicine Daily Progress Note    Date of Service  10/2/2020    Chief Complaint  69 y.o. male admitted 10/1/2020 with chest pain    Hospital Course    69-year-old male with past medical history of pulmonary embolism, DVT on chronic anticoagulation with warfarin, hypertension, hypothyroidism, hyperlipidemia, CAD status post CABG x3, COVID-19 and bipolar disorder who presented for worsening chest pain.  Chest pain has been intermittent for 6 weeks not associated with exertion.  Patient was diagnosed with COVID-19 3 weeks ago and had associated chest pain at that time as well as fever and headaches.  Troponin mildly elevated but stable on repeat, EKG shows no acute ST or T wave changes.      Interval Problem Update  No acute events overnight. Satting well on room air.  Echocardiogram pending. Patient reports that his chest pain has resolved. States he has not seen his cardiologist in over a year.     Consultants/Specialty  N/A    Code Status  DNAR/DNI    Disposition  Pending    Review of Systems  Review of Systems   Constitutional: Negative for chills and fever.   Respiratory: Negative for cough and shortness of breath.    Cardiovascular: Negative for chest pain.   Gastrointestinal: Negative for abdominal pain, nausea and vomiting.   Neurological: Negative for dizziness and headaches.   All other systems reviewed and are negative.       Physical Exam  Temp:  [36.1 °C (97 °F)-36.4 °C (97.5 °F)] 36.3 °C (97.3 °F)  Pulse:  [53-64] 64  Resp:  [12-24] 16  BP: (120-172)/(60-78) 135/63  SpO2:  [92 %-95 %] 94 %    Physical Exam  Vitals signs and nursing note reviewed.   Constitutional:       General: He is not in acute distress.     Appearance: Normal appearance. He is not ill-appearing.   HENT:      Head: Normocephalic and atraumatic.   Eyes:      Conjunctiva/sclera: Conjunctivae normal.      Pupils: Pupils are equal, round, and reactive to light.   Neck:      Musculoskeletal: Normal range of motion and neck supple.    Cardiovascular:      Rate and Rhythm: Normal rate and regular rhythm.      Pulses: Normal pulses.      Heart sounds: Normal heart sounds.   Pulmonary:      Effort: Pulmonary effort is normal. No respiratory distress.      Breath sounds: Normal breath sounds.   Abdominal:      General: Abdomen is flat. There is no distension.      Palpations: Abdomen is soft.      Tenderness: There is no abdominal tenderness.   Musculoskeletal: Normal range of motion.      Right lower leg: No edema.      Left lower leg: No edema.   Skin:     General: Skin is warm and dry.   Neurological:      General: No focal deficit present.      Mental Status: He is alert and oriented to person, place, and time.      Cranial Nerves: No cranial nerve deficit.      Motor: No weakness.   Psychiatric:         Mood and Affect: Mood normal.         Behavior: Behavior normal.         Fluids  No intake or output data in the 24 hours ending 10/02/20 1406    Laboratory  Recent Labs     10/01/20  1630 10/02/20  0424   WBC 9.6 7.3   RBC 4.64* 4.75   HEMOGLOBIN 14.7 15.0   HEMATOCRIT 44.3 44.9   MCV 95.5 94.5   MCH 31.7 31.6   MCHC 33.2* 33.4*   RDW 47.7 47.6   PLATELETCT 361 335   MPV 10.2 10.5     Recent Labs     10/01/20  1630 10/02/20  0424   SODIUM 137 139   POTASSIUM 4.0 4.2   CHLORIDE 100 103   CO2 25 25   GLUCOSE 144* 88   BUN 16 14   CREATININE 0.86 0.83   CALCIUM 9.4 8.9     Recent Labs     10/01/20  1630 10/02/20  0424   INR 1.83* 1.69*               Imaging  DX-CHEST-PORTABLE (1 VIEW)   Final Result      1.  There is no acute cardiopulmonary process.      EC-ECHOCARDIOGRAM COMPLETE W/ CONT    (Results Pending)        Assessment/Plan  * Atypical chest pain- (present on admission)  Assessment & Plan  Initial EKG showed no signs of ST elevation.  Troponins stable  Continue aspirin   Echo pending     Deep vein thrombosis (DVT) (HCC)- (present on admission)  Assessment & Plan  Hx of   Continue warfarin    Hx pulmonary embolism- (present on  admission)  Assessment & Plan  Patient has history of PE I 2007.  continue warfarin.      Dyslipidemia- (present on admission)  Assessment & Plan  Continue atorvastatin 80mg daily.    Chronic anticoagulation- (present on admission)  Assessment & Plan  Patient is on chronic anticoagulation for history of DVT and PE. INR goal of 2-3.  Initial INR found to be 1.83  warfarin per pharmacy.    Acquired hypothyroidism- (present on admission)  Assessment & Plan  continue levothyroxine.       VTE prophylaxis: Warfarin

## 2020-10-02 NOTE — ED NOTES
Updated patient on plan of care including positive Covid, verbalized understanding. Patient given dinner box. Patient awaiting admission bed

## 2020-10-03 ENCOUNTER — APPOINTMENT (OUTPATIENT)
Dept: CARDIOLOGY | Facility: MEDICAL CENTER | Age: 70
End: 2020-10-03
Attending: INTERNAL MEDICINE
Payer: MEDICARE

## 2020-10-03 VITALS
RESPIRATION RATE: 17 BRPM | HEART RATE: 52 BPM | DIASTOLIC BLOOD PRESSURE: 58 MMHG | TEMPERATURE: 97.2 F | BODY MASS INDEX: 31.51 KG/M2 | HEIGHT: 68 IN | WEIGHT: 207.89 LBS | OXYGEN SATURATION: 95 % | SYSTOLIC BLOOD PRESSURE: 140 MMHG

## 2020-10-03 LAB
INR PPP: 1.66 (ref 0.87–1.13)
LV EJECT FRACT  99904: 70
LV EJECT FRACT MOD 2C 99903: 76.99
LV EJECT FRACT MOD 4C 99902: 72.99
LV EJECT FRACT MOD BP 99901: 74.35
PROTHROMBIN TIME: 20.2 SEC (ref 12–14.6)

## 2020-10-03 PROCEDURE — G0378 HOSPITAL OBSERVATION PER HR: HCPCS

## 2020-10-03 PROCEDURE — 90662 IIV NO PRSV INCREASED AG IM: CPT | Performed by: INTERNAL MEDICINE

## 2020-10-03 PROCEDURE — 700102 HCHG RX REV CODE 250 W/ 637 OVERRIDE(OP): Performed by: INTERNAL MEDICINE

## 2020-10-03 PROCEDURE — 93306 TTE W/DOPPLER COMPLETE: CPT | Mod: 26 | Performed by: INTERNAL MEDICINE

## 2020-10-03 PROCEDURE — 700111 HCHG RX REV CODE 636 W/ 250 OVERRIDE (IP): Performed by: INTERNAL MEDICINE

## 2020-10-03 PROCEDURE — 93306 TTE W/DOPPLER COMPLETE: CPT

## 2020-10-03 PROCEDURE — A9270 NON-COVERED ITEM OR SERVICE: HCPCS | Performed by: INTERNAL MEDICINE

## 2020-10-03 PROCEDURE — 700102 HCHG RX REV CODE 250 W/ 637 OVERRIDE(OP): Performed by: STUDENT IN AN ORGANIZED HEALTH CARE EDUCATION/TRAINING PROGRAM

## 2020-10-03 PROCEDURE — 85610 PROTHROMBIN TIME: CPT

## 2020-10-03 PROCEDURE — 90471 IMMUNIZATION ADMIN: CPT

## 2020-10-03 PROCEDURE — 99217 PR OBSERVATION CARE DISCHARGE: CPT | Performed by: INTERNAL MEDICINE

## 2020-10-03 PROCEDURE — A9270 NON-COVERED ITEM OR SERVICE: HCPCS | Performed by: STUDENT IN AN ORGANIZED HEALTH CARE EDUCATION/TRAINING PROGRAM

## 2020-10-03 PROCEDURE — 36415 COLL VENOUS BLD VENIPUNCTURE: CPT

## 2020-10-03 RX ORDER — WARFARIN SODIUM 7.5 MG/1
3.75 TABLET ORAL
Status: COMPLETED | OUTPATIENT
Start: 2020-10-03 | End: 2020-10-03

## 2020-10-03 RX ADMIN — INFLUENZA A VIRUS A/MICHIGAN/45/2015 X-275 (H1N1) ANTIGEN (FORMALDEHYDE INACTIVATED), INFLUENZA A VIRUS A/SINGAPORE/INFIMH-16-0019/2016 IVR-186 (H3N2) ANTIGEN (FORMALDEHYDE INACTIVATED), INFLUENZA B VIRUS B/PHUKET/3073/2013 ANTIGEN (FORMALDEHYDE INACTIVATED), AND INFLUENZA B VIRUS B/MARYLAND/15/2016 BX-69A ANTIGEN (FORMALDEHYDE INACTIVATED) 0.7 ML: 60; 60; 60; 60 INJECTION, SUSPENSION INTRAMUSCULAR at 17:10

## 2020-10-03 RX ADMIN — ATORVASTATIN CALCIUM 80 MG: 80 TABLET, FILM COATED ORAL at 06:38

## 2020-10-03 RX ADMIN — MINERAL OIL, PETROLATUM 1 APPLICATION: 425; 573 OINTMENT OPHTHALMIC at 12:30

## 2020-10-03 RX ADMIN — DIVALPROEX SODIUM 250 MG: 250 TABLET, DELAYED RELEASE ORAL at 06:37

## 2020-10-03 RX ADMIN — WARFARIN SODIUM 3.75 MG: 7.5 TABLET ORAL at 17:36

## 2020-10-03 RX ADMIN — DOCUSATE SODIUM 50 MG AND SENNOSIDES 8.6 MG 2 TABLET: 8.6; 5 TABLET, FILM COATED ORAL at 06:38

## 2020-10-03 RX ADMIN — ASPIRIN 81 MG: 81 TABLET, CHEWABLE ORAL at 06:37

## 2020-10-03 RX ADMIN — METOPROLOL SUCCINATE 12.5 MG: 25 TABLET, EXTENDED RELEASE ORAL at 06:38

## 2020-10-03 NOTE — DISCHARGE INSTRUCTIONS
Discharge Instructions    Discharged to home by car with relative. Discharged via wheelchair, hospital escort: Yes.  Special equipment needed: Not Applicable    Be sure to schedule a follow-up appointment with your primary care doctor or any specialists as instructed.     Discharge Plan:   Influenza Vaccine Indication: Indicated: 9 to 64 years of age    I understand that a diet low in cholesterol, fat, and sodium is recommended for good health. Unless I have been given specific instructions below for another diet, I accept this instruction as my diet prescription.   Other diet: None    Special Instructions: None    · Is patient discharged on Warfarin / Coumadin?   COVID-19: How to Protect Yourself and Others  Know how it spreads  · There is currently no vaccine to prevent coronavirus disease 2019 (COVID-19).  · The best way to prevent illness is to avoid being exposed to this virus.  · The virus is thought to spread mainly from person-to-person.  ? Between people who are in close contact with one another (within about 6 feet).  ? Through respiratory droplets produced when an infected person coughs, sneezes or talks.  ? These droplets can land in the mouths or noses of people who are nearby or possibly be inhaled into the lungs.  ? Some recent studies have suggested that COVID-19 may be spread by people who are not showing symptoms.  Everyone should  Clean your hands often  · Wash your hands often with soap and water for at least 20 seconds especially after you have been in a public place, or after blowing your nose, coughing, or sneezing.  · If soap and water are not readily available, use a hand  that contains at least 60% alcohol. Cover all surfaces of your hands and rub them together until they feel dry.  · Avoid touching your eyes, nose, and mouth with unwashed hands.  Avoid close contact  · Stay home if you are sick.  · Avoid close contact with people who are sick.  · Put distance between yourself and  other people.  ? Remember that some people without symptoms may be able to spread virus.  ? This is especially important for people who are at higher risk of getting very sick.www.cdc.gov/coronavirus/2019-ncov/need-extra-precautions/people-at-higher-risk.html  Cover your mouth and nose with a cloth face cover when around others  · You could spread COVID-19 to others even if you do not feel sick.  · Everyone should wear a cloth face cover when they have to go out in public, for example to the grocery store or to  other necessities.  ? Cloth face coverings should not be placed on young children under age 2, anyone who has trouble breathing, or is unconscious, incapacitated or otherwise unable to remove the mask without assistance.  · The cloth face cover is meant to protect other people in case you are infected.  · Do NOT use a facemask meant for a healthcare worker.  · Continue to keep about 6 feet between yourself and others. The cloth face cover is not a substitute for social distancing.  Cover coughs and sneezes  · If you are in a private setting and do not have on your cloth face covering, remember to always cover your mouth and nose with a tissue when you cough or sneeze or use the inside of your elbow.  · Throw used tissues in the trash.  · Immediately wash your hands with soap and water for at least 20 seconds. If soap and water are not readily available, clean your hands with a hand  that contains at least 60% alcohol.  Clean and disinfect  · Clean AND disinfect frequently touched surfaces daily. This includes tables, doorknobs, light switches, countertops, handles, desks, phones, keyboards, toilets, faucets, and sinks. www.cdc.gov/coronavirus/2019-ncov/prevent-getting-sick/disinfecting-your-home.html  · If surfaces are dirty, clean them: Use detergent or soap and water prior to disinfection.  · Then, use a household disinfectant. You can see a list of EPA-registered household disinfectants  here.  cdc.gov/coronavirus  05/05/2020  This information is not intended to replace advice given to you by your health care provider. Make sure you discuss any questions you have with your health care provider.  Document Released: 04/14/2020 Document Revised: 05/13/2020 Document Reviewed: 04/14/2020  Elsevier Patient Education © 2020 [employer]      Depression / Suicide Risk    As you are discharged from this [facility] facility, it is important to learn how to keep safe from harming yourself.    Recognize the warning signs:  · Abrupt changes in personality, positive or negative- including increase in energy   · Giving away possessions  · Change in eating patterns- significant weight changes-  positive or negative  · Change in sleeping patterns- unable to sleep or sleeping all the time   · Unwillingness or inability to communicate  · Depression  · Unusual sadness, discouragement and loneliness  · Talk of wanting to die  · Neglect of personal appearance   · Rebelliousness- reckless behavior  · Withdrawal from people/activities they love  · Confusion- inability to concentrate     If you or a loved one observes any of these behaviors or has concerns about self-harm, here's what you can do:  · Talk about it- your feelings and reasons for harming yourself  · Remove any means that you might use to hurt yourself (examples: pills, rope, extension cords, firearm)  · Get professional help from the community (Mental Health, Substance Abuse, psychological counseling)  · Do not be alone:Call your Safe Contact- someone whom you trust who will be there for you.  · Call your local CRISIS HOTLINE 213-5229 or 862-744-5008  · Call your local Children's Mobile Crisis Response Team [place] (448) 156-8530 or www.WorldRemit  · Call the toll free National Suicide Prevention Hotlines   · National Suicide Prevention Lifeline 835-626-RDVI (6823)  · National Hope Line Network 800-SUICIDE (143-0105)    COVID-19: How to Protect  Yourself and Others  Know how it spreads  · There is currently no vaccine to prevent coronavirus disease 2019 (COVID-19).  · The best way to prevent illness is to avoid being exposed to this virus.  · The virus is thought to spread mainly from person-to-person.  ? Between people who are in close contact with one another (within about 6 feet).  ? Through respiratory droplets produced when an infected person coughs, sneezes or talks.  ? These droplets can land in the mouths or noses of people who are nearby or possibly be inhaled into the lungs.  ? Some recent studies have suggested that COVID-19 may be spread by people who are not showing symptoms.  Everyone should  Clean your hands often  · Wash your hands often with soap and water for at least 20 seconds especially after you have been in a public place, or after blowing your nose, coughing, or sneezing.  · If soap and water are not readily available, use a hand  that contains at least 60% alcohol. Cover all surfaces of your hands and rub them together until they feel dry.  · Avoid touching your eyes, nose, and mouth with unwashed hands.  Avoid close contact  · Stay home if you are sick.  · Avoid close contact with people who are sick.  · Put distance between yourself and other people.  ? Remember that some people without symptoms may be able to spread virus.  ? This is especially important for people who are at higher risk of getting very sick.www.cdc.gov/coronavirus/2019-ncov/need-extra-precautions/people-at-higher-risk.html  Cover your mouth and nose with a cloth face cover when around others  · You could spread COVID-19 to others even if you do not feel sick.  · Everyone should wear a cloth face cover when they have to go out in public, for example to the grocery store or to  other necessities.  ? Cloth face coverings should not be placed on young children under age 2, anyone who has trouble breathing, or is unconscious, incapacitated or  otherwise unable to remove the mask without assistance.  · The cloth face cover is meant to protect other people in case you are infected.  · Do NOT use a facemask meant for a healthcare worker.  · Continue to keep about 6 feet between yourself and others. The cloth face cover is not a substitute for social distancing.  Cover coughs and sneezes  · If you are in a private setting and do not have on your cloth face covering, remember to always cover your mouth and nose with a tissue when you cough or sneeze or use the inside of your elbow.  · Throw used tissues in the trash.  · Immediately wash your hands with soap and water for at least 20 seconds. If soap and water are not readily available, clean your hands with a hand  that contains at least 60% alcohol.  Clean and disinfect  · Clean AND disinfect frequently touched surfaces daily. This includes tables, doorknobs, light switches, countertops, handles, desks, phones, keyboards, toilets, faucets, and sinks. www.cdc.gov/coronavirus/2019-ncov/prevent-getting-sick/disinfecting-your-home.html  · If surfaces are dirty, clean them: Use detergent or soap and water prior to disinfection.  · Then, use a household disinfectant. You can see a list of EPA-registered household disinfectants here.  cdc.gov/coronavirus  05/05/2020  This information is not intended to replace advice given to you by your health care provider. Make sure you discuss any questions you have with your health care provider.  Document Released: 04/14/2020 Document Revised: 05/13/2020 Document Reviewed: 04/14/2020  Elsevier Patient Education © 2020 [employer] Inc.  COVID-19 Frequently Asked Questions  COVID-19 (coronavirus disease) is an infection that is caused by a large family of viruses. Some viruses cause illness in people and others cause illness in animals like camels, cats, and bats. In some cases, the viruses that cause illness in animals can spread to humans.  Where did the coronavirus come  from?  In December 2019, [place] told the World Health Organization (WHO) of several cases of lung disease (human respiratory illness). These cases were linked to an open seafood and livestock market in the city of [place]. The link to the seafood and livestock market suggests that the virus may have spread from animals to humans. However, since that first outbreak in December, the virus has also been shown to spread from person to person.  What is the name of the disease and the virus?  Disease name  Early on, this disease was called novel coronavirus. This is because scientists determined that the disease was caused by a new (novel) respiratory virus. The World Health Organization (WHO) has now named the disease COVID-19, or coronavirus disease.  Virus name  The virus that causes the disease is called severe acute respiratory syndrome coronavirus 2 (SARS-CoV-2).  More information on disease and virus naming  World Health Organization (WHO): www.who.int/emergencies/diseases/novel-coronavirus-2019/technical-guidance/naming-the-coronavirus-disease-(covid-2019)-and-the-virus-that-causes-it  Who is at risk for complications from coronavirus disease?  Some people may be at higher risk for complications from coronavirus disease. This includes older adults and people who have chronic diseases, such as heart disease, diabetes, and lung disease.  If you are at higher risk for complications, take these extra precautions:  · Avoid close contact with people who are sick or have a fever or cough. Stay at least 3-6 ft (1-2 m) away from them, if possible.  · Wash your hands often with soap and water for at least 20 seconds.  · Avoid touching your face, mouth, nose, or eyes.  · Keep supplies on hand at home, such as food, medicine, and cleaning supplies.  · Stay home as much as possible.  · Avoid social gatherings and travel.  How does coronavirus disease spread?  The virus that causes coronavirus disease spreads easily from person to  person (is contagious). There are also cases of community-spread disease. This means the disease has spread to:  · People who have no known contact with other infected people.  · People who have not traveled to areas where there are known cases.  It appears to spread from one person to another through droplets from coughing or sneezing.  Can I get the virus from touching surfaces or objects?  There is still a lot that we do not know about the virus that causes coronavirus disease. Scientists are basing a lot of information on what they know about similar viruses, such as:  · Viruses cannot generally survive on surfaces for long. They need a human body (host) to survive.  · It is more likely that the virus is spread by close contact with people who are sick (direct contact), such as through:  ? Shaking hands or hugging.  ? Breathing in respiratory droplets that travel through the air. This can happen when an infected person coughs or sneezes on or near other people.  · It is less likely that the virus is spread when a person touches a surface or object that has the virus on it (indirect contact). The virus may be able to enter the body if the person touches a surface or object and then touches his or her face, eyes, nose, or mouth.  Can a person spread the virus without having symptoms of the disease?  It may be possible for the virus to spread before a person has symptoms of the disease, but this is most likely not the main way the virus is spreading. It is more likely for the virus to spread by being in close contact with people who are sick and breathing in the respiratory droplets of a sick person's cough or sneeze.  What are the symptoms of coronavirus disease?  Symptoms vary from person to person and can range from mild to severe. Symptoms may include:  · Fever.  · Cough.  · Tiredness, weakness, or fatigue.  · Fast breathing or feeling short of breath.  These symptoms can appear anywhere from 2 to 14 days after  you have been exposed to the virus. If you develop symptoms, call your health care provider. People with severe symptoms may need hospital care.  If I am exposed to the virus, how long does it take before symptoms start?  Symptoms of coronavirus disease may appear anywhere from 2 to 14 days after a person has been exposed to the virus. If you develop symptoms, call your health care provider.  Should I be tested for this virus?  Your health care provider will decide whether to test you based on your symptoms, history of exposure, and your risk factors.  How does a health care provider test for this virus?  Health care providers will collect samples to send for testing. Samples may include:  · Taking a swab of fluid from the nose.  · Taking fluid from the lungs by having you cough up mucus (sputum) into a sterile cup.  · Taking a blood sample.  · Taking a stool or urine sample.  Is there a treatment or vaccine for this virus?  Currently, there is no vaccine to prevent coronavirus disease. Also, there are no medicines like antibiotics or antivirals to treat the virus. A person who becomes sick is given supportive care, which means rest and fluids. A person may also relieve his or her symptoms by using over-the-counter medicines that treat sneezing, coughing, and runny nose. These are the same medicines that a person takes for the common cold.  If you develop symptoms, call your health care provider. People with severe symptoms may need hospital care.  What can I do to protect myself and my family from this virus?         You can protect yourself and your family by taking the same actions that you would take to prevent the spread of other viruses. Take the following actions:  · Wash your hands often with soap and water for at least 20 seconds. If soap and water are not available, use alcohol-based hand .  · Avoid touching your face, mouth, nose, or eyes.  · Cough or sneeze into a tissue, sleeve, or elbow. Do  not cough or sneeze into your hand or the air.  ? If you cough or sneeze into a tissue, throw it away immediately and wash your hands.  · Disinfect objects and surfaces that you frequently touch every day.  · Avoid close contact with people who are sick or have a fever or cough. Stay at least 3-6 ft (1-2 m) away from them, if possible.  · Stay home if you are sick, except to get medical care. Call your health care provider before you get medical care.  · Make sure your vaccines are up to date. Ask your health care provider what vaccines you need.  What should I do if I need to travel?  Follow travel recommendations from your local health authority, the CDC, and WHO.  Travel information and advice  · Centers for Disease Control and Prevention (CDC): www.cdc.gov/coronavirus/2019-ncov/travelers/index.html  · World Health Organization (WHO): www.who.int/emergencies/diseases/novel-coronavirus-2019/travel-advice  Know the risks and take action to protect your health  · You are at higher risk of getting coronavirus disease if you are traveling to areas with an outbreak or if you are exposed to travelers from areas with an outbreak.  · Wash your hands often and practice good hygiene to lower the risk of catching or spreading the virus.  What should I do if I am sick?  General instructions to stop the spread of infection  · Wash your hands often with soap and water for at least 20 seconds. If soap and water are not available, use alcohol-based hand .  · Cough or sneeze into a tissue, sleeve, or elbow. Do not cough or sneeze into your hand or the air.  · If you cough or sneeze into a tissue, throw it away immediately and wash your hands.  · Stay home unless you must get medical care. Call your health care provider or local health authority before you get medical care.  · Avoid public areas. Do not take public transportation, if possible.  · If you can, wear a mask if you must go out of the house or if you are in  close contact with someone who is not sick.  Keep your home clean  · Disinfect objects and surfaces that are frequently touched every day. This may include:  ? Counters and tables.  ? Doorknobs and light switches.  ? Sinks and faucets.  ? Electronics such as phones, remote controls, keyboards, computers, and tablets.  · Wash dishes in hot, soapy water or use a . Air-dry your dishes.  · Wash laundry in hot water.  Prevent infecting other household members  · Let healthy household members care for children and pets, if possible. If you have to care for children or pets, wash your hands often and wear a mask.  · Sleep in a different bedroom or bed, if possible.  · Do not share personal items, such as razors, toothbrushes, deodorant, aguiar, brushes, towels, and washcloths.  Where to find more information  Centers for Disease Control and Prevention (CDC)  · Information and news updates: www.cdc.gov/coronavirus/2019-ncov  World Health Organization (WHO)  · Information and news updates: www.who.int/emergencies/diseases/novel-coronavirus-2019  · Coronavirus health topic: www.who.int/health-topics/coronavirus  · Questions and answers on COVID-19: www.who.int/news-room/q-a-detail/d-v-gorqnkkvvepez  · Global tracker: who.Mirametrix  American Academy of Pediatrics (AAP)  · Information for families: www.healthychildren.org/English/health-issues/conditions/chest-lungs/Pages/2019-Novel-Coronavirus.aspx  The coronavirus situation is changing rapidly. Check your local health authority website or the CDC and WHO websites for updates and news.  When should I contact a health care provider?  · Contact your health care provider if you have symptoms of an infection, such as fever or cough, and you:  ? Have been near anyone who is known to have coronavirus disease.  ? Have come into contact with a person who is suspected to have coronavirus disease.  ? Have traveled outside of the country.  When should I get emergency medical  care?  · Get help right away by calling your local emergency services (911 in the [place] if you have:  ? Trouble breathing.  ? Pain or pressure in your chest.  ? Confusion.  ? Blue-tinged lips and fingernails.  ? Difficulty waking from sleep.  ? Symptoms that get worse.  Let the emergency medical personnel know if you think you have coronavirus disease.  Summary  · A new respiratory virus is spreading from person to person and causing COVID-19 (coronavirus disease).  · The virus that causes COVID-19 appears to spread easily. It spreads from one person to another through droplets from coughing or sneezing.  · Older adults and those with chronic diseases are at higher risk of disease. If you are at higher risk for complications, take extra precautions.  · There is currently no vaccine to prevent coronavirus disease. There are no medicines, such as antibiotics or antivirals, to treat the virus.  · You can protect yourself and your family by washing your hands often, avoiding touching your face, and covering your coughs and sneezes.  This information is not intended to replace advice given to you by your health care provider. Make sure you discuss any questions you have with your health care provider.  Document Released: 04/14/2020 Document Revised: 04/14/2020 Document Reviewed: 04/14/2020  [employer] Patient Education © 2020 [employer]  COVID-19  COVID-19 is a respiratory infection that is caused by a virus called severe acute respiratory syndrome coronavirus 2 (SARS-CoV-2). The disease is also known as coronavirus disease or novel coronavirus. In some people, the virus may not cause any symptoms. In others, it may cause a serious infection. The infection can get worse quickly and can lead to complications, such as:  · Pneumonia, or infection of the lungs.  · Acute respiratory distress syndrome or ARDS. This is fluid build-up in the lungs.  · Acute respiratory failure. This is a condition in which there is not enough  oxygen passing from the lungs to the body.  · Sepsis or septic shock. This is a serious bodily reaction to an infection.  · Blood clotting problems.  · Secondary infections due to bacteria or fungus.  The virus that causes COVID-19 is contagious. This means that it can spread from person to person through droplets from coughs and sneezes (respiratory secretions).  What are the causes?  This illness is caused by a virus. You may catch the virus by:  · Breathing in droplets from an infected person's cough or sneeze.  · Touching something, like a table or a doorknob, that was exposed to the virus (contaminated) and then touching your mouth, nose, or eyes.  What increases the risk?  Risk for infection  You are more likely to be infected with this virus if you:  · Live in or travel to an area with a COVID-19 outbreak.  · Come in contact with a sick person who recently traveled to an area with a COVID-19 outbreak.  · Provide care for or live with a person who is infected with COVID-19.  Risk for serious illness  You are more likely to become seriously ill from the virus if you:  · Are 65 years of age or older.  · Have a long-term disease that lowers your body's ability to fight infection (immunocompromised).  · Live in a nursing home or long-term care facility.  · Have a long-term (chronic) disease such as:  ? Chronic lung disease, including chronic obstructive pulmonary disease or asthma  ? Heart disease.  ? Diabetes.  ? Chronic kidney disease.  ? Liver disease.  · Are obese.  What are the signs or symptoms?  Symptoms of this condition can range from mild to severe. Symptoms may appear any time from 2 to 14 days after being exposed to the virus. They include:  · A fever.  · A cough.  · Difficulty breathing.  · Chills.  · Muscle pains.  · A sore throat.  · Loss of taste or smell.  Some people may also have stomach problems, such as nausea, vomiting, or diarrhea.  Other people may not have any symptoms of COVID-19.  How is  this diagnosed?  This condition may be diagnosed based on:  · Your signs and symptoms, especially if:  ? You live in an area with a COVID-19 outbreak.  ? You recently traveled to or from an area where the virus is common.  ? You provide care for or live with a person who was diagnosed with COVID-19.  · A physical exam.  · Lab tests, which may include:  ? A nasal swab to take a sample of fluid from your nose.  ? A throat swab to take a sample of fluid from your throat.  ? A sample of mucus from your lungs (sputum).  ? Blood tests.  · Imaging tests, which may include, X-rays, CT scan, or ultrasound.  How is this treated?  At present, there is no medicine to treat COVID-19. Medicines that treat other diseases are being used on a trial basis to see if they are effective against COVID-19.  Your health care provider will talk with you about ways to treat your symptoms. For most people, the infection is mild and can be managed at home with rest, fluids, and over-the-counter medicines.  Treatment for a serious infection usually takes places in a hospital intensive care unit (ICU). It may include one or more of the following treatments. These treatments are given until your symptoms improve.  · Receiving fluids and medicines through an IV.  · Supplemental oxygen. Extra oxygen is given through a tube in the nose, a face mask, or a deng.  · Positioning you to lie on your stomach (prone position). This makes it easier for oxygen to get into the lungs.  · Continuous positive airway pressure (CPAP) or bi-level positive airway pressure (BPAP) machine. This treatment uses mild air pressure to keep the airways open. A tube that is connected to a motor delivers oxygen to the body.  · Ventilator. This treatment moves air into and out of the lungs by using a tube that is placed in your windpipe.  · Tracheostomy. This is a procedure to create a hole in the neck so that a breathing tube can be inserted.  · Extracorporeal membrane  oxygenation (ECMO). This procedure gives the lungs a chance to recover by taking over the functions of the heart and lungs. It supplies oxygen to the body and removes carbon dioxide.  Follow these instructions at home:  Lifestyle  · If you are sick, stay home except to get medical care. Your health care provider will tell you how long to stay home. Call your health care provider before you go for medical care.  · Rest at home as told by your health care provider.  · Do not use any products that contain nicotine or tobacco, such as cigarettes, e-cigarettes, and chewing tobacco. If you need help quitting, ask your health care provider.  · Return to your normal activities as told by your health care provider. Ask your health care provider what activities are safe for you.  General instructions  · Take over-the-counter and prescription medicines only as told by your health care provider.  · Drink enough fluid to keep your urine pale yellow.  · Keep all follow-up visits as told by your health care provider. This is important.  How is this prevented?    There is no vaccine to help prevent COVID-19 infection. However, there are steps you can take to protect yourself and others from this virus.  To protect yourself:   · Do not travel to areas where COVID-19 is a risk. The areas where COVID-19 is reported change often. To identify high-risk areas and travel restrictions, check the CDC travel website: wwwnc.cdc.gov/travel/notices  · If you live in, or must travel to, an area where COVID-19 is a risk, take precautions to avoid infection.  ? Stay away from people who are sick.  ? Wash your hands often with soap and water for 20 seconds. If soap and water are not available, use an alcohol-based hand .  ? Avoid touching your mouth, face, eyes, or nose.  ? Avoid going out in public, follow guidance from your state and local health authorities.  ? If you must go out in public, wear a cloth face covering or face  mask.  ? Disinfect objects and surfaces that are frequently touched every day. This may include:  § Counters and tables.  § Doorknobs and light switches.  § Sinks and faucets.  § Electronics, such as phones, remote controls, keyboards, computers, and tablets.  To protect others:  If you have symptoms of COVID-19, take steps to prevent the virus from spreading to others.  · If you think you have a COVID-19 infection, contact your health care provider right away. Tell your health care team that you think you may have a COVID-19 infection.  · Stay home. Leave your house only to seek medical care. Do not use public transport.  · Do not travel while you are sick.  · Wash your hands often with soap and water for 20 seconds. If soap and water are not available, use alcohol-based hand .  · Stay away from other members of your household. Let healthy household members care for children and pets, if possible. If you have to care for children or pets, wash your hands often and wear a mask. If possible, stay in your own room, separate from others. Use a different bathroom.  · Make sure that all people in your household wash their hands well and often.  · Cough or sneeze into a tissue or your sleeve or elbow. Do not cough or sneeze into your hand or into the air.  · Wear a cloth face covering or face mask.  Where to find more information  · Centers for Disease Control and Prevention: www.cdc.gov/coronavirus/2019-ncov/index.html  · World Health Organization: www.who.int/health-topics/coronavirus  Contact a health care provider if:  · You live in or have traveled to an area where COVID-19 is a risk and you have symptoms of the infection.  · You have had contact with someone who has COVID-19 and you have symptoms of the infection.  Get help right away if:  · You have trouble breathing.  · You have pain or pressure in your chest.  · You have confusion.  · You have bluish lips and fingernails.  · You have difficulty waking  from sleep.  · You have symptoms that get worse.  These symptoms may represent a serious problem that is an emergency. Do not wait to see if the symptoms will go away. Get medical help right away. Call your local emergency services (911 in the [place] Do not drive yourself to the hospital. Let the emergency medical personnel know if you think you have COVID-19.  Summary  · COVID-19 is a respiratory infection that is caused by a virus. It is also known as coronavirus disease or novel coronavirus. It can cause serious infections, such as pneumonia, acute respiratory distress syndrome, acute respiratory failure, or sepsis.  · The virus that causes COVID-19 is contagious. This means that it can spread from person to person through droplets from coughs and sneezes.  · You are more likely to develop a serious illness if you are 65 years of age or older, have a weak immunity, live in a nursing home, or have chronic disease.  · There is no medicine to treat COVID-19. Your health care provider will talk with you about ways to treat your symptoms.  · Take steps to protect yourself and others from infection. Wash your hands often and disinfect objects and surfaces that are frequently touched every day. Stay away from people who are sick and wear a mask if you are sick.  This information is not intended to replace advice given to you by your health care provider. Make sure you discuss any questions you have with your health care provider.  Document Released: 01/23/2020 Document Revised: 05/14/2020 Document Reviewed: 01/23/2020  Elsevier Patient Education © 2020 [employer]

## 2020-10-03 NOTE — DISCHARGE SUMMARY
Discharge Summary    CHIEF COMPLAINT ON ADMISSION  Chief Complaint   Patient presents with   • Chest Pain     x2 weeks intermittently; pain has not gone away since this AM   • Dizziness     today       Reason for Admission  Chest Pain     Admission Date  10/1/2020    CODE STATUS  DNAR/DNI    HPI & HOSPITAL COURSE  This is a 69 y.o. male here with chest pain.     69-year-old male with past medical history of pulmonary embolism, DVT on chronic anticoagulation with warfarin, hypertension, hypothyroidism, hyperlipidemia, CAD status post CABG x3, COVID-19 and bipolar disorder who presented for worsening chest pain.  Chest pain has been intermittent for 6 weeks not associated with exertion.  Patient was diagnosed with COVID-19 3 weeks ago and had associated chest pain at that time as well as fever and headaches.  Troponin mildly elevated but stable on repeat, EKG shows no acute ST or T wave changes.  Due to patient's previous cardiac history echocardiogram was done that showed preserved EF and mild aortic stenosis unchanged from 2019 echo. Patient's chest pain resolved and likely pleuritic from COVID infection. Patient's vital signs are stable and he is ready for discharge home, he is to return to the ED if his condition worsens.     Therefore, he is discharged in good and stable condition to home with close outpatient follow-up.      Discharge Date  10/3/2020    FOLLOW UP ITEMS POST DISCHARGE  Follow up with your primary care physician for continued management of your warfarin.     DISCHARGE DIAGNOSES  Principal Problem (Resolved):    Atypical chest pain POA: Yes  Active Problems:    Chronic anticoagulation POA: Yes    Dyslipidemia POA: Yes    Hx pulmonary embolism POA: Yes    Deep vein thrombosis (DVT) (HCC) POA: Yes      Overview: Dax update 10/1/2016    Acquired hypothyroidism POA: Yes      FOLLOW UP  Future Appointments   Date Time Provider Department Center   10/8/2020 10:30 AM [facility] EXAM 5 VMED None     Edwina  BARRIE Le  [address] [address]  [phone]      Follow up with primary care physician for continued warfarin management      MEDICATIONS ON DISCHARGE     Medication List      CONTINUE taking these medications      Instructions   aspirin 81 MG tablet   Take 1 Tab by mouth every day. QD  Dose: 81 mg     atorvastatin 80 MG tablet  Commonly known as: LIPITOR   Take 80 mg by mouth every day.  Dose: 80 mg     CENTRUM PO   Take 1 Tab by mouth every morning.  Dose: 1 Tab     COENZYME Q10 PO   Take 1 Tab by mouth every morning.  Dose: 1 Tab     divalproex 250 MG Tbec  Commonly known as: DEPAKOTE   Take 250-500 mg by mouth 2 Times a Day. 250mg in the morning and 500mg in the evening  Dose: 250-500 mg     docosahexanoic acid 1000 MG Caps  Commonly known as: OMEGA 3 FA   Take 1,000 mg by mouth every day.  Dose: 1,000 mg     levothyroxine 75 MCG Tabs  Commonly known as: SYNTHROID   Take 75 mcg by mouth every evening.  Dose: 75 mcg     lisinopril 5 MG Tabs  Commonly known as: PRINIVIL   Take 5 mg by mouth every evening.  Dose: 5 mg     metoprolol 25 MG Tabs  Commonly known as: LOPRESSOR   Take 12.5 mg by mouth every morning.  Dose: 12.5 mg     predniSONE 10 MG Tabs  Commonly known as: DELTASONE   Take 10 mg by mouth 2 times a day as needed (gout).  Dose: 10 mg     Vitamin D 50 MCG (2000 UT) Caps   Take 1,000 Units by mouth every evening.  Dose: 1,000 Units     warfarin 7.5 MG Tabs  Commonly known as: COUMADIN   Take 3.75-7.5 mg by mouth every day. 7.5mg on Monday, Wednesday, & Friday   3.75mg all other days  Dose: 3.75-7.5 mg            Allergies  Allergies   Allergen Reactions   • Penicillins Unspecified     Childhood; does know what the reaction was       DIET  Orders Placed This Encounter   Procedures   • Diet Order Cardiac     Standing Status:   Standing     Number of Occurrences:   1     Order Specific Question:   Diet:     Answer:   Cardiac [6]       ACTIVITY  As tolerated.  Weight bearing as  tolerated    CONSULTATIONS  N/A    PROCEDURES  N/A    LABORATORY  Lab Results   Component Value Date    SODIUM 139 10/02/2020    POTASSIUM 4.2 10/02/2020    CHLORIDE 103 10/02/2020    CO2 25 10/02/2020    GLUCOSE 88 10/02/2020    BUN 14 10/02/2020    CREATININE 0.83 10/02/2020    CREATININE 1.0 07/09/2008        Lab Results   Component Value Date    WBC 7.3 10/02/2020    HEMOGLOBIN 15.0 10/02/2020    HEMATOCRIT 44.9 10/02/2020    PLATELETCT 335 10/02/2020        Total time of the discharge process exceeds 36 minutes.

## 2020-10-03 NOTE — CARE PLAN
Problem: Communication  Goal: The ability to communicate needs accurately and effectively will improve  Outcome: PROGRESSING AS EXPECTED     Problem: Infection  Goal: Will remain free from infection  Outcome: PROGRESSING AS EXPECTED     Problem: Bowel/Gastric:  Goal: Normal bowel function is maintained or improved  Outcome: PROGRESSING AS EXPECTED  Goal: Will not experience complications related to bowel motility  Outcome: PROGRESSING AS EXPECTED     Problem: Respiratory:  Goal: Respiratory status will improve  Outcome: PROGRESSING AS EXPECTED     Problem: Pain Management  Goal: Pain level will decrease to patient's comfort goal  Outcome: PROGRESSING AS EXPECTED

## 2020-10-03 NOTE — CARE PLAN
Problem: Communication  Goal: The ability to communicate needs accurately and effectively will improve  Outcome: PROGRESSING AS EXPECTED     Problem: Safety  Goal: Will remain free from injury  Outcome: PROGRESSING AS EXPECTED  Intervention: Provide assistance with mobility  Flowsheets (Taken 10/3/2020 0338)  Assistance: No Assistance Required  Ambulation Tolerance: Tolerates Well  Intervention: Collaborate with Interdisciplinary Team for safe transfer and mobilization techniques  Flowsheets (Taken 10/3/2020 0338)  Assistive Devices: None     Problem: Infection  Goal: Will remain free from infection  Outcome: PROGRESSING AS EXPECTED     Problem: Venous Thromboembolism (VTW)/Deep Vein Thrombosis (DVT) Prevention:  Goal: Patient will participate in Venous Thrombosis (VTE)/Deep Vein Thrombosis (DVT)Prevention Measures  Outcome: PROGRESSING AS EXPECTED     Problem: Bowel/Gastric:  Goal: Normal bowel function is maintained or improved  Outcome: PROGRESSING AS EXPECTED  Goal: Will not experience complications related to bowel motility  Outcome: PROGRESSING AS EXPECTED     Problem: Knowledge Deficit  Goal: Knowledge of disease process/condition, treatment plan, diagnostic tests, and medications will improve  Outcome: PROGRESSING AS EXPECTED  Goal: Knowledge of the prescribed therapeutic regimen will improve  Outcome: PROGRESSING AS EXPECTED     Problem: Discharge Barriers/Planning  Goal: Patient's continuum of care needs will be met  Outcome: PROGRESSING AS EXPECTED     Problem: Respiratory:  Goal: Respiratory status will improve  Outcome: PROGRESSING AS EXPECTED  Intervention: Assess and monitor pulmonary status  Flowsheets  Taken 10/3/2020 0338  Cough: Dry  Taken 10/2/2020 2100  RUL Breath Sounds: Clear     Problem: Pain Management  Goal: Pain level will decrease to patient's comfort goal  Outcome: PROGRESSING AS EXPECTED

## 2020-10-04 NOTE — CARE PLAN
Problem: Communication  Goal: The ability to communicate needs accurately and effectively will improve  10/3/2020 1718 by Anjum Jung R.N.  Outcome: MET  10/3/2020 1123 by Anjum Jung R.N.  Outcome: PROGRESSING AS EXPECTED     Problem: Safety  Goal: Will remain free from injury  10/3/2020 1718 by Anjum Jung R.N.  Outcome: MET  10/3/2020 1123 by Anjum Jung R.N.  Outcome: PROGRESSING AS EXPECTED  Goal: Will remain free from falls  10/3/2020 1718 by Anjum Jung R.N.  Outcome: MET  10/3/2020 1123 by Anjum Jung R.N.  Outcome: PROGRESSING AS EXPECTED     Problem: Safety  Goal: Will remain free from falls  10/3/2020 1718 by Anjum Jung R.N.  Outcome: MET  10/3/2020 1123 by Anjum Jung R.N.  Outcome: PROGRESSING AS EXPECTED     Problem: Infection  Goal: Will remain free from infection  10/3/2020 1718 by Anjum Jung R.N.  Outcome: MET  10/3/2020 1123 by Anjum Jung R.N.  Outcome: PROGRESSING AS EXPECTED     Problem: Venous Thromboembolism (VTW)/Deep Vein Thrombosis (DVT) Prevention:  Goal: Patient will participate in Venous Thrombosis (VTE)/Deep Vein Thrombosis (DVT)Prevention Measures  10/3/2020 1718 by Anjum Jung R.N.  Outcome: MET  10/3/2020 1123 by Anjum Jung R.N.  Outcome: PROGRESSING AS EXPECTED     Problem: Bowel/Gastric:  Goal: Normal bowel function is maintained or improved  10/3/2020 1718 by Anjum Jung R.N.  Outcome: MET  10/3/2020 1123 by Anjum Jung R.N.  Outcome: PROGRESSING AS EXPECTED  Goal: Will not experience complications related to bowel motility  10/3/2020 1718 by Anjum Jung R.N.  Outcome: MET  10/3/2020 1123 by Anjum Jung R.N.  Outcome: PROGRESSING AS EXPECTED

## 2020-10-04 NOTE — PROGRESS NOTES
Pt discharged at this time in stable condition, IV removed before discharge, discharge papers went over with patient and he verbalized understanding, escorted down to car via wheelchair by staff.

## 2020-10-04 NOTE — PROGRESS NOTES
Inpatient Anticoagulation Service Note    Date: 10/3/2020    Reason for Anticoagulation: Deep Vein Thrombosis, Pulmonary Embolism   Target INR: 2.0 to 3.0  [id number] VASc Score: 4  HAS-BLED Score: 4   Hemoglobin Value: 15  Hematocrit Value: 44.9  Lab Platelet Value: 335    INR from last 7 days     Date/Time INR Value    10/03/20 0831  (!) 1.66    10/02/20 0424  (!) 1.69    10/01/20 1630  (!) 1.83        Dose from last 7 days     Date/Time Dose (mg)    10/03/20 1719  3.75    10/02/20 0415  5        Significant Interactions: Statin, Aspirin, Thyroid Medications  Bridge Therapy: No (If less than 5 days and overlap therapy discontinued -- document reason (i.e. Bleed Risk))    (If still on overlap therapy, if No -- document reason (i.e. Bleed Risk))    Reversal Agent Administered: Not Applicable  Comments: No overt s/sx of bleeding noted in chart. NNL to assess H/h. No new DDI noted. Patient received bolus dose yesterday, expect INR to trend up. Will give patient's home regimen of warfarin 3.75 mg.    Plan:  Warfarin 3.75 mg PO X 1  Education Material Provided?: No  Pharmacist suggested discharge dosing:  Resume home dosing of warfarin 7.5 mg PO M,W,F and 3.75 mg ROW. Would recommend repeat INR w/i 48 hours of discharge     Renay Salcido, JonaD,BCPS

## 2020-10-08 ENCOUNTER — APPOINTMENT (OUTPATIENT)
Dept: VASCULAR LAB | Facility: MEDICAL CENTER | Age: 70
End: 2020-10-08
Payer: MEDICARE

## 2020-10-08 DIAGNOSIS — Z79.01 CHRONIC ANTICOAGULATION: ICD-10-CM

## 2020-10-11 ENCOUNTER — APPOINTMENT (OUTPATIENT)
Dept: URGENT CARE | Facility: CLINIC | Age: 70
End: 2020-10-11
Payer: MEDICARE

## 2020-10-12 ENCOUNTER — HOSPITAL ENCOUNTER (OUTPATIENT)
Dept: RADIOLOGY | Facility: MEDICAL CENTER | Age: 70
End: 2020-10-12
Attending: PHYSICIAN ASSISTANT
Payer: MEDICARE

## 2020-10-12 ENCOUNTER — HOSPITAL ENCOUNTER (OUTPATIENT)
Dept: LAB | Facility: MEDICAL CENTER | Age: 70
End: 2020-10-12
Attending: PHYSICIAN ASSISTANT
Payer: MEDICARE

## 2020-10-12 ENCOUNTER — OFFICE VISIT (OUTPATIENT)
Dept: URGENT CARE | Facility: PHYSICIAN GROUP | Age: 70
End: 2020-10-12
Payer: MEDICARE

## 2020-10-12 VITALS
HEART RATE: 61 BPM | OXYGEN SATURATION: 94 % | BODY MASS INDEX: 30.16 KG/M2 | TEMPERATURE: 96.6 F | HEIGHT: 68 IN | SYSTOLIC BLOOD PRESSURE: 140 MMHG | WEIGHT: 199 LBS | RESPIRATION RATE: 14 BRPM | DIASTOLIC BLOOD PRESSURE: 80 MMHG

## 2020-10-12 DIAGNOSIS — Z86.2 HISTORY OF BLOOD CLOTTING DISORDER: ICD-10-CM

## 2020-10-12 DIAGNOSIS — R22.32 LOCALIZED SWELLING, MASS, OR LUMP OF LEFT UPPER EXTREMITY: ICD-10-CM

## 2020-10-12 DIAGNOSIS — Z79.01 CHRONIC ANTICOAGULATION: ICD-10-CM

## 2020-10-12 DIAGNOSIS — L03.114 CELLULITIS OF LEFT UPPER EXTREMITY: ICD-10-CM

## 2020-10-12 LAB
INR PPP: 1.57 (ref 0.87–1.13)
PROTHROMBIN TIME: 19.2 SEC (ref 12–14.6)

## 2020-10-12 PROCEDURE — 36415 COLL VENOUS BLD VENIPUNCTURE: CPT

## 2020-10-12 PROCEDURE — 99214 OFFICE O/P EST MOD 30 MIN: CPT | Performed by: PHYSICIAN ASSISTANT

## 2020-10-12 PROCEDURE — 85610 PROTHROMBIN TIME: CPT

## 2020-10-12 PROCEDURE — 93971 EXTREMITY STUDY: CPT | Mod: LT

## 2020-10-12 RX ORDER — DOXYCYCLINE HYCLATE 100 MG
100 TABLET ORAL 2 TIMES DAILY
Qty: 10 TAB | Refills: 0 | Status: SHIPPED | OUTPATIENT
Start: 2020-10-12 | End: 2020-10-17

## 2020-10-12 ASSESSMENT — ENCOUNTER SYMPTOMS
TINGLING: 0
HEADACHES: 0
VOMITING: 0
FOCAL WEAKNESS: 0
CLAUDICATION: 0
BRUISES/BLEEDS EASILY: 1
FEVER: 0
SHORTNESS OF BREATH: 0
PALPITATIONS: 0
CHILLS: 0
COUGH: 0
DIZZINESS: 0
NAUSEA: 0
SENSORY CHANGE: 0
BLURRED VISION: 0
DOUBLE VISION: 0

## 2020-10-12 ASSESSMENT — FIBROSIS 4 INDEX: FIB4 SCORE: 0.94

## 2020-10-12 NOTE — PROGRESS NOTES
Subjective:      Rene Chan is a 69 y.o. male who presents with Arm Pain (L forearm [id number] )            HPI  69 year old male with history of clotting disorder presents to urgent care with new problem of left forearm pain and swelling onset 2 days ago.  Patient reports similar symptoms with previous blood clot of right upper extremity.  Patient is on warfarin for history of DVT/PE and clotting disorder.  He denies chest pain or shortness of breath.  No unilateral lower extremity swelling or pain. Last INR check on 10/3/2020 was therapeutically low at 1.66.  Patient is followed by Coumadin clinic and has had recent changes in dosing in order to reach therapeutic range of anticoagulation.  Patient reports history of flu shot given about 1 week ago.  He reports mild soreness of left upper extremity which is now resolved.    Review of Systems   Constitutional: Negative for chills, fever and malaise/fatigue.   HENT: Negative for tinnitus.    Eyes: Negative for blurred vision and double vision.   Respiratory: Negative for cough and shortness of breath.    Cardiovascular: Negative for chest pain, palpitations, claudication and leg swelling.   Gastrointestinal: Negative for nausea and vomiting.   Musculoskeletal:        Left upper extremity pain and swelling.    Skin: Negative for rash.   Neurological: Negative for dizziness, tingling, sensory change, focal weakness and headaches.   Endo/Heme/Allergies: Bruises/bleeds easily.   All other systems reviewed and are negative.      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  Social History     Tobacco Use   • Smoking status: Former Smoker     Packs/day: 2.00     Years: 10.00     Pack years: 20.00     Types: Cigarettes     Quit date: 1976     Years since quittin.8   • Smokeless tobacco: Never Used   • Tobacco comment: quit x 32 yrs; 2ppd x 7 years   Substance Use Topics   • Alcohol use: No      Objective:     /80   Pulse 61   Temp 35.9 °C (96.6 °F) (Temporal)   Resp 14   Ht 1.727 m (5' 8\")   Wt 90.3 kg (199 lb)   SpO2 94%   BMI 30.26 kg/m²      Physical Exam  Vitals signs reviewed.   Constitutional:       General: He is not in acute distress.     Appearance: Normal appearance. He is well-developed. He is not ill-appearing.   HENT:      Head: Normocephalic and atraumatic.   Eyes:      Conjunctiva/sclera: Conjunctivae normal.   Neck:      Musculoskeletal: Normal range of motion and neck supple.   Cardiovascular:      Rate and Rhythm: Normal rate and regular rhythm.      Heart sounds: Murmur present. Systolic murmur present with a grade of 2/6.   Pulmonary:      Effort: Pulmonary effort is normal. No respiratory distress.      Breath sounds: Normal breath sounds.   Musculoskeletal:        Arms:    Skin:     General: Skin is warm and dry.   Neurological:      General: No focal deficit present.      Mental Status: He is alert and oriented to person, place, and time.   Psychiatric:         Mood and Affect: Mood normal.         Behavior: Behavior normal.         Thought Content: Thought content normal.         Judgment: Judgment normal.                 Assessment/Plan:     1. Localized swelling, mass, or lump of left upper extremity  US-EXTREMITY VENOUS UPPER UNILAT LEFT   2. History of blood clotting disorder  US-EXTREMITY VENOUS UPPER UNILAT LEFT    Prothrombin Time   3. Chronic " anticoagulation  Prothrombin Time   4. Cellulitis of left upper extremity  doxycycline (VIBRAMYCIN) 100 MG Tab     Venous US LUE:   FINDINGS:   Left upper extremity.    No evidence of deep venous thrombosis.    Evidence of acute occlusive superficial thrombophlebitis in the left    cephalic vein and a branch coming off it at the level of mid forearm.   All other veins of the left upper extremity demonstrate normal flow    dynamics with no evidence of deep vein thrombosis or superficial    thrombophlebitis.       There is subcutaneous left upper extremity edema noted.     Patient takes coumadin and is followed by coumadin clinic; PT/INR ordered for evaluation of therapeutic range.  Last INR was sub-therapeutic at 1.66. Patient reports recent dosing adjustments made by his physician.   Patient's vital signs are stable, he is in no acute respiratory distress. I do not suspect PE. Patient is on anticoagulation and has small probability of DVT/PE, however patient reports his pain is similar to previous DVT of right upper extremity. Patient also reports his symptoms began after having influenza vaccination last week.  He denies previous reaction to vaccinations.  He denies fevers or increasing erythema/pain.  Recommend patient has close follow-up with Coumadin clinic and physician that manages his dosing.  Advised patient that doxycycline may increase INR, however this may not be too much of a concern as his INR is too low currently.  Patient states he will call Coumadin clinic for appointment tomorrow.  Discussed with patient reasons to return or proceed to emergency department for immediate reevaluation. patient verbalized understanding of treatment plan and has no further questions regarding care.

## 2020-10-26 ENCOUNTER — ANTICOAGULATION VISIT (OUTPATIENT)
Dept: VASCULAR LAB | Facility: MEDICAL CENTER | Age: 70
End: 2020-10-26
Attending: INTERNAL MEDICINE
Payer: MEDICARE

## 2020-10-26 DIAGNOSIS — I82.90 DEEP VEIN THROMBOSIS (DVT) OF NON-EXTREMITY VEIN, UNSPECIFIED CHRONICITY: ICD-10-CM

## 2020-10-26 LAB
INR BLD: 1.6 (ref 0.9–1.2)
INR PPP: 1.6 (ref 2–3.5)

## 2020-10-26 PROCEDURE — 85610 PROTHROMBIN TIME: CPT

## 2020-10-26 PROCEDURE — 99212 OFFICE O/P EST SF 10 MIN: CPT | Performed by: NURSE PRACTITIONER

## 2020-10-26 NOTE — PROGRESS NOTES
Anticoagulation Summary  As of 10/26/2020    INR goal:  2.0-3.0   TTR:  60.3 % (5.4 y)   INR used for dosin.60 (10/26/2020)   Warfarin maintenance plan:  3.75 mg (7.5 mg x 0.5) every Sun, Tue, Thu; 7.5 mg (7.5 mg x 1) all other days   Weekly warfarin total:  41.25 mg   Plan last modified:  Miya Acosta, A.P.N. (10/26/2020)   Next INR check:  2020   Priority:  Acute   Target end date:  Indefinite    Indications    DVT (deep venous thrombosis) (HCC) (Resolved) [I82.409]  Pulmonary embolism [415.19] (Resolved) [I26.99]  Deep vein thrombosis (DVT) (HCC) [I82.409]             Anticoagulation Episode Summary     INR check location:  Anticoagulation Clinic    Preferred lab:  [facility]    Send INR reminders to:      Comments:        Anticoagulation Care Providers     Provider Role Specialty Phone number    Patricia Damon M.D. Referring Internal Medicine [phone]    Jona QuinnD Responsible      [facility] Anticoagulation Services Responsible  [phone]        Anticoagulation Patient Findings      HPI:  Rene Chan seen in clinic today for follow up on anticoagulation therapy in the presence of DVT, PE.   Pt tested COVID + 10/1/20. Went to the ER for chest tightness. Sent home on an abx which he is no longer taking.  Denies any medication or diet changes.   No current symptoms of bleeding or thrombosis reported.  Confirmed dose of warfarin. No missed doses.    A/P:   INR subtherapeutic. INR trending low for some time now. No recent VTEs. Will increase regimen. He knows to seek immediate medical attention for any s/sx of recurrent VTE.  BP recorded in vitals.    Follow up appointment in 2 week(s) per pt preference.    Next Appointment:  at 10:45 am.    Miya PARKER

## 2020-10-28 ENCOUNTER — OFFICE VISIT (OUTPATIENT)
Dept: CARDIOLOGY | Facility: MEDICAL CENTER | Age: 70
End: 2020-10-28
Payer: MEDICARE

## 2020-10-28 VITALS
HEART RATE: 66 BPM | BODY MASS INDEX: 32.16 KG/M2 | OXYGEN SATURATION: 94 % | DIASTOLIC BLOOD PRESSURE: 76 MMHG | SYSTOLIC BLOOD PRESSURE: 134 MMHG | HEIGHT: 68 IN | WEIGHT: 212.2 LBS

## 2020-10-28 DIAGNOSIS — Z95.1 S/P CABG X 3: ICD-10-CM

## 2020-10-28 DIAGNOSIS — I25.83 CORONARY ARTERY DISEASE DUE TO LIPID RICH PLAQUE: ICD-10-CM

## 2020-10-28 DIAGNOSIS — I35.0 MILD AORTIC STENOSIS: Chronic | ICD-10-CM

## 2020-10-28 DIAGNOSIS — I65.23 BILATERAL CAROTID ARTERY STENOSIS: ICD-10-CM

## 2020-10-28 DIAGNOSIS — Z79.01 CHRONIC ANTICOAGULATION: ICD-10-CM

## 2020-10-28 DIAGNOSIS — I25.10 CORONARY ARTERY DISEASE DUE TO LIPID RICH PLAQUE: ICD-10-CM

## 2020-10-28 DIAGNOSIS — E78.5 DYSLIPIDEMIA: ICD-10-CM

## 2020-10-28 PROCEDURE — 99214 OFFICE O/P EST MOD 30 MIN: CPT | Performed by: INTERNAL MEDICINE

## 2020-10-28 ASSESSMENT — ENCOUNTER SYMPTOMS
COUGH: 0
DIZZINESS: 0
PALPITATIONS: 0
BRUISES/BLEEDS EASILY: 0
CLAUDICATION: 0
SORE THROAT: 0
FOCAL WEAKNESS: 0
NAUSEA: 0
PND: 0
CHILLS: 0
FEVER: 0
SHORTNESS OF BREATH: 0
WEAKNESS: 0
ABDOMINAL PAIN: 0
FALLS: 0
BLURRED VISION: 0

## 2020-10-28 ASSESSMENT — FIBROSIS 4 INDEX: FIB4 SCORE: 0.95

## 2020-10-28 NOTE — PROGRESS NOTES
Chief Complaint   Patient presents with   • Follow-Up     Echo Results, Mild Aortic Stenosis       Subjective:   Rene Chan is a 70 y.o. 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   • Colon polyps    • Diabetes mellitus, type II (HCC)     diet controlled   • Gout    • Hammer toe    • Hyperlipidemia    • Hypertension    • Hypothyroidism    • Impaired fasting glucose    • Mild aortic stenosis - echo 4/2018      Past Surgical History:   Procedure Laterality Date   • MULTIPLE CORONARY ARTERY BYPASS ENDO VEIN HARVEST  6/4/2015    Procedure: MULTIPLE CORONARY ARTERY BYPASS Grafting  x 3   ENDO VEIN HARVEST right leg;  Surgeon: Christophe Lemus M.D.;  Location: SURGERY [facility];  Service:    • RECOVERY  6/1/2015    Procedure:  [facility];  Surgeon: [facility];  Location: SURGERY PRE-POST PROC UNIT [facility];  Service:    • OTHER ORTHOPEDIC SURGERY  1976    L wrist repair    • ANKLE FUSION     • APPENDECTOMY     • OTHER ABDOMINAL SURGERY      umbilical hernia repair 2000, appy age 13     Family History   Problem Relation Age of Onset   • Heart Disease Sister 50   • Stroke Sister 55        CVA   • Heart Failure Father      Social History     Socioeconomic History   • Marital status:      Spouse name: Not on file   • Number of children: Not on file   • Years of education: Not on file   •  Highest education level: Not on file   Occupational History   • Occupation: Cam      Comment: Build house for 35 years   Social Needs   • Financial resource strain: Not on file   • Food insecurity     Worry: Not on file     Inability: Not on file   • Transportation needs     Medical: Not on file     Non-medical: Not on file   Tobacco Use   • Smoking status: Former Smoker     Packs/day: 2.00     Years: 10.00     Pack years: 20.00     Types: Cigarettes     Quit date: 1976     Years since quittin.8   • Smokeless tobacco: Never Used   • Tobacco comment: quit x 32 yrs; 2ppd x 7 years   Substance and Sexual Activity   • Alcohol use: No   • Drug use: No     Comment: IV drug use in teens   • Sexual activity: Not Currently     Partners: Female   Lifestyle   • Physical activity     Days per week: Not on file     Minutes per session: Not on file   • Stress: Not on file   Relationships   • Social connections     Talks on phone: Not on file     Gets together: Not on file     Attends [employer] service: Not on file     Active member of club or organization: Not on file     Attends meetings of clubs or organizations: Not on file     Relationship status: Not on file   • Intimate partner violence     Fear of current or ex partner: Not on file     Emotionally abused: Not on file     Physically abused: Not on file     Forced sexual activity: Not on file   Other Topics Concern   • Not on file   Social History Narrative    Lives with wife    Retired cam    Does security part time    ADLs and IADLs intact     Allergies   Allergen Reactions   • Penicillins Unspecified     Childhood; does know what the reaction was     Outpatient Encounter Medications as of 10/28/2020   Medication Sig Dispense Refill   • atorvastatin (LIPITOR) 80 MG tablet Take 80 mg by mouth every day.     • divalproex (DEPAKOTE) 250 MG Tablet Delayed Response Take 250-500 mg by mouth 3 times a day. 250mg in the morning and 500mg in the evening    "Indications: 2 at night and 1 in morning     • levothyroxine (SYNTHROID) 75 MCG Tab Take 75 mcg by mouth every evening.     • lisinopril (PRINIVIL) 5 MG Tab Take 5 mg by mouth every evening.     • metoprolol (LOPRESSOR) 25 MG Tab Take 12.5 mg by mouth every morning.     • warfarin (COUMADIN) 7.5 MG Tab Take 3.75-7.5 mg by mouth every day. 7.5mg on Monday, Wednesday, & Friday   3.75mg all other days     • Multiple Vitamins-Minerals (CENTRUM PO) Take 1 Tab by mouth every morning.     • predniSONE (DELTASONE) 10 MG Tab Take 10 mg by mouth 2 times a day as needed (gout).     • aspirin 81 MG tablet Take 1 Tab by mouth every day.  Tab    • COENZYME Q10 PO Take 1 Tab by mouth every morning.     • Cholecalciferol (VITAMIN D) 2000 UNITS Cap Take 1,000 Units by mouth every evening.     • docosahexanoic acid (OMEGA 3 FA) 1000 MG CAPS Take 1,000 mg by mouth every day.       No facility-administered encounter medications on file as of 10/28/2020.      Review of Systems   Constitutional: Negative for chills and fever.   HENT: Negative for sore throat.    Eyes: Negative for blurred vision.   Respiratory: Negative for cough and shortness of breath.    Cardiovascular: Negative for chest pain, palpitations, claudication, leg swelling and PND.   Gastrointestinal: Negative for abdominal pain and nausea.   Musculoskeletal: Negative for falls and joint pain.   Skin: Negative for rash.   Neurological: Negative for dizziness, focal weakness and weakness.   Endo/Heme/Allergies: Does not bruise/bleed easily.        Objective:   /76 (BP Location: Left arm, Patient Position: Sitting, BP Cuff Size: Adult)   Pulse 66   Ht 1.727 m (5' 8\")   Wt 96.3 kg (212 lb 3.2 oz)   SpO2 94%   BMI 32.26 kg/m²     Physical Exam   Constitutional: No distress.   HENT:   Patient wearing a mask due to COVID precautions   Eyes: No scleral icterus.   Neck: No JVD present.   Cardiovascular:   Heart auscultation deffered geiven recent COVID "   Abdominal: Soft. Bowel sounds are normal.   Musculoskeletal:         General: No edema.   Neurological: He is alert.   Skin: No rash noted. He is not diaphoretic.   Psychiatric: He has a normal mood and affect.     Reviewed images of echocardiogram    We reviewed in person the most recent labs  Recent Results (from the past 4032 hour(s))   POCT Protime    Collection Time: 05/21/20 12:00 AM   Result Value Ref Range    INR 1.90    POCT Protime    Collection Time: 06/11/20 12:00 AM   Result Value Ref Range    INR 2.30    POCT Protime    Collection Time: 07/09/20 12:00 AM   Result Value Ref Range    INR 1.20    Point Of Care INR    Collection Time: 07/09/20 10:52 AM   Result Value Ref Range    POC INR 1.2 0.9 - 1.2   POCT Protime    Collection Time: 07/28/20 12:00 AM   Result Value Ref Range    INR 1.30    Point Of Care INR    Collection Time: 07/28/20  3:12 PM   Result Value Ref Range    POC INR 1.3 (H) 0.9 - 1.2   POCT Protime    Collection Time: 08/06/20 12:00 AM   Result Value Ref Range    INR 2.90    Point Of Care INR    Collection Time: 08/06/20 10:25 AM   Result Value Ref Range    POC INR 2.9 (H) 0.9 - 1.2   POCT Protime    Collection Time: 08/20/20 12:00 AM   Result Value Ref Range    INR 3.80    Point Of Care INR    Collection Time: 08/20/20  9:55 AM   Result Value Ref Range    POC INR 3.7 (H) 0.9 - 1.2   Comp Metabolic Panel    Collection Time: 08/31/20  6:14 AM   Result Value Ref Range    Sodium 140 135 - 145 mmol/L    Potassium 4.7 3.6 - 5.5 mmol/L    Chloride 104 96 - 112 mmol/L    Co2 27 20 - 33 mmol/L    Anion Gap 9.0 7.0 - 16.0    Glucose 115 (H) 65 - 99 mg/dL    Bun 15 8 - 22 mg/dL    Creatinine 0.74 0.50 - 1.40 mg/dL    Calcium 9.0 8.5 - 10.5 mg/dL    AST(SGOT) 13 12 - 45 U/L    ALT(SGPT) 18 2 - 50 U/L    Alkaline Phosphatase 42 30 - 99 U/L    Total Bilirubin 0.7 0.1 - 1.5 mg/dL    Albumin 3.8 3.2 - 4.9 g/dL    Total Protein 6.3 6.0 - 8.2 g/dL    Globulin 2.5 1.9 - 3.5 g/dL    A-G Ratio 1.5 g/dL    Lipid Profile    Collection Time: 20  6:14 AM   Result Value Ref Range    Cholesterol,Tot 156 100 - 199 mg/dL    Triglycerides 207 (H) 0 - 149 mg/dL    HDL 38 (A) >=40 mg/dL    LDL 77 <100 mg/dL   TSH    Collection Time: 20  6:14 AM   Result Value Ref Range    TSH 3.430 0.380 - 5.330 uIU/mL   VITAMIN D,25 HYDROXY    Collection Time: 20  6:14 AM   Result Value Ref Range    25-Hydroxy   Vitamin D 25 38 30 - 100 ng/mL   HCT    Collection Time: 20  6:14 AM   Result Value Ref Range    Hematocrit 47.1 42.0 - 52.0 %   VALPROIC ACID    Collection Time: 20  6:14 AM   Result Value Ref Range    Valproic Acid 52.5 50.0 - 100.0 ug/mL   ESTIMATED GFR    Collection Time: 20  6:14 AM   Result Value Ref Range    GFR If African American >60 >60 mL/min/1.73 m 2    GFR If Non African American >60 >60 mL/min/1.73 m 2   POCT Protime    Collection Time: 20 12:00 AM   Result Value Ref Range    INR 3.10    Point Of Care INR    Collection Time: 20 10:06 AM   Result Value Ref Range    POC INR 3.1 (H) 0.9 - 1.2   EKG (NOW)    Collection Time: 10/01/20  3:29 PM   Result Value Ref Range    Report       [facility] Emergency Dept.    Test Date:  2020-10-01  Pt Name:    NURY VALENCIA                  Department: ER  MRN:        [id number]                      Room:  Gender:     Male                         Technician: [id number]  :        1950                   Requested By:ER TRIAGE PROTOCOL  Order #:    [id number]                    Reading MD: JUDY ESPANA MD    Measurements  Intervals                                Axis  Rate:       63                           P:          50  ME:         208                          QRS:        -3  QRSD:       92                           T:          81  QT:         396  QTc:        406    Interpretive Statements  SINUS RHYTHM  ABNRM R PROG, CONSIDER ASMI OR LEAD PLACEMENT  BASELINE WANDER IN LEAD(S) V1,V4  Compared to ECG 2015  16:19:09  ST (T wave) deviation no longer present  Myocardial infarct finding still present  Electronically Signed On 10-1-2020 21:02:58 PDT by JUDY ESPANA MD     CBC with Differential    Collection Time: 10/01/20  4:30 PM   Result Value Ref Range    WBC 9.6 4.8 - 10.8 K/uL    RBC 4.64 (L) 4.70 - 6.10 M/uL    Hemoglobin 14.7 14.0 - 18.0 g/dL    Hematocrit 44.3 42.0 - 52.0 %    MCV 95.5 81.4 - 97.8 fL    MCH 31.7 27.0 - 33.0 pg    MCHC 33.2 (L) 33.7 - 35.3 g/dL    RDW 47.7 35.9 - 50.0 fL    Platelet Count 361 164 - 446 K/uL    MPV 10.2 9.0 - 12.9 fL    Neutrophils-Polys 65.60 44.00 - 72.00 %    Lymphocytes 22.00 22.00 - 41.00 %    Monocytes 9.80 0.00 - 13.40 %    Eosinophils 1.10 0.00 - 6.90 %    Basophils 0.60 0.00 - 1.80 %    Immature Granulocytes 0.90 0.00 - 0.90 %    Nucleated RBC 0.00 /100 WBC    Neutrophils (Absolute) 6.30 1.82 - 7.42 K/uL    Lymphs (Absolute) 2.11 1.00 - 4.80 K/uL    Monos (Absolute) 0.94 (H) 0.00 - 0.85 K/uL    Eos (Absolute) 0.11 0.00 - 0.51 K/uL    Baso (Absolute) 0.06 0.00 - 0.12 K/uL    Immature Granulocytes (abs) 0.09 0.00 - 0.11 K/uL    NRBC (Absolute) 0.00 K/uL   Complete Metabolic Panel (CMP)    Collection Time: 10/01/20  4:30 PM   Result Value Ref Range    Sodium 137 135 - 145 mmol/L    Potassium 4.0 3.6 - 5.5 mmol/L    Chloride 100 96 - 112 mmol/L    Co2 25 20 - 33 mmol/L    Anion Gap 12.0 7.0 - 16.0    Glucose 144 (H) 65 - 99 mg/dL    Bun 16 8 - 22 mg/dL    Creatinine 0.86 0.50 - 1.40 mg/dL    Calcium 9.4 8.5 - 10.5 mg/dL    AST(SGOT) 31 12 - 45 U/L    ALT(SGPT) 26 2 - 50 U/L    Alkaline Phosphatase 50 30 - 99 U/L    Total Bilirubin 1.2 0.1 - 1.5 mg/dL    Albumin 3.8 3.2 - 4.9 g/dL    Total Protein 6.4 6.0 - 8.2 g/dL    Globulin 2.6 1.9 - 3.5 g/dL    A-G Ratio 1.5 g/dL   Troponin    Collection Time: 10/01/20  4:30 PM   Result Value Ref Range    Troponin T 22 (H) 6 - 19 ng/L   ESTIMATED GFR    Collection Time: 10/01/20  4:30 PM   Result Value Ref Range    GFR If   American >60 >60 mL/min/1.73 m 2    GFR If Non African American >60 >60 mL/min/1.73 m 2   Prothrombin Time    Collection Time: 10/01/20  4:30 PM   Result Value Ref Range    PT 21.7 (H) 12.0 - 14.6 sec    INR 1.83 (H) 0.87 - 1.13   COVID/SARS CoV-2 PCR    Collection Time: 10/01/20  7:59 PM    Specimen: Nasopharyngeal; Respirate   Result Value Ref Range    COVID Order Status Received    SARS-CoV-2, PCR (In-House)    Collection Time: 10/01/20  7:59 PM   Result Value Ref Range    SARS-CoV-2 Source NP Swab     SARS-CoV-2 by PCR DETECTED (AA)    TROPONIN    Collection Time: 10/01/20  9:56 PM   Result Value Ref Range    Troponin T 13 6 - 19 ng/L   EKG    Collection Time: 10/02/20  2:13 AM   Result Value Ref Range    Report       [facility]    Test Date:  2020-10-02  Pt Name:    NURY VALENCIA                  Department: [facility]  MRN:        [id number]                      Room:       [facility]  Gender:     Male                         Technician: [id number]  :        1950                   Requested By:SULTAN SHAYAN ORDONEZ  Order #:    [id number]                    Reading MD: Des Moreno MD    Measurements  Intervals                                Axis  Rate:       57                           P:          24  TN:         229                          QRS:        4  QRSD:       95                           T:          84  QT:         425  QTc:        414    Interpretive Statements  SINUS BRADYCARDIA  ATRIAL PREMATURE COMPLEX  PROLONGED TN INTERVAL  Compared to ECG 10/01/2020 15:29:05  Atrial premature complex(es) now present  First degree AV block now present  Electronically Signed On 10-2-2020 6:58:50 PDT by Des Moreno MD     PROTHROMBIN TIME    Collection Time: 10/02/20  4:24 AM   Result Value Ref Range    PT 20.4 (H) 12.0 - 14.6 sec    INR 1.69 (H) 0.87 - 1.13   CBC WITH DIFFERENTIAL    Collection Time: 10/02/20  4:24 AM   Result Value Ref Range    WBC 7.3 4.8 - 10.8 K/uL    RBC 4.75 4.70 - 6.10 M/uL    Hemoglobin 15.0 14.0 - 18.0  g/dL    Hematocrit 44.9 42.0 - 52.0 %    MCV 94.5 81.4 - 97.8 fL    MCH 31.6 27.0 - 33.0 pg    MCHC 33.4 (L) 33.7 - 35.3 g/dL    RDW 47.6 35.9 - 50.0 fL    Platelet Count 335 164 - 446 K/uL    MPV 10.5 9.0 - 12.9 fL    Neutrophils-Polys 53.80 44.00 - 72.00 %    Lymphocytes 30.90 22.00 - 41.00 %    Monocytes 12.00 0.00 - 13.40 %    Eosinophils 1.80 0.00 - 6.90 %    Basophils 0.80 0.00 - 1.80 %    Immature Granulocytes 0.70 0.00 - 0.90 %    Nucleated RBC 0.00 /100 WBC    Neutrophils (Absolute) 3.91 1.82 - 7.42 K/uL    Lymphs (Absolute) 2.25 1.00 - 4.80 K/uL    Monos (Absolute) 0.87 (H) 0.00 - 0.85 K/uL    Eos (Absolute) 0.13 0.00 - 0.51 K/uL    Baso (Absolute) 0.06 0.00 - 0.12 K/uL    Immature Granulocytes (abs) 0.05 0.00 - 0.11 K/uL    NRBC (Absolute) 0.00 K/uL   Comp Metabolic Panel    Collection Time: 10/02/20  4:24 AM   Result Value Ref Range    Sodium 139 135 - 145 mmol/L    Potassium 4.2 3.6 - 5.5 mmol/L    Chloride 103 96 - 112 mmol/L    Co2 25 20 - 33 mmol/L    Anion Gap 11.0 7.0 - 16.0    Glucose 88 65 - 99 mg/dL    Bun 14 8 - 22 mg/dL    Creatinine 0.83 0.50 - 1.40 mg/dL    Calcium 8.9 8.5 - 10.5 mg/dL    AST(SGOT) 22 12 - 45 U/L    ALT(SGPT) 24 2 - 50 U/L    Alkaline Phosphatase 46 30 - 99 U/L    Total Bilirubin 0.9 0.1 - 1.5 mg/dL    Albumin 3.6 3.2 - 4.9 g/dL    Total Protein 6.0 6.0 - 8.2 g/dL    Globulin 2.4 1.9 - 3.5 g/dL    A-G Ratio 1.5 g/dL   MAGNESIUM    Collection Time: 10/02/20  4:24 AM   Result Value Ref Range    Magnesium 2.0 1.5 - 2.5 mg/dL   TROPONIN    Collection Time: 10/02/20  4:24 AM   Result Value Ref Range    Troponin T 21 (H) 6 - 19 ng/L   ESTIMATED GFR    Collection Time: 10/02/20  4:24 AM   Result Value Ref Range    GFR If African American >60 >60 mL/min/1.73 m 2    GFR If Non African American >60 >60 mL/min/1.73 m 2   Prothrombin Time    Collection Time: 10/03/20  8:31 AM   Result Value Ref Range    PT 20.2 (H) 12.0 - 14.6 sec    INR 1.66 (H) 0.87 - 1.13   EC-ECHOCARDIOGRAM COMPLETE  W/O CONT    Collection Time: 10/03/20 10:50 AM   Result Value Ref Range    Eject.Frac. MOD BP 74.35     Eject.Frac. MOD 4C 72.99     Eject.Frac. MOD 2C 76.99     Left Ventrical Ejection Fraction 70    Prothrombin Time    Collection Time: 10/12/20 10:47 AM   Result Value Ref Range    PT 19.2 (H) 12.0 - 14.6 sec    INR 1.57 (H) 0.87 - 1.13   POCT Protime    Collection Time: 10/26/20 12:00 AM   Result Value Ref Range    INR 1.60    Point Of Care INR    Collection Time: 10/26/20 10:49 AM   Result Value Ref Range    POC INR 1.6 (H) 0.9 - 1.2       Assessment:     1. S/P CABG x 3 - 6/2015     2. Dyslipidemia     3. Coronary artery disease due to lipid rich plaque     4. Chronic anticoagulation     5. Bilateral carotid artery stenosis     6. Mild aortic stenosis - echo 4/2018         Medical Decision Making:  Today's Assessment / Status / Plan:     It was my pleasure to meet with Mr. Chan.    Blood pressure is well controlled.  We specifically assessed the labs on hypertension treatment    He is on appropriate statin.    He understands the risks and benefits of chronic anticoagulation.  We reviewed and verified the results of annual testing for anemia and kidney function.    I will see Mr. Chan back in 1 year time and encouraged him to follow up with us over the phone or electronically using my MyChart as issues arise.    It is my pleasure to participate in the care of Mr. Chan.  Please do not hesitate to contact me with questions or concerns.    Ta Wright MD PhD [facility]  Cardiologist [facility]    Please note that this dictation was created using voice recognition software. There may be errors I did not discover before finalizing the note.

## 2020-11-09 ENCOUNTER — ANTICOAGULATION VISIT (OUTPATIENT)
Dept: VASCULAR LAB | Facility: MEDICAL CENTER | Age: 70
End: 2020-11-09
Attending: INTERNAL MEDICINE
Payer: MEDICARE

## 2020-11-09 DIAGNOSIS — I82.90 DEEP VEIN THROMBOSIS (DVT) OF NON-EXTREMITY VEIN, UNSPECIFIED CHRONICITY: ICD-10-CM

## 2020-11-09 LAB
INR BLD: 3.1 (ref 0.9–1.2)
INR PPP: 3.1 (ref 2–3.5)

## 2020-11-09 PROCEDURE — 85610 PROTHROMBIN TIME: CPT

## 2020-11-09 PROCEDURE — 99212 OFFICE O/P EST SF 10 MIN: CPT | Performed by: NURSE PRACTITIONER

## 2020-11-09 NOTE — PROGRESS NOTES
Anticoagulation Summary  As of 11/9/2020    INR goal:  2.0-3.0   TTR:  60.3 % (5.4 y)   INR used for dosing:  3.10 (11/9/2020)   Warfarin maintenance plan:  3.75 mg (7.5 mg x 0.5) every Sun, Tue, Thu; 7.5 mg (7.5 mg x 1) all other days   Weekly warfarin total:  41.25 mg   Plan last modified:  Miya Acosta, A.P.N. (10/26/2020)   Next INR check:  11/23/2020   Priority:  Acute   Target end date:  Indefinite    Indications    DVT (deep venous thrombosis) (HCC) (Resolved) [I82.409]  Pulmonary embolism [415.19] (Resolved) [I26.99]  Deep vein thrombosis (DVT) (HCC) [I82.409]             Anticoagulation Episode Summary     INR check location:  Anticoagulation Clinic    Preferred lab:  [facility]    Send INR reminders to:      Comments:        Anticoagulation Care Providers     Provider Role Specialty Phone number    Patricia Damon M.D. Referring Internal Medicine [phone]    Jona QuinnD Responsible      [facility] Anticoagulation Services Responsible  [phone]        Anticoagulation Patient Findings      HPI:  Rene Chan seen in clinic today for follow up on anticoagulation therapy in the presence of DVT, PE hx.   Denies any changes to current medical/health status since last appointment.   Denies any medication or diet changes.   No current symptoms of bleeding or thrombosis reported.    A/P:   INR supratherapeutic. INR had been trending low. Will decrease tonight then continue current regimen for now.   BP recorded in vitals.    Follow up appointment in 2 week(s).    Next Appointment: Monday, Nov 23, 2020 at 10:45 am.    Miya PARKER

## 2020-11-23 ENCOUNTER — ANTICOAGULATION VISIT (OUTPATIENT)
Dept: VASCULAR LAB | Facility: MEDICAL CENTER | Age: 70
End: 2020-11-23
Attending: INTERNAL MEDICINE
Payer: MEDICARE

## 2020-11-23 VITALS — DIASTOLIC BLOOD PRESSURE: 68 MMHG | HEART RATE: 61 BPM | SYSTOLIC BLOOD PRESSURE: 149 MMHG

## 2020-11-23 DIAGNOSIS — I82.90 DEEP VEIN THROMBOSIS (DVT) OF NON-EXTREMITY VEIN, UNSPECIFIED CHRONICITY: ICD-10-CM

## 2020-11-23 LAB
INR BLD: 2.7 (ref 0.9–1.2)
INR PPP: 2.7 (ref 2–3.5)

## 2020-11-23 PROCEDURE — 85610 PROTHROMBIN TIME: CPT

## 2020-11-23 PROCEDURE — 99211 OFF/OP EST MAY X REQ PHY/QHP: CPT

## 2020-11-23 NOTE — PROGRESS NOTES
Anticoagulation Summary  As of 2020    INR goal:  2.0-3.0   TTR:  60.5 % (5.4 y)   INR used for dosin.70 (2020)   Warfarin maintenance plan:  3.75 mg (7.5 mg x 0.5) every Sun, Tue, Thu; 7.5 mg (7.5 mg x 1) all other days   Weekly warfarin total:  41.25 mg   Plan last modified:  Miya Acosta APaulPLESLIE (10/26/2020)   Next INR check:  2020   Priority:  Acute   Target end date:  Indefinite    Indications    DVT (deep venous thrombosis) (HCC) (Resolved) [I82.409]  Pulmonary embolism [415.19] (Resolved) [I26.99]  Deep vein thrombosis (DVT) (HCC) [I82.409]             Anticoagulation Episode Summary     INR check location:  Anticoagulation Clinic    Preferred lab:  [facility]    Send INR reminders to:      Comments:        Anticoagulation Care Providers     Provider Role Specialty Phone number    Patricia Damon M.D. Referring Internal Medicine [phone]    Jona QuinnD Responsible      [facility] Anticoagulation Services Responsible  [phone]                Anticoagulation Patient Findings      HPI:  Rene Chan seen in clinic today, on anticoagulation therapy with warfarin for DVT, PE  Changes to current medical/health status since last appt: No  Denies signs/symptoms of bleeding and/or thrombosis since the last appt.    Denies any interval changes to diet  Denies any interval changes to medications since last appt. Patient mentioned that he has been taking Valerian root for several months at bedtime but has not remembered to tell anyone, so updated the med list.  Denies any complications or cost restrictions with current therapy.   Verified current warfarin dosing schedule.   BP recorded in vitals.  Medications reconciled      A/P   INR  is now therapeutic.   Continue current regimen    Follow up appointment in 3 week(s).    Fartun Hurt, PharmD

## 2020-11-25 ENCOUNTER — PATIENT OUTREACH (OUTPATIENT)
Dept: HEALTH INFORMATION MANAGEMENT | Facility: OTHER | Age: 70
End: 2020-11-25

## 2020-12-03 ENCOUNTER — HOSPITAL ENCOUNTER (OUTPATIENT)
Dept: LAB | Facility: MEDICAL CENTER | Age: 70
End: 2020-12-03
Attending: FAMILY MEDICINE
Payer: MEDICARE

## 2020-12-03 ENCOUNTER — OFFICE VISIT (OUTPATIENT)
Dept: MEDICAL GROUP | Facility: PHYSICIAN GROUP | Age: 70
End: 2020-12-03
Payer: MEDICARE

## 2020-12-03 VITALS
DIASTOLIC BLOOD PRESSURE: 78 MMHG | HEART RATE: 60 BPM | TEMPERATURE: 97.7 F | OXYGEN SATURATION: 96 % | HEIGHT: 68 IN | BODY MASS INDEX: 32.77 KG/M2 | WEIGHT: 216.2 LBS | SYSTOLIC BLOOD PRESSURE: 120 MMHG

## 2020-12-03 DIAGNOSIS — Z12.5 PROSTATE CANCER SCREENING: ICD-10-CM

## 2020-12-03 DIAGNOSIS — G45.9 TIA (TRANSIENT ISCHEMIC ATTACK): ICD-10-CM

## 2020-12-03 DIAGNOSIS — E66.9 OBESITY (BMI 30-39.9): ICD-10-CM

## 2020-12-03 DIAGNOSIS — I25.10 CORONARY ARTERY DISEASE DUE TO LIPID RICH PLAQUE: ICD-10-CM

## 2020-12-03 DIAGNOSIS — I65.23 BILATERAL CAROTID ARTERY STENOSIS: ICD-10-CM

## 2020-12-03 DIAGNOSIS — F31.70 BIPOLAR DISORDER IN FULL REMISSION, MOST RECENT EPISODE UNSPECIFIED TYPE (HCC): ICD-10-CM

## 2020-12-03 DIAGNOSIS — D12.6 ADENOMATOUS POLYP OF COLON, UNSPECIFIED PART OF COLON: ICD-10-CM

## 2020-12-03 DIAGNOSIS — I25.83 CORONARY ARTERY DISEASE DUE TO LIPID RICH PLAQUE: ICD-10-CM

## 2020-12-03 DIAGNOSIS — E78.5 DYSLIPIDEMIA: ICD-10-CM

## 2020-12-03 DIAGNOSIS — Z11.59 ENCOUNTER FOR HEPATITIS C SCREENING TEST FOR LOW RISK PATIENT: ICD-10-CM

## 2020-12-03 DIAGNOSIS — M1A.0710 IDIOPATHIC CHRONIC GOUT OF RIGHT FOOT WITHOUT TOPHUS: ICD-10-CM

## 2020-12-03 DIAGNOSIS — E03.9 ACQUIRED HYPOTHYROIDISM: ICD-10-CM

## 2020-12-03 DIAGNOSIS — I35.0 MILD AORTIC STENOSIS: Chronic | ICD-10-CM

## 2020-12-03 PROBLEM — Z23 NEED FOR VACCINATION: Status: RESOLVED | Noted: 2018-03-12 | Resolved: 2020-12-03

## 2020-12-03 LAB
HCV AB SER QL: NORMAL
PSA SERPL-MCNC: 0.57 NG/ML (ref 0–4)

## 2020-12-03 PROCEDURE — 86803 HEPATITIS C AB TEST: CPT

## 2020-12-03 PROCEDURE — 84153 ASSAY OF PSA TOTAL: CPT

## 2020-12-03 PROCEDURE — 99214 OFFICE O/P EST MOD 30 MIN: CPT | Performed by: FAMILY MEDICINE

## 2020-12-03 PROCEDURE — 36415 COLL VENOUS BLD VENIPUNCTURE: CPT

## 2020-12-03 ASSESSMENT — FIBROSIS 4 INDEX: FIB4 SCORE: 0.95

## 2020-12-03 NOTE — ASSESSMENT & PLAN NOTE
This is a chronic problem.  Patient declines referring for weight loss counseling.  He states he will work on his exercise and diet on his own.  Does admit to not being as active as usual during the last 6 months.

## 2020-12-03 NOTE — PROGRESS NOTES
Subjective:     CC:   Chief Complaint   Patient presents with   • Hyperlipidemia     establish care   • Hypertension       HPI:   Rene Chan is a 70 y.o. male who presents to establish care and discuss his various medical issues.  Today he is feeling very well.    Last Colorectal Cancer Screening: Current  Last Tdap: Current      Exercise: Encouraged  Diet: Low-cholesterol diet    He  has a past medical history of Anticoagulation monitoring, special range, Bipolar disorder (HCC), CAD (coronary artery disease) (2015), Carotid artery occlusion, Clotting disorder (HCC), Colon polyps, Diabetes mellitus, type II (HCC), Gout, Hammer toe, Hyperlipidemia, Hypertension, Hypothyroidism, Impaired fasting glucose, and Mild aortic stenosis - echo 2018.  He  has a past surgical history that includes other abdominal surgery; other orthopedic surgery (); recovery (2015); multiple coronary artery bypass endo vein harvest (2015); appendectomy; and ankle fusion.    Family History   Problem Relation Age of Onset   • Heart Disease Sister 50   • Stroke Sister 55        CVA   • Heart Failure Father      Social History     Tobacco Use   • Smoking status: Former Smoker     Packs/day: 2.00     Years: 10.00     Pack years: 20.00     Types: Cigarettes     Quit date: 1976     Years since quittin.9   • Smokeless tobacco: Never Used   • Tobacco comment: quit x 32 yrs; 2ppd x 7 years   Substance Use Topics   • Alcohol use: No   • Drug use: No     Comment: IV drug use in teens     He  reports previously being sexually active and has had partner(s) who are Female.    Patient Active Problem List    Diagnosis Date Noted   • Bilateral carotid artery stenosis 2017     Priority: Medium   • Deep vein thrombosis (DVT) ([facility]) 2015     Priority: Medium   • Dyslipidemia 2015     Priority: Medium   • Hx pulmonary embolism 2015     Priority: Medium   • Coronary artery disease due to lipid rich plaque  06/04/2015     Priority: Medium   • S/P CABG x 3 - 6/2015 06/04/2015     Priority: Medium   • TIA (transient ischemic attack) 05/26/2015     Priority: Medium   • Chronic anticoagulation 11/11/2014     Priority: Medium   • Adenomatous polyp of colon 10/06/2016     Priority: Low   • Bipolar disorder in full remission (HCC) 10/06/2016     Priority: Low   • Benign non-nodular prostatic hyperplasia with lower urinary tract symptoms 10/06/2016     Priority: Low   • Acquired hypothyroidism 09/26/2016     Priority: Low   • Gout of foot 09/26/2016     Priority: Low   • Obesity (BMI 30-39.9) 12/03/2020   • Chronic deep vein thrombosis (DVT) of right lower extremity (HCC) 08/27/2018   • Mild aortic stenosis    • Actinic keratoses 03/12/2018     Current Outpatient Medications   Medication Sig Dispense Refill   • VALERIAN ROOT PO Take 1 Capsule by mouth every bedtime.     • atorvastatin (LIPITOR) 80 MG tablet Take 80 mg by mouth every day.     • divalproex (DEPAKOTE) 250 MG Tablet Delayed Response Take 250-500 mg by mouth 3 times a day. 250mg in the morning and 500mg in the evening   Indications: 2 at night and 1 in morning     • levothyroxine (SYNTHROID) 75 MCG Tab Take 75 mcg by mouth every evening.     • lisinopril (PRINIVIL) 5 MG Tab Take 5 mg by mouth every evening.     • metoprolol (LOPRESSOR) 25 MG Tab Take 12.5 mg by mouth every morning.     • predniSONE (DELTASONE) 10 MG Tab Take 10 mg by mouth 2 times a day as needed (gout).     • aspirin 81 MG tablet Take 1 Tab by mouth every day.  Tab    • COENZYME Q10 PO Take 1 Tab by mouth every morning.     • Cholecalciferol (VITAMIN D) 2000 UNITS Cap Take 1,000 Units by mouth every evening.     • docosahexanoic acid (OMEGA 3 FA) 1000 MG CAPS Take 1,000 mg by mouth every day.     • warfarin (COUMADIN) 7.5 MG Tab Take 3.75-7.5 mg by mouth every day. 7.5mg on Monday, Wednesday, & Friday   3.75mg all other days     • Multiple Vitamins-Minerals (CENTRUM PO) Take 1 Tab by mouth  "every morning.       No current facility-administered medications for this visit.      Allergies   Allergen Reactions   • Penicillins Unspecified     Childhood; does know what the reaction was       Review of Systems   Constitutional: Negative for fever, chills and malaise/fatigue.   HENT: Negative for congestion.    Eyes: Negative for pain.   Respiratory: Negative for cough and shortness of breath.    Cardiovascular: Negative for chest pain and leg swelling.   Gastrointestinal: Negative for nausea, vomiting, abdominal pain and diarrhea.   Genitourinary: Negative for dysuria and hematuria.   Skin: Negative for rash.   Neurological: Negative for dizziness, focal weakness and headaches.   Endo/Heme/Allergies: Does not bruise/bleed easily.   Psychiatric/Behavioral: Negative for depression.  The patient is not nervous/anxious.      Objective:   /78 (BP Location: Right arm, Patient Position: Sitting, BP Cuff Size: Large adult)   Pulse 60   Temp 36.5 °C (97.7 °F) (Temporal)   Ht 1.727 m (5' 8\")   Wt 98.1 kg (216 lb 3.2 oz)   SpO2 96%   BMI 32.87 kg/m²      Wt Readings from Last 4 Encounters:   12/03/20 98.1 kg (216 lb 3.2 oz)   10/28/20 96.3 kg (212 lb 3.2 oz)   10/12/20 90.3 kg (199 lb)   10/02/20 94.3 kg (207 lb 14.3 oz)       Physical Exam:  Constitutional: Well-developed and well-nourished. Not diaphoretic. No distress.   Skin: Skin is warm and dry. No rash noted.  Head: Atraumatic without lesions.  Eyes: Conjunctivae and extraocular motions are normal. Pupils are equal, round, and reactive to light. No scleral icterus.   Ears:  External ears unremarkable. Tympanic membranes clear and intact.  Nose: Nares patent. Septum midline. Turbinates without erythema nor edema. No discharge.   Mouth/Throat: Tongue normal. Oropharynx is clear and moist. Posterior pharynx without erythema or exudates.  Neck: Supple, trachea midline. Normal range of motion. No thyromegaly present. No lymphadenopathy--cervical or " supraclavicular.  Cardiovascular: Regular rate and rhythm, S1 and S2 without murmur, rubs, or gallops.    Abdomen: Soft, non tender, and without distention. Active bowel sounds in all four quadrants. No rebound, guarding, masses or HSM.  Extremities: No cyanosis, clubbing, erythema, nor edema. Distal pulses intact and symmetric.   Musculoskeletal: All major joints AROM full in all directions without pain.  Neurological: Alert and oriented x 3. Grossly non-focal. Strength and sensation grossly intact. DTRs 2+/3 and symmetric.   Psychiatric:  Behavior, mood, and affect are appropriate.      Assessment and Plan:     1. Acquired hypothyroidism  This is a chronic problem.  His TSH level is within normal limits continue with current dose of medications.  2. Idiopathic chronic gout of right foot without tophus  This is a chronic problem.  We will continue him on his current regimen.  If he has more frequent occurrences of gout then we will need to consider placing him on allopurinol.  3. Adenomatous polyp of colon, unspecified part of colon  This is a chronic problem.  Patient follows up regularly with gastroenterology for his colonoscopies.  4. Bipolar disorder in full remission, most recent episode unspecified type (HCC)  This is a chronic problem.  Patient is well controlled on current medications.  He is seeing a psychiatrist at the present time but might come to me for his medication refills if the psychiatrist feels they no longer need to see him.  5. TIA (transient ischemic attack)  This is a chronic problem.  Patient has not had a recurrence in several years.  He is on anticoagulant treatment.  And will continue to monitor his carotid artery stenosis.  6. Coronary artery disease due to lipid rich plaque  This is a chronic problem.  Patient is followed regularly by his cardiologist.  7. Dyslipidemia  This is a chronic problem.  Dyslipidemia he is well controlled on his current statin.  8. Bilateral carotid artery  stenosis  This is a chronic problem.  His last carotid ultrasound was in 2017 so we will order one today.  - US-CAROTID DOPPLER BILAT; Future    9. Mild aortic stenosis   This is a chronic problem.  His last echo done a month ago was unchanged.  10. Obesity (BMI 30-39.9)  - Patient identified as having weight management issue.  Appropriate orders and counseling given.  This is a chronic problem.    11. Encounter for hepatitis C screening test for low risk patient  - HEP C VIRUS ANTIBODY; Future    12. Prostate cancer screening  Prostate cancer screening discussed and  lab test ordered.  - PROSTATE SPECIFIC AG SCREENING; Future      Health maintenance: Done  Labs per orders  Patient counseled about  diet, supplements, and exercise.       Follow-up: Return in about 3 months (around 3/3/2021) for f/view labs.  He will return in about 3 months for his wellness exam.  Then will see him about every 6 months for his general medical follow-up.

## 2020-12-03 NOTE — ASSESSMENT & PLAN NOTE
This is a chronic problem.  Patient states that he has a mild flare of his gout every 3 to 4 months.  He treats it with prednisone and usually 1 dose will make it go away.  He does know his triggers.  Last uric acid done was around 7.4.

## 2020-12-03 NOTE — ASSESSMENT & PLAN NOTE
This is a chronic problem.  Patient had TIAs in 2011 and 2012.  Subsequent evaluation did show carotid artery stenosis.  His last ultrasound appears to be in 2017 and it showed moderate carotid stenosis.

## 2020-12-03 NOTE — ASSESSMENT & PLAN NOTE
This is a chronic problem.  He is followed by cardiology.  He is on statin treatment.  His lipid have been under good control.

## 2020-12-03 NOTE — ASSESSMENT & PLAN NOTE
This is a chronic condition.  Patient just had an ultrasound done October 2020 that showed no change in his mild aortic stenosis.  He is followed by cardiology regularly.

## 2020-12-03 NOTE — ASSESSMENT & PLAN NOTE
This is a chronic problem.  Patient is seen regularly by GI and he is current on his colonoscopies at the present time.

## 2020-12-03 NOTE — ASSESSMENT & PLAN NOTE
This is a chronic problem in remission.  Patient is on Depakote and he is well controlled.  Is currently seeing a psychiatrist every 6 to 12 months.  Lab surveillance has been done for this medication and is within normal limits.

## 2020-12-14 ENCOUNTER — ANTICOAGULATION VISIT (OUTPATIENT)
Dept: VASCULAR LAB | Facility: MEDICAL CENTER | Age: 70
End: 2020-12-14
Attending: INTERNAL MEDICINE
Payer: MEDICARE

## 2020-12-14 VITALS — SYSTOLIC BLOOD PRESSURE: 140 MMHG | DIASTOLIC BLOOD PRESSURE: 67 MMHG | HEART RATE: 54 BPM

## 2020-12-14 DIAGNOSIS — I82.90 DEEP VEIN THROMBOSIS (DVT) OF NON-EXTREMITY VEIN, UNSPECIFIED CHRONICITY: ICD-10-CM

## 2020-12-14 LAB
INR BLD: 2.2 (ref 0.9–1.2)
INR PPP: 2.2 (ref 2–3.5)

## 2020-12-14 PROCEDURE — 85610 PROTHROMBIN TIME: CPT

## 2020-12-14 PROCEDURE — 99211 OFF/OP EST MAY X REQ PHY/QHP: CPT | Performed by: NURSE PRACTITIONER

## 2020-12-14 NOTE — PROGRESS NOTES
Anticoagulation Summary  As of 2020    INR goal:  2.0-3.0   TTR:  60.9 % (5.5 y)   INR used for dosin.20 (2020)   Warfarin maintenance plan:  3.75 mg (7.5 mg x 0.5) every Sun, Tue, u; 7.5 mg (7.5 mg x 1) all other days   Weekly warfarin total:  41.25 mg   Plan last modified:  Miya Acosta, A.P.N. (10/26/2020)   Next INR check:  2021   Priority:  Acute   Target end date:  Indefinite    Indications    DVT (deep venous thrombosis) (HCC) (Resolved) [I82.409]  Pulmonary embolism [415.19] (Resolved) [I26.99]  Deep vein thrombosis (DVT) (HCC) [I82.409]             Anticoagulation Episode Summary     INR check location:  Anticoagulation Clinic    Preferred lab:  [facility]    Send INR reminders to:      Comments:        Anticoagulation Care Providers     Provider Role Specialty Phone number    Patricia Damon M.D. Referring Internal Medicine [phone]    Jona QuinnD Responsible      [facility] Anticoagulation Services Responsible  [phone]        Anticoagulation Patient Findings      HPI:  Rene Chan seen in clinic today for follow up on anticoagulation therapy in the presence of DVT, PE hx.   Denies any changes to current medical/health status since last appointment.   Denies any medication or diet changes.   No current symptoms of bleeding or thrombosis reported.    A/P:   INR therapeutic.   Continue current regimen.   BP recorded in vitals.    Follow up appointment in 4 week(s).    Next Appointment: Monday, 2021 at 10:45 am.    Miya PARKER

## 2021-01-04 ENCOUNTER — HOSPITAL ENCOUNTER (OUTPATIENT)
Dept: RADIOLOGY | Facility: MEDICAL CENTER | Age: 71
End: 2021-01-04
Attending: FAMILY MEDICINE
Payer: MEDICARE

## 2021-01-04 DIAGNOSIS — I65.23 BILATERAL CAROTID ARTERY STENOSIS: ICD-10-CM

## 2021-01-04 PROCEDURE — 93880 EXTRACRANIAL BILAT STUDY: CPT

## 2021-01-11 ENCOUNTER — ANTICOAGULATION VISIT (OUTPATIENT)
Dept: VASCULAR LAB | Facility: MEDICAL CENTER | Age: 71
End: 2021-01-11
Attending: INTERNAL MEDICINE
Payer: MEDICARE

## 2021-01-11 VITALS — HEART RATE: 60 BPM | SYSTOLIC BLOOD PRESSURE: 137 MMHG | DIASTOLIC BLOOD PRESSURE: 65 MMHG

## 2021-01-11 DIAGNOSIS — I82.90 DEEP VEIN THROMBOSIS (DVT) OF NON-EXTREMITY VEIN, UNSPECIFIED CHRONICITY: ICD-10-CM

## 2021-01-11 LAB
INR BLD: 1.3 (ref 0.9–1.2)
INR PPP: 1.3 (ref 2–3.5)

## 2021-01-11 PROCEDURE — 99212 OFFICE O/P EST SF 10 MIN: CPT

## 2021-01-11 PROCEDURE — 85610 PROTHROMBIN TIME: CPT

## 2021-01-11 NOTE — PROGRESS NOTES
Anticoagulation Summary  As of 2021    INR goal:  2.0-3.0   TTR:  60.3 % (5.6 y)   INR used for dosin.30 (2021)   Warfarin maintenance plan:  3.75 mg (7.5 mg x 0.5) every Sun, e, Thu; 7.5 mg (7.5 mg x 1) all other days   Weekly warfarin total:  41.25 mg   Plan last modified:  Miya Acosta APaulPPaulNPaul (10/26/2020)   Next INR check:  2021   Priority:  Acute   Target end date:  Indefinite    Indications    DVT (deep venous thrombosis) (HCC) (Resolved) [I82.409]  Pulmonary embolism [415.19] (Resolved) [I26.99]  Deep vein thrombosis (DVT) (HCC) [I82.409]             Anticoagulation Episode Summary     INR check location:  Anticoagulation Clinic    Preferred lab:  [facility]    Send INR reminders to:      Comments:        Anticoagulation Care Providers     Provider Role Specialty Phone number    Patricia Damon M.D. Referring Internal Medicine [phone]    Jona QuinnD Responsible      [facility] Anticoagulation Services Responsible  [phone]                Anticoagulation Patient Findings      HPI:  Rene Chan seen in clinic today, on anticoagulation therapy with warfarin for DVT, PE  Changes to current medical/health status since last appt: Patient states that he forgot to take his warfarin last night, and took a half dose on 1/2 because he ate cranberries.  Denies signs/symptoms of bleeding and/or thrombosis since the last appt.    Denies any interval changes to diet  Denies any interval changes to medications since last appt.   Denies any complications or cost restrictions with current therapy.   Verified current warfarin dosing schedule.   Pt declined vitals.  Medications reconciled  Pt on antiplatelet therapy Aspirin 81mg for ACS and must be reviewed again on 12/15/2021.      A/P   INR  is now sub-therapeutic.   No recent VTEs. Will give bolus dose today of 11.25mg then resume current regimen.    Follow up appointment in 1 week(s).    Fartun Hurt, PharmD

## 2021-01-12 ENCOUNTER — TELEPHONE (OUTPATIENT)
Dept: MEDICAL GROUP | Facility: PHYSICIAN GROUP | Age: 71
End: 2021-01-12

## 2021-01-12 NOTE — TELEPHONE ENCOUNTER
Phone Number Called: [phone]    Call outcome: left message for pt to call clinic so I can inform him about his recent carotid u/s report.    Carotid ultrasound report  Received: Yesterday  Message Contents   Michael Sethi III, M.D.  Chani Lockhart, Med Ass't             Please let the patient know that his carotid ultrasound is essentially unchanged he still has 50 to 69% narrowing of both carotid arteries.  He should have a repeat carotid ultrasound in 1 year unless he starts to have any symptoms that could suggest mini strokes.  Then he is to see me much much sooner.    CT

## 2021-01-14 ENCOUNTER — TELEPHONE (OUTPATIENT)
Dept: MEDICAL GROUP | Facility: PHYSICIAN GROUP | Age: 71
End: 2021-01-14

## 2021-01-15 DIAGNOSIS — Z23 NEED FOR VACCINATION: ICD-10-CM

## 2021-01-18 ENCOUNTER — ANTICOAGULATION VISIT (OUTPATIENT)
Dept: VASCULAR LAB | Facility: MEDICAL CENTER | Age: 71
End: 2021-01-18
Attending: INTERNAL MEDICINE
Payer: MEDICARE

## 2021-01-18 VITALS — DIASTOLIC BLOOD PRESSURE: 67 MMHG | SYSTOLIC BLOOD PRESSURE: 154 MMHG | HEART RATE: 61 BPM

## 2021-01-18 DIAGNOSIS — I82.90 DEEP VEIN THROMBOSIS (DVT) OF NON-EXTREMITY VEIN, UNSPECIFIED CHRONICITY: ICD-10-CM

## 2021-01-18 LAB
INR BLD: 2 (ref 0.9–1.2)
INR PPP: 2 (ref 2–3.5)

## 2021-01-18 PROCEDURE — 85610 PROTHROMBIN TIME: CPT

## 2021-01-18 PROCEDURE — 99211 OFF/OP EST MAY X REQ PHY/QHP: CPT

## 2021-01-18 NOTE — PROGRESS NOTES
Anticoagulation Summary  As of 2021    INR goal:  2.0-3.0   TTR:  60.1 % (5.6 y)   INR used for dosin.00 (2021)   Warfarin maintenance plan:  3.75 mg (7.5 mg x 0.5) every Sun, e, Thu; 7.5 mg (7.5 mg x 1) all other days   Weekly warfarin total:  41.25 mg   Plan last modified:  Miya Acosta APaulPPaulNPaul (10/26/2020)   Next INR check:  2021   Priority:  Acute   Target end date:  Indefinite    Indications    DVT (deep venous thrombosis) (HCC) (Resolved) [I82.409]  Pulmonary embolism [415.19] (Resolved) [I26.99]  Deep vein thrombosis (DVT) (HCC) [I82.409]             Anticoagulation Episode Summary     INR check location:  Anticoagulation Clinic    Preferred lab:  [facility]    Send INR reminders to:      Comments:        Anticoagulation Care Providers     Provider Role Specialty Phone number    Patricia Damon M.D. Referring Internal Medicine [phone]    Jona QuinnD Responsible      [facility] Anticoagulation Services Responsible  [phone]                Anticoagulation Patient Findings      HPI:  Rene Chan seen in clinic today, on anticoagulation therapy with warfarin for DVT, PE  Changes to current medical/health status since last appt: No  Denies signs/symptoms of bleeding and/or thrombosis since the last appt.    Denies any interval changes to diet  Denies any interval changes to medications since last appt.   Denies any complications or cost restrictions with current therapy.   Verified current warfarin dosing schedule.   BP recorded in vitals.  Medications reconciled  Pt on antiplatelet therapy Aspirin 81mg for ACS and must be reviewed again on 12/15/2021.      A/P   INR  is now therapeutic.   Continue current regimen.    Follow up appointment in 3 week(s) per patient.    Fartun Hurt, PharmD

## 2021-01-20 DIAGNOSIS — I10 HYPERTENSION, UNSPECIFIED TYPE: ICD-10-CM

## 2021-02-08 ENCOUNTER — ANTICOAGULATION VISIT (OUTPATIENT)
Dept: VASCULAR LAB | Facility: MEDICAL CENTER | Age: 71
End: 2021-02-08
Attending: INTERNAL MEDICINE
Payer: MEDICARE

## 2021-02-08 VITALS — SYSTOLIC BLOOD PRESSURE: 144 MMHG | HEART RATE: 61 BPM | DIASTOLIC BLOOD PRESSURE: 52 MMHG

## 2021-02-08 DIAGNOSIS — I82.90 DEEP VEIN THROMBOSIS (DVT) OF NON-EXTREMITY VEIN, UNSPECIFIED CHRONICITY: ICD-10-CM

## 2021-02-08 LAB — INR PPP: 1.9 (ref 2–3.5)

## 2021-02-08 PROCEDURE — 85610 PROTHROMBIN TIME: CPT

## 2021-02-08 PROCEDURE — 99212 OFFICE O/P EST SF 10 MIN: CPT | Performed by: NURSE PRACTITIONER

## 2021-02-08 NOTE — PROGRESS NOTES
Anticoagulation Summary  As of 2021    INR goal:  2.0-3.0   TTR:  59.5 % (5.6 y)   INR used for dosin.90 (2021)   Warfarin maintenance plan:  3.75 mg (7.5 mg x 0.5) every Tue, Thu; 7.5 mg (7.5 mg x 1) all other days   Weekly warfarin total:  45 mg   Plan last modified:  Miya Acosta, A.P.N. (2021)   Next INR check:  2021   Priority:  Acute   Target end date:  Indefinite    Indications    DVT (deep venous thrombosis) (HCC) (Resolved) [I82.409]  Pulmonary embolism [415.19] (Resolved) [I26.99]  Deep vein thrombosis (DVT) (HCC) [I82.409]             Anticoagulation Episode Summary     INR check location:  Anticoagulation Clinic    Preferred lab:  [facility]    Send INR reminders to:      Comments:        Anticoagulation Care Providers     Provider Role Specialty Phone number    Patricia Damon M.D. Referring Internal Medicine [phone]    Jona QuinnD Responsible      [facility] Anticoagulation Services Responsible  [phone]        Anticoagulation Patient Findings      HPI:  Rene Chan seen in clinic today for follow up on anticoagulation therapy in the presence of DVT, PE hx.   Denies any changes to current medical/health status since last appointment.   Denies any medication or diet changes.   No current symptoms of bleeding or thrombosis reported.    Pt on antiplatelet therapy - asa 81 mg daily for hx of TIA and CAD. To be reviewed with cardiology.    A/P:   INR slightly subtherapeutic. INR trends a bit low. Will increase regimen.   BP recorded in vitals.    Follow up appointment in 2 week(s).    Next Appointment: Monday, 2021 at 8:30 am.    Miya PARKER

## 2021-02-16 ENCOUNTER — TELEPHONE (OUTPATIENT)
Dept: MEDICAL GROUP | Facility: PHYSICIAN GROUP | Age: 71
End: 2021-02-16

## 2021-02-16 DIAGNOSIS — F31.70 BIPOLAR DISORDER IN FULL REMISSION, MOST RECENT EPISODE UNSPECIFIED TYPE (HCC): ICD-10-CM

## 2021-02-16 DIAGNOSIS — I25.10 CORONARY ARTERY DISEASE DUE TO LIPID RICH PLAQUE: ICD-10-CM

## 2021-02-16 DIAGNOSIS — I25.83 CORONARY ARTERY DISEASE DUE TO LIPID RICH PLAQUE: ICD-10-CM

## 2021-02-16 NOTE — TELEPHONE ENCOUNTER
Phone Number Called: [phone]    Call outcome: Spoke to patient regarding message below. informed pt of the lab order that was sent to the lab.

## 2021-02-16 NOTE — TELEPHONE ENCOUNTER
1. Caller Name:Rene Chan                Call Back Number: [phone]      How would the patient prefer to be contacted with a response: Phone call do NOT leave a detailed message    Pt would like to have labs ordered and sent to the lab prior his appointment with   Dr Michael Sethi in March 4, 2021. Pt is also wondering if there's any lab needed for his Depakote?

## 2021-02-22 ENCOUNTER — ANTICOAGULATION VISIT (OUTPATIENT)
Dept: VASCULAR LAB | Facility: MEDICAL CENTER | Age: 71
End: 2021-02-22
Attending: INTERNAL MEDICINE
Payer: MEDICARE

## 2021-02-22 VITALS — HEART RATE: 64 BPM | SYSTOLIC BLOOD PRESSURE: 149 MMHG | DIASTOLIC BLOOD PRESSURE: 54 MMHG

## 2021-02-22 DIAGNOSIS — I82.90 DEEP VEIN THROMBOSIS (DVT) OF NON-EXTREMITY VEIN, UNSPECIFIED CHRONICITY: ICD-10-CM

## 2021-02-22 LAB
INR BLD: 1.5 (ref 0.9–1.2)
INR PPP: 1.5 (ref 2–3.5)

## 2021-02-22 PROCEDURE — 85610 PROTHROMBIN TIME: CPT

## 2021-02-22 PROCEDURE — 99212 OFFICE O/P EST SF 10 MIN: CPT | Performed by: NURSE PRACTITIONER

## 2021-02-22 NOTE — PROGRESS NOTES
Anticoagulation Summary  As of 2021    INR goal:  2.0-3.0   TTR:  59.1 % (5.7 y)   INR used for dosin.50 (2021)   Warfarin maintenance plan:  7.5 mg (7.5 mg x 1) every day   Weekly warfarin total:  52.5 mg   Plan last modified:  Miya Acosta A.PPaulNPaul (2021)   Next INR check:  3/8/2021   Priority:  Acute   Target end date:  Indefinite    Indications    DVT (deep venous thrombosis) (HCC) (Resolved) [I82.409]  Pulmonary embolism [415.19] (Resolved) [I26.99]  Deep vein thrombosis (DVT) (HCC) [I82.409]             Anticoagulation Episode Summary     INR check location:  Anticoagulation Clinic    Preferred lab:  [facility]    Send INR reminders to:      Comments:        Anticoagulation Care Providers     Provider Role Specialty Phone number    Patricia Damon M.D. Referring Internal Medicine [phone]    Jona QuinnD Responsible      [facility] Anticoagulation Services Responsible  [phone]        Anticoagulation Patient Findings      HPI:  Rene Chan seen in clinic today for follow up on anticoagulation therapy in the presence of DVT, PE hx.   Denies any changes to current medical/health status since last appointment.   Denies any medication or diet changes.   No current symptoms of bleeding or thrombosis reported.    Pt on antiplatelet therapy Aspirin 81mg for TIA, CAD and must be reviewed again with cardiology - he needs to make appt but not due until the Fall of .    A/P:   INR subtherapeutic. Unsure why INR decreased despite dose increase. Will increase regimen further. No recent VTEs.  BP recorded in vitals.    Follow up appointment in 2 week(s).    Next Appointment: 2021 at 10:30 am.    Miya PARKER

## 2021-02-24 ENCOUNTER — HOSPITAL ENCOUNTER (OUTPATIENT)
Dept: LAB | Facility: MEDICAL CENTER | Age: 71
End: 2021-02-24
Attending: FAMILY MEDICINE
Payer: MEDICARE

## 2021-02-24 DIAGNOSIS — I25.10 CORONARY ARTERY DISEASE DUE TO LIPID RICH PLAQUE: ICD-10-CM

## 2021-02-24 DIAGNOSIS — F31.70 BIPOLAR DISORDER IN FULL REMISSION, MOST RECENT EPISODE UNSPECIFIED TYPE (HCC): ICD-10-CM

## 2021-02-24 DIAGNOSIS — I25.83 CORONARY ARTERY DISEASE DUE TO LIPID RICH PLAQUE: ICD-10-CM

## 2021-02-24 LAB
ALBUMIN SERPL BCP-MCNC: 3.7 G/DL (ref 3.2–4.9)
ALBUMIN/GLOB SERPL: 1.4 G/DL
ALP SERPL-CCNC: 41 U/L (ref 30–99)
ALT SERPL-CCNC: 26 U/L (ref 2–50)
ANION GAP SERPL CALC-SCNC: 8 MMOL/L (ref 7–16)
AST SERPL-CCNC: 24 U/L (ref 12–45)
BILIRUB SERPL-MCNC: 0.8 MG/DL (ref 0.1–1.5)
BUN SERPL-MCNC: 16 MG/DL (ref 8–22)
CALCIUM SERPL-MCNC: 9.5 MG/DL (ref 8.5–10.5)
CHLORIDE SERPL-SCNC: 103 MMOL/L (ref 96–112)
CO2 SERPL-SCNC: 28 MMOL/L (ref 20–33)
CREAT SERPL-MCNC: 0.77 MG/DL (ref 0.5–1.4)
ERYTHROCYTE [DISTWIDTH] IN BLOOD BY AUTOMATED COUNT: 47.3 FL (ref 35.9–50)
GLOBULIN SER CALC-MCNC: 2.6 G/DL (ref 1.9–3.5)
GLUCOSE SERPL-MCNC: 114 MG/DL (ref 65–99)
HCT VFR BLD AUTO: 50 % (ref 42–52)
HGB BLD-MCNC: 16.1 G/DL (ref 14–18)
MCH RBC QN AUTO: 31.1 PG (ref 27–33)
MCHC RBC AUTO-ENTMCNC: 32.2 G/DL (ref 33.7–35.3)
MCV RBC AUTO: 96.7 FL (ref 81.4–97.8)
PLATELET # BLD AUTO: 291 K/UL (ref 164–446)
PMV BLD AUTO: 11.2 FL (ref 9–12.9)
POTASSIUM SERPL-SCNC: 5.1 MMOL/L (ref 3.6–5.5)
PROT SERPL-MCNC: 6.3 G/DL (ref 6–8.2)
RBC # BLD AUTO: 5.17 M/UL (ref 4.7–6.1)
SODIUM SERPL-SCNC: 139 MMOL/L (ref 135–145)
VALPROATE SERPL-MCNC: 54.9 UG/ML (ref 50–100)
WBC # BLD AUTO: 7.9 K/UL (ref 4.8–10.8)

## 2021-02-24 PROCEDURE — 80053 COMPREHEN METABOLIC PANEL: CPT

## 2021-02-24 PROCEDURE — 36415 COLL VENOUS BLD VENIPUNCTURE: CPT

## 2021-02-24 PROCEDURE — 85027 COMPLETE CBC AUTOMATED: CPT

## 2021-02-24 PROCEDURE — 80164 ASSAY DIPROPYLACETIC ACD TOT: CPT

## 2021-03-04 ENCOUNTER — OFFICE VISIT (OUTPATIENT)
Dept: MEDICAL GROUP | Facility: PHYSICIAN GROUP | Age: 71
End: 2021-03-04
Payer: MEDICARE

## 2021-03-04 VITALS
DIASTOLIC BLOOD PRESSURE: 64 MMHG | HEIGHT: 68 IN | OXYGEN SATURATION: 94 % | BODY MASS INDEX: 32.22 KG/M2 | RESPIRATION RATE: 20 BRPM | SYSTOLIC BLOOD PRESSURE: 110 MMHG | HEART RATE: 56 BPM | TEMPERATURE: 97.7 F | WEIGHT: 212.6 LBS

## 2021-03-04 DIAGNOSIS — I82.502 LEG DVT (DEEP VENOUS THROMBOEMBOLISM), CHRONIC, LEFT (HCC): ICD-10-CM

## 2021-03-04 DIAGNOSIS — F31.70 BIPOLAR DISORDER IN FULL REMISSION, MOST RECENT EPISODE UNSPECIFIED TYPE (HCC): ICD-10-CM

## 2021-03-04 DIAGNOSIS — M25.512 ACUTE PAIN OF LEFT SHOULDER: ICD-10-CM

## 2021-03-04 DIAGNOSIS — E66.9 OBESITY (BMI 30-39.9): ICD-10-CM

## 2021-03-04 DIAGNOSIS — Z79.01 CHRONIC ANTICOAGULATION: ICD-10-CM

## 2021-03-04 PROBLEM — I82.501 CHRONIC DEEP VEIN THROMBOSIS (DVT) OF RIGHT LOWER EXTREMITY (HCC): Status: RESOLVED | Noted: 2018-08-27 | Resolved: 2021-03-04

## 2021-03-04 PROCEDURE — 99213 OFFICE O/P EST LOW 20 MIN: CPT | Performed by: FAMILY MEDICINE

## 2021-03-04 RX ORDER — WARFARIN SODIUM 7.5 MG/1
TABLET ORAL
Qty: 90 TABLET | Refills: 1 | Status: SHIPPED | OUTPATIENT
Start: 2021-03-04 | End: 2021-09-02

## 2021-03-04 ASSESSMENT — FIBROSIS 4 INDEX: FIB4 SCORE: 1.15

## 2021-03-04 NOTE — ASSESSMENT & PLAN NOTE
This is an acute problem.  Patient states that about a month ago when shoveling the snow he hurt his left shoulder.  Is been sore ever since although gradually getting better.  Has been using BenGay on it and that seems to help.  He did not hear any pop while shoveling the snow.  He does not sleep on his side.

## 2021-03-04 NOTE — PROGRESS NOTES
Subjective:     CC: Here for right shoulder pain and follow-up on issues.    HPI:   Rene presents today with the following medical concerns:    Leg DVT (deep venous thromboembolism), chronic, left (HCC)  This is a chronic problem.  Is documented by ultrasound in 2018.  Patient is on anticoagulant treatment.  Continue to follow.    Bipolar disorder in full remission (CMS-HCC)  This is a chronic problem.  Patient is well controlled on current medications.  He talk to a psychiatrist and does not need to follow-up with him unless there is any new problems that arise.  He is asking if we can refill his medication for him but needed and I said yes.    Obesity (BMI 30-39.9)  This is a chronic problem.  Patient is working on his diet and going to try to be more active this summer.    Acute pain of left shoulder  This is an acute problem.  Patient states that about a month ago when shoveling the snow he hurt his left shoulder.  Is been sore ever since although gradually getting better.  Has been using BenGay on it and that seems to help.  He did not hear any pop while shoveling the snow.  He does not sleep on his side.      Past Medical History:   Diagnosis Date   • Anticoagulation monitoring, special range    • Bipolar disorder (HCC)    • CAD (coronary artery disease) 2015   • Carotid artery occlusion     L 60-79% occluded; R 59% occluded per pt report;    • Clotting disorder (HCC)     protein S elevation in  with recurrent DVT and PE-coumadin lifelong   • Colon polyps    • Diabetes mellitus, type II (HCC)     diet controlled   • Gout    • Hammer toe    • Hyperlipidemia    • Hypertension    • Hypothyroidism    • Impaired fasting glucose    • Mild aortic stenosis - echo 2018        Social History     Tobacco Use   • Smoking status: Former Smoker     Packs/day: 2.00     Years: 10.00     Pack years: 20.00     Types: Cigarettes     Quit date: 1976     Years since quittin.2   • Smokeless tobacco: Never Used    • Tobacco comment: quit x 32 yrs; 2ppd x 7 years   Substance Use Topics   • Alcohol use: No   • Drug use: No     Comment: IV drug use in teens       Current Outpatient Medications Ordered in Epic   Medication Sig Dispense Refill   • metoprolol tartrate (LOPRESSOR) 25 MG Tab TAKE ONE TABLET BY MOUTH TWICE A  Tab 2   • VALERIAN ROOT PO Take 1 Capsule by mouth every bedtime.     • atorvastatin (LIPITOR) 80 MG tablet Take 80 mg by mouth every day.     • divalproex (DEPAKOTE) 250 MG Tablet Delayed Response Take 250-500 mg by mouth 3 times a day. 250mg in the morning and 500mg in the evening   Indications: 2 at night and 1 in morning     • levothyroxine (SYNTHROID) 75 MCG Tab Take 75 mcg by mouth every evening.     • lisinopril (PRINIVIL) 5 MG Tab Take 5 mg by mouth every evening.     • warfarin (COUMADIN) 7.5 MG Tab Take 3.75-7.5 mg by mouth every day. 7.5mg on Monday, Wednesday, & Friday   3.75mg all other days     • predniSONE (DELTASONE) 10 MG Tab Take 10 mg by mouth 2 times a day as needed (gout).     • aspirin 81 MG tablet Take 1 Tab by mouth every day.  Tab    • COENZYME Q10 PO Take 1 Tab by mouth every morning.     • Cholecalciferol (VITAMIN D) 2000 UNITS Cap Take 1,000 Units by mouth every evening.     • docosahexanoic acid (OMEGA 3 FA) 1000 MG CAPS Take 1,000 mg by mouth every day.     • Multiple Vitamins-Minerals (CENTRUM PO) Take 1 Tab by mouth every morning.       No current [facility]-ordered facility-administered medications on file.       Allergies:  Penicillins    Health Maintenance: Completed    ROS:  Gen: no fevers/chills, no changes in weight  Eyes: no changes in vision  ENT: no sore throat, no hearing loss, no bloody nose  Pulm: no sob, no cough  CV: no chest pain, no palpitations  GI: no nausea/vomiting, no diarrhea  : no dysuria  MSk: no myalgias  Skin: no rash  Neuro: no headaches, no numbness/tingling  Heme/Lymph: no easy bruising      Objective:       Exam:  /64 (BP Location:  "Left arm, Patient Position: Sitting, BP Cuff Size: Adult)   Pulse (!) 56   Temp 36.5 °C (97.7 °F) (Temporal)   Resp 20   Ht 1.727 m (5' 8\")   Wt 96.4 kg (212 lb 9.6 oz)   SpO2 94%   BMI 32.33 kg/m²  Body mass index is 32.33 kg/m².    Gen: Alert and oriented, No apparent distress.  Ext: No clubbing, cyanosis, edema.  Patient has normal range of motion of the left shoulder with minimal crepitus felt.  He has mild discomfort over the AC joint and slight swelling.  No obvious loss of strength.  No radiculopathy.      Labs: Lab results reviewed with the patient.    Assessment & Plan:     70 y.o. male with the following -     1. Bipolar disorder in full remission, most recent episode unspecified type (HCC)  This is a chronic problem.  Patient is to continue on his current dose of medications.  When he needs a refill we will call in for him.    2. Leg DVT (deep venous thromboembolism), chronic, left (HCC)  This is a chronic problem.  Patient's has documented chronic DVT in the left popliteal vein.  Continue on anticoagulant treatment.    3. Obesity (BMI 30-39.9)  This is a chronic problem.  Continue weight loss and exercise program.    4. Acute pain of left shoulder  This is an acute problem.  Patient was told he most likely has a strain of the AC joint.  He may also have some degenerative joint disease causing arthritic spurring that could aggravate the tendons.  We will have him try over-the-counter diclofenac gel and a heating pad.  If it does not continue to get better we can do x-rays and/or refer him for possible steroid injection.      Return in about 6 months (around 9/4/2021) for Short.    Please note that this dictation was created using voice recognition software. I have made every reasonable attempt to correct obvious errors, but I expect that there are errors of grammar and possibly content that I did not discover before finalizing the note.      "

## 2021-03-04 NOTE — ASSESSMENT & PLAN NOTE
This is a chronic problem.  Is documented by ultrasound in 2018.  Patient is on anticoagulant treatment.  Continue to follow.

## 2021-03-04 NOTE — ASSESSMENT & PLAN NOTE
This is a chronic problem.  Patient is working on his diet and going to try to be more active this summer.

## 2021-03-04 NOTE — ASSESSMENT & PLAN NOTE
This is a chronic problem.  Patient is well controlled on current medications.  He talk to a psychiatrist and does not need to follow-up with him unless there is any new problems that arise.  He is asking if we can refill his medication for him but needed and I said yes.

## 2021-03-05 RX ORDER — LISINOPRIL 5 MG/1
TABLET ORAL
Qty: 90 TABLET | Refills: 2 | Status: SHIPPED | OUTPATIENT
Start: 2021-03-05 | End: 2021-09-20

## 2021-03-08 ENCOUNTER — ANTICOAGULATION VISIT (OUTPATIENT)
Dept: VASCULAR LAB | Facility: MEDICAL CENTER | Age: 71
End: 2021-03-08
Attending: INTERNAL MEDICINE
Payer: MEDICARE

## 2021-03-08 VITALS — HEART RATE: 58 BPM | SYSTOLIC BLOOD PRESSURE: 127 MMHG | DIASTOLIC BLOOD PRESSURE: 52 MMHG

## 2021-03-08 DIAGNOSIS — Z79.01 CHRONIC ANTICOAGULATION: ICD-10-CM

## 2021-03-08 LAB — INR PPP: 4.7 (ref 2–3.5)

## 2021-03-08 PROCEDURE — 99212 OFFICE O/P EST SF 10 MIN: CPT | Performed by: NURSE PRACTITIONER

## 2021-03-08 PROCEDURE — 85610 PROTHROMBIN TIME: CPT

## 2021-03-08 NOTE — PROGRESS NOTES
Anticoagulation Summary  As of 3/8/2021    INR goal:  2.0-3.0   TTR:  58.9 % (5.7 y)   INR used for dosin.70 (3/8/2021)   Warfarin maintenance plan:  7.5 mg (7.5 mg x 1) every day   Weekly warfarin total:  52.5 mg   Plan last modified:  Miya Acosta, A.P.N. (2021)   Next INR check:  3/15/2021   Priority:  Acute   Target end date:  Indefinite    Indications    DVT (deep venous thrombosis) (HCC) (Resolved) [I82.409]  Pulmonary embolism [415.19] (Resolved) [I26.99]  Deep vein thrombosis (DVT) (HCC) (Resolved) [I82.409]             Anticoagulation Episode Summary     INR check location:  Anticoagulation Clinic    Preferred lab:  [facility]    Send INR reminders to:      Comments:        Anticoagulation Care Providers     Provider Role Specialty Phone number    Patricia Damon M.D. Referring Internal Medicine [phone]    Jona QuinnD Responsible      [facility] Anticoagulation Services Responsible  [phone]        Anticoagulation Patient Findings      HPI:  Rene Chan seen in clinic today for follow up on anticoagulation therapy in the presence of DVT, PE hx.   Denies any changes to current medical/health status since last appointment.   Denies any medication or diet changes.   No current symptoms of bleeding or thrombosis reported.    Pt on antiplatelet therapy Aspirin 81mg for CAD, TIA hx and must be reviewed again with cardiology (no f/u scheduled yet).    A/P:   INR supratherapeutic. INR increased from 1.5 after several dose increases.  HOLD tonight and began decreased regimen.   BP recorded in vitals.    Follow up appointment in 1 week(s).    Next Appointment: Monday, March 15, 2021 at 10:30 am.    Miya PARKER

## 2021-03-15 ENCOUNTER — ANTICOAGULATION VISIT (OUTPATIENT)
Dept: VASCULAR LAB | Facility: MEDICAL CENTER | Age: 71
End: 2021-03-15
Attending: INTERNAL MEDICINE
Payer: MEDICARE

## 2021-03-15 VITALS — HEART RATE: 60 BPM | DIASTOLIC BLOOD PRESSURE: 65 MMHG | SYSTOLIC BLOOD PRESSURE: 132 MMHG

## 2021-03-15 DIAGNOSIS — Z79.01 CHRONIC ANTICOAGULATION: ICD-10-CM

## 2021-03-15 LAB
INR BLD: 2.8 (ref 0.9–1.2)
INR PPP: 2.8 (ref 2–3.5)

## 2021-03-15 PROCEDURE — 99211 OFF/OP EST MAY X REQ PHY/QHP: CPT

## 2021-03-15 PROCEDURE — 85610 PROTHROMBIN TIME: CPT

## 2021-03-15 NOTE — PROGRESS NOTES
Anticoagulation Summary  As of 3/15/2021    INR goal:  2.0-3.0   TTR:  58.8 % (5.7 y)   INR used for dosin.80 (3/15/2021)   Warfarin maintenance plan:  3.75 mg (7.5 mg x 0.5) every Mon; 7.5 mg (7.5 mg x 1) all other days   Weekly warfarin total:  48.75 mg   Plan last modified:  Jona AlfaroD (3/15/2021)   Next INR check:  3/29/2021   Priority:  Acute   Target end date:  Indefinite    Indications    DVT (deep venous thrombosis) (HCC) (Resolved) [I82.409]  Pulmonary embolism [415.19] (Resolved) [I26.99]  Deep vein thrombosis (DVT) (HCC) (Resolved) [I82.409]             Anticoagulation Episode Summary     INR check location:  Anticoagulation Clinic    Preferred lab:  [facility]    Send INR reminders to:      Comments:        Anticoagulation Care Providers     Provider Role Specialty Phone number    Ptaricia Damon M.D. Referring Internal Medicine [phone]    Laisha Méndez PharmD Responsible      [facility] Anticoagulation Services Responsible  [phone]                Anticoagulation Patient Findings      HPI:  Rene Chan seen in clinic today, on anticoagulation therapy with warfarin for DVT, PE  Changes to current medical/health status since last appt: No  Denies signs/symptoms of bleeding and/or thrombosis since the last appt.    Denies any interval changes to diet  Denies any interval changes to medications since last appt.   Denies any complications or cost restrictions with current therapy.   Verified current warfarin dosing schedule.   BP recorded in vitals.  Medications reconciled  Pt on antiplatelet therapy Aspirin 81mg for CAD, TIA hx and must be reviewed again with cardiology (no f/u scheduled yet, should have appt end of Summer).      A/P   INR  is now therapeutic.   Patient's current regimen of 7.5mg daily seems to be too high. Start reduced regimen of 3.75 mg qMon and 7.5 mg AOD.     Follow up appointment in 2 week(s) per patient preference.    Fartun Hurt,  PharmD

## 2021-03-16 LAB — INR BLD: 4.7 (ref 0.9–1.2)

## 2021-03-29 ENCOUNTER — ANTICOAGULATION VISIT (OUTPATIENT)
Dept: VASCULAR LAB | Facility: MEDICAL CENTER | Age: 71
End: 2021-03-29
Attending: INTERNAL MEDICINE
Payer: MEDICARE

## 2021-03-29 VITALS — SYSTOLIC BLOOD PRESSURE: 137 MMHG | HEART RATE: 61 BPM | DIASTOLIC BLOOD PRESSURE: 63 MMHG

## 2021-03-29 DIAGNOSIS — Z79.01 CHRONIC ANTICOAGULATION: ICD-10-CM

## 2021-03-29 LAB — INR PPP: 3.7 (ref 2–3.5)

## 2021-03-29 PROCEDURE — 99212 OFFICE O/P EST SF 10 MIN: CPT | Performed by: NURSE PRACTITIONER

## 2021-03-29 PROCEDURE — 85610 PROTHROMBIN TIME: CPT

## 2021-03-29 NOTE — PROGRESS NOTES
Anticoagulation Summary  As of 3/29/2021    INR goal:  2.0-3.0   TTR:  58.5 % (5.8 y)   INR used for dosing:  3.70 (3/29/2021)   Warfarin maintenance plan:  3.75 mg (7.5 mg x 0.5) every Mon, Fri; 7.5 mg (7.5 mg x 1) all other days   Weekly warfarin total:  45 mg   Plan last modified:  Miya Acosta, A.P.N. (3/29/2021)   Next INR check:  4/19/2021   Priority:  Acute   Target end date:  Indefinite    Indications    DVT (deep venous thrombosis) (HCC) (Resolved) [I82.409]  Pulmonary embolism [415.19] (Resolved) [I26.99]  Deep vein thrombosis (DVT) (HCC) (Resolved) [I82.409]             Anticoagulation Episode Summary     INR check location:  Anticoagulation Clinic    Preferred lab:  [facility]    Send INR reminders to:      Comments:        Anticoagulation Care Providers     Provider Role Specialty Phone number    Patricia Damon M.D. Referring Internal Medicine [phone]    Jona QuinnD Responsible      [facility] Anticoagulation Services Responsible  [phone]        Anticoagulation Patient Findings      HPI:  Rene Chan seen in clinic today for follow up on anticoagulation therapy in the presence of DVT, PE.   Denies any changes to current medical/health status since last appointment.   Denies any medication or diet changes.   No current symptoms of bleeding or thrombosis reported.    Pt on antiplatelet therapy Aspirin 81mg for CAD and TIA and must be reviewed again on ~fall of 2021 when he schedules f/u with cards.    A/P:   INR supratherapeutic.   Will decrease regimen.   BP recorded in vitals.    Pt educated to contact our clinic with any changes in medications or s/s of bleeding or thrombosis. Pt is aware to seek immediate medical attention for falls, head injury or deep cuts    Follow up appointment in 3 week(s) per pt's request.    Next Appointment: Monday, April 19, 2021 at 10:30 am.    Miya PARKER

## 2021-04-09 LAB — INR BLD: 3.7 (ref 0.9–1.2)

## 2021-04-19 ENCOUNTER — ANTICOAGULATION VISIT (OUTPATIENT)
Dept: VASCULAR LAB | Facility: MEDICAL CENTER | Age: 71
End: 2021-04-19
Attending: INTERNAL MEDICINE
Payer: MEDICARE

## 2021-04-19 DIAGNOSIS — Z86.711 HX PULMONARY EMBOLISM: ICD-10-CM

## 2021-04-19 DIAGNOSIS — I82.502 LEG DVT (DEEP VENOUS THROMBOEMBOLISM), CHRONIC, LEFT (HCC): ICD-10-CM

## 2021-04-19 LAB — INR PPP: 2.6 (ref 2–3.5)

## 2021-04-19 PROCEDURE — 99211 OFF/OP EST MAY X REQ PHY/QHP: CPT | Performed by: NURSE PRACTITIONER

## 2021-04-19 PROCEDURE — 85610 PROTHROMBIN TIME: CPT

## 2021-04-19 NOTE — PROGRESS NOTES
Anticoagulation Summary  As of 2021    INR goal:  2.0-3.0   TTR:  58.3 % (5.8 y)   INR used for dosin.60 (2021)   Warfarin maintenance plan:  3.75 mg (7.5 mg x 0.5) every Mon, Fri; 7.5 mg (7.5 mg x 1) all other days   Weekly warfarin total:  45 mg   No change documented:  MISTI BucioPPaulNPaul   Plan last modified:  MISTI BucioP.NPaul (3/29/2021)   Next INR check:  2021   Priority:  Acute   Target end date:  Indefinite    Indications    DVT (deep venous thrombosis) (HCC) (Resolved) [I82.409]  Pulmonary embolism [415.19] (Resolved) [I26.99]  Deep vein thrombosis (DVT) (HCC) (Resolved) [I82.409]             Anticoagulation Episode Summary     INR check location:  Anticoagulation Clinic    Preferred lab:  [facility]    Send INR reminders to:      Comments:        Anticoagulation Care Providers     Provider Role Specialty Phone number    Patricia Damon M.D. Referring Internal Medicine [phone]    Jona QuinnD Responsible      [facility] Anticoagulation Services Responsible  [phone]        Anticoagulation Patient Findings      HPI:  Rene Chan seen in clinic today for follow up on anticoagulation therapy in the presence of DVT, PE hx.   Denies any changes to current medical/health status since last appointment.   Denies any medication or diet changes.   No current symptoms of bleeding or thrombosis reported.    Pt on antiplatelet therapy Aspirin 81mg for TIA, CAD hx and must be reviewed with cardiology (pt will schedule for ).    A/P:   INR therapeutic.   Continue current regimen.       Pt educated to contact our clinic with any changes in medications or s/s of bleeding or thrombosis. Pt is aware to seek immediate medical attention for falls, head injury or deep cuts    Follow up appointment in 4 week(s).    Next Appointment: Monday, May 17, 2021 at 10:30 am.    Miya PARKER

## 2021-04-20 LAB — INR BLD: 2.6 (ref 0.9–1.2)

## 2021-05-17 ENCOUNTER — ANTICOAGULATION VISIT (OUTPATIENT)
Dept: VASCULAR LAB | Facility: MEDICAL CENTER | Age: 71
End: 2021-05-17
Attending: INTERNAL MEDICINE
Payer: MEDICARE

## 2021-05-17 VITALS — HEART RATE: 60 BPM | DIASTOLIC BLOOD PRESSURE: 67 MMHG | SYSTOLIC BLOOD PRESSURE: 141 MMHG

## 2021-05-17 DIAGNOSIS — Z79.01 CHRONIC ANTICOAGULATION: ICD-10-CM

## 2021-05-17 LAB — INR PPP: 3.4 (ref 2–3.5)

## 2021-05-17 PROCEDURE — 85610 PROTHROMBIN TIME: CPT

## 2021-05-17 PROCEDURE — 99212 OFFICE O/P EST SF 10 MIN: CPT | Performed by: NURSE PRACTITIONER

## 2021-05-17 NOTE — PROGRESS NOTES
Anticoagulation Summary  As of 5/17/2021    INR goal:  2.0-3.0   TTR:  58.2 % (5.9 y)   INR used for dosing:  3.40 (5/17/2021)   Warfarin maintenance plan:  3.75 mg (7.5 mg x 0.5) every Mon, Fri; 7.5 mg (7.5 mg x 1) all other days   Weekly warfarin total:  45 mg   Plan last modified:  Miya Acosta A.P.NPaul (3/29/2021)   Next INR check:  6/7/2021   Priority:  Acute   Target end date:  Indefinite    Indications    DVT (deep venous thrombosis) (HCC) (Resolved) [I82.409]  Pulmonary embolism [415.19] (Resolved) [I26.99]  Deep vein thrombosis (DVT) (HCC) (Resolved) [I82.409]             Anticoagulation Episode Summary     INR check location:  Anticoagulation Clinic    Preferred lab:  [facility]    Send INR reminders to:      Comments:        Anticoagulation Care Providers     Provider Role Specialty Phone number    Patricia Dmaon M.D. Referring Internal Medicine [phone]    Jona QuinnD Responsible      [facility] Anticoagulation Services Responsible  [phone]        Anticoagulation Patient Findings      HPI:  Rene Chan seen in clinic today for follow up on anticoagulation therapy in the presence of DVT, PE hx.   Recently saw his dentist for a cracked tooth. He held one dose last week then took a full tab on Friday instead of a half tab.   Denies any medication or diet changes.   No current symptoms of bleeding or thrombosis reported.    Pt on antiplatelet therapy Aspirin 81mg for CAD, CABG, TIA hx and must be reviewed again with cardiology (currently no f/u scheduled. He is due in the fall of 2021).    A/P:   INR supratherapeutic.   HOLD one dose then continue current regimen.   BP recorded in vitals.    Pt educated to contact our clinic with any changes in medications or s/s of bleeding or thrombosis. Pt is aware to seek immediate medical attention for falls, head injury or deep cuts    Follow up appointment in 3 week(s) per pt's request.    Next Appointment: Monday, June 7, 2021 at  10:45 am.    Miya PARKER

## 2021-05-18 LAB — INR BLD: 3.4 (ref 0.9–1.2)

## 2021-06-07 ENCOUNTER — APPOINTMENT (OUTPATIENT)
Dept: VASCULAR LAB | Facility: MEDICAL CENTER | Age: 71
End: 2021-06-07
Payer: MEDICARE

## 2021-06-09 ENCOUNTER — ANTICOAGULATION VISIT (OUTPATIENT)
Dept: VASCULAR LAB | Facility: MEDICAL CENTER | Age: 71
End: 2021-06-09
Attending: INTERNAL MEDICINE
Payer: MEDICARE

## 2021-06-09 VITALS — SYSTOLIC BLOOD PRESSURE: 150 MMHG | DIASTOLIC BLOOD PRESSURE: 56 MMHG | HEART RATE: 57 BPM

## 2021-06-09 DIAGNOSIS — Z79.01 CHRONIC ANTICOAGULATION: ICD-10-CM

## 2021-06-09 LAB — INR PPP: 2.4 (ref 2–3.5)

## 2021-06-09 PROCEDURE — 99211 OFF/OP EST MAY X REQ PHY/QHP: CPT | Performed by: NURSE PRACTITIONER

## 2021-06-09 PROCEDURE — 85610 PROTHROMBIN TIME: CPT

## 2021-06-09 NOTE — PROGRESS NOTES
Anticoagulation Summary  As of 2021    INR goal:  2.0-3.0   TTR:  58.2 % (6 y)   INR used for dosin.40 (2021)   Warfarin maintenance plan:  3.75 mg (7.5 mg x 0.5) every Mon, Fri; 7.5 mg (7.5 mg x 1) all other days   Weekly warfarin total:  45 mg   Plan last modified:  Miya Acosta, A.P.N. (3/29/2021)   Next INR check:  2021   Priority:  Acute   Target end date:  Indefinite    Indications    DVT (deep venous thrombosis) (HCC) (Resolved) [I82.409]  Pulmonary embolism [415.19] (Resolved) [I26.99]  Deep vein thrombosis (DVT) (HCC) (Resolved) [I82.409]             Anticoagulation Episode Summary     INR check location:  Anticoagulation Clinic    Preferred lab:  [facility]    Send INR reminders to:      Comments:        Anticoagulation Care Providers     Provider Role Specialty Phone number    Patricia Damon M.D. Referring Internal Medicine [phone]    Jona QuinnD Responsible      [facility] Anticoagulation Services Responsible  [phone]        Anticoagulation Patient Findings      HPI:  Rene Chan seen in clinic today for follow up on anticoagulation therapy in the presence of DVT, PE hx.   Denies any changes to current medical/health status since last appointment.   Denies any medication or diet changes.   No current symptoms of bleeding or thrombosis reported.  Verified dose with patient.  BP recorded in vitals.    Pt on antiplatelet therapy Aspirin 81mg for CAD, TIA hx and must be reviewed again in the  of  with cardiology.    A/P:   INR therapeutic.   Continue current regimen.     Pt educated to contact our clinic with any changes in medications or s/s of bleeding or thrombosis. Pt is aware to seek immediate medical attention for falls, head injury or deep cuts    Follow up appointment in 4 week(s) per pt's request.    Next Appointment: 2021 at 10:30 am.    Miya PARKER

## 2021-06-14 LAB — INR BLD: 2.4 (ref 0.9–1.2)

## 2021-06-25 ENCOUNTER — PATIENT MESSAGE (OUTPATIENT)
Dept: HEALTH INFORMATION MANAGEMENT | Facility: OTHER | Age: 71
End: 2021-06-25

## 2021-07-07 ENCOUNTER — ANTICOAGULATION VISIT (OUTPATIENT)
Dept: VASCULAR LAB | Facility: MEDICAL CENTER | Age: 71
End: 2021-07-07
Attending: INTERNAL MEDICINE
Payer: MEDICARE

## 2021-07-07 VITALS — DIASTOLIC BLOOD PRESSURE: 48 MMHG | SYSTOLIC BLOOD PRESSURE: 137 MMHG | HEART RATE: 58 BPM

## 2021-07-07 DIAGNOSIS — Z79.01 CHRONIC ANTICOAGULATION: ICD-10-CM

## 2021-07-07 LAB — INR PPP: 2.9 (ref 2–3.5)

## 2021-07-07 PROCEDURE — 99211 OFF/OP EST MAY X REQ PHY/QHP: CPT | Performed by: NURSE PRACTITIONER

## 2021-07-07 PROCEDURE — 85610 PROTHROMBIN TIME: CPT

## 2021-07-07 NOTE — PROGRESS NOTES
Anticoagulation Summary  As of 2021    INR goal:  2.0-3.0   TTR:  58.7 % (6 y)   INR used for dosin.90 (2021)   Warfarin maintenance plan:  3.75 mg (7.5 mg x 0.5) every Mon, Fri; 7.5 mg (7.5 mg x 1) all other days   Weekly warfarin total:  45 mg   Plan last modified:  Miya Acosta, A.P.N. (3/29/2021)   Next INR check:  2021   Priority:  Acute   Target end date:  Indefinite    Indications    DVT (deep venous thrombosis) (HCC) (Resolved) [I82.409]  Pulmonary embolism [415.19] (Resolved) [I26.99]  Deep vein thrombosis (DVT) (HCC) (Resolved) [I82.409]             Anticoagulation Episode Summary     INR check location:  Anticoagulation Clinic    Preferred lab:  [email or web address]    Send INR reminders to:      Comments:        Anticoagulation Care Providers     Provider Role Specialty Phone number    Patricia Damon M.D. Referring Internal Medicine [phone]    Jona QuinnD Responsible      [facility] Anticoagulation Services Responsible  [phone]        Anticoagulation Patient Findings      HPI:  Rene Chan seen in clinic today for follow up on anticoagulation therapy in the presence of DVT, PE hx.   Denies any changes to current medical/health status since last appointment.   Denies any medication or diet changes.   No current symptoms of bleeding or thrombosis reported.  Verified dose with patient.  BP recorded in vitals.    Pt is not on antiplatelet therapy.    A/P:   INR therapeutic.   Continue current regimen.     Pt educated to contact our clinic with any changes in medications or s/s of bleeding or thrombosis. Pt is aware to seek immediate medical attention for falls, head injury or deep cuts    Follow up appointment in 5 week(s).    Next Appointment: 2021 at 10:30 am.    Miya PARKER

## 2021-07-08 LAB — INR BLD: 2.9 (ref 0.9–1.2)

## 2021-08-11 ENCOUNTER — ANTICOAGULATION VISIT (OUTPATIENT)
Dept: VASCULAR LAB | Facility: MEDICAL CENTER | Age: 71
End: 2021-08-11
Attending: INTERNAL MEDICINE
Payer: MEDICARE

## 2021-08-11 VITALS — HEART RATE: 61 BPM | DIASTOLIC BLOOD PRESSURE: 84 MMHG | SYSTOLIC BLOOD PRESSURE: 156 MMHG

## 2021-08-11 DIAGNOSIS — Z79.01 CHRONIC ANTICOAGULATION: ICD-10-CM

## 2021-08-11 LAB
INR BLD: 3.8 (ref 0.9–1.2)
INR PPP: 3.8 (ref 2–3.5)

## 2021-08-11 PROCEDURE — 99212 OFFICE O/P EST SF 10 MIN: CPT

## 2021-08-11 PROCEDURE — 85610 PROTHROMBIN TIME: CPT

## 2021-08-11 NOTE — PROGRESS NOTES
Anticoagulation Summary  As of 8/11/2021    INR goal:  2.0-3.0   TTR:  58.0 % (6.1 y)   INR used for dosing:  3.80 (8/11/2021)   Warfarin maintenance plan:  3.75 mg (7.5 mg x 0.5) every Mon, Wed, Fri; 7.5 mg (7.5 mg x 1) all other days   Weekly warfarin total:  41.25 mg   Plan last modified:  Jona KcD (8/11/2021)   Next INR check:  8/18/2021   Priority:  Acute   Target end date:  Indefinite    Indications    DVT (deep venous thrombosis) (HCC) (Resolved) [I82.409]  Pulmonary embolism [415.19] (Resolved) [I26.99]  Deep vein thrombosis (DVT) (HCC) (Resolved) [I82.409]             Anticoagulation Episode Summary     INR check location:  Anticoagulation Clinic    Preferred lab:  [facility]    Send INR reminders to:      Comments:        Anticoagulation Care Providers     Provider Role Specialty Phone number    Patricia Damon M.D. Referring Internal Medicine [phone]    Jona QuinnD Responsible      [facility] Anticoagulation Services Responsible  [phone]                Refer to Patient Findings for HPI:  Patient Findings     Positives:  Change in diet/appetite (Decreased appetite)    Negatives:  Signs/symptoms of thrombosis, Signs/symptoms of bleeding, Laboratory test error suspected, Change in health, Change in alcohol use, Change in activity, Upcoming invasive procedure, Emergency department visit, Upcoming dental procedure, Missed doses, Extra doses, Change in medications, Hospital admission, Bruising, Other complaints          Vitals:    08/11/21 1039   BP: 156/84   Pulse: 61       Verified current warfarin dosing schedule.    Medications reconciled yes  Pt is on antiplatelet therapy with 81mg aspirin for CAD, TIA hx and must be reviewed again in the Fall of 2021 with cardiology.    A/P   INR supra -therapeutic.     Warfarin dosing recommendation: Hold warfarin today and then decrease weekly regimen by ~8%    Pt educated to contact our clinic with any changes in  medications or s/s of bleeding or thrombosis. Pt is aware to seek immediate medical attention for falls, head injury or deep cuts.    Follow up appointment in 1 week(s).    Jona KcD   Seen with Nandini Muro PharmD

## 2021-08-18 ENCOUNTER — ANTICOAGULATION VISIT (OUTPATIENT)
Dept: VASCULAR LAB | Facility: MEDICAL CENTER | Age: 71
End: 2021-08-18
Attending: INTERNAL MEDICINE
Payer: MEDICARE

## 2021-08-18 VITALS — DIASTOLIC BLOOD PRESSURE: 76 MMHG | HEART RATE: 57 BPM | SYSTOLIC BLOOD PRESSURE: 147 MMHG

## 2021-08-18 DIAGNOSIS — Z79.01 CHRONIC ANTICOAGULATION: ICD-10-CM

## 2021-08-18 LAB — INR PPP: 2.1 (ref 2–3.5)

## 2021-08-18 PROCEDURE — 99211 OFF/OP EST MAY X REQ PHY/QHP: CPT

## 2021-08-18 PROCEDURE — 85610 PROTHROMBIN TIME: CPT

## 2021-08-18 NOTE — PROGRESS NOTES
Anticoagulation Summary  As of 2021    INR goal:  2.0-3.0   TTR:  58.0 % (6.2 y)   INR used for dosin.10 (2021)   Warfarin maintenance plan:  3.75 mg (7.5 mg x 0.5) every Mon, Wed, Fri; 7.5 mg (7.5 mg x 1) all other days   Weekly warfarin total:  41.25 mg   Plan last modified:  Jona KcD (2021)   Next INR check:  2021   Priority:  Acute   Target end date:  Indefinite    Indications    DVT (deep venous thrombosis) (HCC) (Resolved) [I82.409]  Pulmonary embolism [415.19] (Resolved) [I26.99]  Deep vein thrombosis (DVT) (HCC) (Resolved) [I82.409]             Anticoagulation Episode Summary     INR check location:  Anticoagulation Clinic    Preferred lab:  [facility]    Send INR reminders to:      Comments:        Anticoagulation Care Providers     Provider Role Specialty Phone number    Patricia Damon M.D. Referring Internal Medicine [phone]    Jona QuinnD Responsible      [facility] Anticoagulation Services Responsible  [phone]                Refer to Patient Findings for HPI:  Patient Findings     Negatives:  Signs/symptoms of thrombosis, Signs/symptoms of bleeding, Laboratory test error suspected, Change in health, Change in alcohol use, Change in activity, Upcoming invasive procedure, Emergency department visit, Upcoming dental procedure, Missed doses, Extra doses, Change in medications, Change in diet/appetite, Hospital admission, Bruising, Other complaints            Vitals:    21 1043   BP: 147/76   Pulse: (!) 57       Verified current warfarin dosing schedule.    Medications reconciled yes  Pt is on antiplatelet therapy with 81mg aspirin for CAD, TIA hx and must be reviewed again in the  with cardiology.    A/P   INR is therapeutic.     Warfarin dosing recommendation: Pt is to continue with current warfarin dosing regimen.    Pt educated to contact our clinic with any changes in medications or s/s of bleeding or thrombosis.  Pt is aware to seek immediate medical attention for falls, head injury or deep cuts.    Follow up appointment in 3 week(s).    Dileep Fernández PharmD   Seen with ELENA Zepeda

## 2021-08-19 LAB — INR BLD: 2.1 (ref 0.9–1.2)

## 2021-09-08 ENCOUNTER — ANTICOAGULATION VISIT (OUTPATIENT)
Dept: VASCULAR LAB | Facility: MEDICAL CENTER | Age: 71
End: 2021-09-08
Attending: INTERNAL MEDICINE
Payer: MEDICARE

## 2021-09-08 VITALS — SYSTOLIC BLOOD PRESSURE: 124 MMHG | HEART RATE: 54 BPM | DIASTOLIC BLOOD PRESSURE: 70 MMHG

## 2021-09-08 DIAGNOSIS — Z79.01 CHRONIC ANTICOAGULATION: ICD-10-CM

## 2021-09-08 LAB — INR PPP: 1.3 (ref 2–3.5)

## 2021-09-08 PROCEDURE — 99212 OFFICE O/P EST SF 10 MIN: CPT

## 2021-09-08 PROCEDURE — 85610 PROTHROMBIN TIME: CPT

## 2021-09-08 NOTE — PROGRESS NOTES
Anticoagulation Summary  As of 2021    INR goal:  2.0-3.0   TTR:  57.6 % (6.2 y)   INR used for dosin.30 (2021)   Warfarin maintenance plan:  3.75 mg (7.5 mg x 0.5) every Mon, Wed, Fri; 7.5 mg (7.5 mg x 1) all other days   Weekly warfarin total:  41.25 mg   Plan last modified:  Jona KcD (2021)   Next INR check:  9/15/2021   Priority:  Acute   Target end date:  Indefinite    Indications    DVT (deep venous thrombosis) (HCC) (Resolved) [I82.409]  Pulmonary embolism [415.19] (Resolved) [I26.99]  Deep vein thrombosis (DVT) (HCC) (Resolved) [I82.409]             Anticoagulation Episode Summary     INR check location:  Anticoagulation Clinic    Preferred lab:  [facility]    Send INR reminders to:      Comments:        Anticoagulation Care Providers     Provider Role Specialty Phone number    Patricia Damon M.D. Referring Internal Medicine [phone]    Jona QuinnD Responsible      [facility] Anticoagulation Services Responsible  [phone]                Refer to Patient Findings for HPI:  Patient Findings     Negatives:  Signs/symptoms of thrombosis, Signs/symptoms of bleeding, Laboratory test error suspected, Change in health, Change in alcohol use, Change in activity, Upcoming invasive procedure, Emergency department visit, Upcoming dental procedure, Missed doses, Extra doses, Change in medications, Change in diet/appetite, Hospital admission, Bruising, Other complaints          Vitals:    21 1000   BP: 124/70   Pulse: (!) 54     Verified current warfarin dosing schedule.    Medications reconciled   Pt is on 81mg ASA as antiplatelet therapy for CAD + h/o TIA and must be reviewed again on 21 with cardiologo.      A/P   INR  SUB-therapeutic - unclear etiology of reading today. Previous readings relatively stable.   - Suggested Lovenox to bridge until INR is therapeutic - pt declined and was reminded of risks of sub-therapeutic INR  - Pt refused  closer follow up when asked to return sooner - pt agreed to 1 week follow up    Warfarin dosing recommendation: Instructed pt to BOLUS 2x to 11.25mg TODAY and TOMORROW only, then continue regimen      Pt educated to contact our clinic with any changes in medications or s/s of bleeding or thrombosis. Pt is aware to seek immediate medical attention for falls, head injury or deep cuts.    Follow up appointment in 1 week(s) per pt preference    Irvin Porras, JonaD

## 2021-09-15 ENCOUNTER — ANTICOAGULATION VISIT (OUTPATIENT)
Dept: VASCULAR LAB | Facility: MEDICAL CENTER | Age: 71
End: 2021-09-15
Attending: INTERNAL MEDICINE
Payer: MEDICARE

## 2021-09-15 VITALS — SYSTOLIC BLOOD PRESSURE: 145 MMHG | HEART RATE: 59 BPM | DIASTOLIC BLOOD PRESSURE: 76 MMHG

## 2021-09-15 DIAGNOSIS — Z79.01 CHRONIC ANTICOAGULATION: ICD-10-CM

## 2021-09-15 LAB
INR BLD: 2.1 (ref 0.9–1.2)
INR PPP: 2.1 (ref 2–3.5)

## 2021-09-15 PROCEDURE — 99212 OFFICE O/P EST SF 10 MIN: CPT

## 2021-09-15 PROCEDURE — 85610 PROTHROMBIN TIME: CPT

## 2021-09-15 NOTE — PROGRESS NOTES
Anticoagulation Summary  As of 9/15/2021    INR goal:  2.0-3.0   TTR:  57.4 % (6.2 y)   INR used for dosin.10 (9/15/2021)   Warfarin maintenance plan:  3.75 mg (7.5 mg x 0.5) every Mon, Fri; 7.5 mg (7.5 mg x 1) all other days   Weekly warfarin total:  45 mg   Plan last modified:  Jona RuizD (9/15/2021)   Next INR check:  2021   Priority:  Acute   Target end date:  Indefinite    Indications    DVT (deep venous thrombosis) (HCC) (Resolved) [I82.409]  Pulmonary embolism [415.19] (Resolved) [I26.99]  Deep vein thrombosis (DVT) (HCC) (Resolved) [I82.409]             Anticoagulation Episode Summary     INR check location:  Anticoagulation Clinic    Preferred lab:  [facility]    Send INR reminders to:      Comments:        Anticoagulation Care Providers     Provider Role Specialty Phone number    Patricia Damon M.D. Referring Internal Medicine [phone]    Jona QuinnD Responsible      [facility] Anticoagulation Services Responsible  [phone]                Refer to Patient Findings for HPI:  Patient Findings     Positives:  Change in diet/appetite (Pt believes he was eating more spinach and vitamin K last week)    Negatives:  Signs/symptoms of thrombosis, Signs/symptoms of bleeding, Laboratory test error suspected, Change in health, Change in alcohol use, Change in activity, Upcoming invasive procedure, Emergency department visit, Upcoming dental procedure, Missed doses, Extra doses, Change in medications, Hospital admission, Bruising, Other complaints          Vitals:    09/15/21 1102   BP: 145/76   Pulse: (!) 59       Verified current warfarin dosing schedule.    Medications reconciled   Pt is on 81mg ASA as antiplatelet therapy for CAD + h/o TIA and must be reviewed again on 21 with cardiology.      A/P   INR  now-therapeutic.     Warfarin dosing recommendation: Pt to begin increased dosing regimen of 3.75mg Mon,Fri; 7.5mg ROW    Pt educated to contact our clinic  with any changes in medications or s/s of bleeding or thrombosis. Pt is aware to seek immediate medical attention for falls, head injury or deep cuts.    Follow up appointment in 2 week(s).    Irvin Porras, JonaD

## 2021-09-19 DIAGNOSIS — I10 HYPERTENSION, UNSPECIFIED TYPE: ICD-10-CM

## 2021-09-20 RX ORDER — LISINOPRIL 5 MG/1
5 TABLET ORAL DAILY
Qty: 100 TABLET | Refills: 0 | Status: SHIPPED | OUTPATIENT
Start: 2021-09-20 | End: 2021-12-30

## 2021-09-20 NOTE — TELEPHONE ENCOUNTER
Medication Refill  Jaciel August Ass't  You 50 minutes ago (9:54 AM)     Patient has an appt 11/30/2021 with [facility] Plan requires a 100 day supply. Thank you      Routing comment      100 day supply sent to preferred pharmacy.

## 2021-09-29 ENCOUNTER — ANTICOAGULATION VISIT (OUTPATIENT)
Dept: VASCULAR LAB | Facility: MEDICAL CENTER | Age: 71
End: 2021-09-29
Attending: INTERNAL MEDICINE
Payer: MEDICARE

## 2021-09-29 VITALS — SYSTOLIC BLOOD PRESSURE: 117 MMHG | HEART RATE: 60 BPM | DIASTOLIC BLOOD PRESSURE: 71 MMHG

## 2021-09-29 DIAGNOSIS — Z79.01 CHRONIC ANTICOAGULATION: ICD-10-CM

## 2021-09-29 LAB — INR PPP: 2.1 (ref 2–3.5)

## 2021-09-29 PROCEDURE — 99211 OFF/OP EST MAY X REQ PHY/QHP: CPT | Performed by: NURSE PRACTITIONER

## 2021-09-29 PROCEDURE — 85610 PROTHROMBIN TIME: CPT

## 2021-09-29 NOTE — PROGRESS NOTES
Anticoagulation Summary  As of 2021    INR goal:  2.0-3.0   TTR:  57.7 % (6.3 y)   INR used for dosin.10 (2021)   Warfarin maintenance plan:  3.75 mg (7.5 mg x 0.5) every Mon, Fri; 7.5 mg (7.5 mg x 1) all other days   Weekly warfarin total:  45 mg   Plan last modified:  Jona RuizD (9/15/2021)   Next INR check:  10/20/2021   Priority:  Acute   Target end date:  Indefinite    Indications    DVT (deep venous thrombosis) (HCC) (Resolved) [I82.409]  Pulmonary embolism [415.19] (Resolved) [I26.99]  Deep vein thrombosis (DVT) (HCC) (Resolved) [I82.409]             Anticoagulation Episode Summary     INR check location:  Anticoagulation Clinic    Preferred lab:  [facility]    Send INR reminders to:      Comments:        Anticoagulation Care Providers     Provider Role Specialty Phone number    Patricia Damon M.D. Referring Internal Medicine [phone]    Jona QuinnD Responsible      [facility] Anticoagulation Services Responsible  [phone]                Refer to Patient Findings for HPI:      Vitals:    21 1057   BP: 117/71   Pulse: 60       Verified current warfarin dosing schedule.    Medications reconciled   Pt is on ASA 81 mg as antiplatelet therapy for CAD and must be reviewed again on 21 with cards.      A/P   INR  -therapeutic.     Warfarin dosing recommendation: Continue current regimen.    Pt educated to contact our clinic with any changes in medications or s/s of bleeding or thrombosis. Pt is aware to seek immediate medical attention for falls, head injury or deep cuts.    Follow up appointment in 3 week(s).    LAMAR Bucio

## 2021-10-01 LAB — INR BLD: 2.1 (ref 0.9–1.2)

## 2021-10-06 DIAGNOSIS — Z79.01 CHRONIC ANTICOAGULATION: ICD-10-CM

## 2021-10-07 RX ORDER — LEVOTHYROXINE SODIUM 0.07 MG/1
75 TABLET ORAL EVERY EVENING
Qty: 90 TABLET | Refills: 0 | Status: SHIPPED | OUTPATIENT
Start: 2021-10-07 | End: 2021-10-11 | Stop reason: SDUPTHER

## 2021-10-11 RX ORDER — DIVALPROEX SODIUM 250 MG/1
250-500 TABLET, DELAYED RELEASE ORAL 3 TIMES DAILY
Qty: 270 TABLET | Refills: 3 | Status: SHIPPED | OUTPATIENT
Start: 2021-10-11 | End: 2022-04-12 | Stop reason: SDUPTHER

## 2021-10-13 RX ORDER — LEVOTHYROXINE SODIUM 0.07 MG/1
75 TABLET ORAL EVERY EVENING
Qty: 90 TABLET | Refills: 0 | Status: SHIPPED | OUTPATIENT
Start: 2021-10-13 | End: 2022-01-04

## 2021-10-20 ENCOUNTER — ANTICOAGULATION VISIT (OUTPATIENT)
Dept: VASCULAR LAB | Facility: MEDICAL CENTER | Age: 71
End: 2021-10-20
Attending: INTERNAL MEDICINE
Payer: MEDICARE

## 2021-10-20 VITALS — DIASTOLIC BLOOD PRESSURE: 74 MMHG | SYSTOLIC BLOOD PRESSURE: 138 MMHG | HEART RATE: 68 BPM

## 2021-10-20 DIAGNOSIS — Z79.01 CHRONIC ANTICOAGULATION: ICD-10-CM

## 2021-10-20 LAB
INR BLD: 2.1 (ref 0.9–1.2)
INR PPP: 2.1 (ref 2–3.5)

## 2021-10-20 PROCEDURE — 85610 PROTHROMBIN TIME: CPT

## 2021-10-20 PROCEDURE — 99211 OFF/OP EST MAY X REQ PHY/QHP: CPT

## 2021-10-20 NOTE — PROGRESS NOTES
Anticoagulation Summary  As of 10/20/2021    INR goal:  2.0-3.0   TTR:  58.1 % (6.3 y)   INR used for dosin.10 (10/20/2021)   Warfarin maintenance plan:  3.75 mg (7.5 mg x 0.5) every Mon, Fri; 7.5 mg (7.5 mg x 1) all other days   Weekly warfarin total:  45 mg   Plan last modified:  Jona RuizD (9/15/2021)   Next INR check:  2021   Priority:  Acute   Target end date:  Indefinite    Indications    DVT (deep venous thrombosis) (HCC) (Resolved) [I82.409]  Pulmonary embolism [415.19] (Resolved) [I26.99]  Deep vein thrombosis (DVT) (HCC) (Resolved) [I82.409]             Anticoagulation Episode Summary     INR check location:  Anticoagulation Clinic    Preferred lab:  [facility]    Send INR reminders to:      Comments:        Anticoagulation Care Providers     Provider Role Specialty Phone number    Patricia Damon M.D. Referring Internal Medicine [phone]    Jona QuinnD Responsible      [facility] Anticoagulation Services Responsible  [phone]                Refer to Patient Findings for HPI:  Patient Findings     Negatives:  Signs/symptoms of thrombosis, Signs/symptoms of bleeding, Laboratory test error suspected, Change in health, Change in alcohol use, Change in activity, Upcoming invasive procedure, Emergency department visit, Upcoming dental procedure, Missed doses, Extra doses, Change in medications, Change in diet/appetite, Hospital admission, Bruising, Other complaints          Vitals:    10/20/21 1025 10/20/21 1031   BP: 150/76 138/74   Pulse: 70 68   Recommended patient follow-up with PCP about blood pressure readings.     Verified current warfarin dosing schedule.    Medications reconciled Yes  Pt is on ASA 81 mg as antiplatelet therapy for CAD and must be reviewed again on 21 with cards.    A/P   INR is-therapeutic.     Warfarin dosing recommendation: Pt is to continue with current warfarin dosing regimen.    Pt educated to contact our clinic with any  changes in medications or s/s of bleeding or thrombosis. Pt is aware to seek immediate medical attention for falls, head injury or deep cuts.    Follow up appointment in 4 week(s).    Dileep Fernández, JonaD

## 2021-11-17 ENCOUNTER — ANTICOAGULATION VISIT (OUTPATIENT)
Dept: VASCULAR LAB | Facility: MEDICAL CENTER | Age: 71
End: 2021-11-17
Attending: INTERNAL MEDICINE
Payer: MEDICARE

## 2021-11-17 VITALS — DIASTOLIC BLOOD PRESSURE: 78 MMHG | SYSTOLIC BLOOD PRESSURE: 152 MMHG | HEART RATE: 58 BPM

## 2021-11-17 DIAGNOSIS — I82.502 LEG DVT (DEEP VENOUS THROMBOEMBOLISM), CHRONIC, LEFT (HCC): ICD-10-CM

## 2021-11-17 DIAGNOSIS — G45.9 TIA (TRANSIENT ISCHEMIC ATTACK): ICD-10-CM

## 2021-11-17 DIAGNOSIS — Z79.01 CHRONIC ANTICOAGULATION: ICD-10-CM

## 2021-11-17 LAB — INR PPP: 2.5 (ref 2–3.5)

## 2021-11-17 PROCEDURE — 85610 PROTHROMBIN TIME: CPT

## 2021-11-17 PROCEDURE — 99211 OFF/OP EST MAY X REQ PHY/QHP: CPT | Performed by: NURSE PRACTITIONER

## 2021-11-17 NOTE — PROGRESS NOTES
Anticoagulation Summary  As of 2021    INR goal:  2.0-3.0   TTR:  58.6 % (6.4 y)   INR used for dosin.50 (2021)   Warfarin maintenance plan:  3.75 mg (7.5 mg x 0.5) every Mon, Fri; 7.5 mg (7.5 mg x 1) all other days   Weekly warfarin total:  45 mg   Plan last modified:  Jona RuizD (9/15/2021)   Next INR check:  2021   Priority:  Acute   Target end date:  Indefinite    Indications    DVT (deep venous thrombosis) (HCC) (Resolved) [I82.409]  TIA (transient ischemic attack) [G45.9]  Pulmonary embolism [415.19] (Resolved) [I26.99]             Anticoagulation Episode Summary     INR check location:  Anticoagulation Clinic    Preferred lab:  [facility]    Send INR reminders to:      Comments:        Anticoagulation Care Providers     Provider Role Specialty Phone number    Patricia Damon M.D. Referring Internal Medicine [phone]    Jona QuinnD Responsible      [facility] Anticoagulation Services Responsible  [phone]                Refer to Patient Findings for HPI:  Patient Findings     Negatives:  Signs/symptoms of thrombosis, Signs/symptoms of bleeding, Laboratory test error suspected, Change in health, Change in alcohol use, Change in activity, Upcoming invasive procedure, Emergency department visit, Upcoming dental procedure, Missed doses, Extra doses, Change in medications, Change in diet/appetite, Hospital admission, Bruising, Other complaints          Vitals:    21 1035   BP: 152/78   Pulse: (!) 58     Encouraged pt to recheck BP at home.  Verified current warfarin dosing schedule.    Medications reconciled   Pt is on ASA 81 mg as antiplatelet therapy for CAD and must be reviewed again on 21 with cards.      A/P   INR  -therapeutic.     Warfarin dosing recommendation: Continue current regimen.    Pt educated to contact our clinic with any changes in medications or s/s of bleeding or thrombosis. Pt is aware to seek immediate medical  attention for falls, head injury or deep cuts.    Follow up appointment in 5 week(s).    TOREY Bucio.

## 2021-11-18 LAB — INR BLD: 2.5 (ref 0.9–1.2)

## 2021-11-30 ENCOUNTER — OFFICE VISIT (OUTPATIENT)
Dept: CARDIOLOGY | Facility: MEDICAL CENTER | Age: 71
End: 2021-11-30
Payer: MEDICARE

## 2021-11-30 VITALS
BODY MASS INDEX: 31.83 KG/M2 | RESPIRATION RATE: 16 BRPM | WEIGHT: 210 LBS | HEART RATE: 69 BPM | SYSTOLIC BLOOD PRESSURE: 104 MMHG | HEIGHT: 68 IN | DIASTOLIC BLOOD PRESSURE: 60 MMHG | OXYGEN SATURATION: 94 %

## 2021-11-30 DIAGNOSIS — I10 HYPERTENSION, UNSPECIFIED TYPE: ICD-10-CM

## 2021-11-30 DIAGNOSIS — I65.23 BILATERAL CAROTID ARTERY STENOSIS: ICD-10-CM

## 2021-11-30 DIAGNOSIS — Z95.1 S/P CABG X 3: ICD-10-CM

## 2021-11-30 DIAGNOSIS — E78.5 DYSLIPIDEMIA: ICD-10-CM

## 2021-11-30 DIAGNOSIS — I25.83 CORONARY ARTERY DISEASE DUE TO LIPID RICH PLAQUE: ICD-10-CM

## 2021-11-30 DIAGNOSIS — G45.9 TIA (TRANSIENT ISCHEMIC ATTACK): ICD-10-CM

## 2021-11-30 DIAGNOSIS — I25.10 CORONARY ARTERY DISEASE DUE TO LIPID RICH PLAQUE: ICD-10-CM

## 2021-11-30 DIAGNOSIS — I35.0 MILD AORTIC STENOSIS: ICD-10-CM

## 2021-11-30 DIAGNOSIS — Z79.01 CHRONIC ANTICOAGULATION: ICD-10-CM

## 2021-11-30 PROCEDURE — 99214 OFFICE O/P EST MOD 30 MIN: CPT | Performed by: INTERNAL MEDICINE

## 2021-11-30 RX ORDER — PHENOL 1.4 %
10 AEROSOL, SPRAY (ML) MUCOUS MEMBRANE
COMMUNITY
End: 2023-02-08

## 2021-11-30 ASSESSMENT — FIBROSIS 4 INDEX: FIB4 SCORE: 1.16

## 2021-12-01 NOTE — PROGRESS NOTES
Chief Complaint   Patient presents with   • Coronary Artery Bypass Graft (CABG)   • Aortic Stenosis   • Transient Ischemic Attack       Subjective     Rene Handy Chan is a 71 y.o. male who presents today for follow-up of his history of coronary status post CABG    He is done well with the change in his metoprolol last year no major health concerns over the prior tolerating his medications well    Past Medical History:   Diagnosis Date   • Anticoagulation monitoring, special range    • Bipolar disorder (HCC)    • CAD (coronary artery disease) 6/4/2015   • Carotid artery occlusion     L 60-79% occluded; R 59% occluded per pt report; 2011   • Clotting disorder (HCC)     protein S elevation in '12 with recurrent DVT and PE-coumadin lifelong   • Colon polyps    • Diabetes mellitus, type II (HCC)     diet controlled   • Gout    • Hammer toe    • Hyperlipidemia    • Hypertension    • Hypothyroidism    • Impaired fasting glucose    • Mild aortic stenosis - echo 4/2018      Past Surgical History:   Procedure Laterality Date   • MULTIPLE CORONARY ARTERY BYPASS ENDO VEIN HARVEST  6/4/2015    Procedure: MULTIPLE CORONARY ARTERY BYPASS Grafting  x 3   ENDO VEIN HARVEST right leg;  Surgeon: Christophe Lemus M.D.;  Location: SURGERY [facility];  Service:    • RECOVERY  6/1/2015    Procedure:  [facility]31;  Surgeon: [facility];  Location: SURGERY PRE-POST PROC UNIT [facility];  Service:    • OTHER ORTHOPEDIC SURGERY  1976    L wrist repair    • ANKLE FUSION     • APPENDECTOMY     • OTHER ABDOMINAL SURGERY      umbilical hernia repair 2000, appy age 13     Family History   Problem Relation Age of Onset   • Heart Disease Sister 50   • Stroke Sister 55        CVA   • Heart Failure Father      Social History     Socioeconomic History   • Marital status:      Spouse name: Not on file   • Number of children: Not on file   • Years of education: Not on file   • Highest education level: Not on  file   Occupational History   • Occupation: Cam      Comment: Build house for 35 years   Tobacco Use   • Smoking status: Former Smoker     Packs/day: 2.00     Years: 10.00     Pack years: 20.00     Types: Cigarettes     Quit date: 1976     Years since quittin.9   • Smokeless tobacco: Never Used   • Tobacco comment: quit x 32 yrs; 2ppd x 7 years   Vaping Use   • Vaping Use: Never used   Substance and Sexual Activity   • Alcohol use: No   • Drug use: No     Comment: IV drug use in teens   • Sexual activity: Not Currently     Partners: Female   Other Topics Concern   • Not on file   Social History Narrative    Lives with wife    Retired cam    Does security part time    ADLs and IADLs intact     Social Determinants of Health     Financial Resource Strain:    • Difficulty of Paying Living Expenses: Not on file   Food Insecurity:    • Worried About Running Out of Food in the Last Year: Not on file   • Ran Out of Food in the Last Year: Not on file   Transportation Needs:    • Lack of Transportation (Medical): Not on file   • Lack of Transportation (Non-Medical): Not on file   Physical Activity:    • Days of Exercise per Week: Not on file   • Minutes of Exercise per Session: Not on file   Stress:    • Feeling of Stress : Not on file   Social Connections:    • Frequency of Communication with Friends and Family: Not on file   • Frequency of Social Gatherings with Friends and Family: Not on file   • Attends Anabaptism Services: Not on file   • Active Member of Clubs or Organizations: Not on file   • Attends Club or Organization Meetings: Not on file   • Marital Status: Not on file   Intimate Partner Violence:    • Fear of Current or Ex-Partner: Not on file   • Emotionally Abused: Not on file   • Physically Abused: Not on file   • Sexually Abused: Not on file   Housing Stability:    • Unable to Pay for Housing in the Last Year: Not on file   • Number of Places Lived in the Last Year: Not on file   •  "Unstable Housing in the Last Year: Not on file     Allergies   Allergen Reactions   • Penicillins Unspecified     Childhood; does know what the reaction was     Outpatient Encounter Medications as of 11/30/2021   Medication Sig Dispense Refill   • Melatonin 10 MG Tab Take  by mouth.     • levothyroxine (SYNTHROID) 75 MCG Tab Take 1 Tablet by mouth every evening. 90 Tablet 0   • divalproex (DEPAKOTE) 250 MG Tablet Delayed Response Take 1-2 Tablets by mouth 3 times a day. Indications: 2 at night and 1 in morning 270 Tablet 3   • lisinopril (PRINIVIL) 5 MG Tab Take 1 Tablet by mouth every day. **MUST KEEP FOLLOW UP VISIT ON 11/30/2021 TO ENSURE FURTHER REFILLS.** 100 Tablet 0   • warfarin (COUMADIN) 7.5 MG Tab TAKE 0.5 to 1 TABLET BY MOUTH DAILY AS DIRECTED BY [facility] 90 Tablet 1   • metoprolol tartrate (LOPRESSOR) 25 MG Tab TAKE ONE TABLET BY MOUTH TWICE A DAY (Patient taking differently: 12.5 mg every day.) 180 Tab 2   • VALERIAN ROOT PO Take 1 Capsule by mouth every bedtime.     • atorvastatin (LIPITOR) 80 MG tablet Take 80 mg by mouth every day.     • predniSONE (DELTASONE) 10 MG Tab Take 10 mg by mouth 2 times a day as needed (gout).     • aspirin 81 MG tablet Take 1 Tab by mouth every day.  Tab    • COENZYME Q10 PO Take 1 Tab by mouth every morning.     • Cholecalciferol (VITAMIN D) 2000 UNITS Cap Take 1,000 Units by mouth every evening.     • docosahexanoic acid (OMEGA 3 FA) 1000 MG CAPS Take 1,000 mg by mouth every day.     • Multiple Vitamins-Minerals (CENTRUM PO) Take 1 Tab by mouth every morning. (Patient not taking: Reported on 11/30/2021)       No facility-administered encounter medications on file as of 11/30/2021.     ROS           Objective     /60 (BP Location: Left arm, Patient Position: Sitting, BP Cuff Size: Adult)   Pulse 69   Resp 16   Ht 1.727 m (5' 8\")   Wt 95.3 kg (210 lb)   SpO2 94%   BMI 31.93 kg/m²     Physical Exam  Constitutional:       General: He " is not in acute distress.     Appearance: He is not diaphoretic.   Eyes:      General: No scleral icterus.  Neck:      Vascular: No JVD.   Cardiovascular:      Rate and Rhythm: Normal rate.      Heart sounds: Normal heart sounds. No murmur heard.  No friction rub. No gallop.    Pulmonary:      Effort: No respiratory distress.      Breath sounds: No wheezing or rales.   Abdominal:      General: Bowel sounds are normal.      Palpations: Abdomen is soft.   Skin:     Findings: No rash.   Neurological:      Mental Status: He is alert.            We reviewed in person the most recent labs  Recent Results (from the past 5040 hour(s))   POCT Protime    Collection Time: 05/17/21 12:00 AM   Result Value Ref Range    INR 3.40 2 - 3.5   POCT INR device results    Collection Time: 05/17/21 10:37 AM   Result Value Ref Range    POC INR 3.4 (H) 0.9 - 1.2   POCT Protime    Collection Time: 06/09/21 12:00 AM   Result Value Ref Range    INR 2.40 2 - 3.5   POCT INR device results    Collection Time: 06/09/21 10:26 AM   Result Value Ref Range    POC INR 2.4 (H) 0.9 - 1.2   POCT Protime    Collection Time: 07/07/21 12:00 AM   Result Value Ref Range    INR 2.90 2 - 3.5   POCT INR device results    Collection Time: 07/07/21 10:29 AM   Result Value Ref Range    POC INR 2.9 (H) 0.9 - 1.2   POCT Protime    Collection Time: 08/11/21 12:00 AM   Result Value Ref Range    INR 3.80 (A) 2 - 3.5   POCT INR device results    Collection Time: 08/11/21 10:32 AM   Result Value Ref Range    POC INR 3.8 (H) 0.9 - 1.2   POCT Protime    Collection Time: 08/18/21 12:00 AM   Result Value Ref Range    INR 2.10 2 - 3.5   POCT INR device results    Collection Time: 08/18/21 10:38 AM   Result Value Ref Range    POC INR 2.1 (H) 0.9 - 1.2   POCT Protime    Collection Time: 09/08/21 12:00 AM   Result Value Ref Range    INR 1.30 (A) 2 - 3.5   POCT Protime    Collection Time: 09/15/21 12:00 AM   Result Value Ref Range    INR 2.10 2 - 3.5   POCT INR device results     Collection Time: 09/15/21 11:00 AM   Result Value Ref Range    POC INR 2.1 (H) 0.9 - 1.2   POCT Protime    Collection Time: 09/29/21 12:00 AM   Result Value Ref Range    INR 2.10 2 - 3.5   POCT INR device results    Collection Time: 09/29/21 10:56 AM   Result Value Ref Range    POC INR 2.1 (H) 0.9 - 1.2   POCT Protime    Collection Time: 10/20/21 12:00 AM   Result Value Ref Range    INR 2.10 2 - 3.5   POCT INR device results    Collection Time: 10/20/21 10:27 AM   Result Value Ref Range    POC INR 2.1 (H) 0.9 - 1.2   POCT Protime    Collection Time: 11/17/21 12:00 AM   Result Value Ref Range    INR 2.50 2 - 3.5   POCT INR device results    Collection Time: 11/17/21 10:35 AM   Result Value Ref Range    POC INR 2.5 (H) 0.9 - 1.2         Assessment & Plan     1. Bilateral carotid artery stenosis     2. Chronic anticoagulation  Comp Metabolic Panel    CBC WITHOUT DIFFERENTIAL   3. Coronary artery disease due to lipid rich plaque  Lipid Profile   4. Dyslipidemia  Lipid Profile   5. S/P CABG x 3 - 6/2015     6. TIA (transient ischemic attack)     7. Mild aortic stenosis     8. Hypertension, unspecified type  metoprolol tartrate (LOPRESSOR) 25 MG Tab       Medical Decision Making: Today's Assessment/Status/Plan:          It was my pleasure to meet with Mr. Chan.    We addressed the management of hypertension at today's visit. Blood pressure is well controlled.  We specifically assessed the labs on hypertension treatment    We addressed the management of chronic anticoagulation at today's visit. He understands the risks and benefits of chronic anticoagulation.  We reviewed and verified the results of annual testing for anemia and kidney function.      Low threshold for stress test for atypical chest pains      I will see Mr. Chan back in 1 year time and encouraged him to follow up with us over the phone or electronically using my MyChart as issues arise.    It is my pleasure to participate in the care of Mr. Chan.   Please do not hesitate to contact me with questions or concerns.    Ta Wright MD PhD [facility]  Cardiologist [facility]    Please note that this dictation was created using voice recognition software. There may be errors I did not discover before finalizing the note.

## 2021-12-03 ENCOUNTER — OFFICE VISIT (OUTPATIENT)
Dept: MEDICAL GROUP | Facility: PHYSICIAN GROUP | Age: 71
End: 2021-12-03
Payer: MEDICARE

## 2021-12-03 VITALS
BODY MASS INDEX: 31.83 KG/M2 | TEMPERATURE: 97.7 F | HEIGHT: 68 IN | WEIGHT: 210 LBS | SYSTOLIC BLOOD PRESSURE: 130 MMHG | HEART RATE: 60 BPM | DIASTOLIC BLOOD PRESSURE: 62 MMHG

## 2021-12-03 DIAGNOSIS — E03.9 ACQUIRED HYPOTHYROIDISM: ICD-10-CM

## 2021-12-03 DIAGNOSIS — Z00.00 MEDICARE ANNUAL WELLNESS VISIT, SUBSEQUENT: ICD-10-CM

## 2021-12-03 DIAGNOSIS — Z23 NEED FOR VACCINATION: ICD-10-CM

## 2021-12-03 PROBLEM — M25.512 ACUTE PAIN OF LEFT SHOULDER: Status: RESOLVED | Noted: 2021-03-04 | Resolved: 2021-12-03

## 2021-12-03 PROCEDURE — 90662 IIV NO PRSV INCREASED AG IM: CPT | Performed by: FAMILY MEDICINE

## 2021-12-03 PROCEDURE — G0439 PPPS, SUBSEQ VISIT: HCPCS | Mod: 25 | Performed by: FAMILY MEDICINE

## 2021-12-03 PROCEDURE — G0008 ADMIN INFLUENZA VIRUS VAC: HCPCS | Performed by: FAMILY MEDICINE

## 2021-12-03 ASSESSMENT — PATIENT HEALTH QUESTIONNAIRE - PHQ9: CLINICAL INTERPRETATION OF PHQ2 SCORE: 0

## 2021-12-03 ASSESSMENT — FIBROSIS 4 INDEX: FIB4 SCORE: 1.16

## 2021-12-03 ASSESSMENT — ENCOUNTER SYMPTOMS: GENERAL WELL-BEING: GOOD

## 2021-12-03 ASSESSMENT — ACTIVITIES OF DAILY LIVING (ADL): BATHING_REQUIRES_ASSISTANCE: 0

## 2021-12-03 NOTE — ASSESSMENT & PLAN NOTE
Patient is here today for his Medicare wellness visit.  No particular troubles on today's visit.  He would like to discuss some of his sleep issues.  He finds that if he takes a Tylenol he can sleep very good.  He also uses melatonin and sleepy time tea on occasion.  He had been taken to the Depakote at bedtime and that helped for a while but now is getting to where it is not as effective.  He is asking if there is any other medications or could be tried.  We briefly discussed Belsomra but he does not think he could afford that.

## 2021-12-03 NOTE — PROGRESS NOTES
Chief Complaint   Patient presents with   • Annual Wellness Visit         HPI:  Rene is a 71 y.o. here for Medicare Annual Wellness Visit  Medicare annual wellness visit, subsequent  Patient is here today for his Medicare wellness visit.  No particular troubles on today's visit.  He would like to discuss some of his sleep issues.  He finds that if he takes a Tylenol he can sleep very good.  He also uses melatonin and sleepy time tea on occasion.  He had been taken to the Depakote at bedtime and that helped for a while but now is getting to where it is not as effective.  He is asking if there is any other medications or could be tried.  We briefly discussed Belsomra but he does not think he could afford that.          Patient Active Problem List    Diagnosis Date Noted   • Medicare annual wellness visit, subsequent 12/03/2021   • Leg DVT (deep venous thromboembolism), chronic, left (HCC) 03/04/2021   • Obesity (BMI 30-39.9) 12/03/2020   • Mild aortic stenosis    • Actinic keratoses 03/12/2018   • Bilateral carotid artery stenosis 07/13/2017   • Adenomatous polyp of colon 10/06/2016   • Bipolar disorder in full remission (HCC) 10/06/2016   • Benign non-nodular prostatic hyperplasia with lower urinary tract symptoms 10/06/2016   • Acquired hypothyroidism 09/26/2016   • Gout of foot 09/26/2016   • Dyslipidemia 06/24/2015   • Hx pulmonary embolism 06/24/2015   • Coronary artery disease due to lipid rich plaque 06/04/2015   • S/P CABG x 3 - 6/2015 06/04/2015   • TIA (transient ischemic attack) 05/26/2015   • Chronic anticoagulation 11/11/2014       Current Outpatient Medications   Medication Sig Dispense Refill   • Melatonin 10 MG Tab Take  by mouth.     • metoprolol tartrate (LOPRESSOR) 25 MG Tab Take 0.5 Tablets by mouth every day. 45 Tablet 3   • levothyroxine (SYNTHROID) 75 MCG Tab Take 1 Tablet by mouth every evening. 90 Tablet 0   • divalproex (DEPAKOTE) 250 MG Tablet Delayed Response Take 1-2 Tablets by mouth 3  times a day. Indications: 2 at night and 1 in morning 270 Tablet 3   • lisinopril (PRINIVIL) 5 MG Tab Take 1 Tablet by mouth every day. **MUST KEEP FOLLOW UP VISIT ON 11/30/2021 TO ENSURE FURTHER REFILLS.** 100 Tablet 0   • warfarin (COUMADIN) 7.5 MG Tab TAKE 0.5 to 1 TABLET BY MOUTH DAILY AS DIRECTED BY [facility] 90 Tablet 1   • VALERIAN ROOT PO Take 1 Capsule by mouth every bedtime.     • atorvastatin (LIPITOR) 80 MG tablet Take 80 mg by mouth every day.     • predniSONE (DELTASONE) 10 MG Tab Take 10 mg by mouth 2 times a day as needed (gout).     • aspirin 81 MG tablet Take 1 Tab by mouth every day.  Tab    • COENZYME Q10 PO Take 1 Tab by mouth every morning.     • Cholecalciferol (VITAMIN D) 2000 UNITS Cap Take 1,000 Units by mouth every evening.     • docosahexanoic acid (OMEGA 3 FA) 1000 MG CAPS Take 1,000 mg by mouth every day.       No current facility-administered medications for this visit.          Current supplements as per medication list.     Allergies: Penicillins    Current social contact/activities: [employer]     Is patient current with immunizations? No, due for FLU. Patient is interested in receiving FLU today.    He  reports that he quit smoking about 45 years ago. His smoking use included cigarettes. He has a 20.00 pack-year smoking history. He has never used smokeless tobacco. He reports that he does not drink alcohol and does not use drugs.  Counseling given: Not Answered  Comment: quit x 32 yrs; 2ppd x 7 years        DPA/Advanced directive: Patient has Advanced Directive on file.     ROS:    Gait: Uses no assistive device   Ostomy: No   Other tubes: No   Amputations: No   Chronic oxygen use No   Last eye exam 2 yrs ago   Wears hearing aids: Yes   : Denies any urinary leakage during the last 6 months      Screening:    done    Depression Screening    Little interest or pleasure in doing things?  0 - not at all  Feeling down, depressed, or hopeless? 0 -  not at all  Patient Health Questionnaire Score: 0    If depressive symptoms identified deferred to follow up visit unless specifically addressed in assessment and plan.    Interpretation of PHQ-9 Total Score   Score Severity   1-4 No Depression   5-9 Mild Depression   10-14 Moderate Depression   15-19 Moderately Severe Depression   20-27 Severe Depression    Screening for Cognitive Impairment    Three Minute Recall (captain, garden, picture)  3/3    Pete clock face with all 12 numbers and set the hands to show 5 past 8.  Yes    If cognitive concerns identified, deferred for follow up unless specifically addressed in assessment and plan.    Fall Risk Assessment    Has the patient had two or more falls in the last year or any fall with injury in the last year?  No  If fall risk identified, deferred for follow up unless specifically addressed in assessment and plan.    Safety Assessment    Throw rugs on floor.  Yes  Handrails on all stairs.  Yes  Good lighting in all hallways.  Yes  Difficulty hearing.  Yes  Patient counseled about all safety risks that were identified.    Functional Assessment ADLs    Are there any barriers preventing you from cooking for yourself or meeting nutritional needs?  No.    Are there any barriers preventing you from driving safely or obtaining transportation?  No.    Are there any barriers preventing you from using a telephone or calling for help?  No.    Are there any barriers preventing you from shopping?  No.    Are there any barriers preventing you from taking care of your own finances?  No.    Are there any barriers preventing you from managing your medications?  No.    Are there any barriers preventing you from showering, bathing or dressing yourself?  No.    Are you currently engaging in any exercise or physical activity?  Yes.     What is your perception of your health?  Good.    Health Maintenance Summary          Overdue - COVID-19 Vaccine (2 - Booster for Farzana series) Overdue  since 7/6/2021 05/11/2021  Imm Admin: Farzana SARS-CoV-2 Vaccine          COLORECTAL CANCER SCREENING (COLONOSCOPY - Every 3 Years) Tentatively due on 9/3/2022    09/03/2019  REFERRAL TO GI FOR COLONOSCOPY    11/02/2015  REFERRAL TO GI FOR COLONOSCOPY    03/29/2010  REFERRAL TO GI FOR COLONOSCOPY          IMM DTaP/Tdap/Td Vaccine (3 - Td or Tdap) Next due on 5/10/2028    05/10/2018  Imm Admin: Tdap Vaccine    03/23/2018  Imm Admin: Tdap Vaccine          IMM PNEUMOCOCCAL VACCINE: 65+ Years (Series Information) Completed    03/23/2018  Imm Admin: Pneumococcal polysaccharide vaccine (PPSV-23)    03/12/2018  Imm Admin: Pneumococcal polysaccharide vaccine (PPSV-23)    10/06/2016  Imm Admin: Pneumococcal Conjugate Vaccine (Prevnar/PCV-13)    01/10/2007  Imm Admin: Pneumococcal Vaccine (UF) - HISTORICAL DATA          IMM ZOSTER VACCINES (Series Information) Completed    11/05/2019  Imm Admin: Zoster Vaccine Recombinant (RZV) (SHINGRIX)    08/21/2019  Imm Admin: Zoster Vaccine Recombinant (RZV) (SHINGRIX)          HEPATITIS C SCREENING  Completed    12/03/2020  HEP C VIRUS ANTIBODY          Annual Wellness Visit  Completed    12/03/2021  Done    12/03/2021  Prob Dx: Medicare annual wellness visit, subsequent    12/03/2021  Visit Dx: Medicare annual wellness visit, subsequent          IMM INFLUENZA (Series Information) Completed    12/03/2021  Imm Admin: Influenza Vaccine Adult HD    10/03/2020  Imm Admin: Influenza Vaccine Adult HD    08/21/2019  Imm Admin: Influenza, Unspecified - HISTORICAL DATA    11/06/2018  Imm Admin: Influenza Vaccine Adult HD    10/17/2017  Imm Admin: Influenza Vaccine Adult HD    Only the first 5 history entries have been loaded, but more history exists.          IMM HEP B VACCINE (Series Information) Aged Out    No completion history exists for this topic.          IMM MENINGOCOCCAL VACCINE (MCV4) (Series Information) Aged Out    No completion history exists for this topic.                 Patient Care Team:  Michael Sethi III, M.D. as PCP - General (Family Medicine)  [facility]  Rickie Duong M.D. as Consulting Physician (Urology)  Ta Wright M.D. as Consulting Physician (Cardiovascular Disease (Cardiology))  Irvin Chávez M.D. as Consulting Physician (Gastroenterology)  [facility] (Inactive) as Consulting Physician  Rg Mendoza M.D. as Consulting Physician (Otolaryngology)  Mahogany Hurtado, PT, DPT (Inactive) as Physical Therapist (Physical Therapy)    Social History     Tobacco Use   • Smoking status: Former Smoker     Packs/day: 2.00     Years: 10.00     Pack years: 20.00     Types: Cigarettes     Quit date: 1976     Years since quittin.9   • Smokeless tobacco: Never Used   • Tobacco comment: quit x 32 yrs; 2ppd x 7 years   Vaping Use   • Vaping Use: Never used   Substance Use Topics   • Alcohol use: No   • Drug use: No     Comment: IV drug use in teens     Family History   Problem Relation Age of Onset   • Heart Disease Sister 50   • Stroke Sister 55        CVA   • Heart Failure Father      He  has a past medical history of Anticoagulation monitoring, special range, Bipolar disorder (HCC), CAD (coronary artery disease) (2015), Carotid artery occlusion, Clotting disorder (HCC), Colon polyps, Diabetes mellitus, type II (HCC), Gout, Hammer toe, Hyperlipidemia, Hypertension, Hypothyroidism, Impaired fasting glucose, and Mild aortic stenosis - echo 2018.   Past Surgical History:   Procedure Laterality Date   • MULTIPLE CORONARY ARTERY BYPASS ENDO VEIN HARVEST  2015    Procedure: MULTIPLE CORONARY ARTERY BYPASS Grafting  x 3   ENDO VEIN HARVEST right leg;  Surgeon: Christophe Lemus M.D.;  Location: SURGERY [facility];  Service:    • RECOVERY  2015    Procedure:  CATH LAB  [facility] [place] ICD; 794.31;  Surgeon: Sarah Surgery;  Location: SURGERY PRE-POST PROC UNIT [facility] [place] [facility];  Service:    • OTHER  "ORTHOPEDIC SURGERY  1976    L wrist repair    • ANKLE FUSION     • APPENDECTOMY     • OTHER ABDOMINAL SURGERY      umbilical hernia repair 2000, appy age 13           Exam:     /62 (BP Location: Right arm, Patient Position: Sitting, BP Cuff Size: Adult)   Pulse 60   Temp 36.5 °C (97.7 °F) (Temporal)   Ht 1.727 m (5' 8\")   Wt 95.3 kg (210 lb)  Body mass index is 31.93 kg/m².    Hearing excellent.    Dentition good  Alert, oriented in no acute distress.  Eye contact is good, speech goal directed, affect calm      Assessment and Plan. The following treatment and monitoring plan is recommended:    1. Need for vaccination  INFLUENZA VACCINE, HIGH DOSE (65+ ONLY)   2. Acquired hypothyroidism  TSH WITH REFLEX TO FT4   3. Medicare annual wellness visit, subsequent         TSH ordered.  He has lab additional labs pending from his vascular doctor.  His PSA last year was very low so we will recheck that next year.  Services suggested: No services needed at this time  Health Care Screening recommendations as per orders if indicated.  Referrals offered: PT/OT/Nutrition counseling/Behavioral Health/Smoking cessation as per orders if indicated.    Discussion today about general wellness and lifestyle habits:    · Prevent falls and reduce trip hazards; Cautioned about securing or removing rugs.  · Have a working fire alarm and carbon monoxide detector;   · Engage in regular physical activity and social activities       Follow-up: Return in about 6 months (around 6/3/2022) for Long.    "

## 2021-12-22 ENCOUNTER — ANTICOAGULATION VISIT (OUTPATIENT)
Dept: VASCULAR LAB | Facility: MEDICAL CENTER | Age: 71
End: 2021-12-22
Attending: INTERNAL MEDICINE
Payer: MEDICARE

## 2021-12-22 VITALS — HEART RATE: 60 BPM | SYSTOLIC BLOOD PRESSURE: 130 MMHG | DIASTOLIC BLOOD PRESSURE: 81 MMHG

## 2021-12-22 DIAGNOSIS — G45.9 TIA (TRANSIENT ISCHEMIC ATTACK): ICD-10-CM

## 2021-12-22 LAB — INR PPP: 1.6 (ref 2–3.5)

## 2021-12-22 PROCEDURE — 85610 PROTHROMBIN TIME: CPT

## 2021-12-22 PROCEDURE — 99212 OFFICE O/P EST SF 10 MIN: CPT

## 2021-12-22 NOTE — PROGRESS NOTES
Anticoagulation Summary  As of 2021    INR goal:  2.0-3.0   TTR:  58.5 % (6.5 y)   INR used for dosin.60 (2021)   Warfarin maintenance plan:  3.75 mg (7.5 mg x 0.5) every Mon, Fri; 7.5 mg (7.5 mg x 1) all other days   Weekly warfarin total:  45 mg   Plan last modified:  Jona RuizD (9/15/2021)   Next INR check:  1/10/2022   Priority:  Acute   Target end date:  Indefinite    Indications    DVT (deep venous thrombosis) (HCC) (Resolved) [I82.409]  TIA (transient ischemic attack) [G45.9]  Pulmonary embolism [415.19] (Resolved) [I26.99]             Anticoagulation Episode Summary     INR check location:  Anticoagulation Clinic    Preferred lab:  [facility]    Send INR reminders to:      Comments:        Anticoagulation Care Providers     Provider Role Specialty Phone number    Patricia Damon M.D. Referring Internal Medicine [phone]    Jona QuinnD Responsible      [facility] Responsible  [phone]                Refer to Patient Findings for HPI:  Patient Findings     Positives:  Missed doses    Negatives:  Signs/symptoms of thrombosis, Signs/symptoms of bleeding, Laboratory test error suspected, Change in health, Change in alcohol use, Change in activity, Upcoming invasive procedure, Emergency department visit, Upcoming dental procedure, Extra doses, Change in medications, Change in diet/appetite, Hospital admission, Bruising, Other complaints          Vitals:    21 1036   BP: 130/81   Pulse: 60       Verified current warfarin dosing schedule.    Medications reconciled   Pt is on ASA 81 mg as antiplatelet therapy for TIA     A/P   INR  sub-therapeutic.     Warfarin dosing recommendation: Bolus x 1 day then continue regimen    Pt educated to contact our clinic with any changes in medications or s/s of bleeding or thrombosis. Pt is aware to seek immediate medical attention for falls, head injury or deep cuts.    Follow up appointment  in 2 week(s).    Nandini Hill, PharmD

## 2021-12-30 DIAGNOSIS — I10 HYPERTENSION, UNSPECIFIED TYPE: ICD-10-CM

## 2021-12-30 RX ORDER — LISINOPRIL 5 MG/1
5 TABLET ORAL DAILY
Qty: 100 TABLET | Refills: 3 | Status: SHIPPED | OUTPATIENT
Start: 2021-12-30 | End: 2023-02-10

## 2022-01-04 RX ORDER — LEVOTHYROXINE SODIUM 0.07 MG/1
TABLET ORAL
Qty: 90 TABLET | Refills: 0 | Status: SHIPPED | OUTPATIENT
Start: 2022-01-04 | End: 2022-03-24 | Stop reason: SDUPTHER

## 2022-01-10 ENCOUNTER — ANTICOAGULATION VISIT (OUTPATIENT)
Dept: VASCULAR LAB | Facility: MEDICAL CENTER | Age: 72
End: 2022-01-10
Attending: INTERNAL MEDICINE
Payer: MEDICARE

## 2022-01-10 VITALS — DIASTOLIC BLOOD PRESSURE: 77 MMHG | SYSTOLIC BLOOD PRESSURE: 146 MMHG | HEART RATE: 66 BPM

## 2022-01-10 DIAGNOSIS — G45.9 TIA (TRANSIENT ISCHEMIC ATTACK): ICD-10-CM

## 2022-01-10 LAB
INR BLD: 3.8 (ref 0.9–1.2)
INR PPP: 3.8 (ref 2–3.5)

## 2022-01-10 PROCEDURE — 85610 PROTHROMBIN TIME: CPT

## 2022-01-10 PROCEDURE — 99212 OFFICE O/P EST SF 10 MIN: CPT | Performed by: NURSE PRACTITIONER

## 2022-01-10 NOTE — PROGRESS NOTES
Anticoagulation Summary  As of 1/10/2022    INR goal:  2.0-3.0   TTR:  58.4 % (6.6 y)   INR used for dosing:  3.80 (1/10/2022)   Warfarin maintenance plan:  3.75 mg (7.5 mg x 0.5) every Mon, Fri; 7.5 mg (7.5 mg x 1) all other days   Weekly warfarin total:  45 mg   Plan last modified:  Jona RuizD (9/15/2021)   Next INR check:  1/31/2022   Priority:  Acute   Target end date:  Indefinite    Indications    DVT (deep venous thrombosis) (HCC) (Resolved) [I82.409]  TIA (transient ischemic attack) [G45.9]  Pulmonary embolism [415.19] (Resolved) [I26.99]             Anticoagulation Episode Summary     INR check location:  Anticoagulation Clinic    Preferred lab:  [email or web address]    Send INR reminders to:      Comments:        Anticoagulation Care Providers     Provider Role Specialty Phone number    Patricia Damon M.D. Referring Internal Medicine [phone]    Jona QuinnD Responsible      [facility] Anticoagulation Services Responsible  [phone]                Refer to Patient Findings for HPI:  Patient Findings     Positives:  Change in medications    Negatives:  Signs/symptoms of thrombosis, Signs/symptoms of bleeding, Laboratory test error suspected, Change in health, Change in alcohol use, Change in activity, Upcoming invasive procedure, Emergency department visit, Upcoming dental procedure, Missed doses, Extra doses, Change in diet/appetite, Hospital admission, Bruising, Other complaints    Comments:  Has been taking a glucosamine + chondrotin supplement for a couple of months. He's planning to reduce from two tabs daily to one daily. Med rec updated.          Vitals:    01/10/22 1039   BP: 146/77   Pulse: 66       Verified current warfarin dosing schedule.    Medications reconciled   Pt is on ASA 81 mg as antiplatelet therapy for TIA, CAD hx and must be reviewed again on - with cardiology.      A/P   INR  supra-therapeutic. INR low last visit. Reminded to stay consistent with vit k  intake.    Warfarin dosing recommendation: HOLD tonight's dose then continue current regimen.    Pt educated to contact our clinic with any changes in medications or s/s of bleeding or thrombosis. Pt is aware to seek immediate medical attention for falls, head injury or deep cuts.    Follow up appointment in 3 week(s) per pt's request.    TOREY Bucio.

## 2022-01-27 ENCOUNTER — PATIENT MESSAGE (OUTPATIENT)
Dept: HEALTH INFORMATION MANAGEMENT | Facility: OTHER | Age: 72
End: 2022-01-27
Payer: MEDICARE

## 2022-01-31 ENCOUNTER — TELEPHONE (OUTPATIENT)
Dept: VASCULAR LAB | Facility: MEDICAL CENTER | Age: 72
End: 2022-01-31

## 2022-02-02 ENCOUNTER — ANTICOAGULATION VISIT (OUTPATIENT)
Dept: VASCULAR LAB | Facility: MEDICAL CENTER | Age: 72
End: 2022-02-02
Attending: INTERNAL MEDICINE
Payer: MEDICARE

## 2022-02-02 VITALS — HEART RATE: 59 BPM | DIASTOLIC BLOOD PRESSURE: 74 MMHG | SYSTOLIC BLOOD PRESSURE: 127 MMHG

## 2022-02-02 DIAGNOSIS — Z79.01 CHRONIC ANTICOAGULATION: ICD-10-CM

## 2022-02-02 DIAGNOSIS — G45.9 TIA (TRANSIENT ISCHEMIC ATTACK): ICD-10-CM

## 2022-02-02 LAB — INR PPP: 2.2 (ref 2–3.5)

## 2022-02-02 PROCEDURE — 99211 OFF/OP EST MAY X REQ PHY/QHP: CPT | Performed by: NURSE PRACTITIONER

## 2022-02-02 PROCEDURE — 85610 PROTHROMBIN TIME: CPT

## 2022-02-02 RX ORDER — WARFARIN SODIUM 7.5 MG/1
TABLET ORAL
Qty: 90 TABLET | Refills: 1 | Status: SHIPPED | OUTPATIENT
Start: 2022-02-02 | End: 2022-10-26

## 2022-02-02 NOTE — PROGRESS NOTES
Anticoagulation Summary  As of 2022    INR goal:  2.0-3.0   TTR:  58.3 % (6.6 y)   INR used for dosin.20 (2022)   Warfarin maintenance plan:  3.75 mg (7.5 mg x 0.5) every Mon, Fri; 7.5 mg (7.5 mg x 1) all other days   Weekly warfarin total:  45 mg   Plan last modified:  Jona RuizD (9/15/2021)   Next INR check:  3/2/2022   Priority:  Acute   Target end date:  Indefinite    Indications    DVT (deep venous thrombosis) (HCC) (Resolved) [I82.409]  TIA (transient ischemic attack) [G45.9]  Pulmonary embolism [415.19] (Resolved) [I26.99]             Anticoagulation Episode Summary     INR check location:  Anticoagulation Clinic    Preferred lab:  [facility]    Send INR reminders to:      Comments:        Anticoagulation Care Providers     Provider Role Specialty Phone number    Patricia Dmaon M.D. Referring Internal Medicine [phone]    Jona QuinnD Responsible      [facility] Anticoagulation Services Responsible  [phone]                Refer to Patient Findings for HPI:  Patient Findings     Negatives:  Signs/symptoms of thrombosis, Signs/symptoms of bleeding, Laboratory test error suspected, Change in health, Change in alcohol use, Change in activity, Upcoming invasive procedure, Emergency department visit, Upcoming dental procedure, Missed doses, Extra doses, Change in medications, Change in diet/appetite, Hospital admission, Bruising, Other complaints          Vitals:    22 1113   BP: 127/74   Pulse: (!) 59       Verified current warfarin dosing schedule.    Medications reconciled   Pt is on ASA 81 mg as antiplatelet therapy for TIA, CAD and must be reviewed again with cardiology.      A/P   INR  -therapeutic.     Warfarin dosing recommendation: continue current regimen.    Pt educated to contact our clinic with any changes in medications or s/s of bleeding or thrombosis. Pt is aware to seek immediate medical attention for falls, head injury or deep  cuts.    Follow up appointment in 4 week(s).    TOREY Bucio.

## 2022-02-03 LAB — INR BLD: 2.2 (ref 0.9–1.2)

## 2022-02-28 ENCOUNTER — ANTICOAGULATION VISIT (OUTPATIENT)
Dept: VASCULAR LAB | Facility: MEDICAL CENTER | Age: 72
End: 2022-02-28
Attending: INTERNAL MEDICINE
Payer: MEDICARE

## 2022-02-28 DIAGNOSIS — G45.9 TIA (TRANSIENT ISCHEMIC ATTACK): ICD-10-CM

## 2022-02-28 LAB
INR BLD: 3.5 (ref 0.9–1.2)
INR PPP: 3.5 (ref 2–3.5)

## 2022-02-28 PROCEDURE — 99212 OFFICE O/P EST SF 10 MIN: CPT | Performed by: STUDENT IN AN ORGANIZED HEALTH CARE EDUCATION/TRAINING PROGRAM

## 2022-02-28 PROCEDURE — 85610 PROTHROMBIN TIME: CPT

## 2022-02-28 NOTE — Clinical Note
Rene was supra-therapeutic today at 3.5, but he states his wife has been out of town for about two weeks and he has not been eating as many greens. She just got back last night and his normal diet will resume. Due to todays dose being half a tablet already we instructed him to reduce tomorrows dose to half a tablet instead of a full.   Thanks, Ovi

## 2022-02-28 NOTE — PROGRESS NOTES
Anticoagulation Summary  As of 2/28/2022    INR goal:  2.0-3.0   TTR:  58.4 % (6.7 y)   INR used for dosing:  3.50 (2/28/2022)   Warfarin maintenance plan:  3.75 mg (7.5 mg x 0.5) every Mon, Fri; 7.5 mg (7.5 mg x 1) all other days   Weekly warfarin total:  45 mg   Plan last modified:  Jona RuizD (9/15/2021)   Next INR check:  3/7/2022   Priority:  Acute   Target end date:  Indefinite    Indications    DVT (deep venous thrombosis) (HCC) (Resolved) [I82.409]  TIA (transient ischemic attack) [G45.9]  Pulmonary embolism [415.19] (Resolved) [I26.99]             Anticoagulation Episode Summary     INR check location:  Anticoagulation Clinic    Preferred lab:  [facility]    Send INR reminders to:      Comments:        Anticoagulation Care Providers     Provider Role Specialty Phone number    Patricia Damon M.D. Referring Internal Medicine [phone]    Jona QuinnD Responsible      [facility] Anticoagulation Services Responsible  [phone]                Refer to Patient Findings for HPI:  Patient Findings     Positives:  Change in diet/appetite (Ate more shellfish than usual over the weekend. Has been eating less greens over the last couple weeks.), Bruising (Bruising on his arm. Pt states it is normal. Bruises stay for a few days and then goes away.)    Negatives:  Signs/symptoms of thrombosis, Signs/symptoms of bleeding, Laboratory test error suspected, Change in health, Change in alcohol use, Change in activity, Upcoming invasive procedure, Emergency department visit, Upcoming dental procedure, Missed doses, Extra doses, Change in medications, Hospital admission, Other complaints          There were no vitals filed for this visit.  pt declined vitals    Verified current warfarin dosing schedule.    Pt is on ASA 81 mg as antiplatelet therapy for TIA, CAD and must be reviewed again with cardiology.    A/P   INR  SUPRA-therapeutic at 3.5.     Warfarin dosing recommendation: Take  3.75mg warfarin tomorrow and then continue current regimen.    Pt educated to contact our clinic with any changes in medications or s/s of bleeding or thrombosis. Pt is aware to seek immediate medical attention for falls, head injury or deep cuts.    Follow up appointment in 1 week(s).    Sunil Price (Student Intern)    Discussed with:    Ovi Platt, JonaD

## 2022-03-04 ENCOUNTER — TELEPHONE (OUTPATIENT)
Dept: CARDIOLOGY | Facility: MEDICAL CENTER | Age: 72
End: 2022-03-04
Payer: MEDICARE

## 2022-03-05 NOTE — TELEPHONE ENCOUNTER
Given his carotid disease and history of TIA it is reasonable to continue long-term baby aspirin in addition to the anticoagulation.  Thank you for reaching out

## 2022-03-05 NOTE — TELEPHONE ENCOUNTER
----- Message from Ovi Platt PharmD sent at 3/3/2022  5:58 PM PST -----  Good evening Dr. Wrihgt. My name is Ovi Platt, I am a pharmacist with the [facility]. We currently manage Rene's warfarin. Rene has been taking ASA 81mg daily along with his warfarin. His TIA and CABG were in 2015. I wanted to see what your thoughts were on stopping the ASA at this point?     Thank you,  Ovi Platt, JonaD

## 2022-03-07 ENCOUNTER — ANTICOAGULATION VISIT (OUTPATIENT)
Dept: VASCULAR LAB | Facility: MEDICAL CENTER | Age: 72
End: 2022-03-07
Attending: INTERNAL MEDICINE
Payer: MEDICARE

## 2022-03-07 VITALS — HEART RATE: 57 BPM | SYSTOLIC BLOOD PRESSURE: 147 MMHG | DIASTOLIC BLOOD PRESSURE: 79 MMHG

## 2022-03-07 DIAGNOSIS — G45.9 TIA (TRANSIENT ISCHEMIC ATTACK): ICD-10-CM

## 2022-03-07 LAB
INR BLD: 2.1 (ref 0.9–1.2)
INR PPP: 2.1 (ref 2–3.5)

## 2022-03-07 PROCEDURE — 99211 OFF/OP EST MAY X REQ PHY/QHP: CPT | Performed by: STUDENT IN AN ORGANIZED HEALTH CARE EDUCATION/TRAINING PROGRAM

## 2022-03-07 PROCEDURE — 85610 PROTHROMBIN TIME: CPT

## 2022-03-07 NOTE — PROGRESS NOTES
Anticoagulation Summary  As of 3/7/2022    INR goal:  2.0-3.0   TTR:  58.4 % (6.7 y)   INR used for dosin.10 (3/7/2022)   Warfarin maintenance plan:  3.75 mg (7.5 mg x 0.5) every Mon, Fri; 7.5 mg (7.5 mg x 1) all other days   Weekly warfarin total:  45 mg   Plan last modified:  Jona RuizD (9/15/2021)   Next INR check:  3/14/2022   Priority:  Acute   Target end date:  Indefinite    Indications    DVT (deep venous thrombosis) (HCC) (Resolved) [I82.409]  TIA (transient ischemic attack) [G45.9]  Pulmonary embolism [415.19] (Resolved) [I26.99]             Anticoagulation Episode Summary     INR check location:  Anticoagulation Clinic    Preferred lab:  [facility]    Send INR reminders to:      Comments:        Anticoagulation Care Providers     Provider Role Specialty Phone number    Patricia Damon M.D. Referring Internal Medicine [phone]    Jona QuinnD Responsible      [facility] Anticoagulation Services Responsible  [phone]                Refer to Patient Findings for HPI:  Patient Findings     Positives:  Change in medications (Pt has been using small amounts of topical Blue EMU, which contains some salicylates. Discussed risk vs. benefit)    Negatives:  Signs/symptoms of thrombosis, Signs/symptoms of bleeding, Laboratory test error suspected, Change in health, Change in alcohol use, Change in activity, Upcoming invasive procedure, Emergency department visit, Upcoming dental procedure, Missed doses, Extra doses, Change in diet/appetite, Hospital admission, Bruising, Other complaints          Vitals:    22 1049   BP: 147/79   BP Location: Right arm   Patient Position: Sitting   Pulse: (!) 57     Pt states this is higher than his normal 120's-130's    Verified current warfarin dosing schedule.    Medications reconciled , pt has been using small amounts of Blue Emu cream for achy joints.   Pt is on ASA 81 mg as antiplatelet therapy for TIA and CAD. Cardiology would  like to continue lifelong ASA 81mg.      A/P   INR  therapeutic.     Warfarin dosing recommendation: Pt to continue with current regimen.    Pt educated to contact our clinic with any changes in medications or s/s of bleeding or thrombosis. Pt is aware to seek immediate medical attention for falls, head injury or deep cuts.    Follow up appointment in 2 week(s) per pt preference.    Ovi Platt, JonaD

## 2022-03-21 ENCOUNTER — ANTICOAGULATION VISIT (OUTPATIENT)
Dept: VASCULAR LAB | Facility: MEDICAL CENTER | Age: 72
End: 2022-03-21
Attending: INTERNAL MEDICINE
Payer: MEDICARE

## 2022-03-21 VITALS — DIASTOLIC BLOOD PRESSURE: 77 MMHG | HEART RATE: 56 BPM | SYSTOLIC BLOOD PRESSURE: 148 MMHG

## 2022-03-21 DIAGNOSIS — G45.9 TIA (TRANSIENT ISCHEMIC ATTACK): ICD-10-CM

## 2022-03-21 LAB
INR BLD: 2.7 (ref 0.9–1.2)
INR PPP: 2.7 (ref 2–3.5)

## 2022-03-21 PROCEDURE — 85610 PROTHROMBIN TIME: CPT

## 2022-03-21 PROCEDURE — 99211 OFF/OP EST MAY X REQ PHY/QHP: CPT | Performed by: STUDENT IN AN ORGANIZED HEALTH CARE EDUCATION/TRAINING PROGRAM

## 2022-03-21 NOTE — PROGRESS NOTES
Anticoagulation Summary  As of 3/21/2022    INR goal:  2.0-3.0   TTR:  58.6 % (6.8 y)   INR used for dosin.70 (3/21/2022)   Warfarin maintenance plan:  3.75 mg (7.5 mg x 0.5) every Mon, Fri; 7.5 mg (7.5 mg x 1) all other days   Weekly warfarin total:  45 mg   Plan last modified:  Jona RuizD (9/15/2021)   Next INR check:  2022   Priority:  Acute   Target end date:  Indefinite    Indications    DVT (deep venous thrombosis) (HCC) (Resolved) [I82.409]  TIA (transient ischemic attack) [G45.9]  Pulmonary embolism [415.19] (Resolved) [I26.99]             Anticoagulation Episode Summary     INR check location:  Anticoagulation Clinic    Preferred lab:  [facility]    Send INR reminders to:      Comments:  ASA 81 mg for TIA, CAD hx - Lifelong per Dr. Wright 3/7/22      Anticoagulation Care Providers     Provider Role Specialty Phone number    Patricia Damon M.D. Referring Internal Medicine [phone]    Jona QuinnD Responsible      [facility] Responsible  [phone]                Refer to Patient Findings for HPI:  Patient Findings     Negatives:  Signs/symptoms of thrombosis, Signs/symptoms of bleeding, Laboratory test error suspected, Change in health, Change in alcohol use, Change in activity, Upcoming invasive procedure, Emergency department visit, Upcoming dental procedure, Missed doses, Extra doses, Change in medications, Change in diet/appetite, Hospital admission, Bruising, Other complaints          Vitals:    22 0940   BP: 148/77   BP Location: Left arm   Patient Position: Sitting   Pulse: (!) 56       Verified current warfarin dosing schedule.    Medications reconciled   Pt is on ASA 81 mg as antiplatelet therapy for TIA and CAD. Cardiology would like to continue lifelong ASA 81mg.      A/P   INR  therapeutic.     Warfarin dosing recommendation: Continue with the same regimen    Pt educated to contact our clinic with any changes in  medications or s/s of bleeding or thrombosis. Pt is aware to seek immediate medical attention for falls, head injury or deep cuts.    Follow up appointment in 5 week(s) per pt preference.    Ovi Platt, JonaD

## 2022-03-24 RX ORDER — LEVOTHYROXINE SODIUM 0.07 MG/1
75 TABLET ORAL EVERY EVENING
Qty: 90 TABLET | Refills: 0 | Status: SHIPPED | OUTPATIENT
Start: 2022-03-24 | End: 2022-07-05 | Stop reason: SDUPTHER

## 2022-03-25 ENCOUNTER — TELEPHONE (OUTPATIENT)
Dept: MEDICAL GROUP | Facility: PHYSICIAN GROUP | Age: 72
End: 2022-03-25
Payer: MEDICARE

## 2022-03-25 DIAGNOSIS — F31.70 BIPOLAR DISORDER IN FULL REMISSION, MOST RECENT EPISODE UNSPECIFIED TYPE (HCC): ICD-10-CM

## 2022-03-25 DIAGNOSIS — I25.83 CORONARY ARTERY DISEASE DUE TO LIPID RICH PLAQUE: ICD-10-CM

## 2022-03-25 DIAGNOSIS — Z12.5 PROSTATE CANCER SCREENING: ICD-10-CM

## 2022-03-25 DIAGNOSIS — E03.9 ACQUIRED HYPOTHYROIDISM: ICD-10-CM

## 2022-03-25 DIAGNOSIS — I25.10 CORONARY ARTERY DISEASE DUE TO LIPID RICH PLAQUE: ICD-10-CM

## 2022-03-25 RX ORDER — TRAZODONE HYDROCHLORIDE 50 MG/1
50 TABLET ORAL NIGHTLY
Qty: 30 TABLET | Refills: 3 | Status: SHIPPED | OUTPATIENT
Start: 2022-03-25 | End: 2022-04-12 | Stop reason: SDUPTHER

## 2022-03-25 NOTE — TELEPHONE ENCOUNTER
VOICEMAIL  1. Caller Name: Rene Chan                        Call Back Number: [phone] (home)       2. Message: pt states he has an appointment to get labs done and is asking if they can be ordered.    He also states that at his last appointment a sleep medication was discussed and he is asking if that can sent to pharmacy.     3. Patient approves office to leave a detailed voicemail/MyChart message: N\A

## 2022-03-28 ENCOUNTER — TELEPHONE (OUTPATIENT)
Dept: MEDICAL GROUP | Facility: PHYSICIAN GROUP | Age: 72
End: 2022-03-28
Payer: MEDICARE

## 2022-03-28 NOTE — TELEPHONE ENCOUNTER
Called and advised patient of RX and Lab orders placed. Patient picked up Rx yesterday and will go to the lab asap.

## 2022-03-28 NOTE — TELEPHONE ENCOUNTER
Phone Number Called: [phone] (home)       Call outcome: Did not leave a detailed message. Requested patient to call back.    Message: 1st attempt

## 2022-04-01 ENCOUNTER — HOSPITAL ENCOUNTER (OUTPATIENT)
Dept: LAB | Facility: MEDICAL CENTER | Age: 72
End: 2022-04-01
Attending: INTERNAL MEDICINE
Payer: MEDICARE

## 2022-04-01 ENCOUNTER — HOSPITAL ENCOUNTER (OUTPATIENT)
Dept: LAB | Facility: MEDICAL CENTER | Age: 72
End: 2022-04-01
Attending: FAMILY MEDICINE
Payer: MEDICARE

## 2022-04-01 DIAGNOSIS — I25.83 CORONARY ARTERY DISEASE DUE TO LIPID RICH PLAQUE: ICD-10-CM

## 2022-04-01 DIAGNOSIS — I25.10 CORONARY ARTERY DISEASE DUE TO LIPID RICH PLAQUE: ICD-10-CM

## 2022-04-01 DIAGNOSIS — E03.9 ACQUIRED HYPOTHYROIDISM: ICD-10-CM

## 2022-04-01 DIAGNOSIS — E78.5 DYSLIPIDEMIA: ICD-10-CM

## 2022-04-01 DIAGNOSIS — Z79.01 CHRONIC ANTICOAGULATION: ICD-10-CM

## 2022-04-01 DIAGNOSIS — Z12.5 PROSTATE CANCER SCREENING: ICD-10-CM

## 2022-04-01 LAB
ALBUMIN SERPL BCP-MCNC: 4.1 G/DL (ref 3.2–4.9)
ALBUMIN SERPL BCP-MCNC: 4.1 G/DL (ref 3.2–4.9)
ALBUMIN/GLOB SERPL: 2 G/DL
ALBUMIN/GLOB SERPL: 2.1 G/DL
ALP SERPL-CCNC: 41 U/L (ref 30–99)
ALP SERPL-CCNC: 41 U/L (ref 30–99)
ALT SERPL-CCNC: 23 U/L (ref 2–50)
ALT SERPL-CCNC: 24 U/L (ref 2–50)
ANION GAP SERPL CALC-SCNC: 11 MMOL/L (ref 7–16)
ANION GAP SERPL CALC-SCNC: 9 MMOL/L (ref 7–16)
APPEARANCE UR: CLEAR
AST SERPL-CCNC: 24 U/L (ref 12–45)
AST SERPL-CCNC: 25 U/L (ref 12–45)
BILIRUB SERPL-MCNC: 0.8 MG/DL (ref 0.1–1.5)
BILIRUB SERPL-MCNC: 0.8 MG/DL (ref 0.1–1.5)
BILIRUB UR QL STRIP.AUTO: NEGATIVE
BUN SERPL-MCNC: 11 MG/DL (ref 8–22)
BUN SERPL-MCNC: 11 MG/DL (ref 8–22)
CALCIUM SERPL-MCNC: 8.9 MG/DL (ref 8.5–10.5)
CALCIUM SERPL-MCNC: 8.9 MG/DL (ref 8.5–10.5)
CHLORIDE SERPL-SCNC: 101 MMOL/L (ref 96–112)
CHLORIDE SERPL-SCNC: 104 MMOL/L (ref 96–112)
CHOLEST SERPL-MCNC: 140 MG/DL (ref 100–199)
CHOLEST SERPL-MCNC: 140 MG/DL (ref 100–199)
CO2 SERPL-SCNC: 22 MMOL/L (ref 20–33)
CO2 SERPL-SCNC: 23 MMOL/L (ref 20–33)
COLOR UR: YELLOW
CREAT SERPL-MCNC: 0.75 MG/DL (ref 0.5–1.4)
CREAT SERPL-MCNC: 0.8 MG/DL (ref 0.5–1.4)
ERYTHROCYTE [DISTWIDTH] IN BLOOD BY AUTOMATED COUNT: 48.6 FL (ref 35.9–50)
FASTING STATUS PATIENT QL REPORTED: NORMAL
FASTING STATUS PATIENT QL REPORTED: NORMAL
GFR SERPLBLD CREATININE-BSD FMLA CKD-EPI: 94 ML/MIN/1.73 M 2
GFR SERPLBLD CREATININE-BSD FMLA CKD-EPI: 96 ML/MIN/1.73 M 2
GLOBULIN SER CALC-MCNC: 2 G/DL (ref 1.9–3.5)
GLOBULIN SER CALC-MCNC: 2.1 G/DL (ref 1.9–3.5)
GLUCOSE SERPL-MCNC: 94 MG/DL (ref 65–99)
GLUCOSE SERPL-MCNC: 97 MG/DL (ref 65–99)
GLUCOSE UR STRIP.AUTO-MCNC: NEGATIVE MG/DL
HCT VFR BLD AUTO: 45.5 % (ref 42–52)
HDLC SERPL-MCNC: 37 MG/DL
HDLC SERPL-MCNC: 37 MG/DL
HGB BLD-MCNC: 14.9 G/DL (ref 14–18)
KETONES UR STRIP.AUTO-MCNC: NEGATIVE MG/DL
LDLC SERPL CALC-MCNC: 84 MG/DL
LDLC SERPL CALC-MCNC: 85 MG/DL
LEUKOCYTE ESTERASE UR QL STRIP.AUTO: NEGATIVE
MCH RBC QN AUTO: 31.3 PG (ref 27–33)
MCHC RBC AUTO-ENTMCNC: 32.7 G/DL (ref 33.7–35.3)
MCV RBC AUTO: 95.6 FL (ref 81.4–97.8)
MICRO URNS: NORMAL
NITRITE UR QL STRIP.AUTO: NEGATIVE
PH UR STRIP.AUTO: 6.5 [PH] (ref 5–8)
PLATELET # BLD AUTO: 296 K/UL (ref 164–446)
PMV BLD AUTO: 10.7 FL (ref 9–12.9)
POTASSIUM SERPL-SCNC: 4.8 MMOL/L (ref 3.6–5.5)
POTASSIUM SERPL-SCNC: 5 MMOL/L (ref 3.6–5.5)
PROT SERPL-MCNC: 6.1 G/DL (ref 6–8.2)
PROT SERPL-MCNC: 6.2 G/DL (ref 6–8.2)
PROT UR QL STRIP: NEGATIVE MG/DL
PSA SERPL-MCNC: 0.49 NG/ML (ref 0–4)
RBC # BLD AUTO: 4.76 M/UL (ref 4.7–6.1)
RBC UR QL AUTO: NEGATIVE
SODIUM SERPL-SCNC: 133 MMOL/L (ref 135–145)
SODIUM SERPL-SCNC: 137 MMOL/L (ref 135–145)
SP GR UR STRIP.AUTO: 1.01
TRIGL SERPL-MCNC: 92 MG/DL (ref 0–149)
TRIGL SERPL-MCNC: 93 MG/DL (ref 0–149)
TSH SERPL DL<=0.005 MIU/L-ACNC: 2.35 UIU/ML (ref 0.38–5.33)
UROBILINOGEN UR STRIP.AUTO-MCNC: 1 MG/DL
WBC # BLD AUTO: 6.4 K/UL (ref 4.8–10.8)

## 2022-04-01 PROCEDURE — 80053 COMPREHEN METABOLIC PANEL: CPT | Mod: 91

## 2022-04-01 PROCEDURE — 36415 COLL VENOUS BLD VENIPUNCTURE: CPT

## 2022-04-01 PROCEDURE — 80061 LIPID PANEL: CPT

## 2022-04-01 PROCEDURE — 84153 ASSAY OF PSA TOTAL: CPT

## 2022-04-01 PROCEDURE — 81003 URINALYSIS AUTO W/O SCOPE: CPT

## 2022-04-01 PROCEDURE — 84443 ASSAY THYROID STIM HORMONE: CPT

## 2022-04-01 PROCEDURE — 85027 COMPLETE CBC AUTOMATED: CPT

## 2022-04-01 PROCEDURE — 80053 COMPREHEN METABOLIC PANEL: CPT

## 2022-04-01 PROCEDURE — 80061 LIPID PANEL: CPT | Mod: 91

## 2022-04-05 ENCOUNTER — TELEPHONE (OUTPATIENT)
Dept: CARDIOLOGY | Facility: MEDICAL CENTER | Age: 72
End: 2022-04-05
Payer: MEDICARE

## 2022-04-05 NOTE — TELEPHONE ENCOUNTER
----- Message from Rosa Rogers R.N. sent at 4/4/2022 10:17 AM PDT -----    ----- Message -----  From: Ta Wright M.D.  Sent: 4/1/2022   7:28 PM PDT  To: Rosa Rogers R.N.    Labs look good, please let him know     Thank you

## 2022-04-05 NOTE — LETTER
April 5, 2022        Rene Chan  [address] [id number]          Dear Rene,    We have received the results of your recent:    Lab Work     Your test came back within normal limits.  Please follow up as previously discussed with your physician.      Feel free to call us with any questions.        Sincerely,          Bing, Medical Assistant for Dr. Wright  Electronically Signed

## 2022-04-11 RX ORDER — ATORVASTATIN CALCIUM 80 MG/1
TABLET, FILM COATED ORAL
Qty: 100 TABLET | Refills: 3 | Status: SHIPPED | OUTPATIENT
Start: 2022-04-11 | End: 2023-06-07 | Stop reason: SDUPTHER

## 2022-04-12 RX ORDER — DIVALPROEX SODIUM 250 MG/1
TABLET, DELAYED RELEASE ORAL
Qty: 225 TABLET | Refills: 3 | Status: SHIPPED
Start: 2022-04-12 | End: 2022-04-20

## 2022-04-12 RX ORDER — TRAZODONE HYDROCHLORIDE 50 MG/1
50 TABLET ORAL NIGHTLY
Qty: 90 TABLET | Refills: 2 | Status: SHIPPED | OUTPATIENT
Start: 2022-04-12 | End: 2022-09-26 | Stop reason: SDUPTHER

## 2022-04-12 NOTE — TELEPHONE ENCOUNTER
----- Message from Zabrina Warren, Med Ass't sent at 2022 12:56 PM PDT -----  Regardin prescriptions that he gets local now wants them at  pharmacy  Divalproex sod dr 250Mg tab  Trazodone 50 mg table     3 month supply for both

## 2022-04-13 ENCOUNTER — TELEPHONE (OUTPATIENT)
Dept: HEALTH INFORMATION MANAGEMENT | Facility: OTHER | Age: 72
End: 2022-04-13
Payer: MEDICARE

## 2022-04-19 PROBLEM — I10 PRIMARY HYPERTENSION: Status: ACTIVE | Noted: 2022-04-19

## 2022-04-19 PROBLEM — F10.21 ALCOHOL DEPENDENCE IN REMISSION (HCC): Status: ACTIVE | Noted: 2022-04-19

## 2022-04-19 PROBLEM — F11.21 OPIOID DEPENDENCE IN REMISSION (HCC): Status: ACTIVE | Noted: 2022-04-19

## 2022-04-19 PROBLEM — Z86.718 HISTORY OF DVT (DEEP VEIN THROMBOSIS): Status: ACTIVE | Noted: 2022-04-19

## 2022-04-19 PROBLEM — F31.75 BIPOLAR DISORDER, IN PARTIAL REMISSION, MOST RECENT EPISODE DEPRESSED (HCC): Status: ACTIVE | Noted: 2022-04-19

## 2022-04-20 RX ORDER — DIVALPROEX SODIUM 250 MG/1
500 TABLET, DELAYED RELEASE ORAL NIGHTLY
Qty: 180 TABLET | Refills: 3 | Status: SHIPPED | OUTPATIENT
Start: 2022-04-20 | End: 2022-11-30

## 2022-04-20 RX ORDER — DIVALPROEX SODIUM 125 MG/1
125 TABLET, DELAYED RELEASE ORAL 3 TIMES DAILY
Qty: 90 TABLET | Refills: 3 | Status: SHIPPED | OUTPATIENT
Start: 2022-04-20 | End: 2022-05-19

## 2022-04-25 ENCOUNTER — ANTICOAGULATION VISIT (OUTPATIENT)
Dept: VASCULAR LAB | Facility: MEDICAL CENTER | Age: 72
End: 2022-04-25
Attending: INTERNAL MEDICINE
Payer: MEDICARE

## 2022-04-25 VITALS — HEART RATE: 61 BPM | DIASTOLIC BLOOD PRESSURE: 77 MMHG | SYSTOLIC BLOOD PRESSURE: 133 MMHG

## 2022-04-25 DIAGNOSIS — G45.9 TIA (TRANSIENT ISCHEMIC ATTACK): ICD-10-CM

## 2022-04-25 LAB
INR BLD: 3.8 (ref 0.9–1.2)
INR PPP: 3.8 (ref 2–3.5)

## 2022-04-25 PROCEDURE — 99212 OFFICE O/P EST SF 10 MIN: CPT | Performed by: NURSE PRACTITIONER

## 2022-04-25 PROCEDURE — 85610 PROTHROMBIN TIME: CPT

## 2022-04-25 NOTE — PROGRESS NOTES
Anticoagulation Summary  As of 4/25/2022    INR goal:  2.0-3.0   TTR:  58.2 % (6.8 y)   INR used for dosing:  3.80 (4/25/2022)   Warfarin maintenance plan:  3.75 mg (7.5 mg x 0.5) every Mon, Wed, Fri; 7.5 mg (7.5 mg x 1) all other days   Weekly warfarin total:  41.25 mg   Plan last modified:  MISTI BucioPLESLIE (4/25/2022)   Next INR check:  5/9/2022   Priority:  Acute   Target end date:  Indefinite    Indications    DVT (deep venous thrombosis) (HCC) (Resolved) [I82.409]  TIA (transient ischemic attack) [G45.9]  Pulmonary embolism [415.19] (Resolved) [I26.99]             Anticoagulation Episode Summary     INR check location:  Anticoagulation Clinic    Preferred lab:  [facility]    Send INR reminders to:      Comments:  ASA 81 mg for TIA, CAD hx - Lifelong per Dr. Wright 3/7/22      Anticoagulation Care Providers     Provider Role Specialty Phone number    Patricia Damon M.D. Referring Internal Medicine [phone]    Jona QuinnD Responsible      [facility] Responsible  [phone]                Refer to Patient Findings for HPI:  Patient Findings     Positives:  Change in medications    Negatives:  Signs/symptoms of thrombosis, Signs/symptoms of bleeding, Laboratory test error suspected, Change in health, Change in alcohol use, Change in activity, Upcoming invasive procedure, Emergency department visit, Upcoming dental procedure, Missed doses, Extra doses, Change in diet/appetite, Hospital admission, Bruising, Other complaints    Comments:  Taking trazodone for sleep prn.          Vitals:    04/25/22 1040   BP: 133/77   Pulse: 61     Verified current warfarin dosing schedule.    Medications reconciled   Pt is on ASA 81 mg as antiplatelet therapy for TIA, CAD and must be reviewed again on - taking indefinitely.      A/P   INR  supra-therapeutic.     Warfarin dosing recommendation: HOLD tonight then began reduced regimen.    Pt educated to contact our clinic with  any changes in medications or s/s of bleeding or thrombosis. Pt is aware to seek immediate medical attention for falls, head injury or deep cuts.    Follow up appointment in 2 week(s).    TOREY Bucio.

## 2022-05-09 ENCOUNTER — ANTICOAGULATION VISIT (OUTPATIENT)
Dept: VASCULAR LAB | Facility: MEDICAL CENTER | Age: 72
End: 2022-05-09
Attending: INTERNAL MEDICINE
Payer: MEDICARE

## 2022-05-09 VITALS — SYSTOLIC BLOOD PRESSURE: 133 MMHG | HEART RATE: 58 BPM | DIASTOLIC BLOOD PRESSURE: 66 MMHG

## 2022-05-09 DIAGNOSIS — G45.9 TIA (TRANSIENT ISCHEMIC ATTACK): ICD-10-CM

## 2022-05-09 LAB
INR BLD: 2.2 (ref 0.9–1.2)
INR PPP: 2.2 (ref 2–3.5)

## 2022-05-09 PROCEDURE — 85610 PROTHROMBIN TIME: CPT

## 2022-05-09 PROCEDURE — 99211 OFF/OP EST MAY X REQ PHY/QHP: CPT | Performed by: NURSE PRACTITIONER

## 2022-05-09 NOTE — PROGRESS NOTES
Anticoagulation Summary  As of 2022    INR goal:  2.0-3.0   TTR:  58.1 % (6.9 y)   INR used for dosin.20 (2022)   Warfarin maintenance plan:  3.75 mg (7.5 mg x 0.5) every Mon, Wed, Fri; 7.5 mg (7.5 mg x 1) all other days   Weekly warfarin total:  41.25 mg   Plan last modified:  LAMAR Bucio (2022)   Next INR check:  2022   Priority:  Acute   Target end date:  Indefinite    Indications    DVT (deep venous thrombosis) (HCC) (Resolved) [I82.409]  TIA (transient ischemic attack) [G45.9]  Pulmonary embolism [415.19] (Resolved) [I26.99]             Anticoagulation Episode Summary     INR check location:  Anticoagulation Clinic    Preferred lab:  [facility]    Send INR reminders to:      Comments:  ASA 81 mg for TIA, CAD hx - Lifelong per Dr. Wright 3/7/22      Anticoagulation Care Providers     Provider Role Specialty Phone number    Patricia Damon M.D. Referring Internal Medicine [phone]    Jona QuinnD Responsible      [facility]ticoagulation [facility] Responsible  [phone]                Refer to Patient Findings for HPI:      Vitals:    22 1059   BP: 133/66   Pulse: (!) 58     Verified current warfarin dosing schedule.    Medications reconciled   Pt is on ASA 81 mg as antiplatelet therapy for CAD and must be reviewed again on - taking indefinitely per cards.      A/P   INR  -therapeutic.     Warfarin dosing recommendation: Continue current regimen.    Pt educated to contact our clinic with any changes in medications or s/s of bleeding or thrombosis. Pt is aware to seek immediate medical attention for falls, head injury or deep cuts.    Follow up appointment in 3 week(s).    LAMAR Bucio

## 2022-05-19 PROBLEM — M17.12 OSTEOARTHRITIS OF LEFT KNEE: Status: ACTIVE | Noted: 2022-05-19

## 2022-05-31 ENCOUNTER — APPOINTMENT (OUTPATIENT)
Dept: RADIOLOGY | Facility: MEDICAL CENTER | Age: 72
End: 2022-05-31
Attending: EMERGENCY MEDICINE
Payer: MEDICARE

## 2022-05-31 ENCOUNTER — HOSPITAL ENCOUNTER (OUTPATIENT)
Facility: MEDICAL CENTER | Age: 72
End: 2022-06-01
Attending: EMERGENCY MEDICINE | Admitting: HOSPITALIST
Payer: MEDICARE

## 2022-05-31 DIAGNOSIS — K30 INDIGESTION: ICD-10-CM

## 2022-05-31 PROBLEM — R42 DIZZINESS: Status: ACTIVE | Noted: 2022-05-31

## 2022-05-31 PROBLEM — E11.9 DM (DIABETES MELLITUS) (HCC): Status: ACTIVE | Noted: 2022-05-31

## 2022-05-31 PROBLEM — R07.9 CHEST PAIN: Status: ACTIVE | Noted: 2022-05-31

## 2022-05-31 LAB
ALBUMIN SERPL BCP-MCNC: 3.8 G/DL (ref 3.2–4.9)
ALBUMIN/GLOB SERPL: 1.5 G/DL
ALP SERPL-CCNC: 44 U/L (ref 30–99)
ALT SERPL-CCNC: 21 U/L (ref 2–50)
ANION GAP SERPL CALC-SCNC: 11 MMOL/L (ref 7–16)
APTT PPP: 29.9 SEC (ref 24.7–36)
AST SERPL-CCNC: 20 U/L (ref 12–45)
BASOPHILS # BLD AUTO: 1.2 % (ref 0–1.8)
BASOPHILS # BLD: 0.09 K/UL (ref 0–0.12)
BILIRUB SERPL-MCNC: 0.9 MG/DL (ref 0.1–1.5)
BUN SERPL-MCNC: 12 MG/DL (ref 8–22)
CALCIUM SERPL-MCNC: 8.9 MG/DL (ref 8.5–10.5)
CHLORIDE SERPL-SCNC: 103 MMOL/L (ref 96–112)
CO2 SERPL-SCNC: 22 MMOL/L (ref 20–33)
CREAT SERPL-MCNC: 0.62 MG/DL (ref 0.5–1.4)
EKG IMPRESSION: NORMAL
EOSINOPHIL # BLD AUTO: 0.13 K/UL (ref 0–0.51)
EOSINOPHIL NFR BLD: 1.7 % (ref 0–6.9)
ERYTHROCYTE [DISTWIDTH] IN BLOOD BY AUTOMATED COUNT: 46.7 FL (ref 35.9–50)
GFR SERPLBLD CREATININE-BSD FMLA CKD-EPI: 101 ML/MIN/1.73 M 2
GLOBULIN SER CALC-MCNC: 2.5 G/DL (ref 1.9–3.5)
GLUCOSE BLD STRIP.AUTO-MCNC: 180 MG/DL (ref 65–99)
GLUCOSE SERPL-MCNC: 118 MG/DL (ref 65–99)
HCT VFR BLD AUTO: 45.6 % (ref 42–52)
HGB BLD-MCNC: 15.6 G/DL (ref 14–18)
IMM GRANULOCYTES # BLD AUTO: 0.05 K/UL (ref 0–0.11)
IMM GRANULOCYTES NFR BLD AUTO: 0.7 % (ref 0–0.9)
INR PPP: 2.38 (ref 0.87–1.13)
LYMPHOCYTES # BLD AUTO: 1.86 K/UL (ref 1–4.8)
LYMPHOCYTES NFR BLD: 24.9 % (ref 22–41)
MCH RBC QN AUTO: 32.1 PG (ref 27–33)
MCHC RBC AUTO-ENTMCNC: 34.2 G/DL (ref 33.7–35.3)
MCV RBC AUTO: 93.8 FL (ref 81.4–97.8)
MONOCYTES # BLD AUTO: 0.77 K/UL (ref 0–0.85)
MONOCYTES NFR BLD AUTO: 10.3 % (ref 0–13.4)
NEUTROPHILS # BLD AUTO: 4.56 K/UL (ref 1.82–7.42)
NEUTROPHILS NFR BLD: 61.2 % (ref 44–72)
NRBC # BLD AUTO: 0 K/UL
NRBC BLD-RTO: 0 /100 WBC
PLATELET # BLD AUTO: 284 K/UL (ref 164–446)
PMV BLD AUTO: 9.7 FL (ref 9–12.9)
POTASSIUM SERPL-SCNC: 4.4 MMOL/L (ref 3.6–5.5)
PROT SERPL-MCNC: 6.3 G/DL (ref 6–8.2)
PROTHROMBIN TIME: 25.3 SEC (ref 12–14.6)
RBC # BLD AUTO: 4.86 M/UL (ref 4.7–6.1)
SODIUM SERPL-SCNC: 136 MMOL/L (ref 135–145)
TROPONIN T SERPL-MCNC: 12 NG/L (ref 6–19)
TROPONIN T SERPL-MCNC: 13 NG/L (ref 6–19)
TROPONIN T SERPL-MCNC: 14 NG/L (ref 6–19)
WBC # BLD AUTO: 7.5 K/UL (ref 4.8–10.8)

## 2022-05-31 PROCEDURE — 99220 PR INITIAL OBSERVATION CARE,LEVL III: CPT | Performed by: HOSPITALIST

## 2022-05-31 PROCEDURE — 80053 COMPREHEN METABOLIC PANEL: CPT

## 2022-05-31 PROCEDURE — 85610 PROTHROMBIN TIME: CPT

## 2022-05-31 PROCEDURE — 36415 COLL VENOUS BLD VENIPUNCTURE: CPT

## 2022-05-31 PROCEDURE — 71045 X-RAY EXAM CHEST 1 VIEW: CPT

## 2022-05-31 PROCEDURE — 85730 THROMBOPLASTIN TIME PARTIAL: CPT

## 2022-05-31 PROCEDURE — 71275 CT ANGIOGRAPHY CHEST: CPT | Mod: ME

## 2022-05-31 PROCEDURE — 700117 HCHG RX CONTRAST REV CODE 255: Performed by: EMERGENCY MEDICINE

## 2022-05-31 PROCEDURE — A9270 NON-COVERED ITEM OR SERVICE: HCPCS | Performed by: HOSPITALIST

## 2022-05-31 PROCEDURE — 93005 ELECTROCARDIOGRAM TRACING: CPT | Performed by: EMERGENCY MEDICINE

## 2022-05-31 PROCEDURE — 85025 COMPLETE CBC W/AUTO DIFF WBC: CPT

## 2022-05-31 PROCEDURE — 99285 EMERGENCY DEPT VISIT HI MDM: CPT

## 2022-05-31 PROCEDURE — 82962 GLUCOSE BLOOD TEST: CPT

## 2022-05-31 PROCEDURE — G0378 HOSPITAL OBSERVATION PER HR: HCPCS

## 2022-05-31 PROCEDURE — 700102 HCHG RX REV CODE 250 W/ 637 OVERRIDE(OP)

## 2022-05-31 PROCEDURE — A9270 NON-COVERED ITEM OR SERVICE: HCPCS

## 2022-05-31 PROCEDURE — 700102 HCHG RX REV CODE 250 W/ 637 OVERRIDE(OP): Performed by: HOSPITALIST

## 2022-05-31 PROCEDURE — 84484 ASSAY OF TROPONIN QUANT: CPT

## 2022-05-31 RX ORDER — VITAMIN B COMPLEX
2000 TABLET ORAL DAILY
Status: DISCONTINUED | OUTPATIENT
Start: 2022-06-01 | End: 2022-06-01 | Stop reason: HOSPADM

## 2022-05-31 RX ORDER — ATORVASTATIN CALCIUM 80 MG/1
80 TABLET, FILM COATED ORAL EVERY EVENING
Status: DISCONTINUED | OUTPATIENT
Start: 2022-05-31 | End: 2022-06-01 | Stop reason: HOSPADM

## 2022-05-31 RX ORDER — CHOLECALCIFEROL (VITAMIN D3) 125 MCG
10 CAPSULE ORAL
Status: DISCONTINUED | OUTPATIENT
Start: 2022-05-31 | End: 2022-06-01 | Stop reason: HOSPADM

## 2022-05-31 RX ORDER — DIVALPROEX SODIUM 125 MG/1
125 CAPSULE, COATED PELLETS ORAL EVERY MORNING
Status: SHIPPED | COMMUNITY
End: 2022-05-31

## 2022-05-31 RX ORDER — ASPIRIN 325 MG
325 TABLET ORAL DAILY
Status: DISCONTINUED | OUTPATIENT
Start: 2022-05-31 | End: 2022-05-31

## 2022-05-31 RX ORDER — ACETAMINOPHEN 325 MG/1
650 TABLET ORAL EVERY 6 HOURS PRN
Status: DISCONTINUED | OUTPATIENT
Start: 2022-05-31 | End: 2022-06-01 | Stop reason: HOSPADM

## 2022-05-31 RX ORDER — AMOXICILLIN 250 MG
2 CAPSULE ORAL 2 TIMES DAILY
Status: DISCONTINUED | OUTPATIENT
Start: 2022-05-31 | End: 2022-06-01 | Stop reason: HOSPADM

## 2022-05-31 RX ORDER — BISACODYL 10 MG
10 SUPPOSITORY, RECTAL RECTAL
Status: DISCONTINUED | OUTPATIENT
Start: 2022-05-31 | End: 2022-06-01 | Stop reason: HOSPADM

## 2022-05-31 RX ORDER — DIVALPROEX SODIUM 125 MG/1
125 CAPSULE, COATED PELLETS ORAL EVERY MORNING
Status: DISCONTINUED | OUTPATIENT
Start: 2022-06-01 | End: 2022-06-01 | Stop reason: HOSPADM

## 2022-05-31 RX ORDER — TRAZODONE HYDROCHLORIDE 50 MG/1
50 TABLET ORAL NIGHTLY
Status: DISCONTINUED | OUTPATIENT
Start: 2022-05-31 | End: 2022-06-01 | Stop reason: HOSPADM

## 2022-05-31 RX ORDER — DEXTROSE MONOHYDRATE 25 G/50ML
25 INJECTION, SOLUTION INTRAVENOUS
Status: DISCONTINUED | OUTPATIENT
Start: 2022-05-31 | End: 2022-06-01 | Stop reason: HOSPADM

## 2022-05-31 RX ORDER — DIVALPROEX SODIUM 500 MG/1
500 TABLET, DELAYED RELEASE ORAL NIGHTLY
Status: DISCONTINUED | OUTPATIENT
Start: 2022-05-31 | End: 2022-06-01 | Stop reason: HOSPADM

## 2022-05-31 RX ORDER — CHLORAL HYDRATE 500 MG
1000 CAPSULE ORAL DAILY
Status: DISCONTINUED | OUTPATIENT
Start: 2022-06-01 | End: 2022-06-01 | Stop reason: HOSPADM

## 2022-05-31 RX ORDER — LEVOTHYROXINE SODIUM 0.07 MG/1
75 TABLET ORAL DAILY
Status: DISCONTINUED | OUTPATIENT
Start: 2022-06-01 | End: 2022-06-01 | Stop reason: HOSPADM

## 2022-05-31 RX ORDER — POLYETHYLENE GLYCOL 3350 17 G/17G
1 POWDER, FOR SOLUTION ORAL
Status: DISCONTINUED | OUTPATIENT
Start: 2022-05-31 | End: 2022-06-01 | Stop reason: HOSPADM

## 2022-05-31 RX ORDER — DIVALPROEX SODIUM 125 MG/1
125 TABLET, DELAYED RELEASE ORAL EVERY MORNING
COMMUNITY
End: 2023-02-08 | Stop reason: SDUPTHER

## 2022-05-31 RX ORDER — LISINOPRIL 10 MG/1
5 TABLET ORAL DAILY
Status: DISCONTINUED | OUTPATIENT
Start: 2022-05-31 | End: 2022-06-01 | Stop reason: HOSPADM

## 2022-05-31 RX ADMIN — Medication 10 MG: at 21:12

## 2022-05-31 RX ADMIN — ASPIRIN 81 MG: 81 TABLET, COATED ORAL at 18:29

## 2022-05-31 RX ADMIN — LISINOPRIL 5 MG: 10 TABLET ORAL at 18:29

## 2022-05-31 RX ADMIN — ATORVASTATIN CALCIUM 80 MG: 80 TABLET, FILM COATED ORAL at 18:29

## 2022-05-31 RX ADMIN — DIVALPROEX SODIUM 500 MG: 500 TABLET, DELAYED RELEASE ORAL at 21:12

## 2022-05-31 RX ADMIN — TRAZODONE HYDROCHLORIDE 50 MG: 50 TABLET ORAL at 21:13

## 2022-05-31 RX ADMIN — IOHEXOL 70 ML: 350 INJECTION, SOLUTION INTRAVENOUS at 13:52

## 2022-05-31 ASSESSMENT — LIFESTYLE VARIABLES
TOTAL SCORE: 0
HAVE YOU EVER FELT YOU SHOULD CUT DOWN ON YOUR DRINKING: NO
ALCOHOL_USE: NO
AVERAGE NUMBER OF DAYS PER WEEK YOU HAVE A DRINK CONTAINING ALCOHOL: 0
HOW MANY TIMES IN THE PAST YEAR HAVE YOU HAD 5 OR MORE DRINKS IN A DAY: 0
EVER FELT BAD OR GUILTY ABOUT YOUR DRINKING: NO
CONSUMPTION TOTAL: NEGATIVE
ON A TYPICAL DAY WHEN YOU DRINK ALCOHOL HOW MANY DRINKS DO YOU HAVE: 0
SUBSTANCE_ABUSE: 0
TOTAL SCORE: 0
TOTAL SCORE: 0
HAVE PEOPLE ANNOYED YOU BY CRITICIZING YOUR DRINKING: NO
EVER HAD A DRINK FIRST THING IN THE MORNING TO STEADY YOUR NERVES TO GET RID OF A HANGOVER: NO

## 2022-05-31 ASSESSMENT — ENCOUNTER SYMPTOMS
PSYCHIATRIC NEGATIVE: 1
CONSTITUTIONAL NEGATIVE: 1
VOMITING: 0
WEIGHT LOSS: 0
PALPITATIONS: 0
DEPRESSION: 0
BRUISES/BLEEDS EASILY: 0
BLURRED VISION: 0
MYALGIAS: 0
DIAPHORESIS: 0
ABDOMINAL PAIN: 0
HEADACHES: 0
RESPIRATORY NEGATIVE: 1
EYES NEGATIVE: 1
NAUSEA: 1
FOCAL WEAKNESS: 0
CHILLS: 0
SHORTNESS OF BREATH: 0
MUSCULOSKELETAL NEGATIVE: 1
COUGH: 0
WEAKNESS: 0
DIZZINESS: 1
FEVER: 0
SORE THROAT: 0

## 2022-05-31 ASSESSMENT — PATIENT HEALTH QUESTIONNAIRE - PHQ9
SUM OF ALL RESPONSES TO PHQ9 QUESTIONS 1 AND 2: 0
SUM OF ALL RESPONSES TO PHQ9 QUESTIONS 1 AND 2: 0
2. FEELING DOWN, DEPRESSED, IRRITABLE, OR HOPELESS: NOT AT ALL
2. FEELING DOWN, DEPRESSED, IRRITABLE, OR HOPELESS: NOT AT ALL
1. LITTLE INTEREST OR PLEASURE IN DOING THINGS: NOT AT ALL
1. LITTLE INTEREST OR PLEASURE IN DOING THINGS: NOT AT ALL

## 2022-05-31 ASSESSMENT — FIBROSIS 4 INDEX
FIB4 SCORE: 1.11
FIB4 SCORE: 1.22

## 2022-05-31 ASSESSMENT — PAIN DESCRIPTION - PAIN TYPE
TYPE: ACUTE PAIN
TYPE: ACUTE PAIN

## 2022-05-31 NOTE — H&P
"Hospital Medicine History & Physical Note    Date of Service  5/31/2022    Primary Care Physician  Michael Sethi III, M.D.    Consultants    Code Status  Full Code    Chief Complaint  Chief Complaint   Patient presents with   • Chest Pain     Pt reports today while sitting down he began having chest pressure in the middle of his back radiating to the front. +dizziness, -N/V, -SOB.        History of Presenting Illness  Rene Chan is a 71 y.o. male with history of chest pain, PE (on chronic coumadin), DM, DL, Htn CABG x 3 (2015), OA and hypothyroidism. He presented to [facility] on 5/31/2022 with acute chest pain. It occurred at rest while he was a bible study around 10 am. Described as a sharp pain from the middle of his back radiating to the front of his chest, similar to previous pain he experienced prior to CABG. He reports \"mild\" associated dizziness and nausea. The symptoms have now resolved. He denies diaphoresis. EKG shows no acute ischemic changes. He recently underwent an echo a couple weeks ago in preparation for an upcoming knee surgery and is followed by Dr. Wright of cardiology.   CTA was done in the ER and is negative for PE or dissection. Vitals and exam are stable.     I discussed the plan of care with patient, his wife and erp    Review of Systems  Review of Systems   Constitutional: Negative.  Negative for chills, diaphoresis, fever, malaise/fatigue and weight loss.   HENT: Negative.  Negative for sore throat.    Eyes: Negative.  Negative for blurred vision.   Respiratory: Negative.  Negative for cough and shortness of breath.    Cardiovascular: Positive for chest pain. Negative for palpitations and leg swelling.   Gastrointestinal: Positive for nausea. Negative for abdominal pain and vomiting.   Genitourinary: Negative.  Negative for dysuria.   Musculoskeletal: Negative.  Negative for myalgias.   Skin: Negative.  Negative for itching and rash.   Neurological: Positive for dizziness. Negative for " focal weakness, weakness and headaches.   Endo/Heme/Allergies: Negative.  Does not bruise/bleed easily.   Psychiatric/Behavioral: Negative.  Negative for depression, substance abuse and suicidal ideas.   All other systems reviewed and are negative.      Past Medical History   has a past medical history of Anticoagulation monitoring, special range, Bipolar disorder (HCC), CAD (coronary artery disease) (6/4/2015), Carotid artery occlusion, Clotting disorder (HCC), Colon polyps, Diabetes mellitus, type II (HCC), Gout, Hammer toe, Hyperlipidemia, Hypertension, Hypothyroidism, Impaired fasting glucose, and Mild aortic stenosis - echo 4/2018.    Surgical History   has a past surgical history that includes other abdominal surgery; other orthopedic surgery (1976); recovery (6/1/2015); multiple coronary artery bypass endo vein harvest (6/4/2015); appendectomy; and ankle fusion.     Family History  family history includes Heart Disease (age of onset: 50) in his sister; Heart Failure in his father; Stroke (age of onset: 55) in his sister.   Family history reviewed with patient. There is no family history that is pertinent to the chief complaint.     Social History   reports that he quit smoking about 46 years ago. His smoking use included cigarettes. He has a 20.00 pack-year smoking history. He has never used smokeless tobacco. He reports that he does not drink alcohol and does not use drugs.    Allergies  Allergies   Allergen Reactions   • Penicillins Unspecified     Childhood; does know what the reaction was       Medications  Prior to Admission Medications   Prescriptions Last Dose Informant Patient Reported? Taking?   COENZYME Q10 PO 5/31/2022 at AM Patient Yes No   Sig: Take 1 Tab by mouth every morning.   Cholecalciferol (VITAMIN D) 2000 UNITS Cap 5/31/2022 at AM Patient Yes No   Sig: Take 2,000 Units by mouth every day.   Glucosamine-Chondroit-Vit C-Mn (GLUCOSAMINE CHONDR 500 COMPLEX PO) 5/31/2022 at AM Patient Yes No    Sig: Take 1 Tablet by mouth 2 times a day.   Melatonin 10 MG Tab 5/30/2022 at PM Patient Yes No   Sig: Take 10 mg by mouth at bedtime.   aspirin 81 MG tablet 5/30/2022 at PM Patient Yes No   Sig: Take 81 mg by mouth every day.   atorvastatin (LIPITOR) 80 MG tablet 5/30/2022 at PM Patient No No   Sig: TAKE 1 TABLET BY MOUTH EVERY DAY   Patient taking differently: Take 80 mg by mouth every day.   divalproex (DEPAKOTE) 125 MG EC tablet 5/31/2022 at AM Patient Yes Yes   Sig: Take 125 mg by mouth every morning.   divalproex (DEPAKOTE) 250 MG Tablet Delayed Response 5/30/2022 at PM Patient No No   Sig: Take 2 Tablets by mouth every evening.   docosahexanoic acid (OMEGA 3 FA) 1000 MG CAPS 5/31/2022 at AM Patient Yes No   Sig: Take 1,000 mg by mouth every day.   levothyroxine (SYNTHROID) 75 MCG Tab 5/31/2022 at 0200 Patient No No   Sig: Take 1 Tablet by mouth every evening.   lisinopril (PRINIVIL) 5 MG Tab 5/30/2022 at PM Patient No No   Sig: Take 1 Tablet by mouth every day.   metoprolol tartrate (LOPRESSOR) 25 MG Tab 5/31/2022 at AM Patient No No   Sig: Take 0.5 Tablets by mouth every day.   traZODone (DESYREL) 50 MG Tab 5/30/2022 at PM Patient No No   Sig: Take 1 Tablet by mouth every evening. For insomnia   warfarin (COUMADIN) 7.5 MG Tab 5/30/2022 at PM Patient No No   Sig: TAKE 0.5 to 1 TABLET BY MOUTH DAILY AS DIRECTED BY [facility]   Patient taking differently: Take 3.75-7.5 mg by mouth every day. 3.75 mg Monday, Wednesday, and Friday   7.5 mg all other days      Facility-Administered Medications: None       Physical Exam  Temp:  [36.7 °C (98 °F)] 36.7 °C (98 °F)  Pulse:  [48-60] 60  Resp:  [13-18] 18  BP: (118-133)/(58-74) 133/74  SpO2:  [93 %] 93 %  Blood Pressure : 131/67   Temperature: 36.7 °C (98 °F)   Pulse: (!) 48   Respiration: 15   Pulse Oximetry: 93 %       Physical Exam  Vitals and nursing note reviewed. Exam conducted with a chaperone present.   Constitutional:       General: He is  not in acute distress.     Appearance: Normal appearance. He is not diaphoretic.   HENT:      Head: Normocephalic.      Nose: Nose normal.      Mouth/Throat:      Mouth: Mucous membranes are moist.   Eyes:      Pupils: Pupils are equal, round, and reactive to light.   Neck:      Vascular: No carotid bruit.   Cardiovascular:      Rate and Rhythm: Normal rate and regular rhythm.      Pulses: Normal pulses.      Heart sounds: Normal heart sounds. No murmur heard.  Pulmonary:      Effort: Pulmonary effort is normal. No respiratory distress.      Breath sounds: Normal breath sounds. No wheezing.   Abdominal:      General: Abdomen is flat. Bowel sounds are normal. There is no distension.      Palpations: Abdomen is soft.      Tenderness: There is no abdominal tenderness. There is no guarding.   Musculoskeletal:         General: No swelling or deformity. Normal range of motion.      Cervical back: Normal range of motion and neck supple. No rigidity or tenderness.   Lymphadenopathy:      Cervical: No cervical adenopathy.   Skin:     General: Skin is warm and dry.      Capillary Refill: Capillary refill takes less than 2 seconds.   Neurological:      General: No focal deficit present.      Mental Status: He is alert and oriented to person, place, and time.      Cranial Nerves: No cranial nerve deficit.      Sensory: No sensory deficit.      Motor: No weakness.      Coordination: Coordination normal.      Comments: No nystagmus, finger to nose intact, heel to shin intact   Psychiatric:         Mood and Affect: Mood normal.         Behavior: Behavior normal.         Laboratory:  Recent Labs     05/31/22  1155   WBC 7.5   RBC 4.86   HEMOGLOBIN 15.6   HEMATOCRIT 45.6   MCV 93.8   MCH 32.1   MCHC 34.2   RDW 46.7   PLATELETCT 284   MPV 9.7     Recent Labs     05/31/22  1155   SODIUM 136   POTASSIUM 4.4   CHLORIDE 103   CO2 22   GLUCOSE 118*   BUN 12   CREATININE 0.62   CALCIUM 8.9     Recent Labs     05/31/22  1155   ALTSGPT 21    ASTSGOT 20   ALKPHOSPHAT 44   TBILIRUBIN 0.9   GLUCOSE 118*     Recent Labs     05/31/22  1230   APTT 29.9   INR 2.38*     No results for input(s): NTPROBNP in the last 72 hours.      Recent Labs     05/31/22  1155   TROPONINT 13       Imaging:  CT-CTA CHEST PULMONARY ARTERY W/ RECONS   Final Result      1.  No pulmonary embolus. No acute abnormality in the chest.   2.  Prior granulomatous exposure.   3.  Hepatic steatosis.   4.  Cholelithiasis.         DX-CHEST-PORTABLE (1 VIEW)   Final Result      1.  No acute cardiac or pulmonary abnormalities are identified.      NM-CARDIAC STRESS TEST    (Results Pending)           Assessment/Plan:  Justification for Admission Status  I anticipate this patient is appropriate for observation status at this time because no evidence of acute ischemia currently if stress test negative can likely d/c    * Chest pain- (present on admission)  Assessment & Plan  Resolved  Trop wnl  No acute ekg changes  Significant h/o cad with previous cabg and several risk factors  Continue medical management  Serial trops  Tele  If trops remain negative and clinically stable plan for stress test in am  Followed by [facility] as outpatient    Dizziness  Assessment & Plan  Minimal  Resolved  Neuro exam wnl  following    Primary hypertension- (present on admission)  Assessment & Plan  Following  Continue ace and bb    History of DVT (deep vein thrombosis)- (present on admission)  Assessment & Plan  Continue coumadin    Acquired hypothyroidism- (present on admission)  Assessment & Plan  Continue synthroid  Follow up with pcp    Dyslipidemia- (present on admission)  Assessment & Plan  Continue statin      VTE prophylaxis: coumadin

## 2022-05-31 NOTE — ASSESSMENT & PLAN NOTE
Resolved  Trop wnl  No acute ekg changes  Significant h/o cad with previous cabg and several risk factors  Continue medical management  Serial trops  Tele  If trops remain negative and clinically stable plan for stress test in am  Followed by [facility] as outpatient  Cholelithiasis noted no CT but no abdominal pain on exam - following  Will check lipase

## 2022-05-31 NOTE — ED NOTES
Med Rec completed per patient   Allergies reviewed  No ORAL antibiotics in last 30 days    Patient takes Warfarin   3.75 mg Monday, Wednesday, and Friday   7.5 mg all other days

## 2022-05-31 NOTE — ED PROVIDER NOTES
ED Provider Note    CHIEF COMPLAINT  Chief Complaint   Patient presents with   • Chest Pain     Pt reports today while sitting down he began having chest pressure in the middle of his back radiating to the front. +dizziness, -N/V, -SOB.        HPI  Rene Chan is a 71 y.o. male who presents with a chief complaint of substernal chest pain radiating to his back that began approximately 2 hours prior to arrival while sitting at a [employer] study class.  He reports some associated dizziness, nausea, and clamminess but denies any shortness of breath.  He has had similar symptoms in the past but does not recall the diagnosis.  He contacted 911 and in route was given aspirin and nitro but prior to their arrival his symptoms had improved.  He currently feels almost back to baseline but still feels generally unwell.  He denies any recent fevers or chills.    ACS risk factors: No history of MI, current tobacco use, amphetamine/cocaine use, family history of CAD prior to age 60.  Patient does have a history of hypertension, hyperlipidemia, and diabetes.  Patient does have an increased BMI.    ACS features: Not exertional, no shortness of breath, some diaphoresis    PE risk factors: History of PE, no pleuritic component, hemoptysis, unilateral leg swelling/pain, recent travel/immobilization, recent surgery, exogenous hormone use    Aortic dissection features: Mild dizziness but otherwise no neuro symptoms, does radiate to the back, no ripping/tearing pain    No IVDA, fevers/recent illness, possible positional component.    REVIEW OF SYSTEMS  See HPI for further details.  Chest pain.  Dizziness.  Clamminess.  Nausea.  All other systems are negative.     PAST MEDICAL HISTORY   has a past medical history of Anticoagulation monitoring, special range, Bipolar disorder ([facility]), CAD (coronary artery disease) (6/4/2015), Carotid artery occlusion, Clotting disorder ([facility]), Colon polyps, Diabetes mellitus, type II ([facility]), Gout, Hammer toe,  "Hyperlipidemia, Hypertension, Hypothyroidism, Impaired fasting glucose, and Mild aortic stenosis - echo 2018.    SOCIAL HISTORY  Social History     Tobacco Use   • Smoking status: Former Smoker     Packs/day: 2.00     Years: 10.00     Pack years: 20.00     Types: Cigarettes     Quit date: 1976     Years since quittin.4   • Smokeless tobacco: Never Used   • Tobacco comment: quit x 32 yrs; 2ppd x 7 years   Vaping Use   • Vaping Use: Never used   Substance and Sexual Activity   • Alcohol use: No   • Drug use: No     Comment: IV drug use in teens   • Sexual activity: Not Currently     Partners: Female       SURGICAL HISTORY   has a past surgical history that includes other abdominal surgery; other orthopedic surgery (); recovery (2015); multiple coronary artery bypass endo vein harvest (2015); appendectomy; and ankle fusion.    CURRENT MEDICATIONS  Home Medications    **Home medications have not yet been reviewed for this encounter**         ALLERGIES  Allergies   Allergen Reactions   • Penicillins Unspecified     Childhood; does know what the reaction was       PHYSICAL EXAM  VITAL SIGNS: /72   Pulse (!) 55   Temp 36.7 °C (98 °F) (Temporal)   Resp 16   Ht 1.727 m (5' 8\")   Wt 95.3 kg (210 lb)   SpO2 93%   BMI 31.93 kg/m²    Pulse ox interpretation: I interpret this pulse ox as normal.  Constitutional: Alert in no apparent distress.  HENT: No signs of trauma, Bilateral external ears normal, Nose normal.  Moist mucous membranes.  Eyes: Pupils are equal and reactive, Conjunctiva normal, Non-icteric.   Neck: Normal range of motion, No tenderness, Supple, No stridor.   Lymphatic: No lymphadenopathy noted.   Cardiovascular: Regular rate and rhythm, no murmurs. Pulses symmetrical.  Thorax & Lungs: Normal breath sounds, No respiratory distress, No wheezing, No chest tenderness.   Abdomen: Bowel sounds normal, Soft, No tenderness, No masses, No pulsatile masses. No peritoneal signs.  Skin: " Warm, Dry, No erythema, No rash.   Back: Normal alignment.  Extremities: Intact distal pulses, bilateral lower extremity edema, No cyanosis.  Musculoskeletal: No major deformities noted.   Neurologic: Alert, No focal deficits noted.   Psychiatric: Affect normal, Judgment normal, Mood normal.     DIAGNOSTIC STUDIES / PROCEDURES    LABS  Results for orders placed or performed during the hospital encounter of 05/31/22   CBC with Differential   Result Value Ref Range    WBC 7.5 4.8 - 10.8 K/uL    RBC 4.86 4.70 - 6.10 M/uL    Hemoglobin 15.6 14.0 - 18.0 g/dL    Hematocrit 45.6 42.0 - 52.0 %    MCV 93.8 81.4 - 97.8 fL    MCH 32.1 27.0 - 33.0 pg    MCHC 34.2 33.7 - 35.3 g/dL    RDW 46.7 35.9 - 50.0 fL    Platelet Count 284 164 - 446 K/uL    MPV 9.7 9.0 - 12.9 fL    Neutrophils-Polys 61.20 44.00 - 72.00 %    Lymphocytes 24.90 22.00 - 41.00 %    Monocytes 10.30 0.00 - 13.40 %    Eosinophils 1.70 0.00 - 6.90 %    Basophils 1.20 0.00 - 1.80 %    Immature Granulocytes 0.70 0.00 - 0.90 %    Nucleated RBC 0.00 /100 WBC    Neutrophils (Absolute) 4.56 1.82 - 7.42 K/uL    Lymphs (Absolute) 1.86 1.00 - 4.80 K/uL    Monos (Absolute) 0.77 0.00 - 0.85 K/uL    Eos (Absolute) 0.13 0.00 - 0.51 K/uL    Baso (Absolute) 0.09 0.00 - 0.12 K/uL    Immature Granulocytes (abs) 0.05 0.00 - 0.11 K/uL    NRBC (Absolute) 0.00 K/uL   Complete Metabolic Panel (CMP)   Result Value Ref Range    Sodium 136 135 - 145 mmol/L    Potassium 4.4 3.6 - 5.5 mmol/L    Chloride 103 96 - 112 mmol/L    Co2 22 20 - 33 mmol/L    Anion Gap 11.0 7.0 - 16.0    Glucose 118 (H) 65 - 99 mg/dL    Bun 12 8 - 22 mg/dL    Creatinine 0.62 0.50 - 1.40 mg/dL    Calcium 8.9 8.5 - 10.5 mg/dL    AST(SGOT) 20 12 - 45 U/L    ALT(SGPT) 21 2 - 50 U/L    Alkaline Phosphatase 44 30 - 99 U/L    Total Bilirubin 0.9 0.1 - 1.5 mg/dL    Albumin 3.8 3.2 - 4.9 g/dL    Total Protein 6.3 6.0 - 8.2 g/dL    Globulin 2.5 1.9 - 3.5 g/dL    A-G Ratio 1.5 g/dL   Troponin   Result Value Ref Range     Troponin T 13 6 - 19 ng/L   ESTIMATED GFR   Result Value Ref Range    GFR (CKD-EPI) 101 >60 mL/min/1.73 m 2   EKG   Result Value Ref Range    Report       [facility] Emergency Dept.    Test Date:  2022  Pt Name:    NURY VALENCIA                  Department: ER  MRN:        [id number]                      Room:       RD 02  Gender:     Male                         Technician: [id number]  :        1950                   Requested By:ER TRIAGE PROTOCOL  Order #:    [id number]                    Reading MD: George Keen MD    Measurements  Intervals                                Axis  Rate:       58                           P:          37  MT:         252                          QRS:        3  QRSD:       88                           T:          88  QT:         412  QTc:        405    Interpretive Statements  SINUS BRADYCARDIA  ATRIAL PREMATURE COMPLEX  FIRST DEGREE AV BLOCK  ABNRM R PROG  Electronically Signed On 2022 12:26:55 PDT by George Keen MD           RADIOLOGY  CT-CTA CHEST PULMONARY ARTERY W/ RECONS   Final Result      1.  No pulmonary embolus. No acute abnormality in the chest.   2.  Prior granulomatous exposure.   3.  Hepatic steatosis.   4.  Cholelithiasis.         DX-CHEST-PORTABLE (1 VIEW)   Final Result      1.  No acute cardiac or pulmonary abnormalities are identified.        COURSE & MEDICAL DECISION MAKING  Pertinent Labs & Imaging studies reviewed. (See chart for details)  Records obtained and reviewed: Most recent echocardiogram was performed 10/3/2020 and demonstrated LVEF of 70%.  Most recent treadmill stress test was in 2017.  Patient has a history of CAD s/p three-vessel CABG in .    This is a 71-year-old male here after an episode of substernal chest pain, dizziness, clamminess, nausea several hours prior to arrival that occurred at rest.  Differential diagnosis includes, but is not limited to, ACS, stable angina, pneumothorax, pneumonia, GERD, PE,  MSK, aortic dissection, case-/myocarditis.  Arrives bradycardic but afebrile with otherwise normal vital signs.  Appears well-hydrated and nontoxic.  EKG demonstrates abnormal R progression but no obvious acute ischemia.  Patient was already given aspirin and nitro in route and reports that he feels significantly improved.    Chest x-ray is without acute abnormality.  Labs are reassuring.    Because of the patient's history of PE a CTA of the chest was performed given his O2 sat of 93%.  He was not tachycardic but is beta blocked so this is not a reliable measure of hemodynamic status.  Thankfully, CTA did not demonstrate acute PE or acute abnormality.    HEART score: 5    Given patient's elevated heart score with known risk factors he will benefit from hospitalization for additional work-up and management.    FINAL IMPRESSION  1.  Chest pain    Electronically signed by: George Keen M.D., 5/31/2022 11:57 AM

## 2022-05-31 NOTE — PROGRESS NOTES
Inpatient Anticoagulation Service Note    Date: 5/31/2022    Reason for Anticoagulation: Pulmonary Embolism, Deep Vein Thrombosis   Target INR: 2.0 to 3.0     Hemoglobin Value: 15.6  Hematocrit Value: 45.6  Lab Platelet Value: 284    INR from last 7 days     Date/Time INR Value    05/31/22 1230 2.38        Dose from last 7 days     Date/Time Dose (mg)    05/31/22 1604 7.5        Average Dose (mg):  3.75 mg Monday, Wednesday, and Friday, and 7.5 mg all other days  Significant Interactions: Aspirin, Statin  Bridge Therapy: No  Reversal Agent Administered: Not Applicable    Comments: On home warfarin for hx of DVT/PE. INR therapeutic. No new DDIs. A cardiac diet has been ordered. H/H WNL.     Plan:  Will continue home regimen (7.5 mg PO tonight). Pharmacy will continue to follow.  Education Material Provided?: No  Pharmacist suggested discharge dosing:  Continue home regimen of 3.75 mg PO Monday, Wednesday, and Friday, and 7.5 mg PO all other days. Obtain an INR within one week of discharge.      Vaishnavi Conklin, PharmD, BCPS

## 2022-05-31 NOTE — ED TRIAGE NOTES
"Chief Complaint   Patient presents with   • Chest Pain     Pt reports today while sitting down he began having chest pressure in the middle of his back radiating to the front. +dizziness, -N/V, -SOB.      /72   Pulse (!) 55   Temp 36.7 °C (98 °F) (Temporal)   Resp 16   Ht 1.727 m (5' 8\")   Wt 95.3 kg (210 lb)   SpO2 93%   BMI 31.93 kg/m²     Pt given ASA and Nitro PTA by EMS  "

## 2022-06-01 ENCOUNTER — APPOINTMENT (OUTPATIENT)
Dept: VASCULAR LAB | Facility: MEDICAL CENTER | Age: 72
End: 2022-06-01
Attending: INTERNAL MEDICINE
Payer: MEDICARE

## 2022-06-01 ENCOUNTER — APPOINTMENT (OUTPATIENT)
Dept: RADIOLOGY | Facility: MEDICAL CENTER | Age: 72
End: 2022-06-01
Attending: HOSPITALIST
Payer: MEDICARE

## 2022-06-01 VITALS
SYSTOLIC BLOOD PRESSURE: 131 MMHG | WEIGHT: 212.3 LBS | DIASTOLIC BLOOD PRESSURE: 63 MMHG | RESPIRATION RATE: 16 BRPM | HEIGHT: 68 IN | TEMPERATURE: 97.3 F | OXYGEN SATURATION: 94 % | HEART RATE: 61 BPM | BODY MASS INDEX: 32.18 KG/M2

## 2022-06-01 PROBLEM — R07.9 CHEST PAIN: Status: RESOLVED | Noted: 2022-05-31 | Resolved: 2022-06-01

## 2022-06-01 PROBLEM — R42 DIZZINESS: Status: RESOLVED | Noted: 2022-05-31 | Resolved: 2022-06-01

## 2022-06-01 LAB
ALBUMIN SERPL BCP-MCNC: 3.6 G/DL (ref 3.2–4.9)
ALBUMIN/GLOB SERPL: 1.8 G/DL
ALP SERPL-CCNC: 40 U/L (ref 30–99)
ALT SERPL-CCNC: 19 U/L (ref 2–50)
ANION GAP SERPL CALC-SCNC: 11 MMOL/L (ref 7–16)
AST SERPL-CCNC: 17 U/L (ref 12–45)
BASOPHILS # BLD AUTO: 0.6 % (ref 0–1.8)
BASOPHILS # BLD: 0.05 K/UL (ref 0–0.12)
BILIRUB SERPL-MCNC: 0.9 MG/DL (ref 0.1–1.5)
BUN SERPL-MCNC: 14 MG/DL (ref 8–22)
CALCIUM SERPL-MCNC: 8.6 MG/DL (ref 8.5–10.5)
CHLORIDE SERPL-SCNC: 102 MMOL/L (ref 96–112)
CHOLEST SERPL-MCNC: 129 MG/DL (ref 100–199)
CO2 SERPL-SCNC: 25 MMOL/L (ref 20–33)
CREAT SERPL-MCNC: 0.8 MG/DL (ref 0.5–1.4)
EOSINOPHIL # BLD AUTO: 0.16 K/UL (ref 0–0.51)
EOSINOPHIL NFR BLD: 1.8 % (ref 0–6.9)
ERYTHROCYTE [DISTWIDTH] IN BLOOD BY AUTOMATED COUNT: 46.7 FL (ref 35.9–50)
GFR SERPLBLD CREATININE-BSD FMLA CKD-EPI: 94 ML/MIN/1.73 M 2
GLOBULIN SER CALC-MCNC: 2 G/DL (ref 1.9–3.5)
GLUCOSE BLD STRIP.AUTO-MCNC: 121 MG/DL (ref 65–99)
GLUCOSE BLD STRIP.AUTO-MCNC: 160 MG/DL (ref 65–99)
GLUCOSE SERPL-MCNC: 123 MG/DL (ref 65–99)
HCT VFR BLD AUTO: 43.6 % (ref 42–52)
HDLC SERPL-MCNC: 33 MG/DL
HGB BLD-MCNC: 14.9 G/DL (ref 14–18)
IMM GRANULOCYTES # BLD AUTO: 0.05 K/UL (ref 0–0.11)
IMM GRANULOCYTES NFR BLD AUTO: 0.6 % (ref 0–0.9)
INR PPP: 1.48 (ref 0.87–1.13)
LDLC SERPL CALC-MCNC: 65 MG/DL
LIPASE SERPL-CCNC: 26 U/L (ref 11–82)
LYMPHOCYTES # BLD AUTO: 2.23 K/UL (ref 1–4.8)
LYMPHOCYTES NFR BLD: 25.2 % (ref 22–41)
MCH RBC QN AUTO: 32 PG (ref 27–33)
MCHC RBC AUTO-ENTMCNC: 34.2 G/DL (ref 33.7–35.3)
MCV RBC AUTO: 93.8 FL (ref 81.4–97.8)
MONOCYTES # BLD AUTO: 0.94 K/UL (ref 0–0.85)
MONOCYTES NFR BLD AUTO: 10.6 % (ref 0–13.4)
NEUTROPHILS # BLD AUTO: 5.42 K/UL (ref 1.82–7.42)
NEUTROPHILS NFR BLD: 61.2 % (ref 44–72)
NRBC # BLD AUTO: 0 K/UL
NRBC BLD-RTO: 0 /100 WBC
PLATELET # BLD AUTO: 261 K/UL (ref 164–446)
PMV BLD AUTO: 9.9 FL (ref 9–12.9)
POTASSIUM SERPL-SCNC: 4 MMOL/L (ref 3.6–5.5)
PROT SERPL-MCNC: 5.6 G/DL (ref 6–8.2)
PROTHROMBIN TIME: 17.5 SEC (ref 12–14.6)
RBC # BLD AUTO: 4.65 M/UL (ref 4.7–6.1)
SODIUM SERPL-SCNC: 138 MMOL/L (ref 135–145)
TRIGL SERPL-MCNC: 157 MG/DL (ref 0–149)
WBC # BLD AUTO: 8.9 K/UL (ref 4.8–10.8)

## 2022-06-01 PROCEDURE — A9502 TC99M TETROFOSMIN: HCPCS

## 2022-06-01 PROCEDURE — 82962 GLUCOSE BLOOD TEST: CPT

## 2022-06-01 PROCEDURE — A9270 NON-COVERED ITEM OR SERVICE: HCPCS | Performed by: NURSE PRACTITIONER

## 2022-06-01 PROCEDURE — 80061 LIPID PANEL: CPT

## 2022-06-01 PROCEDURE — G0378 HOSPITAL OBSERVATION PER HR: HCPCS

## 2022-06-01 PROCEDURE — 83690 ASSAY OF LIPASE: CPT

## 2022-06-01 PROCEDURE — 700102 HCHG RX REV CODE 250 W/ 637 OVERRIDE(OP): Performed by: HOSPITALIST

## 2022-06-01 PROCEDURE — 700111 HCHG RX REV CODE 636 W/ 250 OVERRIDE (IP)

## 2022-06-01 PROCEDURE — 80053 COMPREHEN METABOLIC PANEL: CPT

## 2022-06-01 PROCEDURE — A9270 NON-COVERED ITEM OR SERVICE: HCPCS | Performed by: HOSPITALIST

## 2022-06-01 PROCEDURE — 99217 PR OBSERVATION CARE DISCHARGE: CPT | Performed by: NURSE PRACTITIONER

## 2022-06-01 PROCEDURE — 700102 HCHG RX REV CODE 250 W/ 637 OVERRIDE(OP): Performed by: NURSE PRACTITIONER

## 2022-06-01 PROCEDURE — 85025 COMPLETE CBC W/AUTO DIFF WBC: CPT

## 2022-06-01 PROCEDURE — 85610 PROTHROMBIN TIME: CPT

## 2022-06-01 RX ORDER — WARFARIN SODIUM 7.5 MG/1
7.5 TABLET ORAL
Status: DISCONTINUED | OUTPATIENT
Start: 2022-06-02 | End: 2022-06-01 | Stop reason: HOSPADM

## 2022-06-01 RX ORDER — WARFARIN SODIUM 7.5 MG/1
7.5 TABLET ORAL ONCE
Status: COMPLETED | OUTPATIENT
Start: 2022-06-01 | End: 2022-06-01

## 2022-06-01 RX ORDER — REGADENOSON 0.08 MG/ML
INJECTION, SOLUTION INTRAVENOUS
Status: COMPLETED
Start: 2022-06-01 | End: 2022-06-01

## 2022-06-01 RX ORDER — AMINOPHYLLINE 25 MG/ML
100 INJECTION, SOLUTION INTRAVENOUS
Status: DISCONTINUED | OUTPATIENT
Start: 2022-06-01 | End: 2022-06-01 | Stop reason: HOSPADM

## 2022-06-01 RX ORDER — WARFARIN SODIUM 7.5 MG/1
3.75 TABLET ORAL
Status: DISCONTINUED | OUTPATIENT
Start: 2022-06-03 | End: 2022-06-01 | Stop reason: HOSPADM

## 2022-06-01 RX ORDER — OMEPRAZOLE 20 MG/1
20 CAPSULE, DELAYED RELEASE ORAL DAILY
Qty: 30 CAPSULE | Refills: 1 | Status: SHIPPED | OUTPATIENT
Start: 2022-06-01 | End: 2022-07-01

## 2022-06-01 RX ORDER — REGADENOSON 0.08 MG/ML
0.4 INJECTION, SOLUTION INTRAVENOUS ONCE
Status: COMPLETED | OUTPATIENT
Start: 2022-06-01 | End: 2022-06-01

## 2022-06-01 RX ADMIN — REGADENOSON 0.4 MG: 0.08 INJECTION, SOLUTION INTRAVENOUS at 09:53

## 2022-06-01 RX ADMIN — ASPIRIN 81 MG: 81 TABLET, COATED ORAL at 05:43

## 2022-06-01 RX ADMIN — Medication 30 MG: at 06:36

## 2022-06-01 RX ADMIN — OMEGA-3 FATTY ACIDS CAP 1000 MG 1000 MG: 1000 CAP at 05:43

## 2022-06-01 RX ADMIN — WARFARIN SODIUM 7.5 MG: 7.5 TABLET ORAL at 11:08

## 2022-06-01 RX ADMIN — LEVOTHYROXINE SODIUM 75 MCG: 0.07 TABLET ORAL at 05:42

## 2022-06-01 RX ADMIN — Medication 2000 UNITS: at 05:43

## 2022-06-01 RX ADMIN — METOPROLOL TARTRATE 12.5 MG: 25 TABLET, FILM COATED ORAL at 05:44

## 2022-06-01 RX ADMIN — DIVALPROEX SODIUM 125 MG: 125 CAPSULE ORAL at 06:36

## 2022-06-01 RX ADMIN — LISINOPRIL 5 MG: 10 TABLET ORAL at 05:43

## 2022-06-01 ASSESSMENT — PAIN DESCRIPTION - PAIN TYPE
TYPE: ACUTE PAIN
TYPE: ACUTE PAIN

## 2022-06-01 NOTE — CARE PLAN
The patient is Stable - Low risk of patient condition declining or worsening    Shift Goals  Clinical Goals: stress test AM  Patient Goals: Safety  Family Goals: n/a    Progress made toward(s) clinical / shift goals:    Problem: Knowledge Deficit - Standard  Goal: Patient and family/care givers will demonstrate understanding of plan of care, disease process/condition, diagnostic tests and medications  Outcome: Progressing     Problem: Hemodynamics  Goal: Patient's hemodynamics, fluid balance and neurologic status will be stable or improve  Outcome: Progressing     Problem: Mobility  Goal: Patient's capacity to carry out activities will improve  Outcome: Progressing     Problem: Self Care  Goal: Patient will have the ability to perform ADLs independently or with assistance (bathe, groom, dress, toilet and feed)  Outcome: Progressing     Problem: Fall Risk  Goal: Patient will remain free from falls  Outcome: Progressing       Patient is not progressing towards the following goals:

## 2022-06-01 NOTE — PROGRESS NOTES
Inpatient Anticoagulation Service Note    Date: 6/1/2022    Reason for Anticoagulation: Pulmonary Embolism, Deep Vein Thrombosis   Target INR: 2.0 to 3.0     Hemoglobin Value: 14.9  Hematocrit Value: 43.6  Lab Platelet Value: 261    INR from last 7 days     Date/Time INR Value    05/31/22 1230 2.38        Dose from last 7 days     Date/Time Dose (mg)    06/01/22 1044 7.5    05/31/22 1604 0        Average Dose (mg):  3.75 mg Monday, Wednesday, and Friday, and 7.5 mg all other days  Significant Interactions: Aspirin, Statin, Thyroid Medications  Bridge Therapy: No   Reversal Agent Administered: Not Applicable    Comments: On home warfarin for hx of DVT/PE. INR therapeutic yesterday. No new DDIs. A cardiac diet has been ordered. H/H WNL. Dose was not ordered last night.    Plan:   Will order warfarin 7.5 mg now and will resume home regimen tomorrow. Pharmacy will continue to follow.    Education Material Provided?: No  Pharmacist suggested discharge dosing: Continue home regimen of 3.75 mg PO Monday, Wednesday, and Friday, and 7.5 mg PO all other days. Obtain an INR within one week of discharge.      Vaishnavi Conklin, PharmD, BCPS

## 2022-06-01 NOTE — DISCHARGE SUMMARY
Discharge Summary    CHIEF COMPLAINT ON ADMISSION  Chief Complaint   Patient presents with   • Chest Pain     Pt reports today while sitting down he began having chest pressure in the middle of his back radiating to the front. +dizziness, -N/V, -SOB.        Reason for Admission  EMS     Admission Date  5/31/2022    CODE STATUS  Full Code    HPI & HOSPITAL COURSE    Mr. Rene Chan is a 71-year-old male with a PMHx of chest pain, pulmonary embolism on Coumadin, DMT2, dyslipidemia, HTN, CAD s/p CABG x3 in 2015, OA, and hypothyroidism who presented on 5/31/2022 due to chest pain.    Patient reports that chest pain occurred while at rest during his Bible study around 10 AM on admission day.  He describes chest pain as sharp, midsternal, radiating to his back and across the front of his chest.  Patient reports that he had experienced similar chest pain when he had his prior CABG.  Patient reports this as mild and associated with dizziness and nausea.  Patient endorsed himself to the ER for further evaluation and treatment.    In ER, patient reports that his symptoms had resolved.  Patient denies any diaphoresis.  Initial EKG noted no acute ischemic changes.  He recently underwent an echocardiogram a couple weeks ago for preparation of upcoming knee surgery and follows Dr. Wright for cardiology.  CTA pulmonary was obtained in the ER and was negative for any signs of pulmonary embolism or dissection.  Vitals within normal limits.  Patient admitted into the observation unit for further evaluation and treatment.    During this course of stay, patient was monitored through telemetry.  A cardiac stress test was ordered and obtained which noted small sized nonreversible decreased uptake of mild severity in the apical lateral left circumflex segment during poststress images may be prior infarction or artifact with normal ejection fraction and wall motion.    Patient seen and examined prior to being discharged.  Discussed with  patient cardiac stress test results.  Patient highly recommended to follow-up with PCP along with following up with cardiology Dr. Wright if chest pain persists.  Patient recommended to resume all home medication.  All questions and concerns answered pending discharge.  Patient discharged home.      Therefore, he is discharged in good and stable condition to home with close outpatient follow-up.    The patient recovered much more quickly than anticipated on admission.    Discharge Date  06/01/22      FOLLOW UP ITEMS POST DISCHARGE  Please call [phone] to schedule PCP appointment for patient.    Required specialty appointments include:       Discharge Instructions per KAROL Perry    -Follow-up with PCP s/p hospitalization  -Follow-up with cardiology if chest pain persists  -Resume all other home medication  -Start taking omeprazole 20 mg daily for possible indigestion/GERD    DIET: Cardiac    ACTIVITY: As tolerated    DIAGNOSIS: Chest pain    Return to ER if symptoms persist, chest pain, palpitations, shortness of breath, numbness, tingling, weakness, and high fevers.    DISCHARGE DIAGNOSES  Principal Problem (Resolved):    Chest pain POA: Yes  Active Problems:    Dyslipidemia POA: Yes    Acquired hypothyroidism POA: Yes    History of DVT (deep vein thrombosis) POA: Yes    Primary hypertension POA: Yes    DM (diabetes mellitus) (HCC) POA: Unknown  Resolved Problems:    Dizziness POA: Unknown      FOLLOW UP  Future Appointments   Date Time Provider Department Center   7/28/2022  9:00 AM Arturo Boyle M.D. [facility]     Michael Sethi III, M.D.  [address] [facility] [id number] [phone]    Schedule an appointment as soon as possible for a visit in 1 week(s)      Patricia Damon M.D.  [address]  [place] [id number] [phone]          Ta Wright M.D.  [address]  [address]  [place] [id number] [phone]    Schedule an appointment as  soon as possible for a visit in 1 week(s)        MEDICATIONS ON DISCHARGE     Medication List      START taking these medications      Instructions   omeprazole 20 MG delayed-release capsule  Commonly known as: PRILOSEC   Take 1 Capsule by mouth every day for 30 days.  Dose: 20 mg        CHANGE how you take these medications      Instructions   atorvastatin 80 MG tablet  What changed: when to take this  Commonly known as: LIPITOR   TAKE 1 TABLET BY MOUTH EVERY DAY     warfarin 7.5 MG Tabs  What changed:   · how much to take  · how to take this  · when to take this  · additional instructions  Commonly known as: COUMADIN   TAKE 0.5 to 1 TABLET BY MOUTH DAILY AS DIRECTED BY [facility]        CONTINUE taking these medications      Instructions   aspirin 81 MG tablet   Take 81 mg by mouth every day.  Dose: 81 mg     COENZYME Q10 PO   Take 1 Tab by mouth every morning.  Dose: 1 Tablet     * divalproex 125 MG EC tablet  Commonly known as: DEPAKOTE   Take 125 mg by mouth every morning.  Dose: 125 mg     * divalproex 250 MG Tbec  Commonly known as: DEPAKOTE   Take 2 Tablets by mouth every evening.  Dose: 500 mg     docosahexanoic acid 1000 MG Caps  Commonly known as: OMEGA 3 FA   Take 1,000 mg by mouth every day.  Dose: 1,000 mg     GLUCOSAMINE CHONDR 500 COMPLEX PO   Take 1 Tablet by mouth 2 times a day.  Dose: 1 Tablet     levothyroxine 75 MCG Tabs  Commonly known as: SYNTHROID   Take 1 Tablet by mouth every evening.  Dose: 75 mcg     lisinopril 5 MG Tabs  Commonly known as: PRINIVIL   Take 1 Tablet by mouth every day.  Dose: 5 mg     Melatonin 10 MG Tabs   Take 10 mg by mouth at bedtime.  Dose: 10 mg     metoprolol tartrate 25 MG Tabs  Commonly known as: LOPRESSOR   Take 0.5 Tablets by mouth every day.  Dose: 12.5 mg     traZODone 50 MG Tabs  Commonly known as: DESYREL   Take 1 Tablet by mouth every evening. For insomnia  Dose: 50 mg     Vitamin D 50 MCG (2000 UT) Caps   Take 2,000 Units by mouth  every day.  Dose: 2,000 Units         * This list has 2 medication(s) that are the same as other medications prescribed for you. Read the directions carefully, and ask your doctor or other care provider to review them with you.                Allergies  Allergies   Allergen Reactions   • Penicillins Unspecified     Childhood; does know what the reaction was       DIET  Orders Placed This Encounter   Procedures   • Diet Order Diet: Cardiac     Standing Status:   Standing     Number of Occurrences:   1     Order Specific Question:   Diet:     Answer:   Cardiac [6]       ACTIVITY  As tolerated.  Weight bearing as tolerated    CONSULTATIONS  NONE    PROCEDURES  NONE    IMAGING  NM-CARDIAC STRESS TEST   Final Result      CT-CTA CHEST PULMONARY ARTERY W/ RECONS   Final Result      1.  No pulmonary embolus. No acute abnormality in the chest.   2.  Prior granulomatous exposure.   3.  Hepatic steatosis.   4.  Cholelithiasis.         DX-CHEST-PORTABLE (1 VIEW)   Final Result      1.  No acute cardiac or pulmonary abnormalities are identified.            LABORATORY  Lab Results   Component Value Date    SODIUM 138 06/01/2022    POTASSIUM 4.0 06/01/2022    CHLORIDE 102 06/01/2022    CO2 25 06/01/2022    GLUCOSE 123 (H) 06/01/2022    BUN 14 06/01/2022    CREATININE 0.80 06/01/2022    CREATININE 1.0 07/09/2008        Lab Results   Component Value Date    WBC 8.9 06/01/2022    HEMOGLOBIN 14.9 06/01/2022    HEMATOCRIT 43.6 06/01/2022    PLATELETCT 261 06/01/2022        Total time of the discharge process exceeds 36 minutes.    ==========================================================================================================  Please note that this dictation was created using voice recognition software. I have made every reasonable attempt to correct obvious errors, but there may be errors of grammar and possibly content that I did not discover before finalizing the note.    Electronically signed by:  NGA Braga  MSN, APRN, FNP-C  Hospitalist Services  [facility]  [phone]  [email or web address]  06/01/22                  1220

## 2022-06-01 NOTE — CARE PLAN
The patient is Stable - Low risk of patient condition declining or worsening    Shift Goals  Clinical Goals: stress test, trop  Patient Goals: labs, stress test  Family Goals: not present    Progress made toward(s) clinical / shift goals:    Problem: Knowledge Deficit - Standard  Goal: Patient and family/care givers will demonstrate understanding of plan of care, disease process/condition, diagnostic tests and medications  5/31/2022 1731 by Jena Leon RPaulN.  Outcome: Progressing  5/31/2022 1658 by Jena Leon RSAWYER.  Outcome: Progressing       Patient is not progressing towards the following goals:

## 2022-06-01 NOTE — PROGRESS NOTES
I have received report from day shift RN. I have assumed care of this patient. He is A&Ox4, VSS. Denies chest pain or dizziness at this time. Patient has been assessed (see flow sheet) and plan of care has been discussed. Patient verbalizes understanding of plan and denies any further needs at this time. Patient is now resting in bed, bed is in the lowest position and locked, and the call light is within reach.

## 2022-06-01 NOTE — DISCHARGE INSTRUCTIONS
FOLLOW UP ITEMS POST DISCHARGE  Please call [phone] to schedule PCP appointment for patient.    Required specialty appointments include:         Discharge Instructions per KAROL Perry     -Follow-up with PCP s/p hospitalization  -Follow-up with cardioloDischarge Instructions    Discharged to home by car with relative. Discharged via wheelchair, hospital escort: Yes.  Special equipment needed: Not Applicable    Be sure to schedule a follow-up appointment with your primary care doctor or any specialists as instructed.     Discharge Plan:   Diet Plan: Discussed  Activity Level: Discussed  Confirmed Follow up Appointment: Appointment Scheduled  Confirmed Symptoms Management: Discussed  Medication Reconciliation Updated: Yes    I understand that a diet low in cholesterol, fat, and sodium is recommended for good health. Unless I have been given specific instructions below for another diet, I accept this instruction as my diet prescription.   Other diet: Cardiac Diet    Special Instructions: None    Is patient discharged on Warfarin / Coumadin?   No     Depression / Suicide Risk    As you are discharged from this [facility] facility, it is important to learn how to keep safe from harming yourself.    Recognize the warning signs:  Abrupt changes in personality, positive or negative- including increase in energy   Giving away possessions  Change in eating patterns- significant weight changes-  positive or negative  Change in sleeping patterns- unable to sleep or sleeping all the time   Unwillingness or inability to communicate  Depression  Unusual sadness, discouragement and loneliness  Talk of wanting to die  Neglect of personal appearance   Rebelliousness- reckless behavior  Withdrawal from people/activities they love  Confusion- inability to concentrate     If you or a loved one observes any of these behaviors or has concerns about self-harm, here's what you can do:  Talk about it- your  feelings and reasons for harming yourself  Remove any means that you might use to hurt yourself (examples: pills, rope, extension cords, firearm)  Get professional help from the community (Mental Health, Substance Abuse, psychological counseling)  Do not be alone:Call your Safe Contact- someone whom you trust who will be there for you.  Call your local CRISIS HOTLINE 118-1464 or 335-367-7058  Call your local Children's Mobile Crisis Response Team [place] (053) 328-8080 or wwwCladwell  Call the toll free National Suicide Prevention Hotlines   National Suicide Prevention Lifeline 810-937-FNEL (9308)  National [place] Line Network 800-SUICIDE (505-9268)    gy if chest pain persists  -Resume all other home medication  -Start taking omeprazole 20 mg daily for possible indigestion/GERD     DIET: Cardiac     ACTIVITY: As tolerated     DIAGNOSIS: Chest pain     Return to ER if symptoms persist, chest pain, palpitations, shortness of breath, numbness, tingling, weakness, and high fevers.

## 2022-06-01 NOTE — CARE PLAN
Problem: Knowledge Deficit - Standard  Goal: Patient and family/care givers will demonstrate understanding of plan of care, disease process/condition, diagnostic tests and medications  Outcome: Progressing     Problem: Hemodynamics  Goal: Patient's hemodynamics, fluid balance and neurologic status will be stable or improve  Outcome: Progressing     Problem: Mobility  Goal: Patient's capacity to carry out activities will improve  Outcome: Progressing     Problem: Self Care  Goal: Patient will have the ability to perform ADLs independently or with assistance (bathe, groom, dress, toilet and feed)  Outcome: Progressing     Problem: Fall Risk  Goal: Patient will remain free from falls  Outcome: Progressing   The patient is Stable - Low risk of patient condition declining or worsening    Shift Goals  Clinical Goals: stress test  Patient Goals: rest  Family Goals: n/a    Progress made toward(s) clinical / shift goals:  Pt no longer has chest pain.    Patient is not progressing towards the following goals: N/A

## 2022-06-01 NOTE — HOSPITAL COURSE
Mr. Rene Chan is a 71-year-old male with a PMHx of chest pain, pulmonary embolism on Coumadin, DMT2, dyslipidemia, HTN, CAD s/p CABG x3 in 2015, OA, and hypothyroidism who presented on 5/31/2022 due to chest pain.    Patient reports that chest pain occurred while at rest during his Bible study around 10 AM on admission day.  He describes chest pain as sharp, midsternal, radiating to his back and across the front of his chest.  Patient reports that he had experienced similar chest pain when he had his prior CABG.  Patient reports this as mild and associated with dizziness and nausea.  Patient endorsed himself to the ER for further evaluation and treatment.    In ER, patient reports that his symptoms had resolved.  Patient denies any diaphoresis.  Initial EKG noted no acute ischemic changes.  He recently underwent an echocardiogram a couple weeks ago for preparation of upcoming knee surgery and follows Dr. Wright for cardiology.  CTA pulmonary was obtained in the ER and was negative for any signs of pulmonary embolism or dissection.  Vitals within normal limits.  Patient admitted into the observation unit for further evaluation and treatment.    During this course of stay, patient was monitored through telemetry.  A cardiac stress test was ordered and obtained which noted small sized nonreversible decreased uptake of mild severity in the apical lateral left circumflex segment during poststress images may be prior infarction or artifact with normal ejection fraction and wall motion.    Patient seen and examined prior to being discharged.  Discussed with patient cardiac stress test results.  Patient highly recommended to follow-up with PCP along with following up with cardiology Dr. Wright if chest pain persists.  Patient recommended to resume all home medication.  All questions and concerns answered pending discharge.  Patient discharged home.

## 2022-06-01 NOTE — PROGRESS NOTES
Report received from BRANDYN Olivas.  Assumed care of patient.  Assessment complete.  Patient sitting up in bed relaxing.  Plan of care gone over with the patient and all concerns addressed.  Patient A & O  X 4.  No apparent signs of distress.  Safety precautions in place.  Patient educated to call for assistance.  Hourly rounding in place.

## 2022-06-01 NOTE — DISCHARGE PLANNING
HTH/SCP TCN chart review completed. Collaborated with RN ARY, Mayte, and attending physician prior to meeting with the patient. The most current review of medical record, knowledge of pt's PLOF and social support, LACE+ score of 67, no 6 clicks scores of mobility present were considered.      TCN met with patient at bedside. Introduced TCN program. Provided education regarding differences in post acute resources including IRF, SNF and HH. Discussed SCP plan benefits including Meds to Beds and [facility] transitional care services. Patient verbalized understanding.     Patient endorses he lives with his wife, reporting he is at his baseline level of function which is independence in all IADL, ADL tasks including driving. Patient endorses no need for assistive device in ambulation, but does have a cane at home. Given the aforementioned information, it is anticipated patient will have no additional post acute needs and will likely discharge to home, pending medical clearance, with close outpatient follow ups. As such, [facility] referral was not sent.     TCN will continue to follow and collaborate with discharge planning team as additional post acute needs arise. Thank you.

## 2022-06-02 ENCOUNTER — ANTICOAGULATION VISIT (OUTPATIENT)
Dept: VASCULAR LAB | Facility: MEDICAL CENTER | Age: 72
End: 2022-06-02
Attending: INTERNAL MEDICINE
Payer: MEDICARE

## 2022-06-02 ENCOUNTER — PATIENT OUTREACH (OUTPATIENT)
Dept: MEDICAL GROUP | Facility: PHYSICIAN GROUP | Age: 72
End: 2022-06-02

## 2022-06-02 DIAGNOSIS — G45.9 TIA (TRANSIENT ISCHEMIC ATTACK): ICD-10-CM

## 2022-06-02 LAB
INR BLD: 2.1 (ref 0.9–1.2)
INR PPP: 2.1 (ref 2–3.5)

## 2022-06-02 PROCEDURE — 85610 PROTHROMBIN TIME: CPT

## 2022-06-02 PROCEDURE — 99212 OFFICE O/P EST SF 10 MIN: CPT | Performed by: NURSE PRACTITIONER

## 2022-06-02 NOTE — PROGRESS NOTES
Anticoagulation Summary  As of 2022    INR goal:  2.0-3.0   TTR:  58.4 % (7 y)   INR used for dosin.10 (2022)   Warfarin maintenance plan:  3.75 mg (7.5 mg x 0.5) every Mon, Wed, Fri; 7.5 mg (7.5 mg x 1) all other days   Weekly warfarin total:  41.25 mg   Plan last modified:  LAMAR Bucio (2022)   Next INR check:  2022   Priority:  Acute   Target end date:  Indefinite    Indications    DVT (deep venous thrombosis) (HCC) (Resolved) [I82.409]  TIA (transient ischemic attack) [G45.9]  Pulmonary embolism [415.19] (Resolved) [I26.99]             Anticoagulation Episode Summary     INR check location:  Anticoagulation Clinic    Preferred lab:  [facility]    Send INR reminders to:      Comments:  ASA 81 mg for TIA, CAD hx - [facility] per Dr. Wright 3/7/22      Anticoagulation Care Providers     Provider Role Specialty Phone number    Patricia Damon M.D. Referring Internal Medicine [phone]    Jona QuinnD Responsible      [facility] Responsible  [phone]                Refer to Patient Findings for HPI:  Patient Findings     Positives:  Upcoming invasive procedure, Emergency department visit, Hospital admission    Negatives:  Signs/symptoms of thrombosis, Signs/symptoms of bleeding, Laboratory test error suspected, Change in health, Change in alcohol use, Change in activity, Upcoming dental procedure, Missed doses, Extra doses, Change in medications, Change in diet/appetite, Bruising, Other complaints    Comments:  He was hospitalized for 2 days for chest pain. Work up essentially neg. He is starting omeprazole. Also scheduled for a TKR on 22. He will need to stop warfarin 5 days prior and bridge with Lovenox. He has Lovenox at home from a previous procedure. He will see how many he has and check the dose and expiration date and let us know.          Verified current warfarin dosing schedule.    Medications reconciled   Pt is on ASA 81  mg daily as antiplatelet therapy for CAD. He is taking indefinitely per Dr Wright.    He has a hx of DVT, PE and TIA. For upcoming surgery on 7/12/22, will have patient hold warfarin 5 days prior. Will bridge with enoxaparin. Cr cl ~112 ml/min. Pt to check his supply at home. Will f/u with pt in 1 week and plan to send rx for injections.    A/P   INR  -therapeutic.     Warfarin dosing recommendation: Continue current regimen.    Pt educated to contact our clinic with any changes in medications or s/s of bleeding or thrombosis. Pt is aware to seek immediate medical attention for falls, head injury or deep cuts.    Follow up appointment in 1 week(s).    MISTI BucioPSAWYER.    Cc:  Dr Arturo Boyle  Faxed to [phone]

## 2022-06-03 NOTE — PROGRESS NOTES
Subjective:     Rene Chan is a 71 y.o. male who presents for Hospital Follow-up.    POST DISCHARGE CALL:  Discharge Date:6/1/2022   Date of Outreach Call: 6/3/2022 10:54 AM  Now that you're home, how are you doing? Good  Do you have questions about your medications? No  Did you fill your medications? Yes  Do you have a follow-up appointment scheduled?Yes  Discharging Department: Clinical Decision Unit  Number of Attempts: 2  Current or previous attempts completed within two business days of discharge? Yes  Provided education regarding treatment plan, medication, self-management, ADLs? Yes  Has patient completed Advance Directive? If yes, advise them to bring to appointment. Yes  Care Manager phone number provided? *  Is there anything else I can help you with? No    HPI:   Recently hospitalized for :  Hospital discharge follow-up  Patient is here for follow-up on hospitalization for chest pain.  He was discharged on May 31.  Work-up for cardiac issues was negative.    DM (diabetes mellitus) ([facility])  This is a chronic problem.  Patient is due for labs.  He currently has diet-controlled diabetes.    Atherosclerosis of aorta ([facility])  This is a chronic problem.  Is noted on previous CT studies.  He is on a healthy diet and on a statin.        Current medicines (including reconciliation performed today)  Current Outpatient Medications   Medication Sig Dispense Refill   • predniSONE (DELTASONE) 10 MG Tab Take 10 mg by mouth every day. Used for gout     • omeprazole (PRILOSEC) 20 MG delayed-release capsule Take 1 Capsule by mouth every day for 30 days. 30 Capsule 1   • divalproex (DEPAKOTE) 250 MG Tablet Delayed Response Take 2 Tablets by mouth every evening. 180 Tablet 3   • traZODone (DESYREL) 50 MG Tab Take 1 Tablet by mouth every evening. For insomnia 90 Tablet 2   • atorvastatin (LIPITOR) 80 MG tablet TAKE 1 TABLET BY MOUTH EVERY DAY (Patient taking differently: Take 80 mg by mouth every day.) 100 Tablet 3   •  levothyroxine (SYNTHROID) 75 MCG Tab Take 1 Tablet by mouth every evening. 90 Tablet 0   • warfarin (COUMADIN) 7.5 MG Tab TAKE 0.5 to 1 TABLET BY MOUTH DAILY AS DIRECTED BY [facility] (Patient taking differently: Take 3.75-7.5 mg by mouth every day. 3.75 mg Monday, Wednesday, and Friday   7.5 mg all other days) 90 Tablet 1   • Glucosamine-Chondroit-Vit C-Mn (GLUCOSAMINE CHONDR 500 COMPLEX PO) Take 1 Tablet by mouth 2 times a day.     • lisinopril (PRINIVIL) 5 MG Tab Take 1 Tablet by mouth every day. 100 Tablet 3   • Melatonin 10 MG Tab Take 10 mg by mouth at bedtime.     • metoprolol tartrate (LOPRESSOR) 25 MG Tab Take 0.5 Tablets by mouth every day. 45 Tablet 3   • aspirin 81 MG tablet Take 81 mg by mouth every day. 100 Tab    • COENZYME Q10 PO Take 1 Tab by mouth every morning.     • Cholecalciferol (VITAMIN D) 2000 UNITS Cap Take 2,000 Units by mouth every day.     • docosahexanoic acid (OMEGA 3 FA) 1000 MG CAPS Take 1,000 mg by mouth every day.     • [START ON 2022] enoxaparin (LOVENOX) 150 MG/ML Solution Prefilled Syringe Inject 150 mg under the skin every day for 10 days. 10 Each 0   • divalproex (DEPAKOTE) 125 MG EC tablet Take 125 mg by mouth every morning.       No current facility-administered medications for this visit.       Allergies:   Penicillins    Social History     Tobacco Use   • Smoking status: Former Smoker     Packs/day: 2.00     Years: 10.00     Pack years: 20.00     Types: Cigarettes     Quit date: 1976     Years since quittin.4   • Smokeless tobacco: Never Used   • Tobacco comment: quit x 32 yrs; 2ppd x 7 years   Vaping Use   • Vaping Use: Never used   Substance Use Topics   • Alcohol use: No   • Drug use: No     Comment: IV drug use in teens       ROS:  neg    Objective:     See MA chart note  Physical Exam:  General patient appears alert and cooperative.  Heart: Regular rate and rhythm murmurs or gallop  Lungs: Clear to auscultation    Monofilament testing  with a 10 gram force: sensation intact: intact bilaterally  Visual Inspection: Feet without maceration, ulcers, fissures.  Pedal pulses: intact bilaterally    Assessment and Plan:   1. Type 2 diabetes mellitus without complication, unspecified whether long term insulin use (HCC)  This is a chronic problem.  Additional labs ordered.  Patient states he will be seeing his eye doctor soon and he was asked to have the report sent to us.  - HEMOGLOBIN A1C; Future  - MICROALBUMIN CREAT RATIO URINE; Future    2. Atherosclerosis of aorta (HCC)  This is a chronic problem.  Continue on a statin.    3. Hospital discharge follow-up  This is an acute issue.  Patient had a negative cardiac work-up for his chest pain.  He most likely was due to esophageal issues.  He has been started on a PPI medication but states his chest symptoms only occur about once every 6 months.  We will continue to follow.      - Chart and discharge summary were reviewed.   - Hospitalization and results reviewed with patient.   - Medications reviewed including instructions regarding high risk medications, dosing and side effects.  - Recommended Services: No services needed at this time  - Advance directive/POLST on file?  Yes    Follow-up:Return in about 6 months (around 12/8/2022), or if symptoms worsen or fail to improve, for Long.    Face-to-face transitional care management services with MODERATE (today's visit is within 14 days post discharge & LACE+ score of 28-58) medical decision complexity were provided.     LACE+ Historical Score Over Time (0-28: Low, 29-58: Medium, 59+: High): 75

## 2022-06-03 NOTE — PROGRESS NOTES
Attempt # 2 made today for [facility] follow-up. Spoke with Rene, he says he is feeling good and was able to get his medication and started today and is aware of his follow-up appointment. He has no questions at this time and no needs were identified.

## 2022-06-08 ENCOUNTER — ANTICOAGULATION VISIT (OUTPATIENT)
Dept: VASCULAR LAB | Facility: MEDICAL CENTER | Age: 72
End: 2022-06-08
Attending: INTERNAL MEDICINE
Payer: MEDICARE

## 2022-06-08 ENCOUNTER — OFFICE VISIT (OUTPATIENT)
Dept: MEDICAL GROUP | Facility: PHYSICIAN GROUP | Age: 72
End: 2022-06-08
Payer: MEDICARE

## 2022-06-08 ENCOUNTER — HOSPITAL ENCOUNTER (OUTPATIENT)
Dept: LAB | Facility: MEDICAL CENTER | Age: 72
End: 2022-06-08
Attending: FAMILY MEDICINE
Payer: MEDICARE

## 2022-06-08 VITALS
DIASTOLIC BLOOD PRESSURE: 68 MMHG | HEIGHT: 68 IN | SYSTOLIC BLOOD PRESSURE: 132 MMHG | OXYGEN SATURATION: 96 % | BODY MASS INDEX: 33.3 KG/M2 | TEMPERATURE: 97.6 F | HEART RATE: 62 BPM | WEIGHT: 219.7 LBS | RESPIRATION RATE: 16 BRPM

## 2022-06-08 DIAGNOSIS — I70.0 ATHEROSCLEROSIS OF AORTA (HCC): ICD-10-CM

## 2022-06-08 DIAGNOSIS — I82.502 LEG DVT (DEEP VENOUS THROMBOEMBOLISM), CHRONIC, LEFT (HCC): ICD-10-CM

## 2022-06-08 DIAGNOSIS — Z86.711 HX PULMONARY EMBOLISM: ICD-10-CM

## 2022-06-08 DIAGNOSIS — E11.9 TYPE 2 DIABETES MELLITUS WITHOUT COMPLICATION, UNSPECIFIED WHETHER LONG TERM INSULIN USE (HCC): ICD-10-CM

## 2022-06-08 DIAGNOSIS — Z09 HOSPITAL DISCHARGE FOLLOW-UP: ICD-10-CM

## 2022-06-08 DIAGNOSIS — G45.9 TIA (TRANSIENT ISCHEMIC ATTACK): ICD-10-CM

## 2022-06-08 LAB
CREAT UR-MCNC: 33.92 MG/DL
EST. AVERAGE GLUCOSE BLD GHB EST-MCNC: 169 MG/DL
HBA1C MFR BLD: 7.5 % (ref 4–5.6)
INR PPP: 1.4 (ref 2–3.5)
MICROALBUMIN UR-MCNC: <1.2 MG/DL
MICROALBUMIN/CREAT UR: NORMAL MG/G (ref 0–30)

## 2022-06-08 PROCEDURE — 36415 COLL VENOUS BLD VENIPUNCTURE: CPT

## 2022-06-08 PROCEDURE — 99495 TRANSJ CARE MGMT MOD F2F 14D: CPT | Performed by: FAMILY MEDICINE

## 2022-06-08 PROCEDURE — 85610 PROTHROMBIN TIME: CPT

## 2022-06-08 PROCEDURE — 83036 HEMOGLOBIN GLYCOSYLATED A1C: CPT

## 2022-06-08 PROCEDURE — 99213 OFFICE O/P EST LOW 20 MIN: CPT | Performed by: NURSE PRACTITIONER

## 2022-06-08 PROCEDURE — 82043 UR ALBUMIN QUANTITATIVE: CPT

## 2022-06-08 PROCEDURE — 82570 ASSAY OF URINE CREATININE: CPT

## 2022-06-08 RX ORDER — ENOXAPARIN SODIUM 150 MG/ML
1.5 INJECTION SUBCUTANEOUS DAILY
Qty: 10 EACH | Refills: 0 | Status: SHIPPED | OUTPATIENT
Start: 2022-07-08 | End: 2022-07-18

## 2022-06-08 RX ORDER — PREDNISONE 10 MG/1
10 TABLET ORAL DAILY
COMMUNITY
End: 2022-06-23 | Stop reason: SDUPTHER

## 2022-06-08 RX ORDER — ENOXAPARIN SODIUM 150 MG/ML
1.5 INJECTION SUBCUTANEOUS DAILY
Qty: 10 EACH | Refills: 1 | Status: SHIPPED | OUTPATIENT
Start: 2022-06-08 | End: 2022-06-08

## 2022-06-08 ASSESSMENT — FIBROSIS 4 INDEX: FIB4 SCORE: 1.08

## 2022-06-08 NOTE — PATIENT INSTRUCTIONS
Date  Warfarin dosing PM Lovenox   5 Days before procedure 7/7/22 0mg NONE   4 Days before procedure 7/8/22 0mg Lovenox injection   3 Days before procedure 7/9/22 0mg Lovenox injection   2 Days before procedure 7/10/22 0mg Lovenox injection   1 Days before procedure 7/11/22 0mg NONE   PROCEDURE 7/12/22 7.5 mg (restart warfarin if okay with doctor) NONE   1 Day after procedure 7/13/22 7.5 mg Restart Lovenox injections      2 Days after procedure 7/14/22 7.5 mg Lovenox injection   3 Days after procedure 7/15/22 3.75 mg Lovenox injection   4 Days after procedure 7/16/22 7.5 mg Lovenox injection   5 Days after procedure 7/17/22 7.5 mg Lovenox injection   6 Days after procedure 7/18/22  GET INR checked

## 2022-06-08 NOTE — PROGRESS NOTES
Anticoagulation Summary  As of 2022    INR goal:  2.0-3.0   TTR:  58.3 % (7 y)   INR used for dosin.40 (2022)   Warfarin maintenance plan:  3.75 mg (7.5 mg x 0.5) every Mon, Wed, Fri; 7.5 mg (7.5 mg x 1) all other days   Weekly warfarin total:  41.25 mg   Plan last modified:  MISTI BucioPLESLIE (2022)   Next INR check:  6/15/2022   Priority:  Acute   Target end date:  Indefinite    Indications    DVT (deep venous thrombosis) (HCC) (Resolved) [I82.409]  TIA (transient ischemic attack) [G45.9]  Pulmonary embolism [415.19] (Resolved) [I26.99]             Anticoagulation Episode Summary     INR check location:  Anticoagulation Clinic    Preferred lab:  [facility]    Send INR reminders to:      Comments:  ASA 81 mg for TIA, CAD hx - [facility] per Dr. Wright 3/7/22      Anticoagulation Care Providers     Provider Role Specialty Phone number    Patricia Damon M.D. Referring Internal Medicine [phone]    Jona QuinnD Responsible      [facility] Responsible  [phone]             Refer to Patient Findings for HPI:  Patient Findings     Positives:  Upcoming invasive procedure, Missed doses    Comments:  Missed his warfarin dose on Monday. TKR on 22.          Verified current warfarin dosing schedule.    Medications reconciled   Pt is on ASA 81 mg  as antiplatelet therapy for TIA, CAD and must be reviewed again on - indefinitely per cards.      A/P   INR  sub-therapeutic. INR decreased from 2.1 last visit. No recent VTEs/TIAs.     Warfarin dosing recommendation: Tonight take 7.5 mg tonight then resume your previous regimen.      Bridging instructions for next month will be as follows:  Pt to have procedure on 22. Due to pt history lovenox bridging is indicated.   Pt to follow instructions as below, after procedure to ask operating MD when safe to resume anticoagulation, if no instructions given, pt will follow as below.     CHADSvasc - n/a  Clot  history - DVT, PE, TIA hx    Current weight: 99.7 kg (weighted today)  CrCl = 116 ml/min  Lab Results   Component Value Date/Time    BUN 14 06/01/2022 01:53 AM    CREATININE 0.80 06/01/2022 01:53 AM    CREATININE 1.0 07/09/2008 05:05 AM          Used Lovenox before - yes        Date  Warfarin dosing PM Lovenox   5 Days before procedure 7/7/22 0mg NONE   4 Days before procedure 7/8/22 0mg Lovenox injection   3 Days before procedure 7/9/22 0mg Lovenox injection   2 Days before procedure 7/10/22 0mg Lovenox injection   1 Days before procedure 7/11/22 0mg NONE   PROCEDURE 7/12/22 7.5 mg (restart warfarin if okay with doctor) NONE   1 Day after procedure 7/13/22 7.5 mg Restart Lovenox injections      2 Days after procedure 7/14/22 7.5 mg Lovenox injection   3 Days after procedure 7/15/22 3.75 mg Lovenox injection   4 Days after procedure 7/16/22 7.5 mg Lovenox injection   5 Days after procedure 7/17/22 7.5 mg Lovenox injection   6 Days after procedure 7/18/22  GET INR checked       Pt educated to contact our clinic with any changes in medications or s/s of bleeding or thrombosis. Pt is aware to seek immediate medical attention for falls, head injury or deep cuts.    Follow up appointment in 1 week(s).    TOREY Bucio.

## 2022-06-08 NOTE — ASSESSMENT & PLAN NOTE
This is a chronic problem.  Is noted on previous CT studies.  He is on a healthy diet and on a statin.

## 2022-06-08 NOTE — ASSESSMENT & PLAN NOTE
Patient is here for follow-up on hospitalization for chest pain.  He was discharged on May 31.  Work-up for cardiac issues was negative.

## 2022-06-09 DIAGNOSIS — E11.9 TYPE 2 DIABETES MELLITUS WITHOUT COMPLICATION, UNSPECIFIED WHETHER LONG TERM INSULIN USE (HCC): ICD-10-CM

## 2022-06-09 LAB — INR BLD: 1.4 (ref 0.9–1.2)

## 2022-06-15 ENCOUNTER — ANTICOAGULATION VISIT (OUTPATIENT)
Dept: VASCULAR LAB | Facility: MEDICAL CENTER | Age: 72
End: 2022-06-15
Attending: INTERNAL MEDICINE
Payer: MEDICARE

## 2022-06-15 VITALS — HEART RATE: 58 BPM | DIASTOLIC BLOOD PRESSURE: 75 MMHG | SYSTOLIC BLOOD PRESSURE: 132 MMHG

## 2022-06-15 DIAGNOSIS — G45.9 TIA (TRANSIENT ISCHEMIC ATTACK): ICD-10-CM

## 2022-06-15 LAB
INR BLD: 2.5 (ref 0.9–1.2)
INR PPP: 2.5 (ref 2–3.5)

## 2022-06-15 PROCEDURE — 99211 OFF/OP EST MAY X REQ PHY/QHP: CPT

## 2022-06-15 PROCEDURE — 85610 PROTHROMBIN TIME: CPT

## 2022-06-15 NOTE — PROGRESS NOTES
Anticoagulation Summary  As of 6/15/2022    INR goal:  2.0-3.0   TTR:  58.3 % (7 y)   INR used for dosin.50 (6/15/2022)   Warfarin maintenance plan:  3.75 mg (7.5 mg x 0.5) every Mon, Wed, Fri; 7.5 mg (7.5 mg x 1) all other days   Weekly warfarin total:  41.25 mg   Plan last modified:  Miya Acosta APaulP.NPaul (2022)   Next INR check:  2022   Priority:  Acute   Target end date:  Indefinite    Indications    DVT (deep venous thrombosis) (HCC) (Resolved) [I82.409]  TIA (transient ischemic attack) [G45.9]  Pulmonary embolism [415.19] (Resolved) [I26.99]             Anticoagulation Episode Summary     INR check location:  Anticoagulation Clinic    Preferred lab:  [facility]    Send INR reminders to:      Comments:  ASA 81 mg for TIA, CAD hx - [facility] per Dr. Wright 3/7/22      Anticoagulation Care Providers     Provider Role Specialty Phone number    Patricia Damon M.D. Referring Internal Medicine [phone]    Jona QuinnD Responsible      [facility] Responsible  [phone]                Refer to Patient Findings for HPI:  Patient Findings     Positives:  Upcoming invasive procedure (Upcoming TKA. Bridging instructions provided.)    Negatives:  Signs/symptoms of thrombosis, Signs/symptoms of bleeding, Laboratory test error suspected, Change in health, Change in alcohol use, Change in activity, Emergency department visit, Upcoming dental procedure, Missed doses, Extra doses, Change in medications, Change in diet/appetite, Hospital admission, Bruising, Other complaints        BP recorded in vitals.    Verified current warfarin dosing schedule.    Medications reconciled   Pt is on ASA 81 mg  as antiplatelet therapy for TIA, CAD. Taking indefinitely per cards.      A/P   INR  therapeutic.     Warfarin dosing recommendation: Instructed pt to continue on with current regimen.    Pt educated to contact our clinic with any changes in medications or s/s of bleeding  or thrombosis. Pt is aware to seek immediate medical attention for falls, head injury or deep cuts.    Follow up appointment in 2 week(s).    Tiburcio Baker, PharmD, BCACP

## 2022-06-20 RX ORDER — PREDNISONE 10 MG/1
10 TABLET ORAL DAILY
Qty: 30 TABLET | Refills: 0 | Status: CANCELLED | OUTPATIENT
Start: 2022-06-20

## 2022-06-23 RX ORDER — PREDNISONE 10 MG/1
10 TABLET ORAL DAILY
Qty: 30 TABLET | Status: CANCELLED | OUTPATIENT
Start: 2022-06-23

## 2022-06-23 RX ORDER — PREDNISONE 20 MG/1
TABLET ORAL
Qty: 30 TABLET | Refills: 2 | Status: ON HOLD | OUTPATIENT
Start: 2022-06-23 | End: 2024-03-12

## 2022-06-23 NOTE — TELEPHONE ENCOUNTER
Pt called and requested a refill of prednisone for his gout. Pt stated he does need a lot and just enough for when he need it. This was prescribed to him back in 2019. Pt wants [facility]

## 2022-06-24 ENCOUNTER — PRE-ADMISSION TESTING (OUTPATIENT)
Dept: ADMISSIONS | Facility: MEDICAL CENTER | Age: 72
End: 2022-06-24
Attending: ORTHOPAEDIC SURGERY
Payer: MEDICARE

## 2022-06-24 DIAGNOSIS — M17.12 PRIMARY OSTEOARTHRITIS OF LEFT KNEE: ICD-10-CM

## 2022-06-24 LAB
ANION GAP SERPL CALC-SCNC: 10 MMOL/L (ref 7–16)
APPEARANCE UR: CLEAR
APTT PPP: 23.4 SEC (ref 24.7–36)
BASOPHILS # BLD AUTO: 1 % (ref 0–1.8)
BASOPHILS # BLD: 0.06 K/UL (ref 0–0.12)
BILIRUB UR QL STRIP.AUTO: NEGATIVE
BUN SERPL-MCNC: 14 MG/DL (ref 8–22)
CALCIUM SERPL-MCNC: 9.2 MG/DL (ref 8.4–10.2)
CHLORIDE SERPL-SCNC: 104 MMOL/L (ref 96–112)
CO2 SERPL-SCNC: 24 MMOL/L (ref 20–33)
COLOR UR: YELLOW
CREAT SERPL-MCNC: 0.75 MG/DL (ref 0.5–1.4)
EOSINOPHIL # BLD AUTO: 0.15 K/UL (ref 0–0.51)
EOSINOPHIL NFR BLD: 2.4 % (ref 0–6.9)
ERYTHROCYTE [DISTWIDTH] IN BLOOD BY AUTOMATED COUNT: 45.9 FL (ref 35.9–50)
GFR SERPLBLD CREATININE-BSD FMLA CKD-EPI: 95 ML/MIN/1.73 M 2
GLUCOSE SERPL-MCNC: 162 MG/DL (ref 65–99)
GLUCOSE UR STRIP.AUTO-MCNC: NEGATIVE MG/DL
HCT VFR BLD AUTO: 46.2 % (ref 42–52)
HGB BLD-MCNC: 15.7 G/DL (ref 14–18)
HIV 1+2 AB+HIV1 P24 AG SERPL QL IA: NORMAL
IMM GRANULOCYTES # BLD AUTO: 0.01 K/UL (ref 0–0.11)
IMM GRANULOCYTES NFR BLD AUTO: 0.2 % (ref 0–0.9)
INR PPP: 1.28 (ref 0.87–1.13)
KETONES UR STRIP.AUTO-MCNC: NEGATIVE MG/DL
LEUKOCYTE ESTERASE UR QL STRIP.AUTO: NEGATIVE
LYMPHOCYTES # BLD AUTO: 1.75 K/UL (ref 1–4.8)
LYMPHOCYTES NFR BLD: 27.7 % (ref 22–41)
MCH RBC QN AUTO: 31.8 PG (ref 27–33)
MCHC RBC AUTO-ENTMCNC: 34 G/DL (ref 33.7–35.3)
MCV RBC AUTO: 93.5 FL (ref 81.4–97.8)
MICRO URNS: NORMAL
MONOCYTES # BLD AUTO: 0.82 K/UL (ref 0–0.85)
MONOCYTES NFR BLD AUTO: 13 % (ref 0–13.4)
NEUTROPHILS # BLD AUTO: 3.52 K/UL (ref 1.82–7.42)
NEUTROPHILS NFR BLD: 55.7 % (ref 44–72)
NITRITE UR QL STRIP.AUTO: NEGATIVE
NRBC # BLD AUTO: 0 K/UL
NRBC BLD-RTO: 0 /100 WBC
PH UR STRIP.AUTO: 5.5 [PH] (ref 5–8)
PLATELET # BLD AUTO: 267 K/UL (ref 164–446)
PMV BLD AUTO: 10 FL (ref 9–12.9)
POTASSIUM SERPL-SCNC: 4.7 MMOL/L (ref 3.6–5.5)
PROT UR QL STRIP: NEGATIVE MG/DL
PROTHROMBIN TIME: 15 SEC (ref 12–14.6)
RBC # BLD AUTO: 4.94 M/UL (ref 4.7–6.1)
RBC UR QL AUTO: NEGATIVE
SCCMEC + MECA PNL NOSE NAA+PROBE: NEGATIVE
SCCMEC + MECA PNL NOSE NAA+PROBE: NEGATIVE
SODIUM SERPL-SCNC: 138 MMOL/L (ref 135–145)
SP GR UR STRIP.AUTO: 1.01
WBC # BLD AUTO: 6.3 K/UL (ref 4.8–10.8)

## 2022-06-24 PROCEDURE — 87640 STAPH A DNA AMP PROBE: CPT | Mod: XU

## 2022-06-24 PROCEDURE — 85730 THROMBOPLASTIN TIME PARTIAL: CPT

## 2022-06-24 PROCEDURE — 85610 PROTHROMBIN TIME: CPT

## 2022-06-24 PROCEDURE — 87389 HIV-1 AG W/HIV-1&-2 AB AG IA: CPT

## 2022-06-24 PROCEDURE — 85025 COMPLETE CBC W/AUTO DIFF WBC: CPT

## 2022-06-24 PROCEDURE — 81003 URINALYSIS AUTO W/O SCOPE: CPT

## 2022-06-24 PROCEDURE — 80048 BASIC METABOLIC PNL TOTAL CA: CPT

## 2022-06-24 PROCEDURE — 36415 COLL VENOUS BLD VENIPUNCTURE: CPT

## 2022-06-24 PROCEDURE — 87641 MR-STAPH DNA AMP PROBE: CPT

## 2022-06-24 ASSESSMENT — FIBROSIS 4 INDEX: FIB4 SCORE: 1.08

## 2022-06-24 NOTE — PREPROCEDURE INSTRUCTIONS
Pre-admit appointment completed. Pt states all instructions given are understood. Medications the patient will take the morning of surgery per anesthesia protocol:   Metoprolol, Depakote, Prilosec. Instructed to take other prescribed medications through the day before surgery.  Patient has been given bridging program for Warfarin from vascular medicine.    Denies anesthesia complications    Surgery preparation checklist for Joint Replacement Program given to Pt with verbal instructions.

## 2022-06-27 ENCOUNTER — ANTICOAGULATION VISIT (OUTPATIENT)
Dept: VASCULAR LAB | Facility: MEDICAL CENTER | Age: 72
End: 2022-06-27
Attending: INTERNAL MEDICINE
Payer: MEDICARE

## 2022-06-27 DIAGNOSIS — G45.9 TIA (TRANSIENT ISCHEMIC ATTACK): ICD-10-CM

## 2022-06-27 LAB — INR PPP: 1.9 (ref 2–3.5)

## 2022-06-27 PROCEDURE — 99212 OFFICE O/P EST SF 10 MIN: CPT

## 2022-06-27 PROCEDURE — 85610 PROTHROMBIN TIME: CPT

## 2022-06-27 NOTE — PROGRESS NOTES
Anticoagulation Summary  As of 2022    INR goal:  2.0-3.0   TTR:  58.1 % (7 y)   INR used for dosin.90 (2022)   Warfarin maintenance plan:  3.75 mg (7.5 mg x 0.5) every Mon, Wed, Fri; 7.5 mg (7.5 mg x 1) all other days   Weekly warfarin total:  41.25 mg   Plan last modified:  Miya Acosta APaulPPaulNPaul (2022)   Next INR check:  2022   Priority:  Acute   Target end date:  Indefinite    Indications    DVT (deep venous thrombosis) (HCC) (Resolved) [I82.409]  TIA (transient ischemic attack) [G45.9]  Pulmonary embolism [415.19] (Resolved) [I26.99]             Anticoagulation Episode Summary     INR check location:  Anticoagulation Clinic    Preferred lab:  [facility]    Send INR reminders to:      Comments:  ASA 81 mg for TIA, CAD hx - Lifelong per Dr. Wright 3/7/22      Anticoagulation Care Providers     Provider Role Specialty Phone number    Patricia Damon M.D. Referring Internal Medicine [phone]    Jona QuinnD Responsible      [facility] Responsible  [phone]                Refer to Patient Findings for HPI:     pt declined vitals    Verified current warfarin dosing schedule.    Pt is on ASA 81 mg  as antiplatelet therapy for TIA, CAD. Taking indefinitely per cards.      A/P   INR  SUB-therapeutic.     Warfarin dosing recommendation: Bolus today then Pt is to continue with current warfarin dosing regimen.     Pt educated to contact our clinic with any changes in medications or s/s of bleeding or thrombosis. Pt is aware to seek immediate medical attention for falls, head injury or deep cuts.    Follow up appointment in 1 week(s).    Hong Brooks, JonaD

## 2022-06-28 NOTE — DISCHARGE PLANNING
DISCHARGE PLANNING NOTE - TOTAL JOINT    Procedure: Procedure(s):  LEFT ARTHROPLASTY, KNEE, TOTAL  Procedure Date: 7/12/2022  Insurance: Payor: [facility] SENIOR CARE PLUS / Plan: [facility] SCP [place] PREFERRED 21  / Product Type: Medicare Advantage /    Equipment currently available at home?  ice.  Steps into the home? 2  Steps within the home? 0  Toilet height? ADA  Type of shower? walk-in shower  Who will be with you during your recovery? Spouse.  Is Outpatient Physical Therapy set up after surgery? No   Did you take the Total Joint Class and where? Yes  Planning same day discharge?Yes     This writer met with pt during his preadmission  appointment. Pt will need a fww. Home safety checklist reviewed and copy given to pt. Pt educated to dc criteria. All questions answered and verbalizes understanding of all instructions. No dc needs identified at this time. Anticipate dc to home without barriers.

## 2022-06-29 LAB — INR BLD: 1.9 (ref 0.9–1.2)

## 2022-07-05 RX ORDER — LEVOTHYROXINE SODIUM 0.07 MG/1
75 TABLET ORAL EVERY EVENING
Qty: 100 TABLET | Refills: 2 | Status: SHIPPED | OUTPATIENT
Start: 2022-07-05 | End: 2023-04-05

## 2022-07-05 RX ORDER — LEVOTHYROXINE SODIUM 0.07 MG/1
75 TABLET ORAL EVERY EVENING
Qty: 90 TABLET | Refills: 0 | Status: CANCELLED | OUTPATIENT
Start: 2022-07-05

## 2022-07-06 ENCOUNTER — ANTICOAGULATION VISIT (OUTPATIENT)
Dept: VASCULAR LAB | Facility: MEDICAL CENTER | Age: 72
End: 2022-07-06
Attending: INTERNAL MEDICINE
Payer: MEDICARE

## 2022-07-06 VITALS — SYSTOLIC BLOOD PRESSURE: 164 MMHG | DIASTOLIC BLOOD PRESSURE: 68 MMHG | HEART RATE: 59 BPM

## 2022-07-06 DIAGNOSIS — G45.9 TIA (TRANSIENT ISCHEMIC ATTACK): ICD-10-CM

## 2022-07-06 LAB — INR PPP: 2.5 (ref 2–3.5)

## 2022-07-06 PROCEDURE — 85610 PROTHROMBIN TIME: CPT

## 2022-07-06 PROCEDURE — 99211 OFF/OP EST MAY X REQ PHY/QHP: CPT | Performed by: PHARMACIST

## 2022-07-06 NOTE — PROGRESS NOTES
Anticoagulation Summary  As of 2022    INR goal:  2.0-3.0   TTR:  58.2 % (7 y)   INR used for dosin.50 (2022)   Warfarin maintenance plan:  3.75 mg (7.5 mg x 0.5) every Mon, Wed, Fri; 7.5 mg (7.5 mg x 1) all other days   Weekly warfarin total:  41.25 mg   Plan last modified:  Miya Acosta A.P.NPaul (2022)   Next INR check:  2022   Priority:  Acute   Target end date:  Indefinite    Indications    DVT (deep venous thrombosis) (HCC) (Resolved) [I82.409]  TIA (transient ischemic attack) [G45.9]  Pulmonary embolism [415.19] (Resolved) [I26.99]             Anticoagulation Episode Summary     INR check location:  Anticoagulation Clinic    Preferred lab:  [facility]    Send INR reminders to:      Comments:  ASA 81 mg for TIA, CAD hx - Lifelong per Dr. Wright 3/7/22      Anticoagulation Care Providers     Provider Role Specialty Phone number    Patricia Damon M.D. Referring Internal Medicine [phone]    Jona QuinnD Responsible      [facility]tico[facility] Responsible  [phone]                Refer to Patient Findings for HPI:  Patient Findings     Positives:  Upcoming invasive procedure    Negatives:  Signs/symptoms of thrombosis, Signs/symptoms of bleeding, Laboratory test error suspected, Change in health, Change in alcohol use, Change in activity, Emergency department visit, Upcoming dental procedure, Missed doses, Extra doses, Change in medications, Change in diet/appetite, Hospital admission, Bruising, Other complaints          Vitals:    22 1024   BP: (!) 164/68   Pulse: (!) 59         Verified current warfarin dosing schedule.    Medications reconciled   Pt is on ASA as antiplatelet therapy for TIA, CAD history and must be reviewed again on per Dr Wright.      A/P   INR  -therapeutic.     Warfarin dosing recommendation: Pt instructed to follow bridging instructions detailed below.             Date  Warfarin dosing PM Lovenox   5 Days before procedure  7/7/22 0mg NONE   4 Days before procedure 7/8/22 0mg Lovenox injection   3 Days before procedure 7/9/22 0mg Lovenox injection   2 Days before procedure 7/10/22 0mg Lovenox injection   1 Days before procedure 7/11/22 0mg NONE   PROCEDURE 7/12/22 7.5 mg (restart warfarin if okay with doctor) NONE   1 Day after procedure 7/13/22 7.5 mg Restart Lovenox injections      2 Days after procedure 7/14/22 7.5 mg Lovenox injection   3 Days after procedure 7/15/22 3.75 mg Lovenox injection   4 Days after procedure 7/16/22 7.5 mg Lovenox injection   5 Days after procedure 7/17/22 7.5 mg Lovenox injection   6 Days after procedure 7/18/22   GET INR checked         Pt educated to contact our clinic with any changes in medications or s/s of bleeding or thrombosis. Pt is aware to seek immediate medical attention for falls, head injury or deep cuts.    Follow up appointment in 2 week(s).    Emilie Alvarado, Pharmacy Intern    Isidro Mc, PharmD

## 2022-07-07 LAB — INR BLD: 2.5 (ref 0.9–1.2)

## 2022-07-11 NOTE — H&P
Surgery Orthopedic History & Physical Note    Date  7/10/2022    Primary Care Physician  Michael Sethi III, M.D.      Pre-Op Diagnosis Codes:     * Osteoarthritis of left knee [M17.12]    HPI  This is a 71 y.o. male who presented with severe pain bilateral knees left worse than the right which has been going on for several years.  Hurts getting up and down from seated position.  Hurts walking short distances.  Activities day living which require standing walking very difficult.  He did well over 3 months of physical therapy in the past and does not help.  He is done anti-inflammatories and assistive devices and these have not helped either.  Pain Assessment  Pain Assessment: 0-10  Pain Score: 6    Past Medical History:   Diagnosis Date   • Anticoagulation monitoring, special range    • Bipolar disorder (HCC)    • CAD (coronary artery disease) 6/4/2015   • Carotid artery occlusion     L 60-79% occluded; R 59% occluded per pt report; 2011   • Clotting disorder (HCC)     protein S elevation in '12 with recurrent DVT and PE-coumadin lifelong   • Colon polyps    • Diabetes mellitus, type II (HCC)     diet controlled   • Gout    • Hammer toe    • Hyperlipidemia    • Hypertension    • Hypothyroidism    • Impaired fasting glucose    • Mild aortic stenosis - echo 4/2018        Past Surgical History:   Procedure Laterality Date   • MULTIPLE CORONARY ARTERY BYPASS ENDO VEIN HARVEST  6/4/2015    Procedure: MULTIPLE CORONARY ARTERY BYPASS Grafting  x 3   ENDO VEIN HARVEST right leg;  Surgeon: Christophe Lemus M.D.;  Location: SURGERY [facility];  Service:    • RECOVERY  6/1/2015    Procedure:  CATH LAB  [facility] w/POSS [facility] ICD; 794.31;  Surgeon: [facility];  [place] [facility];  Service:    • OTHER ORTHOPEDIC SURGERY  1976    L wrist repair    • ANKLE FUSION     • APPENDECTOMY     • OTHER ABDOMINAL SURGERY      umbilical hernia repair 2000, appy age 13       No current  facility-administered medications for this encounter.     Current Outpatient Medications   Medication Sig Dispense Refill   • levothyroxine (SYNTHROID) 75 MCG Tab Take 1 Tablet by mouth every evening. 100 Tablet 2   • predniSONE (DELTASONE) 20 MG Tab Take 2 po a day for 5 days and then 1 a day for 1 week prn gout flare. 30 Tablet 2   • enoxaparin (LOVENOX) 150 MG/ML Solution Prefilled Syringe Inject 150 mg under the skin every day for 10 days. 10 Each 0   • divalproex (DEPAKOTE) 125 MG EC tablet Take 125 mg by mouth every morning.     • divalproex (DEPAKOTE) 250 MG Tablet Delayed Response Take 2 Tablets by mouth every evening. 180 Tablet 3   • traZODone (DESYREL) 50 MG Tab Take 1 Tablet by mouth every evening. For insomnia 90 Tablet 2   • atorvastatin (LIPITOR) 80 MG tablet TAKE 1 TABLET BY MOUTH EVERY DAY (Patient taking differently: Take 80 mg by mouth every day.) 100 Tablet 3   • warfarin (COUMADIN) 7.5 MG Tab TAKE 0.5 to 1 TABLET BY MOUTH DAILY AS DIRECTED BY [facility] (Patient taking differently: Take 3.75-7.5 mg by mouth every day. 3.75 mg Monday, Wednesday, and Friday   7.5 mg all other days) 90 Tablet 1   • Glucosamine-Chondroit-Vit C-Mn (GLUCOSAMINE CHONDR 500 COMPLEX PO) Take 1 Tablet by mouth 2 times a day.     • lisinopril (PRINIVIL) 5 MG Tab Take 1 Tablet by mouth every day. 100 Tablet 3   • Melatonin 10 MG Tab Take 10 mg by mouth at bedtime.     • metoprolol tartrate (LOPRESSOR) 25 MG Tab Take 0.5 Tablets by mouth every day. 45 Tablet 3   • aspirin 81 MG tablet Take 81 mg by mouth every day. 100 Tab    • COENZYME Q10 PO Take 1 Tab by mouth every morning.     • Cholecalciferol (VITAMIN D) 2000 UNITS Cap Take 2,000 Units by mouth every day.     • docosahexanoic acid (OMEGA 3 FA) 1000 MG CAPS Take 1,000 mg by mouth every day.         Social History     Occupational History   • Occupation: [employer]      Comment: Build house for 35 years   Tobacco Use   • Smoking status: Former Smoker      Packs/day: 2.00     Years: 10.00     Pack years: 20.00     Types: Cigarettes     Quit date: 1976     Years since quittin.5   • Smokeless tobacco: Never Used   • Tobacco comment: quit x 32 yrs; 2ppd x 7 years   Vaping Use   • Vaping Use: Never used   Substance and Sexual Activity   • Alcohol use: No   • Drug use: No     Comment: IV drug use in teens   • Sexual activity: Not Currently     Partners: Female       Family History   Problem Relation Age of Onset   • Heart Disease Sister 50   • Stroke Sister 55        CVA   • Heart Failure Father        Allergies  Penicillins    Review of Systems  Pertinent ROS in HPI. Other ROS reviewed and found to be otherwise unremarkable.       Physical Exam  Patient walks antalgic gait favoring left over the right knee  Moderate joint effusion bilateral knees  Stable varus valgus posterior drawer bilateral knees  Range of motion is  left knee  Range of motion is 5-125 right knee  Joint line tenderness bilateral knees  Neurovascular intact bilateral lower extremities    Radiology:  Severe degenerative arthritis loss joint space osteophyte production cyst formation bilateral knees      Assessment/Plan:  Pre-Op Diagnosis Codes:     * Osteoarthritis of left knee [M17.12]  Procedure(s):  LEFT ARTHROPLASTY, KNEE, TOTAL    Patient has failed conservative measures for bilateral knees.  We will go and start with a left total knee arthroplasty as this is one that is bothering him the most.  We will do a cortisone injection in the other knee just to give him some relief while he is going through the surgery on the left side.  He is recovered from his left side we will do a right total knee arthroplasty.  Risk benefits surgery were discussed with patient    Patient has been educated about choice for planned prosthesis and rationale for its use.  Patient understands and wishes to proceed.

## 2022-07-12 ENCOUNTER — APPOINTMENT (OUTPATIENT)
Dept: RADIOLOGY | Facility: MEDICAL CENTER | Age: 72
End: 2022-07-12
Attending: STUDENT IN AN ORGANIZED HEALTH CARE EDUCATION/TRAINING PROGRAM
Payer: MEDICARE

## 2022-07-12 ENCOUNTER — ANESTHESIA (OUTPATIENT)
Dept: SURGERY | Facility: MEDICAL CENTER | Age: 72
End: 2022-07-12
Payer: MEDICARE

## 2022-07-12 ENCOUNTER — ANESTHESIA EVENT (OUTPATIENT)
Dept: SURGERY | Facility: MEDICAL CENTER | Age: 72
End: 2022-07-12
Payer: MEDICARE

## 2022-07-12 ENCOUNTER — HOSPITAL ENCOUNTER (OUTPATIENT)
Facility: MEDICAL CENTER | Age: 72
End: 2022-07-12
Attending: ORTHOPAEDIC SURGERY | Admitting: ORTHOPAEDIC SURGERY
Payer: MEDICARE

## 2022-07-12 VITALS
BODY MASS INDEX: 30.39 KG/M2 | SYSTOLIC BLOOD PRESSURE: 125 MMHG | HEART RATE: 69 BPM | HEIGHT: 68 IN | TEMPERATURE: 96.8 F | WEIGHT: 200.51 LBS | DIASTOLIC BLOOD PRESSURE: 56 MMHG | OXYGEN SATURATION: 93 % | RESPIRATION RATE: 18 BRPM

## 2022-07-12 DIAGNOSIS — M17.12 PRIMARY OSTEOARTHRITIS OF LEFT KNEE: ICD-10-CM

## 2022-07-12 LAB — GLUCOSE BLD STRIP.AUTO-MCNC: 167 MG/DL (ref 65–99)

## 2022-07-12 PROCEDURE — 73560 X-RAY EXAM OF KNEE 1 OR 2: CPT | Mod: LT

## 2022-07-12 PROCEDURE — 160025 RECOVERY II MINUTES (STATS): Performed by: ORTHOPAEDIC SURGERY

## 2022-07-12 PROCEDURE — 82962 GLUCOSE BLOOD TEST: CPT

## 2022-07-12 PROCEDURE — 64447 NJX AA&/STRD FEMORAL NRV IMG: CPT | Performed by: ORTHOPAEDIC SURGERY

## 2022-07-12 PROCEDURE — 01402 ANES OPN/ARTH TOT KNE ARTHRP: CPT | Performed by: STUDENT IN AN ORGANIZED HEALTH CARE EDUCATION/TRAINING PROGRAM

## 2022-07-12 PROCEDURE — 160036 HCHG PACU - EA ADDL 30 MINS PHASE I: Performed by: ORTHOPAEDIC SURGERY

## 2022-07-12 PROCEDURE — 160035 HCHG PACU - 1ST 60 MINS PHASE I: Performed by: ORTHOPAEDIC SURGERY

## 2022-07-12 PROCEDURE — 502000 HCHG MISC OR IMPLANTS RC 0278: Performed by: ORTHOPAEDIC SURGERY

## 2022-07-12 PROCEDURE — C1776 JOINT DEVICE (IMPLANTABLE): HCPCS | Performed by: ORTHOPAEDIC SURGERY

## 2022-07-12 PROCEDURE — 160041 HCHG SURGERY MINUTES - EA ADDL 1 MIN LEVEL 4: Performed by: ORTHOPAEDIC SURGERY

## 2022-07-12 PROCEDURE — 160047 HCHG PACU  - EA ADDL 30 MINS PHASE II: Performed by: ORTHOPAEDIC SURGERY

## 2022-07-12 PROCEDURE — 76942 ECHO GUIDE FOR BIOPSY: CPT | Mod: 26 | Performed by: STUDENT IN AN ORGANIZED HEALTH CARE EDUCATION/TRAINING PROGRAM

## 2022-07-12 PROCEDURE — A9270 NON-COVERED ITEM OR SERVICE: HCPCS | Performed by: STUDENT IN AN ORGANIZED HEALTH CARE EDUCATION/TRAINING PROGRAM

## 2022-07-12 PROCEDURE — 64447 NJX AA&/STRD FEMORAL NRV IMG: CPT | Mod: 59 | Performed by: STUDENT IN AN ORGANIZED HEALTH CARE EDUCATION/TRAINING PROGRAM

## 2022-07-12 PROCEDURE — 700111 HCHG RX REV CODE 636 W/ 250 OVERRIDE (IP): Performed by: STUDENT IN AN ORGANIZED HEALTH CARE EDUCATION/TRAINING PROGRAM

## 2022-07-12 PROCEDURE — 160009 HCHG ANES TIME/MIN: Performed by: ORTHOPAEDIC SURGERY

## 2022-07-12 PROCEDURE — 97110 THERAPEUTIC EXERCISES: CPT

## 2022-07-12 PROCEDURE — 502240 HCHG MISC OR SUPPLY RC 0272: Performed by: ORTHOPAEDIC SURGERY

## 2022-07-12 PROCEDURE — 700111 HCHG RX REV CODE 636 W/ 250 OVERRIDE (IP): Performed by: ORTHOPAEDIC SURGERY

## 2022-07-12 PROCEDURE — 700101 HCHG RX REV CODE 250: Performed by: STUDENT IN AN ORGANIZED HEALTH CARE EDUCATION/TRAINING PROGRAM

## 2022-07-12 PROCEDURE — C1713 ANCHOR/SCREW BN/BN,TIS/BN: HCPCS | Performed by: ORTHOPAEDIC SURGERY

## 2022-07-12 PROCEDURE — 160029 HCHG SURGERY MINUTES - 1ST 30 MINS LEVEL 4: Performed by: ORTHOPAEDIC SURGERY

## 2022-07-12 PROCEDURE — 160048 HCHG OR STATISTICAL LEVEL 1-5: Performed by: ORTHOPAEDIC SURGERY

## 2022-07-12 PROCEDURE — 97116 GAIT TRAINING THERAPY: CPT

## 2022-07-12 PROCEDURE — 700105 HCHG RX REV CODE 258: Performed by: ORTHOPAEDIC SURGERY

## 2022-07-12 PROCEDURE — 700102 HCHG RX REV CODE 250 W/ 637 OVERRIDE(OP): Performed by: STUDENT IN AN ORGANIZED HEALTH CARE EDUCATION/TRAINING PROGRAM

## 2022-07-12 PROCEDURE — 27447 TOTAL KNEE ARTHROPLASTY: CPT | Mod: LT | Performed by: ORTHOPAEDIC SURGERY

## 2022-07-12 PROCEDURE — 27447 TOTAL KNEE ARTHROPLASTY: CPT | Mod: 80ROC,LT | Performed by: STUDENT IN AN ORGANIZED HEALTH CARE EDUCATION/TRAINING PROGRAM

## 2022-07-12 PROCEDURE — 700101 HCHG RX REV CODE 250: Performed by: ORTHOPAEDIC SURGERY

## 2022-07-12 PROCEDURE — 160046 HCHG PACU - 1ST 60 MINS PHASE II: Performed by: ORTHOPAEDIC SURGERY

## 2022-07-12 PROCEDURE — 160002 HCHG RECOVERY MINUTES (STAT): Performed by: ORTHOPAEDIC SURGERY

## 2022-07-12 PROCEDURE — 97161 PT EVAL LOW COMPLEX 20 MIN: CPT

## 2022-07-12 PROCEDURE — 99100 ANES PT EXTEME AGE<1 YR&>70: CPT | Performed by: STUDENT IN AN ORGANIZED HEALTH CARE EDUCATION/TRAINING PROGRAM

## 2022-07-12 DEVICE — IMPLANTABLE DEVICE: Type: IMPLANTABLE DEVICE | Site: KNEE | Status: FUNCTIONAL

## 2022-07-12 DEVICE — CEMENT ORTHOPEDIC HV US  (10/PK): Type: IMPLANTABLE DEVICE | Site: KNEE | Status: FUNCTIONAL

## 2022-07-12 DEVICE — FEMUR TKA OXINIUM CR JOURNEY II NP LEFT SIZE 7: Type: IMPLANTABLE DEVICE | Site: KNEE | Status: FUNCTIONAL

## 2022-07-12 RX ORDER — HALOPERIDOL 5 MG/ML
1 INJECTION INTRAMUSCULAR EVERY 6 HOURS PRN
Status: DISCONTINUED | OUTPATIENT
Start: 2022-07-12 | End: 2022-07-12 | Stop reason: HOSPADM

## 2022-07-12 RX ORDER — ONDANSETRON 2 MG/ML
INJECTION INTRAMUSCULAR; INTRAVENOUS PRN
Status: DISCONTINUED | OUTPATIENT
Start: 2022-07-12 | End: 2022-07-12 | Stop reason: SURG

## 2022-07-12 RX ORDER — BUPIVACAINE HYDROCHLORIDE AND EPINEPHRINE 2.5; 5 MG/ML; UG/ML
INJECTION, SOLUTION EPIDURAL; INFILTRATION; INTRACAUDAL; PERINEURAL
Status: DISCONTINUED | OUTPATIENT
Start: 2022-07-12 | End: 2022-07-12 | Stop reason: HOSPADM

## 2022-07-12 RX ORDER — BISACODYL 10 MG
10 SUPPOSITORY, RECTAL RECTAL
Status: DISCONTINUED | OUTPATIENT
Start: 2022-07-12 | End: 2022-07-12 | Stop reason: HOSPADM

## 2022-07-12 RX ORDER — DEXAMETHASONE SODIUM PHOSPHATE 4 MG/ML
10 INJECTION, SOLUTION INTRA-ARTICULAR; INTRALESIONAL; INTRAMUSCULAR; INTRAVENOUS; SOFT TISSUE ONCE
Status: DISCONTINUED | OUTPATIENT
Start: 2022-07-13 | End: 2022-07-12 | Stop reason: HOSPADM

## 2022-07-12 RX ORDER — HYDRALAZINE HYDROCHLORIDE 20 MG/ML
5 INJECTION INTRAMUSCULAR; INTRAVENOUS
Status: DISCONTINUED | OUTPATIENT
Start: 2022-07-12 | End: 2022-07-12 | Stop reason: HOSPADM

## 2022-07-12 RX ORDER — ACETAMINOPHEN 500 MG
1000 TABLET ORAL ONCE
Status: COMPLETED | OUTPATIENT
Start: 2022-07-12 | End: 2022-07-12

## 2022-07-12 RX ORDER — ROPIVACAINE HYDROCHLORIDE 5 MG/ML
INJECTION, SOLUTION EPIDURAL; INFILTRATION; PERINEURAL
Status: COMPLETED | OUTPATIENT
Start: 2022-07-12 | End: 2022-07-12

## 2022-07-12 RX ORDER — DOCUSATE SODIUM 100 MG/1
100 CAPSULE, LIQUID FILLED ORAL 2 TIMES DAILY
Status: DISCONTINUED | OUTPATIENT
Start: 2022-07-12 | End: 2022-07-12 | Stop reason: HOSPADM

## 2022-07-12 RX ORDER — DIPHENHYDRAMINE HYDROCHLORIDE 50 MG/ML
25 INJECTION INTRAMUSCULAR; INTRAVENOUS EVERY 6 HOURS PRN
Status: DISCONTINUED | OUTPATIENT
Start: 2022-07-12 | End: 2022-07-12 | Stop reason: HOSPADM

## 2022-07-12 RX ORDER — MEPERIDINE HYDROCHLORIDE 25 MG/ML
12.5 INJECTION INTRAMUSCULAR; INTRAVENOUS; SUBCUTANEOUS
Status: DISCONTINUED | OUTPATIENT
Start: 2022-07-12 | End: 2022-07-12 | Stop reason: HOSPADM

## 2022-07-12 RX ORDER — AMOXICILLIN 250 MG
1 CAPSULE ORAL NIGHTLY
Status: DISCONTINUED | OUTPATIENT
Start: 2022-07-12 | End: 2022-07-12 | Stop reason: HOSPADM

## 2022-07-12 RX ORDER — OXYCODONE HCL 5 MG/5 ML
5 SOLUTION, ORAL ORAL
Status: DISCONTINUED | OUTPATIENT
Start: 2022-07-12 | End: 2022-07-12 | Stop reason: HOSPADM

## 2022-07-12 RX ORDER — AMOXICILLIN 250 MG
1 CAPSULE ORAL
Status: DISCONTINUED | OUTPATIENT
Start: 2022-07-12 | End: 2022-07-12 | Stop reason: HOSPADM

## 2022-07-12 RX ORDER — HYDROMORPHONE HYDROCHLORIDE 1 MG/ML
0.4 INJECTION, SOLUTION INTRAMUSCULAR; INTRAVENOUS; SUBCUTANEOUS
Status: DISCONTINUED | OUTPATIENT
Start: 2022-07-12 | End: 2022-07-12 | Stop reason: HOSPADM

## 2022-07-12 RX ORDER — TRAMADOL HYDROCHLORIDE 50 MG/1
50 TABLET ORAL EVERY 4 HOURS PRN
Status: DISCONTINUED | OUTPATIENT
Start: 2022-07-12 | End: 2022-07-12 | Stop reason: HOSPADM

## 2022-07-12 RX ORDER — DEXAMETHASONE SODIUM PHOSPHATE 4 MG/ML
4 INJECTION, SOLUTION INTRA-ARTICULAR; INTRALESIONAL; INTRAMUSCULAR; INTRAVENOUS; SOFT TISSUE
Status: DISCONTINUED | OUTPATIENT
Start: 2022-07-12 | End: 2022-07-12 | Stop reason: HOSPADM

## 2022-07-12 RX ORDER — ENOXAPARIN SODIUM 100 MG/ML
30 INJECTION SUBCUTANEOUS EVERY 12 HOURS
Status: DISCONTINUED | OUTPATIENT
Start: 2022-07-13 | End: 2022-07-12 | Stop reason: HOSPADM

## 2022-07-12 RX ORDER — OXYCODONE HYDROCHLORIDE 5 MG/1
5 TABLET ORAL EVERY 4 HOURS PRN
Qty: 42 TABLET | Refills: 0 | Status: SHIPPED | OUTPATIENT
Start: 2022-07-12 | End: 2022-07-19

## 2022-07-12 RX ORDER — METOPROLOL TARTRATE 1 MG/ML
1 INJECTION, SOLUTION INTRAVENOUS
Status: DISCONTINUED | OUTPATIENT
Start: 2022-07-12 | End: 2022-07-12 | Stop reason: HOSPADM

## 2022-07-12 RX ORDER — TRANEXAMIC ACID 100 MG/ML
1000 INJECTION, SOLUTION INTRAVENOUS ONCE
Status: DISCONTINUED | OUTPATIENT
Start: 2022-07-12 | End: 2022-07-12 | Stop reason: HOSPADM

## 2022-07-12 RX ORDER — POLYETHYLENE GLYCOL 3350 17 G/17G
1 POWDER, FOR SOLUTION ORAL 2 TIMES DAILY PRN
Status: DISCONTINUED | OUTPATIENT
Start: 2022-07-12 | End: 2022-07-12 | Stop reason: HOSPADM

## 2022-07-12 RX ORDER — HALOPERIDOL 5 MG/ML
1 INJECTION INTRAMUSCULAR
Status: DISCONTINUED | OUTPATIENT
Start: 2022-07-12 | End: 2022-07-12 | Stop reason: HOSPADM

## 2022-07-12 RX ORDER — OXYCODONE HYDROCHLORIDE 10 MG/1
10 TABLET ORAL
Status: DISCONTINUED | OUTPATIENT
Start: 2022-07-12 | End: 2022-07-12 | Stop reason: HOSPADM

## 2022-07-12 RX ORDER — CEFAZOLIN SODIUM 1 G/3ML
2 INJECTION, POWDER, FOR SOLUTION INTRAMUSCULAR; INTRAVENOUS ONCE
Status: DISCONTINUED | OUTPATIENT
Start: 2022-07-12 | End: 2022-07-12 | Stop reason: HOSPADM

## 2022-07-12 RX ORDER — ENEMA 19; 7 G/133ML; G/133ML
1 ENEMA RECTAL
Status: DISCONTINUED | OUTPATIENT
Start: 2022-07-12 | End: 2022-07-12 | Stop reason: HOSPADM

## 2022-07-12 RX ORDER — DEXAMETHASONE SODIUM PHOSPHATE 4 MG/ML
INJECTION, SOLUTION INTRA-ARTICULAR; INTRALESIONAL; INTRAMUSCULAR; INTRAVENOUS; SOFT TISSUE PRN
Status: DISCONTINUED | OUTPATIENT
Start: 2022-07-12 | End: 2022-07-12 | Stop reason: SURG

## 2022-07-12 RX ORDER — IPRATROPIUM BROMIDE AND ALBUTEROL SULFATE 2.5; .5 MG/3ML; MG/3ML
3 SOLUTION RESPIRATORY (INHALATION)
Status: DISCONTINUED | OUTPATIENT
Start: 2022-07-12 | End: 2022-07-12 | Stop reason: HOSPADM

## 2022-07-12 RX ORDER — ONDANSETRON 2 MG/ML
4 INJECTION INTRAMUSCULAR; INTRAVENOUS EVERY 4 HOURS PRN
Status: DISCONTINUED | OUTPATIENT
Start: 2022-07-12 | End: 2022-07-12 | Stop reason: HOSPADM

## 2022-07-12 RX ORDER — OXYCODONE HCL 5 MG/5 ML
10 SOLUTION, ORAL ORAL
Status: DISCONTINUED | OUTPATIENT
Start: 2022-07-12 | End: 2022-07-12 | Stop reason: HOSPADM

## 2022-07-12 RX ORDER — ACETAMINOPHEN 500 MG
1000 TABLET ORAL EVERY 6 HOURS PRN
Status: DISCONTINUED | OUTPATIENT
Start: 2022-07-17 | End: 2022-07-12 | Stop reason: HOSPADM

## 2022-07-12 RX ORDER — HYDROMORPHONE HYDROCHLORIDE 2 MG/ML
INJECTION, SOLUTION INTRAMUSCULAR; INTRAVENOUS; SUBCUTANEOUS PRN
Status: DISCONTINUED | OUTPATIENT
Start: 2022-07-12 | End: 2022-07-12 | Stop reason: SURG

## 2022-07-12 RX ORDER — GABAPENTIN 300 MG/1
300 CAPSULE ORAL ONCE
Status: COMPLETED | OUTPATIENT
Start: 2022-07-12 | End: 2022-07-12

## 2022-07-12 RX ORDER — HYDROMORPHONE HYDROCHLORIDE 1 MG/ML
0.1 INJECTION, SOLUTION INTRAMUSCULAR; INTRAVENOUS; SUBCUTANEOUS
Status: DISCONTINUED | OUTPATIENT
Start: 2022-07-12 | End: 2022-07-12 | Stop reason: HOSPADM

## 2022-07-12 RX ORDER — DEXTROSE MONOHYDRATE 25 G/50ML
12.5 INJECTION, SOLUTION INTRAVENOUS PRN
Status: DISCONTINUED | OUTPATIENT
Start: 2022-07-12 | End: 2022-07-12 | Stop reason: HOSPADM

## 2022-07-12 RX ORDER — TRANEXAMIC ACID 100 MG/ML
INJECTION, SOLUTION INTRAVENOUS PRN
Status: DISCONTINUED | OUTPATIENT
Start: 2022-07-12 | End: 2022-07-12 | Stop reason: SURG

## 2022-07-12 RX ORDER — DIVALPROEX SODIUM 125 MG/1
125 TABLET, DELAYED RELEASE ORAL EVERY MORNING
Status: DISCONTINUED | OUTPATIENT
Start: 2022-07-12 | End: 2022-07-12 | Stop reason: HOSPADM

## 2022-07-12 RX ORDER — LEVOTHYROXINE SODIUM 0.07 MG/1
75 TABLET ORAL EVERY EVENING
Status: DISCONTINUED | OUTPATIENT
Start: 2022-07-12 | End: 2022-07-12 | Stop reason: HOSPADM

## 2022-07-12 RX ORDER — WARFARIN SODIUM 7.5 MG/1
3.75-7.5 TABLET ORAL DAILY
Status: DISCONTINUED | OUTPATIENT
Start: 2022-07-12 | End: 2022-07-12 | Stop reason: HOSPADM

## 2022-07-12 RX ORDER — SODIUM CHLORIDE, SODIUM LACTATE, POTASSIUM CHLORIDE, CALCIUM CHLORIDE 600; 310; 30; 20 MG/100ML; MG/100ML; MG/100ML; MG/100ML
INJECTION, SOLUTION INTRAVENOUS CONTINUOUS
Status: ACTIVE | OUTPATIENT
Start: 2022-07-12 | End: 2022-07-12

## 2022-07-12 RX ORDER — HYDROMORPHONE HYDROCHLORIDE 1 MG/ML
0.5 INJECTION, SOLUTION INTRAMUSCULAR; INTRAVENOUS; SUBCUTANEOUS
Status: DISCONTINUED | OUTPATIENT
Start: 2022-07-12 | End: 2022-07-12 | Stop reason: HOSPADM

## 2022-07-12 RX ORDER — ACETAMINOPHEN 500 MG
1000 TABLET ORAL EVERY 6 HOURS
Status: DISCONTINUED | OUTPATIENT
Start: 2022-07-12 | End: 2022-07-12 | Stop reason: HOSPADM

## 2022-07-12 RX ORDER — TRAZODONE HYDROCHLORIDE 50 MG/1
50 TABLET ORAL NIGHTLY
Status: DISCONTINUED | OUTPATIENT
Start: 2022-07-12 | End: 2022-07-12 | Stop reason: HOSPADM

## 2022-07-12 RX ORDER — TRAMADOL HYDROCHLORIDE 50 MG/1
50 TABLET ORAL EVERY 4 HOURS PRN
Qty: 60 TABLET | Refills: 0 | Status: SHIPPED | OUTPATIENT
Start: 2022-07-12 | End: 2022-07-22

## 2022-07-12 RX ORDER — KETOROLAC TROMETHAMINE 30 MG/ML
INJECTION, SOLUTION INTRAMUSCULAR; INTRAVENOUS
Status: DISCONTINUED | OUTPATIENT
Start: 2022-07-12 | End: 2022-07-12 | Stop reason: HOSPADM

## 2022-07-12 RX ORDER — LABETALOL HYDROCHLORIDE 5 MG/ML
5 INJECTION, SOLUTION INTRAVENOUS
Status: DISCONTINUED | OUTPATIENT
Start: 2022-07-12 | End: 2022-07-12 | Stop reason: HOSPADM

## 2022-07-12 RX ORDER — LISINOPRIL 5 MG/1
5 TABLET ORAL DAILY
Status: DISCONTINUED | OUTPATIENT
Start: 2022-07-12 | End: 2022-07-12 | Stop reason: HOSPADM

## 2022-07-12 RX ORDER — ONDANSETRON 2 MG/ML
4 INJECTION INTRAMUSCULAR; INTRAVENOUS
Status: DISCONTINUED | OUTPATIENT
Start: 2022-07-12 | End: 2022-07-12 | Stop reason: HOSPADM

## 2022-07-12 RX ORDER — OXYCODONE HYDROCHLORIDE 5 MG/1
5 TABLET ORAL
Status: DISCONTINUED | OUTPATIENT
Start: 2022-07-12 | End: 2022-07-12 | Stop reason: HOSPADM

## 2022-07-12 RX ORDER — HYDROMORPHONE HYDROCHLORIDE 1 MG/ML
0.2 INJECTION, SOLUTION INTRAMUSCULAR; INTRAVENOUS; SUBCUTANEOUS
Status: DISCONTINUED | OUTPATIENT
Start: 2022-07-12 | End: 2022-07-12 | Stop reason: HOSPADM

## 2022-07-12 RX ORDER — SCOLOPAMINE TRANSDERMAL SYSTEM 1 MG/1
1 PATCH, EXTENDED RELEASE TRANSDERMAL
Status: DISCONTINUED | OUTPATIENT
Start: 2022-07-12 | End: 2022-07-12 | Stop reason: HOSPADM

## 2022-07-12 RX ORDER — DIVALPROEX SODIUM 500 MG/1
500 TABLET, DELAYED RELEASE ORAL NIGHTLY
Status: DISCONTINUED | OUTPATIENT
Start: 2022-07-12 | End: 2022-07-12 | Stop reason: HOSPADM

## 2022-07-12 RX ORDER — PHENYLEPHRINE HYDROCHLORIDE 10 MG/ML
INJECTION, SOLUTION INTRAMUSCULAR; INTRAVENOUS; SUBCUTANEOUS PRN
Status: DISCONTINUED | OUTPATIENT
Start: 2022-07-12 | End: 2022-07-12 | Stop reason: SURG

## 2022-07-12 RX ORDER — DIPHENHYDRAMINE HYDROCHLORIDE 50 MG/ML
12.5 INJECTION INTRAMUSCULAR; INTRAVENOUS
Status: DISCONTINUED | OUTPATIENT
Start: 2022-07-12 | End: 2022-07-12 | Stop reason: HOSPADM

## 2022-07-12 RX ORDER — CEFAZOLIN SODIUM 1 G/3ML
INJECTION, POWDER, FOR SOLUTION INTRAMUSCULAR; INTRAVENOUS PRN
Status: DISCONTINUED | OUTPATIENT
Start: 2022-07-12 | End: 2022-07-12 | Stop reason: SURG

## 2022-07-12 RX ADMIN — CEFAZOLIN 2 G: 330 INJECTION, POWDER, FOR SOLUTION INTRAMUSCULAR; INTRAVENOUS at 11:19

## 2022-07-12 RX ADMIN — HYDROMORPHONE HYDROCHLORIDE 0.2 MG: 2 INJECTION INTRAMUSCULAR; INTRAVENOUS; SUBCUTANEOUS at 11:48

## 2022-07-12 RX ADMIN — PHENYLEPHRINE HYDROCHLORIDE 100 MCG: 10 INJECTION INTRAVENOUS at 12:21

## 2022-07-12 RX ADMIN — PROPOFOL 150 MG: 10 INJECTION, EMULSION INTRAVENOUS at 11:17

## 2022-07-12 RX ADMIN — GABAPENTIN 300 MG: 300 CAPSULE ORAL at 08:25

## 2022-07-12 RX ADMIN — ROPIVACAINE HYDROCHLORIDE 30 ML: 5 INJECTION, SOLUTION EPIDURAL; INFILTRATION; PERINEURAL at 10:35

## 2022-07-12 RX ADMIN — ACETAMINOPHEN 1000 MG: 500 TABLET ORAL at 08:25

## 2022-07-12 RX ADMIN — DEXAMETHASONE SODIUM PHOSPHATE 4 MG: 4 INJECTION, SOLUTION INTRA-ARTICULAR; INTRALESIONAL; INTRAMUSCULAR; INTRAVENOUS; SOFT TISSUE at 11:19

## 2022-07-12 RX ADMIN — HYDROMORPHONE HYDROCHLORIDE 0.4 MG: 2 INJECTION INTRAMUSCULAR; INTRAVENOUS; SUBCUTANEOUS at 11:17

## 2022-07-12 RX ADMIN — ONDANSETRON 4 MG: 2 INJECTION INTRAMUSCULAR; INTRAVENOUS at 11:39

## 2022-07-12 RX ADMIN — TRANEXAMIC ACID 2000 MG: 100 INJECTION, SOLUTION INTRAVENOUS at 11:15

## 2022-07-12 RX ADMIN — SODIUM CHLORIDE, POTASSIUM CHLORIDE, SODIUM LACTATE AND CALCIUM CHLORIDE: 600; 310; 30; 20 INJECTION, SOLUTION INTRAVENOUS at 08:25

## 2022-07-12 RX ADMIN — FENTANYL CITRATE 100 MCG: 50 INJECTION, SOLUTION INTRAMUSCULAR; INTRAVENOUS at 11:17

## 2022-07-12 RX ADMIN — ATROPINE SULFATE 0.4 MG: 0.4 INJECTION, SOLUTION INTRAMUSCULAR; INTRAVENOUS; SUBCUTANEOUS at 12:26

## 2022-07-12 RX ADMIN — EPHEDRINE SULFATE 10 MG: 50 INJECTION INTRAMUSCULAR; INTRAVENOUS; SUBCUTANEOUS at 12:24

## 2022-07-12 RX ADMIN — HYDROMORPHONE HYDROCHLORIDE 0.4 MG: 2 INJECTION INTRAMUSCULAR; INTRAVENOUS; SUBCUTANEOUS at 12:18

## 2022-07-12 RX ADMIN — SUGAMMADEX 200 MG: 100 INJECTION, SOLUTION INTRAVENOUS at 12:24

## 2022-07-12 RX ADMIN — ALFENTANIL HYDROCHLORIDE 1000 MCG: 500 INJECTION INTRAVENOUS at 11:44

## 2022-07-12 RX ADMIN — ROCURONIUM BROMIDE 50 MG: 10 INJECTION, SOLUTION INTRAVENOUS at 11:17

## 2022-07-12 ASSESSMENT — COGNITIVE AND FUNCTIONAL STATUS - GENERAL
MOBILITY SCORE: 22
WALKING IN HOSPITAL ROOM: A LITTLE
CLIMB 3 TO 5 STEPS WITH RAILING: A LITTLE
SUGGESTED CMS G CODE MODIFIER MOBILITY: CJ

## 2022-07-12 ASSESSMENT — PAIN DESCRIPTION - PAIN TYPE: TYPE: SURGICAL PAIN

## 2022-07-12 ASSESSMENT — FIBROSIS 4 INDEX: FIB4 SCORE: 1.05

## 2022-07-12 ASSESSMENT — GAIT ASSESSMENTS
DEVIATION: ANTALGIC;STEP TO
DISTANCE (FEET): 150
ASSISTIVE DEVICE: FRONT WHEEL WALKER
GAIT LEVEL OF ASSIST: STANDBY ASSIST

## 2022-07-12 ASSESSMENT — PAIN SCALES - GENERAL: PAIN_LEVEL: 3

## 2022-07-12 NOTE — ANESTHESIA PROCEDURE NOTES
Peripheral Block    Date/Time: 7/12/2022 10:35 AM  Performed by: Slim Moses M.D.  Authorized by: Slim Moses M.D.     Start Time:  7/12/2022 10:35 AM  End Time:  7/12/2022 10:37 AM  Reason for Block: at surgeon's request and post-op pain management ONLY    patient identified, IV checked, site marked, risks and benefits discussed, surgical consent, monitors and equipment checked, pre-op evaluation and timeout performed    Patient Position:  Supine  Prep: ChloraPrep    Monitoring:  Heart rate, continuous pulse ox and cardiac monitor  Block Region:  Lower Extremity  Lower Extremity - Block Type:  Selective FEMORAL nerve block at the Adductor Canal    Laterality:  Right  Procedures: ultrasound guided  Image captured, interpreted and electronically stored.  Local Infiltration:  Lidocaine  Strength:  1 %  Dose:  3 ml  Block Type:  Single-shot  Needle Length:  100mm  Needle Gauge:  21 G  Needle Localization:  Ultrasound guidance  Injection Assessment:  Negative aspiration for heme, no paresthesia on injection, incremental injection and local visualized surrounding nerve on ultrasound  Evidence of intravascular injection: No     US Guided Selective Femoral Nerve Block at Adductor Canal:   US probe placed at mid-thigh level on externally rotated leg and femur identified.  Probe directed medially until Sartorius Muscle (SM), Femoral Artery (FA) and Saphenous Nerve (SN) identified in Adductor Canal (AC).  Needle inserted anterolateral to probe in an in plane approach into a subsartorial perivascular perineural position.  After negative aspiration LA injected with ease and visualized spreading within the AC.

## 2022-07-12 NOTE — ANESTHESIA PROCEDURE NOTES
Airway    Date/Time: 7/12/2022 11:19 AM  Performed by: Slim Moses M.D.  Authorized by: Slim Moses M.D.     Location:  OR  Urgency:  Elective  Indications for Airway Management:  Anesthesia      Spontaneous Ventilation: absent    Sedation Level:  Deep  Preoxygenated: Yes    Patient Position:  Sniffing  Final Airway Type:  Endotracheal airway  Final Endotracheal Airway:  ETT  Cuffed: Yes    Technique Used for Successful ETT Placement:  Direct laryngoscopy    Insertion Site:  Oral  Blade Type:  Hopkins  Laryngoscope Blade/Videolaryngoscope Blade Size:  2  ETT Size (mm):  7.5  Measured from:  Teeth  ETT to Teeth (cm):  24  Placement Verified by: auscultation and capnometry    Cormack-Lehane Classification:  Grade IIa - partial view of glottis  Number of Attempts at Approach:  1  Ventilation Between Attempts:  None  Number of Other Approaches Attempted:  0

## 2022-07-12 NOTE — ANESTHESIA POSTPROCEDURE EVALUATION
Patient: Rene Chan    Procedure Summary     Date: 07/12/22 Room / Location:  OR 06 / SURGERY [facility]    Anesthesia Start: 1112 Anesthesia Stop: 1239    Procedure: LEFT ARTHROPLASTY, KNEE, TOTAL (Left Knee) Diagnosis:       Osteoarthritis of left knee      (Osteoarthritis of left knee)    Surgeons: Arturo Boyle M.D. Responsible Provider: Slim Moses M.D.    Anesthesia Type: general, peripheral nerve block ASA Status: 3          Final Anesthesia Type: general, peripheral nerve block  Last vitals  BP   Blood Pressure : 135/59    Temp   36.5 °C (97.7 °F)    Pulse   65   Resp   14    SpO2   93 %      Anesthesia Post Evaluation    Patient location during evaluation: PACU  Patient participation: complete - patient participated  Level of consciousness: awake and alert  Pain score: 3    Airway patency: patent  Anesthetic complications: no  Cardiovascular status: hemodynamically stable  Respiratory status: acceptable  Hydration status: euvolemic    PONV: none          No complications documented.     Nurse Pain Score: 3 (NPRS)

## 2022-07-12 NOTE — THERAPY
Physical Therapy   Initial Evaluation     Patient Name: Rene Chan  Age:  71 y.o., Sex:  male  Medical Record #: [id number]  Today's Date: 7/12/2022     Precautions  Precautions: (P) No Weight Bearing Restrictions Left Lower Extremity    Assessment  Patient is 71 y.o. male who was recently seen s/p L TKA. Pt was agreeable to therapy evaluation and was able to demonstrate safe upright mobility with close guarding and use of FWW. Pt had 1-2 slight LOB during ambulation and stair training, however, was able to maintain upright standing posture with self correction and CGA from therapist. Pt appeared to improve in gait mechanics and balance as session continued. Pt demonstrated with dec in SpO2 during ambulation down to 77% on RA, however, was able to recover back to 93% after deep breathing. RN was made aware. Pt had slight LOB during step with FWW use as pt demonstrated with incorrect mechanics. Pt was able to improve mechanics with cues and demonstration and was able to verbalize and demonstrate correct mechanics. Pt states son and spouse will be able to assist pt into home. Pt is in no acute skilled PT needs at this time, anticipate pt to d/c home once medically clear.     The pt was provided with following txt: Pt provided with VC, demonstration, and facilitation for appropriate heel to toe gait mechanics. Pt was primarily limited in heel to toe mechanics due to L knee pain. Pt was provided with VC and correction during supine therapeutic exercises for correct mechanics and provided with education on intensity, frequency, and duration.     Plan    Recommend Physical Therapy for Evaluation only     DC Equipment Recommendations: (P) Front-Wheel Walker  Discharge Recommendations: (P) Recommend outpatient physical therapy services to address higher level deficits     Objective       07/12/22 1525   Initial Contact Note    Initial Contact Note Order Received and Verified, Evaluation Only - Patient Does Not Require  Further Acute Physical Therapy at this Time.  However, May Benefit from Post Acute Therapy for Higher Level Functional Deficits.   Precautions   Precautions No Weight Bearing Restrictions Left Lower Extremity   Pain 0 - 10 Group   Location Knee   Location Orientation Left   Description Aching   Therapist Pain Assessment Prior to Activity;During Activity;Post Activity;Nurse Notified;3   Prior Living Situation   Prior Services None   Housing / Facility 1 Story House   Steps Into Home 2   Steps In Home 0   Equipment Owned None   Lives with - Patient's Self Care Capacity Spouse;Adult Children   Comments spouse will be able to assist upon d/c to home and will have son assist as needed   Prior Level of Functional Mobility   Bed Mobility Independent   Transfer Status Independent   Ambulation Independent   Distance Ambulation (Feet)   (community)   Assistive Devices Used None   Stairs Independent   History of Falls   History of Falls No   Cognition    Cognition / Consciousness WDL   Comments pleasant/cooperative; slightly lethargic   Passive ROM Lower Body   Passive ROM Lower Body X   Comments limited due to pain; able to demo 0 to 90 deg of L knee ROM   Active ROM Lower Body    Active ROM Lower Body  X   Comments same as above   Strength Lower Body   Lower Body Strength  X   Comments limited due to pain; able to demo functional strength for B LE   Sensation Lower Body   Lower Extremity Sensation   WDL   Lower Body Muscle Tone   Lower Body Muscle Tone  WDL   Strength Upper Body   Upper Body Strength  WDL   Upper Body Muscle Tone   Upper Body Muscle Tone  WDL   Neurological Concerns   Neurological Concerns No   Coordination Upper Body   Coordination WDL   Coordination Lower Body    Coordination Lower Body  WDL   Balance Assessment   Sitting Balance (Static) Good   Sitting Balance (Dynamic) Fair +   Standing Balance (Static) Good   Standing Balance (Dynamic) Fair +   Weight Shift Sitting Good   Weight Shift Standing Fair    Comments w/fww   Gait Analysis   Gait Level Of Assist Standby Assist   Assistive Device Front Wheel Walker   Distance (Feet) 150   # of Times Distance was Traveled 1   Deviation Antalgic;Step To   # of Stairs Climbed 2   Level of Assist with Stairs Contact Guard Assist   Weight Bearing Status none noted   Comments cues and demo to go up/down steps; slightly LOB due to incorrect mechanics; required increased reps to improve mechanics   Bed Mobility    Comments found up in chair   Functional Mobility   Sit to Stand Standby Assist   Bed, Chair, Wheelchair Transfer Standby Assist   Transfer Method Stand Step   Mobility EOB, sit<>stand, ambulation, stairs, back to chair   Comments cues for handplacement upon standing   How much difficulty does the patient currently have...   Turning over in bed (including adjusting bedclothes, sheets and blankets)? 4   Sitting down on and standing up from a chair with arms (e.g., wheelchair, bedside commode, etc.) 4   Moving from lying on back to sitting on the side of the bed? 4   How much help from another person does the patient currently need...   Moving to and from a bed to a chair (including a wheelchair)? 4   Need to walk in a hospital room? 3   Climbing 3-5 steps with a railing? 3   6 clicks Mobility Score 22   Activity Tolerance   Sitting in Chair NT   Sitting Edge of Bed 5 mins   Standing 10 mins   Comments no adverse events noted   Edema / Skin Assessment   Edema / Skin  Not Assessed   Education Group   Education Provided Role of Physical Therapist;Gait Training;Stair Training;Use of Assistive Device   Role of Physical Therapist Patient Response Patient;Acceptance;Explanation;Demonstration;Verbal Demonstration;Action Demonstration   Gait Training Patient Response Patient;Acceptance;Explanation;Demonstration;Verbal Demonstration;Action Demonstration   Stair Training Patient Response Patient;Acceptance;Explanation;Demonstration;Action Demonstration;Verbal Demonstration   Use  of Assistive Device Patient Response Patient;Acceptance;Explanation;Demonstration;Verbal Demonstration;Action Demonstration   Anticipated Discharge Equipment and Recommendations   DC Equipment Recommendations Front-Wheel Walker   Discharge Recommendations Recommend outpatient physical therapy services to address higher level deficits   Interdisciplinary Plan of Care Collaboration   IDT Collaboration with  Nursing   Patient Position at End of Therapy Call Light within Reach;Tray Table within Reach;Phone within Reach;Family / Friend in Room;Seated   Collaboration Comments aware of visit and recs   Session Information   Date / Session Number  7/12 eval only   Priority 0

## 2022-07-12 NOTE — ANESTHESIA TIME REPORT
Anesthesia Start and Stop Event Times     Date Time Event    7/12/2022 1030 Ready for Procedure     1112 Anesthesia Start     1239 Anesthesia Stop        Responsible Staff  07/12/22    Name Role Begin End    Slim Moses M.D. Anesth 1112 1239        Overtime Reason:  no overtime (within assigned shift)    Comments:

## 2022-07-12 NOTE — OP REPORT
DIAGNOSIS: left knee osteoarthritis.    PROCEDURE: Total knee arthroplasty, left side.    ANESTHESIA: General.    COMPLICATIONS: None.    SURGEON: Arturo Boyle MD    ASSISTANT: Andrew Lentz    INDICATIONS: This is a patient with severe pain secondary to osteoarthritis having failed conservative treatments.    DESCRIPTION OF PROCEDURE: Patient was identified in the preop area, site was   marked, taken back to the operating room and underwent general anesthesia.   The left lower extremity was prepped and draped in sterile manner.   Preoperative timeout was held and antibiotics were given. Incision was made   over the anterior knee, soft tissue was dissected down to the fascia. The   fascia was split in medial parapatellar approach. The step drill was placed,   cut was made at 6 degrees and then a femoral cut guide was placed. All cuts   were made for the 7. The tibia was cut and sized to the 4. The   patella was cut and sized to a 38. All the trials were removed and then the   7 CR J2 S&N femur, 4 tibia and 38 oval patellar button were all cemented into  place. Final trialing showed that a 11 poly provided stability throughout   the entire arc of motion and 11 poly was placed. The wound was soaked with   dilute Betadine solution and was injected with Marcaine. Vicryl was used for   the fascia, Monocryl, soft tissue, skin and Dermabond for the final skin   layer. Patient was woken up, taken back to PACU, will be weightbear as   tolerated.

## 2022-07-12 NOTE — ANESTHESIA PREPROCEDURE EVALUATION
Case: [id number] Date/Time: 07/12/22 0945    Procedure: LEFT ARTHROPLASTY, KNEE, TOTAL (Left )    Diagnosis: Osteoarthritis of left knee [M17.12]    Pre-op diagnosis: Osteoarthritis of left knee [M17.12]    Location:  OR  / SURGERY [facility]    Surgeons: Arturo Boyle M.D.          Relevant Problems   NEURO   (positive) History of DVT (deep vein thrombosis)   (positive) Hx pulmonary embolism   (positive) TIA (transient ischemic attack)      CARDIAC   (positive) Atherosclerosis of aorta (HCC)   (positive) Bilateral carotid artery stenosis   (positive) Coronary artery disease due to lipid rich plaque   (positive) Leg DVT (deep venous thromboembolism), chronic, left (HCC)   (positive) Mild aortic stenosis   (positive) Primary hypertension   (positive) S/P CABG x 3 - 6/2015      ENDO   (positive) Acquired hypothyroidism      Other   (positive) Gout of foot       Physical Exam    Airway   Mallampati: II  TM distance: >3 FB  Neck ROM: full       Cardiovascular - normal exam  Rhythm: regular  Rate: normal  (-) murmur     Dental - normal exam           Pulmonary - normal exam  Breath sounds clear to auscultation     Abdominal    Neurological - normal exam                 Anesthesia Plan    ASA 3   ASA physical status 3 criteria: hypertension - poorly controlled, moderate reduction of ejection fraction and CAD/stents (> 3 months)    Plan - general and peripheral nerve block     Peripheral nerve block will be post-op pain control  Airway plan will be ETT                    Informed Consent:

## 2022-07-12 NOTE — OR NURSING
1236 PT RECEIVED IN PACU, REPORT RECEIVED.  PT NON RESPONSIVE, OPA IN PLACE, NO ACUTE RESPIRATORY DISTRESS OBSERVED. ADJ BP CUFF.     1329 PT AWAKE, FOLLOWS COMMANDS, D/C OPA.     1335 XRAY NOTIFIED. VSS    1350 O2 SAT DROPS TO 88% RA. IS INSTRUCTIONS GIVEN TO PT WITH RETURN DEMONSTRATION. PT ACHIEVED 2500ML. GOAL 2875ML.     1407 XRAY AT BEDSIDE.     1416 PT MEETS CRITERIA FOR TRANSFER TO STAGE 2.

## 2022-07-12 NOTE — OR NURSING
1436: Pt arrived to stage 2 via ranjit from PACU. Pt is AO, states pain is at tolerable level.  Left knee dressing CDI.  Called Dr. Boyle regarding re-starting coumadin and lovenox.  Pt is to re-start taking coumadin starting tonight and restart lovenox tomorrow morning.     1445:  Physical therapist at chairside. Pt sitting up in recliner.Per PT, pt will not need OT today.  Pt's wife at chairside.    1502:  Pt walking with walker with standby assist.     1600:  Pt given walker and pt demonstrated its use; tolerated well, denies dizziness or weakness.  D/Lloyd to care of wife post uneventful stay in PACU 2.

## 2022-07-12 NOTE — LETTER
May 26, 2022    Patient Name: Rene Chan  Surgeon Name: Arturo Boyle M.D.  Surgery Facility: [facility] ([address] [facility])  Surgery Date: 7/12/2022    The time of your surgery is not final and may change up to and until the day of your surgery. You will be contacted 24-48 hours prior to your surgery date with your check-in and surgery time.    If you will not be at one of the below numbers please call/text the surgery scheduler at [phone]  Preferred Phone: [phone]    BEFORE YOUR SURGERY   Pre Registration and/or Lab Work must be done within and no earlier than 28 days prior to your surgery date. Please call [facility] at [phone] for an appointment as soon as possible.    COVID testing is not required for non-symptomatic vaccinated patients with proof of vaccination at [facility].  For un-vaccinated patients please refer to the following instructions for your COVID testing requirements:    COVID test required 4-7 days prior to surgery, failure to do so can result in a cancellation.    The following locations offer COVID testing:    Approved facilities for COVID testing, if scheduled at [facility]:  · [facility] from 7:30am-3:30pm at [facility] [place] [place] [place]  · [facility] [phone] (Please call for an appointment)  · Your local pharmacy    Not scheduled at [facility] contact the scheduled facility for approved testing facilities.        Please refrain from smoking any substance after midnight prior to surgery. Smoking may interfere with the anesthetic and frequently produces nausea during the recovery period.    Continue taking all lifesaving medications. Including the morning of your surgery with small sip of water.    Please read the MEDICATION INSTRUCTIONS below completely.    DAY OF YOUR SURGERY  Nothing to eat or drink after midnight     Please arrive at the hospital/surgery  center at the check-in time provided.     An adult will need to bring you and take you home after your surgery.     AFTER YOUR SURGERY  Post op Appointment:   Date: 7/28/22   Time: 0900   With: Arturo Boyle M.D.   Location: [address] [place] [id number]    - Therapy- Your first appointment should be 1  week(s) after your surgery. For your convenience we have 4 Physical Therapy locations: [place], [facility], [place], and [facility]. Call our office ASAP to schedule an appointment at [phone] or take the enclosed Therapy Prescription to a facility of your choice.  - No dental work for 3-6 months after your surgery.  - You must have someone provide transportation post surgery and someone to monitor you for at least 24 hours post-surgery. If you don't have either of these your appointment will be canceled.     TIME OFF WORK  FMLA or Disability forms can be faxed directly to: [phone] or you may drop them off at [facility] [place] [facility] [place] [address]. Our office charges a $35.00 fee per form. Forms will be completed within 10 business days of drop off and payment received. For the status of your forms you may contact our disability office directly [phone].    MEDICATION INSTRUCTIONS  The following medications should be stopped a minimum of 10 days prior to surgery:  All over the counter, Supplements & Herbal medications    Anorectics: Phentermine (Adipex-P, Lomaira and Suprenza), Phentermine-topiramate (Qsymia), Bupropion-naltrexone (Contrave)    Opiod Partial Agonists/Opioid Antagonists: Buprenorphine (Subocone, Belbuca, Butrans, Probuphine Implant, Sublocade), Naltrexone (ReVia, Vivitrol), Naloxone    Amphetamines: Dextroamphetamine/Amphetamine (Adderall, Mydayis), Methylphenidate Hydrochloride (Concerta, Metadate, Methylin, Ritalin)    The following medications should be stopped 5 days prior to surgery:  Blood Thinners: Any Aspirin, Aspirin products, anti-inflammatories such as ibuprofen  and any blood thinners such as Coumadin and Plavix. Please consult your prescribing physician if you are on life saving blood thinners, in regards to when to stop medications prior to surgery.     The following medications should be stopped a minimum of 3 days prior to surgery:  PDE-5 inhibitors: Sildenafil (Viagra), Tadalafil (Cialis), Vardenafil (Levitra), Avanafil (Stendra)    MAO Inhibitors: Rasagiline (Azilect), Selegiline (Eldepryl, Emsam, Selapar), Isocarboxazid (Marplan), Phenelzine (Nardil)

## 2022-07-12 NOTE — DISCHARGE INSTRUCTIONS
If any questions arise, call your provider.  If your provider is not available, please feel free to call the Surgical Center at [phone]  .    MEDICATIONS: Resume taking daily medication.  Take prescribed pain medication with food.  If no medication is prescribed, you may take non-aspirin pain medication if needed.  PAIN MEDICATION CAN BE VERY CONSTIPATING.  Take a stool softener or laxative such as senokot, pericolace, or milk of magnesia if needed.    NO ORAL PAIN MEDICATION GIVEN IN RECOVERY.     What to Expect Post Anesthesia    Rest and take it easy for the first 24 hours.  A responsible adult is recommended to remain with you during that time.  It is normal to feel sleepy.  We encourage you to not do anything that requires balance, judgment or coordination.    FOR 24 HOURS DO NOT:  Drive, operate machinery or run household appliances.  Drink beer or alcoholic beverages.  Make important decisions or sign legal documents.    To avoid nausea, slowly advance diet as tolerated, avoiding spicy or greasy foods for the first day.  Add more substantial food to your diet according to your provider's instructions.  Babies can be fed formula or breast milk as soon as they are hungry.  INCREASE FLUIDS AND FIBER TO AVOID CONSTIPATION.    MILD FLU-LIKE SYMPTOMS ARE NORMAL.  YOU MAY EXPERIENCE GENERALIZED MUSCLE ACHES, THROAT IRRITATION, HEADACHE AND/OR SOME NAUSEA.    Activity:   The first week after your knee joint replacement is a time to recover from the surgery. We expect light exercise to keep you active and mobile.    LEFT LEG:  NON-WEIGHT BEARING FOR 24 HOURS. THEN FOLLOW DR. TAPIA INSTRUCTIONS.     Do not place any weight on your injured leg. Hold your injured leg off the floor when you stand or walk. Do not touch the floor with your injured leg.  Since you are not able to bear any weight on the leg, an assistive device, such as a walker or crutches, will be necessary for you to walk.     ASSISTED DEVICES: You  are being discharged with the following special equipment:  Crutches and Walker      SWELLING/BRUISING  Swelling is an expected response to surgery. To reduce swelling, elevate your surgical limb above heart level multiple times per day and apply ice (see Ice instructions). If your swelling becomes excessive, is limiting your ability to move, or becomes worrisome please contact your doctor's office.    Bruising often does not appear until after you arrive home and can be quite dramatic-appearing purple, black, or green. Bruising is typically not concerning and will subside without any treatment.     FOLLOW-UP  Make sure that you have an appointment 7-14 days following surgery. Your procedure/rehabilitation will be discussed and physical therapy may be prescribed at that time.        Discharge Instructions:  The first week after your joint replacement is a time to recover from the surgery. We expect light exercise to keep you active and mobile.   Most patients are prescribed two medications for pain control: a narcotic such as Oxycodone or Hydrocodone or a milder medication called Tramadol. These can be alternated for pain control, and the priority is to decrease use of the stronger narcotic as soon as tolerated. In addition, you were prescribed Celebrex, an anti-inflammatory medication. Do not take any other types of anti-inflammatories with this medication. Last, you should take acetaminophen, either 500mg every 4 hours, or 1000mg every 8 hours, around the clock. Do not exceed 3000mg in a 24 hour period. The anti-inflammatory and acetaminophen will help reduce the amount of narcotic medication you require.      Take your pain medication as appropriate to ensure that your pain is not limiting your recovery. You'll be seen in clinic in 7-10 days (this appointment has already been made, call our office if you're unsure of the time). At that time, we'll prescribe your physical therapy to help with the recovery phase.       -Keep dressing clean and dry. Leave dressing in place until follow up. If you have an incisional vac dressing, the battery may die before your first post operative appointment, but you can leave the surgical dressing in place and it will be removed at your clinic visit. The battery of the dressing may die, and the sponge will inflate because it is no longer holding suction. You can still leave the dressing in place and it will be removed at the office. Call the office if you notice drainage from the surgical dressing.     -OK to shower, keep dressing in place. Pat dressing dry, do not rub incision     -Do not soak incision in bath, hot tub or pool     -Follow up with Dr. Boyle at regularly scheduled time     -Call [facility] office at [phone] for questions     -Weight bearing status: as tolerated     -Restart you anticoagulation as directed by your surgeon and primary physician (typically 24 hours after surgery)

## 2022-07-15 ENCOUNTER — HOME HEALTH ADMISSION (OUTPATIENT)
Dept: HOME HEALTH SERVICES | Facility: HOME HEALTHCARE | Age: 72
End: 2022-07-15
Payer: MEDICARE

## 2022-07-18 ENCOUNTER — ANTICOAGULATION VISIT (OUTPATIENT)
Dept: VASCULAR LAB | Facility: MEDICAL CENTER | Age: 72
End: 2022-07-18
Attending: INTERNAL MEDICINE
Payer: MEDICARE

## 2022-07-18 DIAGNOSIS — Z86.711 HX PULMONARY EMBOLISM: ICD-10-CM

## 2022-07-18 DIAGNOSIS — G45.9 TIA (TRANSIENT ISCHEMIC ATTACK): ICD-10-CM

## 2022-07-18 DIAGNOSIS — I82.502 LEG DVT (DEEP VENOUS THROMBOEMBOLISM), CHRONIC, LEFT (HCC): ICD-10-CM

## 2022-07-18 LAB
INR BLD: 1.3 (ref 0.9–1.2)
INR PPP: 1.3 (ref 2–3.5)

## 2022-07-18 PROCEDURE — 99212 OFFICE O/P EST SF 10 MIN: CPT | Performed by: NURSE PRACTITIONER

## 2022-07-18 PROCEDURE — 85610 PROTHROMBIN TIME: CPT

## 2022-07-18 NOTE — PROGRESS NOTES
Anticoagulation Summary  As of 2022    INR goal:  2.0-3.0   TTR:  58.1 % (7.1 y)   INR used for dosin.30 (2022)   Warfarin maintenance plan:  3.75 mg (7.5 mg x 0.5) every Mon, Wed, Fri; 7.5 mg (7.5 mg x 1) all other days   Weekly warfarin total:  41.25 mg   Plan last modified:  LAMAR Bucio (2022)   Next INR check:  2022   Priority:  Acute   Target end date:  Indefinite    Indications    DVT (deep venous thrombosis) (HCC) (Resolved) [I82.409]  TIA (transient ischemic attack) [G45.9]  Pulmonary embolism [415.19] (Resolved) [I26.99]             Anticoagulation Episode Summary     INR check location:  Anticoagulation Clinic    Preferred lab:  [facility]    Send INR reminders to:      Comments:  ASA 81 mg for TIA, CAD hx - [facility] per Dr. Wright 3/7/22      Anticoagulation Care Providers     Provider Role Specialty Phone number    Patricia Damon M.D. Referring Internal Medicine [phone]    Jona QuinnD Responsible      [facility] Anticoagulation Services Responsible  [phone]                Refer to Patient Findings for HPI:  Patient Findings     Positives:  Change in health, Change in activity, Change in medications, Other complaints    Negatives:  Signs/symptoms of thrombosis, Signs/symptoms of bleeding, Laboratory test error suspected, Change in alcohol use, Upcoming invasive procedure, Emergency department visit, Upcoming dental procedure, Missed doses, Extra doses, Change in diet/appetite, Hospital admission, Bruising    Comments:  He had a left TKR last week. Resumed warfarin the night of surgery. Is bridging with Lovenox 150 mg daily as instructed. Using a walker. Has significant bruising to his left thigh, calf and ankle. Is elevating when he sits.          There were no vitals filed for this visit.   pt declined vitals    Verified current warfarin dosing schedule.    Medications reconciled   Pt is on ASA 81 mg as antiplatelet therapy for CAD and  must be reviewed again on with cardiology.      A/P   INR  sub-therapeutic at 1.3. Continue enoxaparin.    Warfarin dosing recommendation: Tonight take 11.25 mg (1.5 tabs) then resume your usual regimen.    Pt educated to contact our clinic with any changes in medications or s/s of bleeding or thrombosis. Pt is aware to seek immediate medical attention for falls, head injury or deep cuts.    Follow up appointment on Wednesday.    MISTI BucioP.N.

## 2022-07-19 ENCOUNTER — PATIENT MESSAGE (OUTPATIENT)
Dept: MEDICAL GROUP | Facility: PHYSICIAN GROUP | Age: 72
End: 2022-07-19

## 2022-07-20 ENCOUNTER — ANTICOAGULATION VISIT (OUTPATIENT)
Dept: VASCULAR LAB | Facility: MEDICAL CENTER | Age: 72
End: 2022-07-20
Attending: INTERNAL MEDICINE
Payer: MEDICARE

## 2022-07-20 VITALS — DIASTOLIC BLOOD PRESSURE: 61 MMHG | SYSTOLIC BLOOD PRESSURE: 127 MMHG | HEART RATE: 60 BPM

## 2022-07-20 DIAGNOSIS — G45.9 TIA (TRANSIENT ISCHEMIC ATTACK): ICD-10-CM

## 2022-07-20 LAB
INR BLD: 2.1 (ref 0.9–1.2)
INR PPP: 2.1 (ref 2–3.5)

## 2022-07-20 PROCEDURE — 99211 OFF/OP EST MAY X REQ PHY/QHP: CPT | Performed by: NURSE PRACTITIONER

## 2022-07-20 PROCEDURE — 85610 PROTHROMBIN TIME: CPT

## 2022-07-20 NOTE — PROGRESS NOTES
Anticoagulation Summary  As of 2022    INR goal:  2.0-3.0   TTR:  58.1 % (7.1 y)   INR used for dosin.10 (2022)   Warfarin maintenance plan:  3.75 mg (7.5 mg x 0.5) every Mon, Wed, Fri; 7.5 mg (7.5 mg x 1) all other days   Weekly warfarin total:  41.25 mg   Plan last modified:  MISTI BucioPLESLIE (2022)   Next INR check:  8/10/2022   Priority:  Acute   Target end date:  Indefinite    Indications    DVT (deep venous thrombosis) (HCC) (Resolved) [I82.409]  TIA (transient ischemic attack) [G45.9]  Pulmonary embolism [415.19] (Resolved) [I26.99]             Anticoagulation Episode Summary     INR check location:  Anticoagulation Clinic    Preferred lab:  [facility]    Send INR reminders to:      Comments:  ASA 81 mg for TIA, CAD hx - [facility] per Dr. Wirght 3/7/22      Anticoagulation Care Providers     Provider Role Specialty Phone number    Patricia Damon M.D. Referring Internal Medicine [phone]    Jona QuinnD Responsible      [facility] Anticoagulation Services Responsible  [phone]                Refer to Patient Findings for HPI:  Patient Findings     Positives:  Change in medications, Bruising    Negatives:  Signs/symptoms of thrombosis, Signs/symptoms of bleeding, Laboratory test error suspected, Change in health, Change in alcohol use, Change in activity, Upcoming invasive procedure, Emergency department visit, Upcoming dental procedure, Missed doses, Extra doses, Change in diet/appetite, Hospital admission, Other complaints    Comments:  Has Tramadol as needed for pain. Also has prednisone PRN gout pain but he hasn't needed to take.          Vitals:    22 1039   BP: 127/61   Pulse: 60       Verified current warfarin dosing schedule.    Medications reconciled   Pt is on ASA 81 mg as antiplatelet therapy for CAD and must be reviewed again on with cardiology.      A/P   INR  -therapeutic. INR up from 1.3 on Monday with a loading dose. Stop Lovenox. He will  resume his usual dose of regimen.    Warfarin dosing recommendation: Take 3.75 mg M-W-F, 7.5 mg all other days.    Pt educated to contact our clinic with any changes in medications or s/s of bleeding or thrombosis. Pt is aware to seek immediate medical attention for falls, head injury or deep cuts.    Follow up appointment in 3 week(s) per patient's request. Suggested sooner follow up but he prefers 3 weeks due to recent surgery and somewhat limited mobility.    TOREY Bucio.

## 2022-07-21 ENCOUNTER — HOME CARE VISIT (OUTPATIENT)
Dept: HOME HEALTH SERVICES | Facility: HOME HEALTHCARE | Age: 72
End: 2022-07-21

## 2022-08-10 ENCOUNTER — ANTICOAGULATION VISIT (OUTPATIENT)
Dept: VASCULAR LAB | Facility: MEDICAL CENTER | Age: 72
End: 2022-08-10
Attending: INTERNAL MEDICINE
Payer: MEDICARE

## 2022-08-10 ENCOUNTER — PATIENT OUTREACH (OUTPATIENT)
Dept: HEALTH INFORMATION MANAGEMENT | Facility: OTHER | Age: 72
End: 2022-08-10

## 2022-08-10 VITALS — HEART RATE: 75 BPM | SYSTOLIC BLOOD PRESSURE: 125 MMHG | DIASTOLIC BLOOD PRESSURE: 76 MMHG

## 2022-08-10 DIAGNOSIS — E11.9 TYPE 2 DIABETES MELLITUS WITHOUT COMPLICATION, UNSPECIFIED WHETHER LONG TERM INSULIN USE (HCC): ICD-10-CM

## 2022-08-10 DIAGNOSIS — G45.9 TIA (TRANSIENT ISCHEMIC ATTACK): ICD-10-CM

## 2022-08-10 DIAGNOSIS — I25.10 CORONARY ARTERY DISEASE DUE TO LIPID RICH PLAQUE: ICD-10-CM

## 2022-08-10 DIAGNOSIS — I25.83 CORONARY ARTERY DISEASE DUE TO LIPID RICH PLAQUE: ICD-10-CM

## 2022-08-10 LAB — INR PPP: 2.9 (ref 2–3.5)

## 2022-08-10 PROCEDURE — 99211 OFF/OP EST MAY X REQ PHY/QHP: CPT

## 2022-08-10 PROCEDURE — 85610 PROTHROMBIN TIME: CPT

## 2022-08-10 NOTE — PROGRESS NOTES
Attempted to call Rene for Personal Care Management Program information since I am unable to see him in clinic. LVM with contact information for Rene to call back. Will leave as identified at this time

## 2022-08-10 NOTE — PROGRESS NOTES
Anticoagulation Summary  As of 8/10/2022      INR goal:  2.0-3.0   TTR:  58.4 % (7.1 y)   INR used for dosin.90 (8/10/2022)   Warfarin maintenance plan:  3.75 mg (7.5 mg x 0.5) every Mon, Wed, Fri; 7.5 mg (7.5 mg x 1) all other days   Weekly warfarin total:  41.25 mg   Plan last modified:  Miya Acosta APaulPPaulNPaul (2022)   Next INR check:  2022   Priority:  Acute   Target end date:  Indefinite    Indications    DVT (deep venous thrombosis) (HCC) (Resolved) [I82.409]  TIA (transient ischemic attack) [G45.9]  Pulmonary embolism [415.19] (Resolved) [I26.99]                 Anticoagulation Episode Summary       INR check location:  Anticoagulation Clinic    Preferred lab:  [facility]    Send INR reminders to:      Comments:  ASA 81 mg for TIA, CAD hx - [facility] per Dr. Wright 3/7/22          Anticoagulation Care Providers       Provider Role Specialty Phone number    Patricia Damon M.D. Referring Internal Medicine [phone]    Jona QuinnD Responsible      [facility] Anticoagulation Services Responsible  [phone]                  Refer to Patient Findings for HPI:  Patient Findings       Positives:  Upcoming dental procedure ( month cleaning and filling; Conlonoscopy schedule for 2022)    Negatives:  Signs/symptoms of thrombosis, Signs/symptoms of bleeding, Laboratory test error suspected, Change in health, Change in alcohol use, Change in activity, Upcoming invasive procedure, Emergency department visit, Missed doses, Extra doses, Change in medications, Change in diet/appetite (decreased adjasdk), Hospital admission, Bruising, Other complaints            Vitals:    08/10/22 1029   BP: 125/76   Pulse: 75       Verified current warfarin dosing schedule.    Medications reconciled   Pt is on ASA 81 mg daily as antiplatelet therapy for TIA/CAD history indefinitely per Dr. Wright       A/P   INR  2.9 -therapeutic.     Warfarin dosing recommendation:    Pt  educated to contact our clinic with any changes in medications or s/s of bleeding or thrombosis. Pt is aware to seek immediate medical attention for falls, head injury or deep cuts.    Follow up appointment in 4 week(s).    Gautam Farmer, Pharmacy Student    Nandini Hill, PharmD

## 2022-08-11 ENCOUNTER — OFFICE VISIT (OUTPATIENT)
Dept: MEDICAL GROUP | Facility: PHYSICIAN GROUP | Age: 72
End: 2022-08-11
Payer: MEDICARE

## 2022-08-11 VITALS
BODY MASS INDEX: 29.98 KG/M2 | HEART RATE: 70 BPM | WEIGHT: 191 LBS | RESPIRATION RATE: 18 BRPM | SYSTOLIC BLOOD PRESSURE: 128 MMHG | HEIGHT: 67 IN | DIASTOLIC BLOOD PRESSURE: 66 MMHG | TEMPERATURE: 97.6 F | OXYGEN SATURATION: 94 %

## 2022-08-11 DIAGNOSIS — J39.2 THROAT IRRITATION: ICD-10-CM

## 2022-08-11 DIAGNOSIS — E11.9 TYPE 2 DIABETES MELLITUS WITHOUT COMPLICATION, UNSPECIFIED WHETHER LONG TERM INSULIN USE (HCC): ICD-10-CM

## 2022-08-11 DIAGNOSIS — Z13.6 ENCOUNTER FOR ABDOMINAL AORTIC ANEURYSM SCREENING: ICD-10-CM

## 2022-08-11 LAB — INR BLD: 2.9 (ref 0.9–1.2)

## 2022-08-11 PROCEDURE — 99214 OFFICE O/P EST MOD 30 MIN: CPT | Performed by: FAMILY MEDICINE

## 2022-08-11 ASSESSMENT — FIBROSIS 4 INDEX: FIB4 SCORE: 1.05

## 2022-08-11 NOTE — ASSESSMENT & PLAN NOTE
This is a chronic problem.  Patient was told that he needs to recheck his hemoglobin A1c the middle of next month.  The last one was elevated and he has lost weight and change his diet since then.

## 2022-08-11 NOTE — ASSESSMENT & PLAN NOTE
This is an acute problem.  Patient states ever since his operation a month ago and being intubated he has felt a scratch and irritation in the back of his throat.  States sometimes he can put his finger on the right posterior anterior aspect and feel where it hurts.  After rubbing it the pain will go away for about a day.  Does not really have any trouble swallowing.

## 2022-08-11 NOTE — PROGRESS NOTES
Subjective:     CC: Here for a couple of issues.    HPI:   Rene presents today with the following medical concerns:    Throat irritation  This is an acute problem.  Patient states ever since his operation a month ago and being intubated he has felt a scratch and irritation in the back of his throat.  States sometimes he can put his finger on the right posterior anterior aspect and feel where it hurts.  After rubbing it the pain will go away for about a day.  Does not really have any trouble swallowing.    DM (diabetes mellitus) ([facility])  This is a chronic problem.  Patient was told that he needs to recheck his hemoglobin A1c the middle of next month.  The last one was elevated and he has lost weight and change his diet since then.    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    Colon polyps     Diabetes mellitus, type II (HCC)     diet controlled    Gout     Hammer toe     Hyperlipidemia     Hypertension     Hypothyroidism     Impaired fasting glucose     Mild aortic stenosis - echo 2018        Social History     Tobacco Use    Smoking status: Former     Packs/day: 2.00     Years: 10.00     Pack years: 20.00     Types: Cigarettes     Quit date: 1976     Years since quittin.6    Smokeless tobacco: Never    Tobacco comments:     quit x 32 yrs; 2ppd x 7 years   Vaping Use    Vaping Use: Never used   Substance Use Topics    Alcohol use: No    Drug use: No     Comment: IV drug use in teens       Current Outpatient Medications Ordered in Epic   Medication Sig Dispense Refill    levothyroxine (SYNTHROID) 75 MCG Tab Take 1 Tablet by mouth every evening. 100 Tablet 2    predniSONE (DELTASONE) 20 MG Tab Take 2 po a day for 5  days and then 1 a day for 1 week prn gout flare. 30 Tablet 2    divalproex (DEPAKOTE) 125 MG EC tablet Take 125 mg by mouth every morning.      divalproex (DEPAKOTE) 250 MG Tablet Delayed Response Take 2 Tablets by mouth every evening. 180 Tablet 3    traZODone (DESYREL) 50 MG Tab Take 1 Tablet by mouth every evening. For insomnia 90 Tablet 2    atorvastatin (LIPITOR) 80 MG tablet TAKE 1 TABLET BY MOUTH EVERY DAY (Patient taking differently: Take 80 mg by mouth every day.) 100 Tablet 3    warfarin (COUMADIN) 7.5 MG Tab TAKE 0.5 to 1 TABLET BY MOUTH DAILY AS DIRECTED BY [facility] (Patient taking differently: Take 3.75-7.5 mg by mouth every day. 3.75 mg Monday, Wednesday, and Friday   7.5 mg all other days) 90 Tablet 1    Glucosamine-Chondroit-Vit C-Mn (GLUCOSAMINE CHONDR 500 COMPLEX PO) Take 1 Tablet by mouth 2 times a day.      lisinopril (PRINIVIL) 5 MG Tab Take 1 Tablet by mouth every day. 100 Tablet 3    Melatonin 10 MG Tab Take 10 mg by mouth at bedtime.      metoprolol tartrate (LOPRESSOR) 25 MG Tab Take 0.5 Tablets by mouth every day. 45 Tablet 3    aspirin 81 MG tablet Take 81 mg by mouth every day. 100 Tab     COENZYME Q10 PO Take 1 Tab by mouth every morning.      Cholecalciferol (VITAMIN D) 2000 UNITS Cap Take 2,000 Units by mouth every day.      docosahexanoic acid (OMEGA 3 FA) 1000 MG CAPS Take 1,000 mg by mouth every day.       No current [facility]-ordered facility-administered medications on file.       Allergies:  Penicillins    Health Maintenance: Completed    ROS:  Gen: no fevers/chills, no changes in weight  Eyes: no changes in vision  ENT: no sore throat, no hearing loss, no bloody nose  Pulm: no sob, no cough  CV: no chest pain, no palpitations  GI: no nausea/vomiting, no diarrhea  : no dysuria  MSk: no myalgias  Skin: no rash  Neuro: no headaches, no numbness/tingling  Heme/Lymph: no easy bruising      Objective:       Exam:  /66 (BP Location: Right arm, Patient  "Position: Sitting, BP Cuff Size: Adult)   Pulse 70   Temp 36.4 °C (97.6 °F) (Temporal)   Resp 18   Ht 1.702 m (5' 7\")   Wt 86.6 kg (191 lb)   SpO2 94%   BMI 29.91 kg/m²  Body mass index is 29.91 kg/m².    Gen: Alert and oriented, No apparent distress.  Neck: Neck is supple without lymphadenopathy.  Mouth: No obvious suspicious lesions are seen in the back of his throat.  Tongue appears normal.  There is a very small cyst just to the right of midline next to his uvula.  The uvula does come down to a point and is a little bit longer than normal.  Ext: No clubbing, cyanosis, edema.  His left knee shows a well-healed incision.      Labs: Reviewed    Assessment & Plan:     71 y.o. male with the following -     1. Throat irritation  This is a new problem.  Given that he has had continued troubles will refer him to ear nose and throat for further evaluation.  - Referral to ENT    2. Encounter for abdominal aortic aneurysm screening  This is a screening issue.  Ultrasound ordered.  - US-ABDOMINAL AORTA W/O DOPPLER; Future    3. Type 2 diabetes mellitus without complication, unspecified whether long term insulin use (HCC)  This is a chronic problem.  We will see what his next hemoglobin A1c shows      Return in about 6 months (around 2/11/2023) for Long.  30 minutes spent with patient.  Please note that this dictation was created using voice recognition software. I have made every reasonable attempt to correct obvious errors, but I expect that there are errors of grammar and possibly content that I did not discover before finalizing the note.        "

## 2022-08-11 NOTE — LETTER
[facility]  Michael Sethi III, M.D.  [address]  [place] [address]  Fax: [phone]   Authorization for Release/Disclosure of   Protected Health Information   Name: NURY VALENCIA : 1950 SSN: [id number]   Address: [facility] [place] [address] Phone:    [phone] (home)    I authorize the entity listed below to release/disclose the PHI below to:   [facility]/Michael Sethi III, M.D. and Michael Sethi III, M.D.   Provider or Entity Name:  [facility] [place]   Address   City, State, Zip  [address] [place] [address] Phone:  [phone]    Fax:  [phone]   Reason for request: continuity of care   Information to be released:    [  ] LAST COLONOSCOPY,  including any PATH REPORT and follow-up  [  ] LAST FIT/COLOGUARD RESULT [  ] LAST DEXA  [  ] LAST MAMMOGRAM  [  ] LAST PAP  [  ] LAST LABS [  ] RETINA EXAM REPORT  [  ] IMMUNIZATION RECORDS  [ XXX ] Release all info      [  ] Check here and initial the line next to each item to release ALL health information INCLUDING  _____ Care and treatment for drug and / or alcohol abuse  _____ HIV testing, infection status, or AIDS  _____ Genetic Testing    DATES OF SERVICE OR TIME PERIOD TO BE DISCLOSED: _____________  I understand and acknowledge that:  * This Authorization may be revoked at any time by you in writing, except if your health information has already been used or disclosed.  * Your health information that will be used or disclosed as a result of you signing this authorization could be re-disclosed by the recipient. If this occurs, your re-disclosed health information may no longer be protected by State or Federal laws.  * You may refuse to sign this Authorization. Your refusal will not affect your ability to obtain treatment.  * This Authorization becomes effective upon signing and will  on (date) __________.      If no date is indicated, this Authorization will  one (1) year from the  signature date.    Name: Rene Chan    Signature:   Date:     8/11/2022       PLEASE FAX REQUESTED RECORDS BACK TO: [phone]

## 2022-08-26 ENCOUNTER — PATIENT OUTREACH (OUTPATIENT)
Dept: HEALTH INFORMATION MANAGEMENT | Facility: OTHER | Age: 72
End: 2022-08-26
Payer: MEDICARE

## 2022-08-26 DIAGNOSIS — E11.9 TYPE 2 DIABETES MELLITUS WITHOUT COMPLICATION, UNSPECIFIED WHETHER LONG TERM INSULIN USE (HCC): ICD-10-CM

## 2022-08-26 NOTE — PROGRESS NOTES
8/26/22   CHW outreach via TC to introduce Chronic Care Management program. CHW left voice message with brief introduction to the program and provided contact information for patient to call back if interested.

## 2022-09-07 ENCOUNTER — ANTICOAGULATION VISIT (OUTPATIENT)
Dept: VASCULAR LAB | Facility: MEDICAL CENTER | Age: 72
End: 2022-09-07
Attending: NURSE PRACTITIONER
Payer: MEDICARE

## 2022-09-07 DIAGNOSIS — I82.502 LEG DVT (DEEP VENOUS THROMBOEMBOLISM), CHRONIC, LEFT (HCC): ICD-10-CM

## 2022-09-07 DIAGNOSIS — G45.9 TIA (TRANSIENT ISCHEMIC ATTACK): ICD-10-CM

## 2022-09-07 DIAGNOSIS — Z86.711 HX PULMONARY EMBOLISM: ICD-10-CM

## 2022-09-07 LAB — INR PPP: 3.1 (ref 2–3.5)

## 2022-09-07 PROCEDURE — 85610 PROTHROMBIN TIME: CPT

## 2022-09-07 PROCEDURE — 99212 OFFICE O/P EST SF 10 MIN: CPT | Performed by: NURSE PRACTITIONER

## 2022-09-07 NOTE — PROGRESS NOTES
Anticoagulation Summary  As of 9/7/2022      INR goal:  2.0-3.0   TTR:  58.3 % (7.2 y)   INR used for dosing:  3.10 (9/7/2022)   Warfarin maintenance plan:  3.75 mg (7.5 mg x 0.5) every Mon, Wed, Fri; 7.5 mg (7.5 mg x 1) all other days   Weekly warfarin total:  41.25 mg   Plan last modified:  MISTI BucioPLESLIE (4/25/2022)   Next INR check:  10/5/2022   Priority:  Acute   Target end date:  Indefinite    Indications    DVT (deep venous thrombosis) (HCC) (Resolved) [I82.409]  TIA (transient ischemic attack) [G45.9]  Pulmonary embolism [415.19] (Resolved) [I26.99]                 Anticoagulation Episode Summary       INR check location:  Anticoagulation Clinic    Preferred lab:  [facility]    Send INR reminders to:      Comments:  ASA 81 mg for TIA, CAD hx - [facility] per Dr. Wright 3/7/22          Anticoagulation Care Providers       Provider Role Specialty Phone number    Patricia Damon M.D. Referring Internal Medicine [phone]    Jona QuinnD Responsible      [facility] Responsible  [phone]                  Refer to Patient Findings for HPI:  Patient Findings       Positives:  Change in diet/appetite    Negatives:  Signs/symptoms of thrombosis, Signs/symptoms of bleeding, Laboratory test error suspected, Change in health, Change in alcohol use, Change in activity, Upcoming invasive procedure, Emergency department visit, Upcoming dental procedure, Missed doses, Extra doses, Change in medications, Hospital admission, Bruising, Other complaints    Comments:  Has been eating less greens than usual but will resume his normal diet.            There were no vitals filed for this visit.   pt declined vitals    Verified current warfarin dosing schedule.    Medications reconciled   Pt is on ASA 81 mg as antiplatelet therapy for TIA, CAD and must be reviewed again on with cardiology.      A/P   INR  slightly supra-therapeutic.     Warfarin dosing recommendation: HOLD  tonight's dose then resume your previous regimen.    Pt educated to contact our clinic with any changes in medications or s/s of bleeding or thrombosis. Pt is aware to seek immediate medical attention for falls, head injury or deep cuts.    Follow up appointment in 4 week(s) per pt's request.    TOREY Bucio.

## 2022-09-08 LAB — INR BLD: 3.1 (ref 0.9–1.2)

## 2022-09-14 ENCOUNTER — APPOINTMENT (OUTPATIENT)
Dept: RADIOLOGY | Facility: MEDICAL CENTER | Age: 72
End: 2022-09-14
Attending: FAMILY MEDICINE
Payer: MEDICARE

## 2022-09-14 DIAGNOSIS — Z13.6 ENCOUNTER FOR ABDOMINAL AORTIC ANEURYSM SCREENING: ICD-10-CM

## 2022-09-14 PROCEDURE — 76775 US EXAM ABDO BACK WALL LIM: CPT

## 2022-09-21 DIAGNOSIS — Z79.01 CHRONIC ANTICOAGULATION: ICD-10-CM

## 2022-09-23 ENCOUNTER — TELEPHONE (OUTPATIENT)
Dept: MEDICAL GROUP | Facility: PHYSICIAN GROUP | Age: 72
End: 2022-09-23
Payer: MEDICARE

## 2022-09-23 NOTE — TELEPHONE ENCOUNTER
PHONECALL  1. Caller Name: Rene Chan                       Call Back Number: [phone]     2. Message: patient called stating that for the last week or so the trazodone has not really been working so he has started to take 2 tablets. And he was wondering if that was ok.

## 2022-09-26 RX ORDER — TRAZODONE HYDROCHLORIDE 50 MG/1
100 TABLET ORAL NIGHTLY
Qty: 180 TABLET | Refills: 2 | Status: SHIPPED | OUTPATIENT
Start: 2022-09-26 | End: 2023-03-30

## 2022-10-05 ENCOUNTER — ANTICOAGULATION VISIT (OUTPATIENT)
Dept: VASCULAR LAB | Facility: MEDICAL CENTER | Age: 72
End: 2022-10-05
Attending: NURSE PRACTITIONER
Payer: MEDICARE

## 2022-10-05 DIAGNOSIS — I82.502 LEG DVT (DEEP VENOUS THROMBOEMBOLISM), CHRONIC, LEFT (HCC): ICD-10-CM

## 2022-10-05 DIAGNOSIS — G45.9 TIA (TRANSIENT ISCHEMIC ATTACK): ICD-10-CM

## 2022-10-05 DIAGNOSIS — Z79.01 LONG TERM (CURRENT) USE OF ANTICOAGULANTS: ICD-10-CM

## 2022-10-05 LAB
INR BLD: 2.2 (ref 0.9–1.2)
INR PPP: 2.2 (ref 2–3.5)

## 2022-10-05 PROCEDURE — 99213 OFFICE O/P EST LOW 20 MIN: CPT

## 2022-10-05 PROCEDURE — 85610 PROTHROMBIN TIME: CPT

## 2022-10-05 RX ORDER — ENOXAPARIN SODIUM 100 MG/ML
80 INJECTION SUBCUTANEOUS EVERY 12 HOURS
Qty: 20 EACH | Refills: 1 | Status: SHIPPED | OUTPATIENT
Start: 2022-10-05 | End: 2022-11-02

## 2022-10-05 NOTE — PROGRESS NOTES
Anticoagulation Summary  As of 10/5/2022      INR goal:  2.0-3.0   TTR:  58.6 % (7.3 y)   INR used for dosin.20 (10/5/2022)   Warfarin maintenance plan:  3.75 mg (7.5 mg x 0.5) every Mon, Wed, Fri; 7.5 mg (7.5 mg x 1) all other days   Weekly warfarin total:  41.25 mg   Plan last modified:  MISTI BucioPLESLIE (2022)   Next INR check:  10/24/2022   Priority:  Acute   Target end date:  Indefinite    Indications    DVT (deep venous thrombosis) (HCC) (Resolved) [I82.409]  TIA (transient ischemic attack) [G45.9]  Pulmonary embolism [415.19] (Resolved) [I26.99]                 Anticoagulation Episode Summary       INR check location:  Anticoagulation Clinic    Preferred lab:  [facility]    Send INR reminders to:      Comments:  ASA 81 mg for TIA, CAD hx - [facility] per Dr. Wright 3/7/22          Anticoagulation Care Providers       Provider Role Specialty Phone number    Patricia Damon M.D. Referring Internal Medicine [phone]    Jona QuinnD Responsible      [facility] Responsible  [phone]                  Refer to Patient Findings for HPI:  Patient Findings       Positives:  Upcoming invasive procedure (colonoscopy on 10/19), Missed doses    Negatives:  Signs/symptoms of thrombosis, Signs/symptoms of bleeding, Laboratory test error suspected, Change in health, Change in alcohol use, Change in activity, Emergency department visit, Upcoming dental procedure, Extra doses, Change in medications, Change in diet/appetite, Hospital admission, Bruising, Other complaints            There were no vitals filed for this visit.  pt declined vitals    Verified current warfarin dosing schedule.    Medications reconciled   Pt is on ASA 81 mg as antiplatelet therapy for TIA, CAD and must be reviewed again on with cardiology.      A/P   INR  therapeutic.     Warfarin dosing recommendation: Instructed pt to continue on with current regimen.    Pt to have procedure on  10/19/22. Due to pt history lovenox bridging is indicated.   Pt to follow instructions as below, after procedure to ask operating MD when safe to resume anticoagulation, if no instructions given, pt will follow as below    CHADSvasc N/a  Clot history Hx of DVT/PE & TIA    Current weight ~ 86.6 kg  CrCl = ~ 94 mL/min  Lab Results   Component Value Date/Time    BUN 14 06/24/2022 11:36 AM    CREATININE 0.75 06/24/2022 11:36 AM    CREATININE 1.0 07/09/2008 05:05 AM        Used Lovenox before - Yes        Date  Warfarin dosing AM Lovenox PM Lovenox   5 Days before procedure 10/14/22 0mg NONE NONE   4 Days before procedure 10/15/22 0mg Lovenox injection Lovenox injection   3 Days before procedure 10/16/22 0mg Lovenox injection Lovenox injection   2 Days before procedure 10/17/22 0mg Lovenox injection Lovenox injection   1 Days before procedure 10/18/22 0mg NONE NONE   PROCEDURE 10/19/22 7.5 mg   (If okay per operating provider) NONE NONE   1 Day after procedure 10/20/22 7.5 mg Restart Lovenox injections    Lovenox injection   2 Days after procedure 10/21/22 3.75 mg Lovenox injection Lovenox injection   3 Days after procedure 10/22/22 7.5 mg Lovenox injection Lovenox injection   4 Days after procedure 10/23/22 7.5 mg Lovenox injection Lovenox injection   5 Days after procedure 10/24/22 - Lovenox injection GET INR checked     Sent Lovenox Rx to pt's preferred drug outlet.    Pt educated to contact our clinic with any changes in medications or s/s of bleeding or thrombosis. Pt is aware to seek immediate medical attention for falls, head injury or deep cuts.    Follow up appointment in 2.5 week(s).    Tiburcio Baker, PharmD, BCACP

## 2022-10-10 ENCOUNTER — HOSPITAL ENCOUNTER (OUTPATIENT)
Dept: LAB | Facility: MEDICAL CENTER | Age: 72
End: 2022-10-10
Attending: FAMILY MEDICINE
Payer: MEDICARE

## 2022-10-10 DIAGNOSIS — E11.9 TYPE 2 DIABETES MELLITUS WITHOUT COMPLICATION, UNSPECIFIED WHETHER LONG TERM INSULIN USE (HCC): ICD-10-CM

## 2022-10-10 LAB
EST. AVERAGE GLUCOSE BLD GHB EST-MCNC: 137 MG/DL
HBA1C MFR BLD: 6.4 % (ref 4–5.6)

## 2022-10-10 PROCEDURE — 83036 HEMOGLOBIN GLYCOSYLATED A1C: CPT

## 2022-10-10 PROCEDURE — 36415 COLL VENOUS BLD VENIPUNCTURE: CPT

## 2022-10-11 DIAGNOSIS — E11.9 TYPE 2 DIABETES MELLITUS WITHOUT COMPLICATION, UNSPECIFIED WHETHER LONG TERM INSULIN USE (HCC): ICD-10-CM

## 2022-10-24 ENCOUNTER — ANTICOAGULATION VISIT (OUTPATIENT)
Dept: VASCULAR LAB | Facility: MEDICAL CENTER | Age: 72
End: 2022-10-24
Attending: INTERNAL MEDICINE
Payer: MEDICARE

## 2022-10-24 DIAGNOSIS — G45.9 TIA (TRANSIENT ISCHEMIC ATTACK): ICD-10-CM

## 2022-10-24 LAB — INR PPP: 1.4 (ref 2–3.5)

## 2022-10-24 PROCEDURE — 85610 PROTHROMBIN TIME: CPT

## 2022-10-24 PROCEDURE — 99212 OFFICE O/P EST SF 10 MIN: CPT

## 2022-10-24 NOTE — PROGRESS NOTES
Anticoagulation Summary  As of 10/24/2022      INR goal:  2.0-3.0   TTR:  58.6 % (7.3 y)   INR used for dosing:     Plan last modified:  LAMAR Bucio (4/25/2022)   Next INR check:     Target end date:  Indefinite    Indications    DVT (deep venous thrombosis) (HCC) (Resolved) [I82.409]  TIA (transient ischemic attack) [G45.9]  Pulmonary embolism [415.19] (Resolved) [I26.99]                 Anticoagulation Episode Summary       INR check location:  Anticoagulation Clinic    Preferred lab:  [email or web address]    Send INR reminders to:      Comments:  ASA 81 mg for TIA, CAD hx - [facility] per Dr. Wright 3/7/22          Anticoagulation Care Providers       Provider Role Specialty Phone number    Patricia Damon M.D. Referring Internal Medicine [phone]    Laisha Méndez PharmD Responsible      [facility] Responsible  [phone]          Refer to Patient Findings for HPI:    There were no vitals filed for this visit.  pt declined vitals    Verified current warfarin dosing schedule.    Medications reconciled   Pt is on ASA 81mg as antiplatelet therapy for TIA, CAD and must be reviewed again on with cardiology.      A/P   INR is sub-therapeutic at 1.4.     Warfarin dosing recommendation: Bolus today and increase Wednesday dose to 7.5 mg so that patient is taking 7.5mg today, tomorrow, and Wednesday and will follow-up on Thursday.     Pt educated to contact our clinic with any changes in medications or s/s of bleeding or thrombosis. Pt is aware to seek immediate medical attention for falls, head injury or deep cuts.    Follow up appointment in 3 day(s).    Haritha Silverio, JonaD

## 2022-10-26 DIAGNOSIS — Z79.01 CHRONIC ANTICOAGULATION: ICD-10-CM

## 2022-10-26 LAB — INR BLD: 1.4 (ref 0.9–1.2)

## 2022-10-26 RX ORDER — WARFARIN SODIUM 7.5 MG/1
TABLET ORAL
Qty: 90 TABLET | Refills: 1 | Status: SHIPPED | OUTPATIENT
Start: 2022-10-26 | End: 2023-06-19

## 2022-11-02 ENCOUNTER — ANTICOAGULATION VISIT (OUTPATIENT)
Dept: VASCULAR LAB | Facility: MEDICAL CENTER | Age: 72
End: 2022-11-02
Attending: NURSE PRACTITIONER
Payer: MEDICARE

## 2022-11-02 DIAGNOSIS — I82.502 LEG DVT (DEEP VENOUS THROMBOEMBOLISM), CHRONIC, LEFT (HCC): ICD-10-CM

## 2022-11-02 DIAGNOSIS — Z86.711 HX PULMONARY EMBOLISM: ICD-10-CM

## 2022-11-02 DIAGNOSIS — G45.9 TIA (TRANSIENT ISCHEMIC ATTACK): ICD-10-CM

## 2022-11-02 LAB — INR PPP: 2.5 (ref 2–3.5)

## 2022-11-02 PROCEDURE — 85610 PROTHROMBIN TIME: CPT

## 2022-11-02 PROCEDURE — 99211 OFF/OP EST MAY X REQ PHY/QHP: CPT | Performed by: NURSE PRACTITIONER

## 2022-11-02 NOTE — PROGRESS NOTES
Anticoagulation Summary  As of 2022      INR goal:  2.0-3.0   TTR:  58.4 % (7.4 y)   INR used for dosin.50 (2022)   Warfarin maintenance plan:  3.75 mg (7.5 mg x 0.5) every Mon, Wed, Fri; 7.5 mg (7.5 mg x 1) all other days   Weekly warfarin total:  41.25 mg   Plan last modified:  LAMAR Bucio (2022)   Next INR check:  2022   Priority:  Acute   Target end date:  Indefinite    Indications    DVT (deep venous thrombosis) (HCC) (Resolved) [I82.409]  TIA (transient ischemic attack) [G45.9]  Pulmonary embolism [415.19] (Resolved) [I26.99]                 Anticoagulation Episode Summary       INR check location:  Anticoagulation Clinic    Preferred lab:  [facility]    Send INR reminders to:      Comments:  ASA 81 mg for TIA, CAD hx - [facility] per Dr. Wright 3/7/22          Anticoagulation Care Providers       Provider Role Specialty Phone number    Patricia Damon M.D. Referring Internal Medicine [phone]    Jona QuinnD Responsible      [facility] Responsible  [phone]                  Refer to Patient Findings for HPI:      There were no vitals filed for this visit.   pt declined vitals    Verified current warfarin dosing schedule.    Medications reconciled   ASA 81 mg daily for CAD per cardiology.      A/P   INR  -therapeutic.     Warfarin dosing recommendation: Resume 3.75 mg --, 7.5 mg all other days.    Pt educated to contact our clinic with any changes in medications or s/s of bleeding or thrombosis. Pt is aware to seek immediate medical attention for falls, head injury or deep cuts.    Follow up appointment in 3 week(s) per pt's request.    LAMAR Bucio

## 2022-11-03 LAB — INR BLD: 2.5 (ref 0.9–1.2)

## 2022-11-20 NOTE — PROGRESS NOTES
4 Eyes Skin Assessment Completed by BRANDYN Bella and BRANDYN Matute.    Head WDL  Ears WDL  Nose WDL  Mouth WDL  Neck WDL  Breast/Chest WDL  Shoulder Blades WDL  Spine WDL  (R) Arm/Elbow/Hand WDL  (L) Arm/Elbow/Hand WDL  Abdomen WDL  Groin WDL  Scrotum/Coccyx/Buttocks WDL  (R) Leg WDL  (L) Leg WDL  (R) Heel/Foot/Toe WDL  (L) Heel/Foot/Toe WDL          Devices In Places Tele Box, Blood Pressure Cuff and Pulse Ox      Interventions In Place Pressure Redistribution Mattress    Possible Skin Injury No    Pictures Uploaded Into Epic N/A  Wound Consult Placed N/A  RN Wound Prevention Protocol Ordered No    normal...

## 2022-11-21 ENCOUNTER — DOCUMENTATION (OUTPATIENT)
Dept: HEALTH INFORMATION MANAGEMENT | Facility: OTHER | Age: 72
End: 2022-11-21
Payer: MEDICARE

## 2022-11-28 ENCOUNTER — ANTICOAGULATION VISIT (OUTPATIENT)
Dept: VASCULAR LAB | Facility: MEDICAL CENTER | Age: 72
End: 2022-11-28
Attending: NURSE PRACTITIONER
Payer: MEDICARE

## 2022-11-28 VITALS — DIASTOLIC BLOOD PRESSURE: 82 MMHG | SYSTOLIC BLOOD PRESSURE: 160 MMHG | HEART RATE: 59 BPM

## 2022-11-28 DIAGNOSIS — G45.9 TIA (TRANSIENT ISCHEMIC ATTACK): ICD-10-CM

## 2022-11-28 LAB — INR PPP: 1.8 (ref 2–3.5)

## 2022-11-28 PROCEDURE — 99211 OFF/OP EST MAY X REQ PHY/QHP: CPT

## 2022-11-28 PROCEDURE — 85610 PROTHROMBIN TIME: CPT

## 2022-11-28 NOTE — PROGRESS NOTES
Anticoagulation Summary  As of 2022      INR goal:  2.0-3.0   TTR:  58.4 % (7.4 y)   INR used for dosin.80 (2022)   Warfarin maintenance plan:  3.75 mg (7.5 mg x 0.5) every Mon, Wed, Fri; 7.5 mg (7.5 mg x 1) all other days; Starting 2022   Weekly warfarin total:  41.25 mg   Plan last modified:  LAMAR Bucio (2022)   Next INR check:     Priority:  Acute   Target end date:  Indefinite    Indications    DVT (deep venous thrombosis) (HCC) (Resolved) [I82.409]  TIA (transient ischemic attack) [G45.9]  Pulmonary embolism [415.19] (Resolved) [I26.99]                 Anticoagulation Episode Summary       INR check location:  Anticoagulation Clinic    Preferred lab:  [facility]    Send INR reminders to:      Comments:  ASA 81 mg for TIA, CAD hx - [facility] per Dr. Wright 3/7/22          Anticoagulation Care Providers       Provider Role Specialty Phone number    Patricia Damon M.D. Referring Internal Medicine [phone]    Jona QuinnD Responsible      [facility] Responsible  [phone]          Refer to Patient Findings for HPI:  Patient Findings       Positives:  Change in diet/appetite (More cranberry over the holidays. Also more green intake over the holidays.)    Negatives:  Signs/symptoms of thrombosis, Signs/symptoms of bleeding, Laboratory test error suspected, Change in health, Change in alcohol use, Change in activity, Upcoming invasive procedure, Emergency department visit, Upcoming dental procedure, Missed doses, Extra doses, Change in medications, Hospital admission, Bruising, Other complaints          Vitals:    22 1041   BP: (!) 160/82   Pulse: (!) 59   Pt denies any CP, HA, or other symptoms. Did drink coffee before appointment.     Verified current warfarin dosing schedule.    Medications reconciled  ASA 81 mg for TIA, CAD hx - [facility] per Dr. Wright 3/7/22 ZL     A/P   INR is sub-therapeutic.     Warfarin dosing  recommendation: Bolus today with 1 tab and then continue as normal.    Pt educated to contact our clinic with any changes in medications or s/s of bleeding or thrombosis. Pt is aware to seek immediate medical attention for falls, head injury or deep cuts.    Follow up appointment in 6 week(s) per patient preference. Patient has been previously stable on current regimen, so he feels comfortable making the appointment this far out.    Haritha Silverio, JonaD

## 2022-11-29 LAB — INR BLD: 1.8 (ref 0.9–1.2)

## 2022-11-30 RX ORDER — DIVALPROEX SODIUM 250 MG/1
TABLET, DELAYED RELEASE ORAL
Qty: 270 TABLET | Refills: 3 | Status: SHIPPED | OUTPATIENT
Start: 2022-11-30 | End: 2023-02-08

## 2022-11-30 NOTE — TELEPHONE ENCOUNTER
Received request via: Pharmacy    Was the patient seen in the last year in this department? Yes    Does the patient have an active prescription (recently filled or refills available) for medication(s) requested? No    Does the patient have senior living Plus and need 100 day supply (blood pressure, diabetes and cholesterol meds only)? Medication is not for cholesterol, blood pressure or diabetes

## 2022-12-01 NOTE — ASSESSMENT & PLAN NOTE
This is a chronic problem.  Last carotid ultrasound was in 2017.  This showed moderate stenosis.  Patient is on anticoagulant treatment.   Bailey Breaux(Attending)

## 2022-12-17 DIAGNOSIS — I10 HYPERTENSION, UNSPECIFIED TYPE: ICD-10-CM

## 2022-12-20 NOTE — TELEPHONE ENCOUNTER
Is the patient due for a refill? Yes    Was the patient seen the past year? No    Date of last office visit: 11/30/2021    Does the patient have an upcoming appointment?  No      Provider to refill:CW    Does the patients insurance require a 100 day supply?  Yes

## 2022-12-21 NOTE — TELEPHONE ENCOUNTER
90 day courtesy refill sent.    Task deferred to schedulers to schedule for [facility] via staff message.

## 2022-12-22 ENCOUNTER — TELEPHONE (OUTPATIENT)
Dept: CARDIOLOGY | Facility: MEDICAL CENTER | Age: 72
End: 2022-12-22
Payer: MEDICARE

## 2022-12-22 NOTE — TELEPHONE ENCOUNTER
----- Message from Rosa Rogers R.N. sent at 12/21/2022  8:37 AM PST -----  Please schedule pt for [facility].  Last seen 11/2021.  Thank you!

## 2023-01-03 ENCOUNTER — ANTICOAGULATION VISIT (OUTPATIENT)
Dept: VASCULAR LAB | Facility: MEDICAL CENTER | Age: 73
End: 2023-01-03
Attending: NURSE PRACTITIONER
Payer: MEDICARE

## 2023-01-03 VITALS — HEART RATE: 58 BPM | SYSTOLIC BLOOD PRESSURE: 132 MMHG | DIASTOLIC BLOOD PRESSURE: 63 MMHG

## 2023-01-03 DIAGNOSIS — G45.9 TIA (TRANSIENT ISCHEMIC ATTACK): ICD-10-CM

## 2023-01-03 LAB
INR BLD: 3.3 (ref 0.9–1.2)
INR PPP: 3.3 (ref 2–3.5)

## 2023-01-03 PROCEDURE — 85610 PROTHROMBIN TIME: CPT

## 2023-01-03 PROCEDURE — 99212 OFFICE O/P EST SF 10 MIN: CPT

## 2023-01-03 NOTE — PROGRESS NOTES
Anticoagulation Summary  As of 1/3/2023      INR goal:  2.0-3.0   TTR:  58.5 % (7.5 y)   INR used for dosing:  3.30 (1/3/2023)   Warfarin maintenance plan:  3.75 mg (7.5 mg x 0.5) every Mon, Wed, Fri; 7.5 mg (7.5 mg x 1) all other days   Weekly warfarin total:  41.25 mg   Plan last modified:  LAMAR Bucio (4/25/2022)   Next INR check:  2/13/2023   Priority:  Acute   Target end date:  Indefinite    Indications    DVT (deep venous thrombosis) (HCC) (Resolved) [I82.409]  TIA (transient ischemic attack) [G45.9]  Pulmonary embolism [415.19] (Resolved) [I26.99]                 Anticoagulation Episode Summary       INR check location:  Anticoagulation Clinic    Preferred lab:  [facility]    Send INR reminders to:      Comments:  ASA 81 mg for TIA, CAD hx - Lifelong per Dr. Wright 3/7/22          Anticoagulation Care Providers       Provider Role Specialty Phone number    Patricia Damon M.D. Referring Internal Medicine [phone]    Jona QuninD Responsible      [facility]ticoagulation Services Responsible  [phone]          Refer to Patient Findings for HPI:  Patient Findings       Positives:  Change in diet/appetite (pt had cranberries over the holidays)    Negatives:  Signs/symptoms of thrombosis, Signs/symptoms of bleeding, Laboratory test error suspected, Change in health, Change in alcohol use, Change in activity, Upcoming invasive procedure, Emergency department visit, Upcoming dental procedure, Missed doses, Extra doses, Change in medications, Hospital admission, Bruising, Other complaints          Vitals:    01/03/23 1314   BP: 132/63   Pulse: (!) 58       Patient seen in clinic today for follow up on anticoagulation therapy with warfarin (a high risk medication) for hx of DVT, PE and TIA  Verified current warfarin dosing schedule.  Patient denies any missed doses of warfarin.    Medications reconciled   Pt is on ASA 81mg  as antiplatelet therapy for TIA and CAD and must be  reviewed again on next follow up with cards.      A/P   INR is SUPRA-therapeutic today at 3.3.     Warfarin dosing recommendation: Patient will reduce dose of warfarin to 3.75mg TONIGHT, as a one time adjustment, then will resume his current dosing regimen.   Patient will follow up again in 6 weeks (per pt preference).     Pt educated to contact our clinic with any changes in medications or s/s of bleeding or thrombosis. Pt is aware to seek immediate medical attention for falls, head injury or deep cuts.    Follow up appointment in 6 week(s).    Next appt: Monday, Feb 13 @ 1pm     Lupe Everett PharmD

## 2023-01-04 ENCOUNTER — PATIENT MESSAGE (OUTPATIENT)
Dept: MEDICAL GROUP | Facility: PHYSICIAN GROUP | Age: 73
End: 2023-01-04
Payer: MEDICARE

## 2023-01-04 DIAGNOSIS — I10 HYPERTENSION, UNSPECIFIED TYPE: ICD-10-CM

## 2023-01-30 ENCOUNTER — ANTICOAGULATION VISIT (OUTPATIENT)
Dept: VASCULAR LAB | Facility: MEDICAL CENTER | Age: 73
End: 2023-01-30
Attending: NURSE PRACTITIONER
Payer: MEDICARE

## 2023-01-30 VITALS — SYSTOLIC BLOOD PRESSURE: 138 MMHG | DIASTOLIC BLOOD PRESSURE: 62 MMHG

## 2023-01-30 DIAGNOSIS — I82.502 LEG DVT (DEEP VENOUS THROMBOEMBOLISM), CHRONIC, LEFT (HCC): ICD-10-CM

## 2023-01-30 DIAGNOSIS — Z86.718 HISTORY OF DVT (DEEP VEIN THROMBOSIS): ICD-10-CM

## 2023-01-30 DIAGNOSIS — G45.9 TIA (TRANSIENT ISCHEMIC ATTACK): ICD-10-CM

## 2023-01-30 LAB — INR PPP: 2 (ref 2–3.5)

## 2023-01-30 PROCEDURE — 99211 OFF/OP EST MAY X REQ PHY/QHP: CPT | Performed by: NURSE PRACTITIONER

## 2023-01-30 PROCEDURE — 85610 PROTHROMBIN TIME: CPT

## 2023-01-31 ENCOUNTER — HOSPITAL ENCOUNTER (EMERGENCY)
Facility: MEDICAL CENTER | Age: 73
End: 2023-02-01
Attending: STUDENT IN AN ORGANIZED HEALTH CARE EDUCATION/TRAINING PROGRAM
Payer: MEDICARE

## 2023-01-31 ENCOUNTER — APPOINTMENT (OUTPATIENT)
Dept: RADIOLOGY | Facility: MEDICAL CENTER | Age: 73
End: 2023-01-31
Attending: STUDENT IN AN ORGANIZED HEALTH CARE EDUCATION/TRAINING PROGRAM
Payer: MEDICARE

## 2023-01-31 DIAGNOSIS — M25.00 HEMARTHROSIS: ICD-10-CM

## 2023-01-31 LAB
ALBUMIN SERPL BCP-MCNC: 4.5 G/DL (ref 3.2–4.9)
ALBUMIN/GLOB SERPL: 1.6 G/DL
ALP SERPL-CCNC: 49 U/L (ref 30–99)
ALT SERPL-CCNC: 19 U/L (ref 2–50)
ANION GAP SERPL CALC-SCNC: 11 MMOL/L (ref 7–16)
AST SERPL-CCNC: 23 U/L (ref 12–45)
BASOPHILS # BLD AUTO: 0.9 % (ref 0–1.8)
BASOPHILS # BLD: 0.06 K/UL (ref 0–0.12)
BILIRUB SERPL-MCNC: 1 MG/DL (ref 0.1–1.5)
BODY FLD TYPE: NORMAL
BUN SERPL-MCNC: 13 MG/DL (ref 8–22)
CALCIUM ALBUM COR SERPL-MCNC: 8.9 MG/DL (ref 8.5–10.5)
CALCIUM SERPL-MCNC: 9.3 MG/DL (ref 8.5–10.5)
CHLORIDE SERPL-SCNC: 101 MMOL/L (ref 96–112)
CO2 SERPL-SCNC: 25 MMOL/L (ref 20–33)
CREAT SERPL-MCNC: 0.63 MG/DL (ref 0.5–1.4)
CRYSTALS FLD MICRO: NORMAL
D DIMER PPP IA.FEU-MCNC: 0.28 UG/ML (FEU) (ref 0–0.5)
EOSINOPHIL # BLD AUTO: 0.1 K/UL (ref 0–0.51)
EOSINOPHIL NFR BLD: 1.5 % (ref 0–6.9)
ERYTHROCYTE [DISTWIDTH] IN BLOOD BY AUTOMATED COUNT: 46.8 FL (ref 35.9–50)
GFR SERPLBLD CREATININE-BSD FMLA CKD-EPI: 100 ML/MIN/1.73 M 2
GLOBULIN SER CALC-MCNC: 2.8 G/DL (ref 1.9–3.5)
GLUCOSE SERPL-MCNC: 143 MG/DL (ref 65–99)
HCT VFR BLD AUTO: 46.7 % (ref 42–52)
HGB BLD-MCNC: 15.7 G/DL (ref 14–18)
IMM GRANULOCYTES # BLD AUTO: 0.02 K/UL (ref 0–0.11)
IMM GRANULOCYTES NFR BLD AUTO: 0.3 % (ref 0–0.9)
INR BLD: 2 (ref 0.9–1.2)
LYMPHOCYTES # BLD AUTO: 1.42 K/UL (ref 1–4.8)
LYMPHOCYTES NFR BLD: 20.7 % (ref 22–41)
MCH RBC QN AUTO: 31.2 PG (ref 27–33)
MCHC RBC AUTO-ENTMCNC: 33.6 G/DL (ref 33.7–35.3)
MCV RBC AUTO: 92.8 FL (ref 81.4–97.8)
MONOCYTES # BLD AUTO: 0.65 K/UL (ref 0–0.85)
MONOCYTES NFR BLD AUTO: 9.5 % (ref 0–13.4)
NEUTROPHILS # BLD AUTO: 4.62 K/UL (ref 1.82–7.42)
NEUTROPHILS NFR BLD: 67.1 % (ref 44–72)
NRBC # BLD AUTO: 0 K/UL
NRBC BLD-RTO: 0 /100 WBC
PLATELET # BLD AUTO: 269 K/UL (ref 164–446)
PMV BLD AUTO: 10.5 FL (ref 9–12.9)
POTASSIUM SERPL-SCNC: 4.1 MMOL/L (ref 3.6–5.5)
PROT SERPL-MCNC: 7.3 G/DL (ref 6–8.2)
RBC # BLD AUTO: 5.03 M/UL (ref 4.7–6.1)
SODIUM SERPL-SCNC: 137 MMOL/L (ref 135–145)
WBC # BLD AUTO: 6.9 K/UL (ref 4.8–10.8)

## 2023-01-31 PROCEDURE — 99284 EMERGENCY DEPT VISIT MOD MDM: CPT

## 2023-01-31 PROCEDURE — 80053 COMPREHEN METABOLIC PANEL: CPT

## 2023-01-31 PROCEDURE — 85025 COMPLETE CBC W/AUTO DIFF WBC: CPT

## 2023-01-31 PROCEDURE — 700101 HCHG RX REV CODE 250: Performed by: STUDENT IN AN ORGANIZED HEALTH CARE EDUCATION/TRAINING PROGRAM

## 2023-01-31 PROCEDURE — 89060 EXAM SYNOVIAL FLUID CRYSTALS: CPT

## 2023-01-31 PROCEDURE — 87205 SMEAR GRAM STAIN: CPT

## 2023-01-31 PROCEDURE — 87076 CULTURE ANAEROBE IDENT EACH: CPT

## 2023-01-31 PROCEDURE — 85379 FIBRIN DEGRADATION QUANT: CPT

## 2023-01-31 PROCEDURE — 36415 COLL VENOUS BLD VENIPUNCTURE: CPT

## 2023-01-31 PROCEDURE — 87070 CULTURE OTHR SPECIMN AEROBIC: CPT

## 2023-01-31 PROCEDURE — 73560 X-RAY EXAM OF KNEE 1 OR 2: CPT | Mod: RT

## 2023-01-31 PROCEDURE — 89051 BODY FLUID CELL COUNT: CPT

## 2023-01-31 RX ADMIN — LIDOCAINE HYDROCHLORIDE 20 ML: 10 INJECTION, SOLUTION EPIDURAL; INFILTRATION; INTRACAUDAL; PERINEURAL at 22:08

## 2023-01-31 ASSESSMENT — FIBROSIS 4 INDEX: FIB4 SCORE: 1.07

## 2023-02-01 VITALS
OXYGEN SATURATION: 95 % | SYSTOLIC BLOOD PRESSURE: 150 MMHG | HEART RATE: 68 BPM | RESPIRATION RATE: 16 BRPM | BODY MASS INDEX: 29.75 KG/M2 | HEIGHT: 68 IN | TEMPERATURE: 97.7 F | WEIGHT: 196.3 LBS | DIASTOLIC BLOOD PRESSURE: 73 MMHG

## 2023-02-01 LAB
APPEARANCE FLD: NORMAL
BLOOD CULTURE HOLD CXBCH: NORMAL
BODY FLD TYPE: NORMAL
COLOR FLD: NORMAL
EOSINOPHIL NFR FLD: 1 %
GRAM STN SPEC: NORMAL
LYMPHOCYTES NFR FLD: 14 %
MONONUC CELLS NFR FLD: 2 %
NEUTROPHILS NFR FLD: 83 %
RBC # FLD: NORMAL CELLS/UL
SIGNIFICANT IND 70042: NORMAL
SITE SITE: NORMAL
SOURCE SOURCE: NORMAL
WBC # FLD: 1408 CELLS/UL

## 2023-02-01 NOTE — ED NOTES
Pt aox4, vss, nad, ambulatory steady  Pt understood all dc info and when to seek medical care, no further quesitons  20g lac iv taken out, tip intact  Wife taking pt home

## 2023-02-01 NOTE — DISCHARGE INSTRUCTIONS
Follow-up at the orthopedic clinic for your hemarthrosis and chronic knee pain if you develop any fevers significant joint redness swelling, pain with passive range of motion this may be a sign of an infected joint you should return to the ER for this.  Your D-dimer was negative low concern for underlying blood clot in your leg at this point though should you have continued swelling or pain you should return for recheck.  Continue to take the antibiotics until they are gone use Ace wrap ice and elevate the knee.

## 2023-02-01 NOTE — ED TRIAGE NOTES
Chief Complaint   Patient presents with    Knee Pain     Patient reports having a steroid shot in his right knee this morning. Patient reports since the shot he has had increased pain and dwelling to the knee. Patient reports unable to put weight on the knee. Patient reports history of blood clots and is concerned about clots.

## 2023-02-01 NOTE — ED PROVIDER NOTES
ED Provider Note    CHIEF COMPLAINT  Chief Complaint   Patient presents with    Knee Pain     Patient reports having a steroid shot in his right knee this morning. Patient reports since the shot he has had increased pain and dwelling to the knee. Patient reports unable to put weight on the knee. Patient reports history of blood clots and is concerned about clots.        EXTERNAL RECORDS REVIEWED  Outpatient Notes visit at the [facility].  A steroid injection of his right knee for osteoarthritis    HPI/ROS  LIMITATION TO HISTORY   Select: : None  OUTSIDE HISTORIAN(S):  none    Rene Chan is a 72 y.o. male who presents relation of right knee pain and swelling as well as right calf pain and swelling.  Patient states he has a history of osteoarthritis of his right knee, was seen at the [facility] today where he had a steroid injection, was placed on prophylactic antibiotics after the injection.  Then noticed increasing pain and swelling so he came to the ER.  Has a remote history of DVT on Coumadin, is concerned he may have an underlying DVT as he has noticed some increasing right lower extremity and calf tenderness as well.  Said no fevers no joint erythema no pain or passive range of motion.    PAST MEDICAL HISTORY   has a past medical history of Anticoagulation monitoring, special range, Bipolar disorder ([facility]), CAD (coronary artery disease) (6/4/2015), Carotid artery occlusion, Clotting disorder ([facility]), Colon polyps, Diabetes mellitus, type II (HCC), Gout, Hammer toe, Hyperlipidemia, Hypertension, Hypothyroidism, Impaired fasting glucose, and Mild aortic stenosis - echo 4/2018.    SURGICAL HISTORY   has a past surgical history that includes other abdominal surgery; other orthopedic surgery (1976); recovery (6/1/2015); multiple coronary artery bypass endo vein harvest (6/4/2015); appendectomy; ankle fusion; and total knee arthroplasty (Left, 7/12/2022).    FAMILY HISTORY  Family History   Problem Relation Age of Onset     "Heart Disease Sister 50    Stroke Sister 55        CVA    Heart Failure Father        SOCIAL HISTORY  Social History     Tobacco Use    Smoking status: Former     Packs/day: 2.00     Years: 10.00     Pack years: 20.00     Types: Cigarettes     Quit date: 1976     Years since quittin.1    Smokeless tobacco: Never    Tobacco comments:     quit x 32 yrs; 2ppd x 7 years   Vaping Use    Vaping Use: Never used   Substance and Sexual Activity    Alcohol use: No    Drug use: No     Comment: IV drug use in teens    Sexual activity: Not Currently     Partners: Female       CURRENT MEDICATIONS  Home Medications       Reviewed by Lisa Marques R.N. (Registered Nurse) on 23 at 1849  Med List Status: Partial     Medication Last Dose Status   aspirin 81 MG tablet  Active   atorvastatin (LIPITOR) 80 MG tablet  Active   cephALEXin (KEFLEX) 500 MG Cap  Active   Cholecalciferol (VITAMIN D) 2000 UNITS Cap  Active   COENZYME Q10 PO  Active   divalproex (DEPAKOTE) 125 MG EC tablet  Active   divalproex (DEPAKOTE) 250 MG Tablet Delayed Response  Active   docosahexanoic acid (OMEGA 3 FA) 1000 MG CAPS  Active   Glucosamine-Chondroit-Vit C-Mn (GLUCOSAMINE CHONDR 500 COMPLEX PO)  Active   levothyroxine (SYNTHROID) 75 MCG Tab  Active   lisinopril (PRINIVIL) 5 MG Tab  Active   Melatonin 10 MG Tab  Active   metoprolol tartrate (LOPRESSOR) 25 MG Tab  Active   predniSONE (DELTASONE) 20 MG Tab  Active   sulfamethoxazole-trimethoprim (BACTRIM DS) 800-160 MG tablet  Active   traZODone (DESYREL) 50 MG Tab  Active   warfarin (COUMADIN) 7.5 MG Tab  Active                    ALLERGIES  Allergies   Allergen Reactions    Penicillins Unspecified     Childhood; does know what the reaction was       PHYSICAL EXAM  VITAL SIGNS: BP (!) 130/94   Pulse 66   Temp 36.1 °C (97 °F) (Temporal)   Resp 17   Ht 1.727 m (5' 8\")   Wt 89 kg (196 lb 4.8 oz)   SpO2 97%   BMI 29.85 kg/m²      Pulse ox interpretation: I interpret this pulse ox as " normal.  VITALS - vital signs documented prior to this note have been reviewed and noted,  GENERAL - awake, alert, oriented, GCS 15, no apparent distress, non-toxic  appearing  HEENT - normocephalic, atraumatic, pupils equal, sclera anicteric, mucus  membranes moist  NECK - supple, no meningismus, full active range of motion, trachea midline  CARDIOVASCULAR - regular rate/rhythm, no murmurs/gallops/rubs  PULMONARY - no respiratory distress, speaking in full sentences, clear to  auscultation bilaterally, no wheezing/ronchi/rales, no accessory muscle use  GASTROINTESTINAL - soft, non-tender, non-distended, no rebound, guarding,  or peritonitis  GENITOURINARY - Deferred  NEUROLOGIC - Awake alert, normal mental status, speech fluid, cognition  normal, moves all extremities  MUSCULOSKELETAL - no obvious asymmetry or deformities present with he has an effusion of his right knee there is no overlying erythema no tenderness  passive range of motion mild calf tenderness negative Homans' sign 1+ edema bilaterally 2+ PT DP pulses  EXTREMITIES - warm, well-perfused, no cyanosis or significant edema  DERMATOLOGIC - warm, dry, no rashes, no jaundice  PSYCHIATRIC - normal affect, normal insight, normal concentration DIAGNOSTIC STUDIES / PROCEDURES    LABS  And hyperglycemia otherwise are nonactionable    RADIOLOGY  I have independently interpreted the diagnostic imaging associated with this visit and am waiting the final reading from the radiologist.   My preliminary interpretation is a follows: No fracture osteoarthritis  Radiologist interpretation: Osteoarthritis joint effusion    COURSE & MEDICAL DECISION MAKING    ED Observation Status? No; Patient does not meet criteria for ED Observation.     INITIAL ASSESSMENT, COURSE AND PLAN  Care Narrative:   Presented for evaluation of knee pain knee swelling and leg pain differential included was not limited to osteoarthritis septic arthritis hemarthrosis joint effusion.  Prior history  of DVT, has noted some increased leg swelling DVT was thought to be less likely though was a consideration as such a D-dimer was ordered.  On examination does have an effusion of his right knee.  There is no significant overlying erythema no pain with passive range of motion.  Though given the recent joint injection with steroids there was concern for underlying septic arthritis.  Thus after informed consent was  obtained a right knee joint aspiration was performed with approximately 40 cc of blood evacuated.  Fluid was sent to the lab, no signs of superimposed infection seem consistent with a hemarthrosis.  Likely exacerbated by the patient's anticoagulated state and significant underlying arthritis along with the patient's recent steroid injection. INR yesterday was 2.0.  Pain improved after joint aspiration.  Was given an Ace wrap and ice was applied.  Recommended follow-up with the [facility].  Patient was placed on prophylactic antibiotics after his joint aspiration recommend he continue these.  All pertinent return precautions were discussed with the patient, and they expressed understanding.  Patient was discharged in a stable condition      HTN/IDDM FOLLOW UP:  The patient is referred to a primary physician for blood pressure management, diabetic screening, and for all other preventive health concerns      ADDITIONAL PROBLEM LIST  Knee pain joint aspiration  DISPOSITION AND DISCUSSIONS  I have discussed management of the patient with the following physicians and EDITH's:  none    Discussion of management with other QHP or appropriate source(s): None     Escalation of care considered, and ultimately not performed:acute inpatient care management, however at this time, the patient is most appropriate for outpatient management    Barriers to care at this time, including but not limited to:  none .     Decision tools and prescription drugs considered including, but not limited to: Antibiotics  recommended continue his antibiotics .    FINAL DIAGNOSIS  1.  Hemarthrosis  2.  Osteoarthritis  3.  Hyperglycemia  4.  Hypertension         Electronically signed by: Mario Alberto Roberts D.O., 1/31/2023 9:29 PM

## 2023-02-04 LAB
BACTERIA FLD AEROBE CULT: NORMAL
GRAM STN SPEC: NORMAL
SIGNIFICANT IND 70042: NORMAL
SITE SITE: NORMAL
SOURCE SOURCE: NORMAL

## 2023-02-08 RX ORDER — DIVALPROEX SODIUM 125 MG/1
TABLET, DELAYED RELEASE ORAL
Qty: 180 TABLET | Refills: 3 | Status: SHIPPED | OUTPATIENT
Start: 2023-02-08 | End: 2024-01-29

## 2023-02-08 RX ORDER — DIVALPROEX SODIUM 250 MG/1
TABLET, DELAYED RELEASE ORAL
Qty: 270 TABLET | Refills: 3 | Status: SHIPPED | OUTPATIENT
Start: 2023-02-08 | End: 2024-02-05 | Stop reason: SDUPTHER

## 2023-02-28 ENCOUNTER — HOSPITAL ENCOUNTER (OUTPATIENT)
Dept: LAB | Facility: MEDICAL CENTER | Age: 73
End: 2023-02-28
Attending: FAMILY MEDICINE
Payer: MEDICARE

## 2023-02-28 DIAGNOSIS — E11.9 TYPE 2 DIABETES MELLITUS WITHOUT COMPLICATION, UNSPECIFIED WHETHER LONG TERM INSULIN USE (HCC): ICD-10-CM

## 2023-02-28 LAB
EST. AVERAGE GLUCOSE BLD GHB EST-MCNC: 140 MG/DL
HBA1C MFR BLD: 6.5 % (ref 4–5.6)

## 2023-02-28 PROCEDURE — 36415 COLL VENOUS BLD VENIPUNCTURE: CPT

## 2023-02-28 PROCEDURE — 83036 HEMOGLOBIN GLYCOSYLATED A1C: CPT

## 2023-03-03 ENCOUNTER — OFFICE VISIT (OUTPATIENT)
Dept: MEDICAL GROUP | Facility: PHYSICIAN GROUP | Age: 73
End: 2023-03-03
Payer: MEDICARE

## 2023-03-03 VITALS
OXYGEN SATURATION: 96 % | RESPIRATION RATE: 18 BRPM | TEMPERATURE: 97.6 F | BODY MASS INDEX: 29.4 KG/M2 | HEIGHT: 68 IN | DIASTOLIC BLOOD PRESSURE: 72 MMHG | SYSTOLIC BLOOD PRESSURE: 124 MMHG | WEIGHT: 194 LBS | HEART RATE: 60 BPM

## 2023-03-03 DIAGNOSIS — I10 DIABETES MELLITUS WITH COINCIDENT HYPERTENSION (HCC): ICD-10-CM

## 2023-03-03 DIAGNOSIS — F31.70 BIPOLAR DISORDER IN FULL REMISSION, MOST RECENT EPISODE UNSPECIFIED TYPE (HCC): ICD-10-CM

## 2023-03-03 DIAGNOSIS — E11.9 DIABETES MELLITUS WITH COINCIDENT HYPERTENSION (HCC): ICD-10-CM

## 2023-03-03 DIAGNOSIS — I10 PRIMARY HYPERTENSION: ICD-10-CM

## 2023-03-03 DIAGNOSIS — F10.21 ALCOHOL DEPENDENCE IN REMISSION (HCC): ICD-10-CM

## 2023-03-03 DIAGNOSIS — Z12.5 PROSTATE CANCER SCREENING: ICD-10-CM

## 2023-03-03 DIAGNOSIS — I70.0 ATHEROSCLEROSIS OF AORTA (HCC): ICD-10-CM

## 2023-03-03 DIAGNOSIS — F11.21 OPIOID DEPENDENCE IN REMISSION (HCC): ICD-10-CM

## 2023-03-03 DIAGNOSIS — R35.0 URINARY FREQUENCY: ICD-10-CM

## 2023-03-03 DIAGNOSIS — E03.9 ACQUIRED HYPOTHYROIDISM: ICD-10-CM

## 2023-03-03 DIAGNOSIS — I82.502 LEG DVT (DEEP VENOUS THROMBOEMBOLISM), CHRONIC, LEFT (HCC): ICD-10-CM

## 2023-03-03 PROBLEM — J39.2 THROAT IRRITATION: Status: RESOLVED | Noted: 2022-08-11 | Resolved: 2023-03-03

## 2023-03-03 PROBLEM — Z09 HOSPITAL DISCHARGE FOLLOW-UP: Status: RESOLVED | Noted: 2022-06-08 | Resolved: 2023-03-03

## 2023-03-03 PROBLEM — Z00.00 MEDICARE ANNUAL WELLNESS VISIT, SUBSEQUENT: Status: RESOLVED | Noted: 2021-12-03 | Resolved: 2023-03-03

## 2023-03-03 PROCEDURE — 99214 OFFICE O/P EST MOD 30 MIN: CPT | Performed by: FAMILY MEDICINE

## 2023-03-03 RX ORDER — DUTASTERIDE 0.5 MG/1
0.5 CAPSULE, LIQUID FILLED ORAL DAILY
Qty: 90 CAPSULE | Refills: 1 | Status: SHIPPED | OUTPATIENT
Start: 2023-03-03 | End: 2023-09-08

## 2023-03-03 ASSESSMENT — PATIENT HEALTH QUESTIONNAIRE - PHQ9: CLINICAL INTERPRETATION OF PHQ2 SCORE: 0

## 2023-03-03 ASSESSMENT — FIBROSIS 4 INDEX: FIB4 SCORE: 1.43

## 2023-03-03 NOTE — LETTER
[facility]  Michael Sethi III, M.D.  [address]  [place] [address]  Fax: [phone]   Authorization for Release/Disclosure of   Protected Health Information   Name: NURY VALENCIA : 1950 SSN: [id number]   Address: [facility] [place] [address] Phone:    [phone] (home)    I authorize the entity listed below to release/disclose the PHI below to:   [facility]/Michael Sethi III, M.D. and Michael Sethi III, M.D.   Provider or Entity Name:  [facility]   Address   [facility], Zip  [address] [place] [address] [place] [place], [place] Phone:  [phone]    Fax:     Reason for request: continuity of care   Information to be released:    [  ] LAST COLONOSCOPY,  including any PATH REPORT and follow-up  [  ] LAST FIT/COLOGUARD RESULT [  ] LAST DEXA  [  ] LAST MAMMOGRAM  [  ] LAST PAP  [  ] LAST LABS [XXX  ] RETINA EXAM REPORT  [  ] IMMUNIZATION RECORDS  [  ] Release all info      [  ] Check here and initial the line next to each item to release ALL health information INCLUDING  _____ Care and treatment for drug and / or alcohol abuse  _____ HIV testing, infection status, or AIDS  _____ Genetic Testing    DATES OF SERVICE OR TIME PERIOD TO BE DISCLOSED: _____________  I understand and acknowledge that:  * This Authorization may be revoked at any time by you in writing, except if your health information has already been used or disclosed.  * Your health information that will be used or disclosed as a result of you signing this authorization could be re-disclosed by the recipient. If this occurs, your re-disclosed health information may no longer be protected by State or Federal laws.  * You may refuse to sign this Authorization. Your refusal will not affect your ability to obtain treatment.  * This Authorization becomes effective upon signing and will  on (date) __________.      If no date is indicated, this Authorization will  one (1) year from the signature date.     Name: Rene Chan  Signature: Date:   3/3/2023     PLEASE FAX REQUESTED RECORDS BACK TO: [phone]

## 2023-03-03 NOTE — PROGRESS NOTES
Subjective:     CC: Patient is here to discuss his urinary frequency and his other medical issues and also to go over his hemoglobin A1c result.    HPI:   Rene presents today with the following medical concerns:    Urinary frequency  This is a chronic problem.  Patient states he has to get up several times at night to urinate.  He would like to try a medication for it again.  In the past he been tried on Flomax and it did not seem to help.  Of note his PSAs have been rather low so is questionable whether he actually has BPH or not.  I do not see in the chart whether he is seeing a urologist before.    Acquired hypothyroidism  This is a chronic problem.  He is on supplementation.  He is due for labs before the next visit and that will be ordered.    Alcohol dependence in remission ([facility])  This is a chronic stable condition.  Continue to follow.    Atherosclerosis of aorta ([facility])  This is a chronic problem.  Was found on x-ray studies.  He is on a statin.    Bipolar disorder in full remission ([facility])  This is a chronic problem.  Continue to follow.    Diabetes mellitus with coincident hypertension ([facility])  These are both chronic stable conditions.  Continue to follow.  Recheck hemoglobin A1c again in about 5 to 6 months.  Not currently on diabetic medication.    Leg DVT (deep venous thromboembolism), chronic, left (HCC)  This is a chronic stable condition.  Continue to follow.    Opioid dependence in remission ([facility])  This is a chronic stable condition.    Primary hypertension  This is a chronic problem under control.  Continue current medications.    Past Medical History:   Diagnosis Date    Anticoagulation monitoring, special range     Bipolar disorder ([facility])     CAD (coronary artery disease) 6/4/2015    Carotid artery occlusion     L 60-79% occluded; R 59% occluded per pt report; 2011    Clotting disorder (HCC)     protein S elevation in '12 with recurrent DVT and PE-coumadin lifelong    Colon polyps     Diabetes  mellitus, type II (HCC)     diet controlled    Gout     Hammer toe     Hyperlipidemia     Hypertension     Hypothyroidism     Impaired fasting glucose     Mild aortic stenosis - echo 2018        Social History     Tobacco Use    Smoking status: Former     Packs/day: 2.00     Years: 10.00     Pack years: 20.00     Types: Cigarettes     Quit date: 1976     Years since quittin.2    Smokeless tobacco: Never    Tobacco comments:     quit x 32 yrs; 2ppd x 7 years   Vaping Use    Vaping Use: Never used   Substance Use Topics    Alcohol use: No    Drug use: No     Comment: IV drug use in teens       Current Outpatient Medications Ordered in Epic   Medication Sig Dispense Refill    dutasteride (AVODART) 0.5 MG capsule Take 1 Capsule by mouth every day. 90 Capsule 1    lisinopril (PRINIVIL) 5 MG Tab Take 1 Tablet by mouth every day. 45 Tablet 0    divalproex (DEPAKOTE) 125 MG EC tablet Take 1 po  Tablet 3    divalproex (DEPAKOTE) 250 MG Tablet Delayed Response Take  2 po at night 270 Tablet 3    metoprolol tartrate (LOPRESSOR) 25 MG Tab Take 0.5 Tablets by mouth every day. 50 Tablet 1    warfarin (COUMADIN) 7.5 MG Tab TAKE 1/2 TO 1 TABLET BY MOUTH DAILY AS DIRECTED BY [facility] 90 Tablet 1    traZODone (DESYREL) 50 MG Tab Take 2 Tablets by mouth every evening. For insomnia 180 Tablet 2    levothyroxine (SYNTHROID) 75 MCG Tab Take 1 Tablet by mouth every evening. 100 Tablet 2    predniSONE (DELTASONE) 20 MG Tab Take 2 po a day for 5 days and then 1 a day for 1 week prn gout flare. 30 Tablet 2    atorvastatin (LIPITOR) 80 MG tablet TAKE 1 TABLET BY MOUTH EVERY DAY (Patient taking differently: Take 80 mg by mouth every day.) 100 Tablet 3    Glucosamine-Chondroit-Vit C-Mn (GLUCOSAMINE CHONDR 500 COMPLEX PO) Take 1 Tablet by mouth 2 times a day.      aspirin 81 MG tablet Take 81 mg by mouth every day. 100 Tab     COENZYME Q10 PO Take 1 Tab by mouth every morning.      Cholecalciferol  "(VITAMIN D) 2000 UNITS Cap Take 2,000 Units by mouth every day.      docosahexanoic acid (OMEGA 3 FA) 1000 MG CAPS Take 1,000 mg by mouth every day.       No current [facility]-ordered facility-administered medications on file.       Allergies:  Penicillins    Health Maintenance: Completed    ROS:  Gen: no fevers/chills, no changes in weight  Eyes: no changes in vision  ENT: no sore throat, no hearing loss, no bloody nose  Pulm: no sob, no cough  CV: no chest pain, no palpitations  GI: no nausea/vomiting, no diarrhea  : no dysuria  MSk: no myalgias  Skin: no rash  Neuro: no headaches, no numbness/tingling  Heme/Lymph: no easy bruising      Objective:       Exam:  /72 (BP Location: Right arm, Patient Position: Sitting, BP Cuff Size: Adult)   Pulse 60   Temp 36.4 °C (97.6 °F) (Temporal)   Resp 18   Ht 1.727 m (5' 8\")   Wt 88 kg (194 lb)   SpO2 96%   BMI 29.50 kg/m²  Body mass index is 29.5 kg/m².    Gen: Alert and oriented, No apparent distress.  Ext: No clubbing, cyanosis, edema.  Psych: Patient is alert and oriented x3.  Unusual topics expressed.  Insight and judgment is good.    Labs: Labs reviewed with patient    Assessment & Plan:     72 y.o. male with the following -     1. Diabetes mellitus with coincident hypertension (HCC)  These are both chronic problems under good control.  Lab ordered to be done before his next visit.  - Comp Metabolic Panel; Future  - HEMOGLOBIN A1C; Future  - MICROALBUMIN CREAT RATIO URINE; Future    2. Acquired hypothyroidism  This is a chronic problem.  Continue current medication.  Lab ordered.  - TSH WITH REFLEX TO FT4; Future    3. Primary hypertension  This is a chronic stable condition.  Continue current medications.  - Lipid Profile; Future  - URINALYSIS,CULTURE IF INDICATED; Future  - CBC WITH DIFFERENTIAL; Future  - ESTIMATED GFR; Future    4. Prostate cancer screening  This is a screening issue.  Lab ordered.  - PROSTATE SPECIFIC AG SCREENING; Future    5. " Atherosclerosis of aorta (HCC)  This is a chronic problem.  Continue on a statin.    6. Leg DVT (deep venous thromboembolism), chronic, left (HCC)  This is a chronic stable condition.  Continue to follow.    7. Alcohol dependence in remission (HCC)  This is a chronic stable condition.  Continue to follow.    8. Opioid dependence in remission (HCC)  This is a chronic stable condition.  Continue to follow.    9. Bipolar disorder in full remission, most recent episode unspecified type ([facility])  This is a chronic stable condition.  Continue to follow.    10. Urinary frequency  This is a chronic problem.  Patient told we can try him on Avodart.  He needs to give it 1 to 2 months to see if it is helpful.  If it does not help his symptoms then we can refer him to urology for detailed evaluation.    [facility] Gap Form    Diagnosis to address: I70.0 - Atherosclerosis of aorta (HCC)  Assessment and plan: Chronic, stable. Continue with current defined treatment plan: This was noted on x-ray studies.  He is on 80 mg of atorvastatin a day.. Follow-up at least annually.  Diagnosis: I82.502 - Leg DVT (deep venous thromboembolism), chronic, left (HCC)  Assessment and plan: Chronic, stable. Continue with current defined treatment plan: This is a chronic stable condition.. Follow-up at least annually.  Diagnosis: F10.21 - Alcohol dependence in remission (HCC)  Assessment and plan: Chronic, stable. Continue with current defined treatment plan: This is a chronic stable condition.. Follow-up at least annually.  Diagnosis: F11.21 - Opioid dependence in remission (HCC)  Assessment and plan: Chronic, stable. Continue with current defined treatment plan: Chronic stable condition.. Follow-up at least annually.  Diagnosis: F31.70 - Bipolar disorder in full remission, most recent episode unspecified type (HCC)  Assessment and plan: Chronic, stable. Continue with current defined treatment plan: Chronic stable condition.. Follow-up at least  annually.  Last edited 03/03/23 11:48 PST by Michael Sethi III, M.D.         Return in about 5 months (around 8/1/2023).    Please note that this dictation was created using voice recognition software. I have made every reasonable attempt to correct obvious errors, but I expect that there are errors of grammar and possibly content that I did not discover before finalizing the note.

## 2023-03-03 NOTE — ASSESSMENT & PLAN NOTE
These are both chronic stable conditions.  Continue to follow.  Recheck hemoglobin A1c again in about 5 to 6 months.  Not currently on diabetic medication.

## 2023-03-03 NOTE — PROGRESS NOTES
Annual Health Assessment Questions:    1.  Are you currently engaging in any exercise or physical activity? Yes    2.  How would you describe your mood or emotional well-being today? good    3.  Have you had any falls in the last year? No    4.  Have you noticed any problems with your balance or had difficulty walking? No    5.  In the last six months have you experienced any leakage of urine? Yes    6. DPA/Advanced Directive: Patient has Advance Directive on file.

## 2023-03-03 NOTE — ASSESSMENT & PLAN NOTE
This is a chronic problem.  Patient states he has to get up several times at night to urinate.  He would like to try a medication for it again.  In the past he been tried on Flomax and it did not seem to help.  Of note his PSAs have been rather low so is questionable whether he actually has BPH or not.  I do not see in the chart whether he is seeing a urologist before.

## 2023-03-03 NOTE — ASSESSMENT & PLAN NOTE
This is a chronic problem.  He is on supplementation.  He is due for labs before the next visit and that will be ordered.

## 2023-03-05 ENCOUNTER — OFFICE VISIT (OUTPATIENT)
Dept: URGENT CARE | Facility: CLINIC | Age: 73
End: 2023-03-05
Payer: MEDICARE

## 2023-03-05 VITALS
TEMPERATURE: 97.8 F | WEIGHT: 195.9 LBS | HEART RATE: 58 BPM | BODY MASS INDEX: 31.48 KG/M2 | HEIGHT: 66 IN | SYSTOLIC BLOOD PRESSURE: 132 MMHG | DIASTOLIC BLOOD PRESSURE: 62 MMHG | RESPIRATION RATE: 20 BRPM | OXYGEN SATURATION: 96 %

## 2023-03-05 DIAGNOSIS — S91.312A FOOT LACERATION, LEFT, INITIAL ENCOUNTER: ICD-10-CM

## 2023-03-05 PROCEDURE — 12001 RPR S/N/AX/GEN/TRNK 2.5CM/<: CPT | Mod: LT | Performed by: FAMILY MEDICINE

## 2023-03-05 ASSESSMENT — FIBROSIS 4 INDEX: FIB4 SCORE: 1.43

## 2023-03-06 ENCOUNTER — ANTICOAGULATION VISIT (OUTPATIENT)
Dept: VASCULAR LAB | Facility: MEDICAL CENTER | Age: 73
End: 2023-03-06
Attending: INTERNAL MEDICINE
Payer: MEDICARE

## 2023-03-06 VITALS — HEART RATE: 71 BPM | SYSTOLIC BLOOD PRESSURE: 120 MMHG | DIASTOLIC BLOOD PRESSURE: 66 MMHG

## 2023-03-06 DIAGNOSIS — G45.9 TIA (TRANSIENT ISCHEMIC ATTACK): ICD-10-CM

## 2023-03-06 LAB — INR PPP: 1.7 (ref 2–3.5)

## 2023-03-06 PROCEDURE — 85610 PROTHROMBIN TIME: CPT

## 2023-03-06 PROCEDURE — 99212 OFFICE O/P EST SF 10 MIN: CPT

## 2023-03-06 NOTE — PROGRESS NOTES
Anticoagulation Summary  As of 3/6/2023      INR goal:  2.0-3.0   TTR:  58.0 % (7.7 y)   INR used for dosin.70 (3/6/2023)   Warfarin maintenance plan:  3.75 mg (7.5 mg x 0.5) every Wed, Fri; 7.5 mg (7.5 mg x 1) all other days   Weekly warfarin total:  45 mg   Plan last modified:  Kim Everett (3/6/2023)   Next INR check:  3/27/2023   Priority:  Acute   Target end date:  Indefinite    Indications    DVT (deep venous thrombosis) (HCC) (Resolved) [I82.409]  TIA (transient ischemic attack) [G45.9]  Pulmonary embolism [415.19] (Resolved) [I26.99]                 Anticoagulation Episode Summary       INR check location:  Anticoagulation Clinic    Preferred lab:  [facility]    Send INR reminders to:      Comments:  ASA 81 mg for TIA, CAD hx - Lifelong per Dr. Wright 3/7/22          Anticoagulation Care Providers       Provider Role Specialty Phone number    Patricia Damon M.D. Referring Internal Medicine [phone]    Jona QuinnD Responsible      [facility] Responsible  [phone]          Refer to Patient Findings for HPI:  Patient Findings       Positives:  Change in diet/appetite (more greens than usual)    Negatives:  Signs/symptoms of thrombosis, Signs/symptoms of bleeding, Laboratory test error suspected, Change in health, Change in alcohol use, Change in activity, Upcoming invasive procedure, Emergency department visit, Upcoming dental procedure, Missed doses, Extra doses, Change in medications, Hospital admission, Bruising, Other complaints          Vitals:    23 1033   BP: 120/66   Pulse: 71       Patient seen in clinic today for follow up on anticoagulation therapy with warfarin (a high risk medication) for hx of DVT, PE and TIA  Verified current warfarin dosing schedule.  Patient denies any missed doses of warfarin, but has been eating more greens and would like to continue to do so.     Medications reconciled   Pt is on ASA 81mg as  antiplatelet therapy for TIA and must be reviewed again on next follow up with cards.      A/P   INR is SUB-therapeutic today at 1.7.     Warfarin dosing recommendation: Patient will begin increased weekly regimen of 3.75mg on Wed and Fri and 7.5mg ROW.   Patient asked to return in 2 weeks, but patient prefers to return in 3 weeks instead.     Pt educated to contact our clinic with any changes in medications or s/s of bleeding or thrombosis. Pt is aware to seek immediate medical attention for falls, head injury or deep cuts.    Follow up appointment in 3 week(s).    Next appt: Monday, March 27 @ 10:30am     Lupe Everett PharmD

## 2023-03-06 NOTE — PROGRESS NOTES
Subjective:      Chief Complaint   Patient presents with    Laceration     X today left medial heel                Laceration   The incident occurred less than 1 hour ago.  location:  left foot.   The laceration is 2 cm in size. The laceration mechanism was a metal edge. The pain is mild. The pain has been constant since onset.  tetanus status is:   current        Social History     Tobacco Use    Smoking status: Former     Packs/day: 2.00     Years: 10.00     Pack years: 20.00     Types: Cigarettes     Quit date: 1976     Years since quittin.2    Smokeless tobacco: Never    Tobacco comments:     quit x 32 yrs; 2ppd x 7 years   Vaping Use    Vaping Use: Never used   Substance Use Topics    Alcohol use: No    Drug use: No     Comment: IV drug use in teens           Past Medical History:   Diagnosis Date    Anticoagulation monitoring, special range     Bipolar disorder ([facility])     CAD (coronary artery disease) 2015    Carotid artery occlusion     L 60-79% occluded; R 59% occluded per pt report;     Clotting disorder ([facility])     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 45 Tablet 0    divalproex (DEPAKOTE) 125 MG EC tablet Take 1 po  Tablet 3    divalproex (DEPAKOTE) 250 MG Tablet Delayed Response Take  2 po at night 270 Tablet 3    metoprolol tartrate (LOPRESSOR) 25 MG Tab Take 0.5 Tablets by mouth every day. 50 Tablet 1    warfarin (COUMADIN) 7.5 MG Tab TAKE 1/2 TO 1 TABLET BY MOUTH DAILY AS DIRECTED BY [facility] 90 Tablet 1    traZODone (DESYREL) 50 MG Tab Take 2 Tablets  "by mouth every evening. For insomnia 180 Tablet 2    levothyroxine (SYNTHROID) 75 MCG Tab Take 1 Tablet by mouth every evening. 100 Tablet 2    predniSONE (DELTASONE) 20 MG Tab Take 2 po a day for 5 days and then 1 a day for 1 week prn gout flare. 30 Tablet 2    atorvastatin (LIPITOR) 80 MG tablet TAKE 1 TABLET BY MOUTH EVERY DAY (Patient taking differently: Take 80 mg by mouth every day.) 100 Tablet 3    Glucosamine-Chondroit-Vit C-Mn (GLUCOSAMINE CHONDR 500 COMPLEX PO) Take 1 Tablet by mouth 2 times a day.      aspirin 81 MG tablet Take 81 mg by mouth every day. 100 Tab     COENZYME Q10 PO Take 1 Tab by mouth every morning.      Cholecalciferol (VITAMIN D) 2000 UNITS Cap Take 2,000 Units by mouth every day.      docosahexanoic acid (OMEGA 3 FA) 1000 MG CAPS Take 1,000 mg by mouth every day.      dutasteride (AVODART) 0.5 MG capsule Take 1 Capsule by mouth every day. (Patient not taking: Reported on 3/5/2023) 90 Capsule 1     No current facility-administered medications on file prior to visit.         Review of Systems   Cardio - denies chest pain  resp - denies SOB.   Neurological: Negative for tingling, sensory change and focal weakness.   All other systems reviewed and are negative.         Objective:     /62   Pulse (!) 58   Temp 36.6 °C (97.8 °F) (Temporal)   Resp 20   Ht 1.688 m (5' 6.46\")   Wt 88.9 kg (195 lb 14.4 oz)   SpO2 96%       Physical Exam   Constitutional: pt is oriented to person, place, and time. Pt appears well-developed. No distress.   HENT:   Head: Normocephalic and atraumatic.   Eyes: Conjunctivae are normal.   Cardiovascular: Normal rate.    Pulmonary/Chest: Effort normal.   Musculoskeletal:     There is a 2.5 cm linear, superficial lac over heel of left foot.       Neurological: He is alert and oriented to person, place, and time.   Skin: Skin is warm. Pt is not diaphoretic. No erythema.   Psychiatric:  behavior is normal.   Nursing note and vitals reviewed.            "   Assessment/Plan:          1. Foot laceration, left, initial encounter             The wound was thoroughly irrigated with copious amount of normal saline.   Area was then prepped in the usual sterile fashion.    Wound was cleansed   Wound explored and found to be relatively superficial and no foreign bodies appreciated.  I approximated the wound edges and closed the laceration with several layers of dermabond .  Area was then cleansed and dressed        Basic wound care instructions given:  -advised to keep the wound clean and dry.   -daily dressing changes  -wash with mild soap and water daily  -advised no hydrogen peroxide or ETOH  -Pt will RTC immediately for:  Increased pain, swelling, red streaking, wound discharge or fever  -pt voiced understanding  -all questions answered

## 2023-03-14 ENCOUNTER — OFFICE VISIT (OUTPATIENT)
Dept: CARDIOLOGY | Facility: MEDICAL CENTER | Age: 73
End: 2023-03-14
Payer: MEDICARE

## 2023-03-14 VITALS
HEIGHT: 66 IN | HEART RATE: 61 BPM | BODY MASS INDEX: 32.3 KG/M2 | SYSTOLIC BLOOD PRESSURE: 140 MMHG | RESPIRATION RATE: 14 BRPM | OXYGEN SATURATION: 94 % | DIASTOLIC BLOOD PRESSURE: 56 MMHG | WEIGHT: 201 LBS

## 2023-03-14 DIAGNOSIS — I70.0 ATHEROSCLEROSIS OF AORTA (HCC): ICD-10-CM

## 2023-03-14 DIAGNOSIS — I25.10 CORONARY ARTERY DISEASE DUE TO LIPID RICH PLAQUE: ICD-10-CM

## 2023-03-14 DIAGNOSIS — I25.83 CORONARY ARTERY DISEASE DUE TO LIPID RICH PLAQUE: ICD-10-CM

## 2023-03-14 DIAGNOSIS — D68.69 SECONDARY HYPERCOAGULABLE STATE (HCC): Chronic | ICD-10-CM

## 2023-03-14 DIAGNOSIS — I35.0 MILD AORTIC STENOSIS: ICD-10-CM

## 2023-03-14 DIAGNOSIS — I10 PRIMARY HYPERTENSION: ICD-10-CM

## 2023-03-14 DIAGNOSIS — I65.23 BILATERAL CAROTID ARTERY STENOSIS: ICD-10-CM

## 2023-03-14 DIAGNOSIS — Z95.1 S/P CABG X 3: ICD-10-CM

## 2023-03-14 DIAGNOSIS — D69.2 SENILE PURPURA (HCC): Chronic | ICD-10-CM

## 2023-03-14 DIAGNOSIS — E78.5 DYSLIPIDEMIA: ICD-10-CM

## 2023-03-14 PROCEDURE — 99214 OFFICE O/P EST MOD 30 MIN: CPT | Performed by: INTERNAL MEDICINE

## 2023-03-14 ASSESSMENT — FIBROSIS 4 INDEX: FIB4 SCORE: 1.43

## 2023-03-14 NOTE — PROGRESS NOTES
Chief Complaint   Patient presents with    Transient Ischemic Attack    Coronary Artery Disease    Dyslipidemia       Subjective     Rene Handy Chan is a 72 y.o. 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    Colon polyps     Diabetes mellitus, type II (HCC)     diet controlled    Gout     Hammer toe     Hyperlipidemia     Hypertension     Hypothyroidism     Impaired fasting glucose     Mild aortic stenosis - echo 4/2018      Past Surgical History:   Procedure Laterality Date    PB TOTAL KNEE ARTHROPLASTY Left 7/12/2022    Procedure: LEFT ARTHROPLASTY, KNEE, TOTAL;  Surgeon: Arturo Boyle M.D.;  Location: SURGERY [facility];  Service: Orthopedics    MULTIPLE CORONARY ARTERY BYPASS ENDO VEIN HARVEST  6/4/2015    Procedure: MULTIPLE CORONARY ARTERY BYPASS Grafting  x 3   ENDO VEIN HARVEST right leg;  Surgeon: Christophe Lemus M.D.;  Location: SURGERY [facility];  Service:     RECOVERY  6/1/2015    Procedure:  CATH LAB  [facility] w/POSS ISSACGrulla ICD; 794.31;  Surgeon: [facility];  Location: SURGERY PRE-POST PROC UNIT [facility];  Service:     OTHER ORTHOPEDIC SURGERY  1976    L wrist repair     ANKLE FUSION      APPENDECTOMY      OTHER ABDOMINAL SURGERY      umbilical hernia repair 2000, appy age 13     Family History   Problem Relation Age of Onset    Heart Disease Sister 50    Stroke Sister 55        CVA    Heart Failure Father      Social History     Socioeconomic History    Marital status:      Spouse name: Not on file    Number of children: Not on file    Years of education: Not on file    Highest  education level: Not on file   Occupational History    Occupation: Cam      Comment: Build house for 35 years   Tobacco Use    Smoking status: Former     Packs/day: 2.00     Years: 10.00     Pack years: 20.00     Types: Cigarettes     Quit date: 1976     Years since quittin.2    Smokeless tobacco: Never    Tobacco comments:     quit x 32 yrs; 2ppd x 7 years   Vaping Use    Vaping Use: Never used   Substance and Sexual Activity    Alcohol use: No    Drug use: No     Comment: IV drug use in teens    Sexual activity: Not Currently     Partners: Female   Other Topics Concern    Not on file   Social History Narrative    Lives with wife    Retired cam    Does security part time    ADLs and IADLs intact     Social Determinants of Health     Financial Resource Strain: Not on file   Food Insecurity: Not on file   Transportation Needs: Not on file   Physical Activity: Not on file   Stress: Not on file   Social Connections: Not on file   Intimate Partner Violence: Not on file   Housing Stability: Not on file     Allergies   Allergen Reactions    Penicillins Unspecified     Childhood; does know what the reaction was     Outpatient Encounter Medications as of 3/14/2023   Medication Sig Dispense Refill    lisinopril (PRINIVIL) 5 MG Tab Take 1 Tablet by mouth every day. 45 Tablet 0    divalproex (DEPAKOTE) 125 MG EC tablet Take 1 po  Tablet 3    divalproex (DEPAKOTE) 250 MG Tablet Delayed Response Take  2 po at night 270 Tablet 3    metoprolol tartrate (LOPRESSOR) 25 MG Tab Take 0.5 Tablets by mouth every day. 50 Tablet 1    warfarin (COUMADIN) 7.5 MG Tab TAKE 1/2 TO 1 TABLET BY MOUTH DAILY AS DIRECTED BY [facility] 90 Tablet 1    traZODone (DESYREL) 50 MG Tab Take 2 Tablets by mouth every evening. For insomnia 180 Tablet 2    levothyroxine (SYNTHROID) 75 MCG Tab Take 1 Tablet by mouth every evening. 100 Tablet 2    predniSONE (DELTASONE) 20 MG Tab Take 2 po a day for 5 days and  "then 1 a day for 1 week prn gout flare. 30 Tablet 2    atorvastatin (LIPITOR) 80 MG tablet TAKE 1 TABLET BY MOUTH EVERY DAY (Patient taking differently: Take 80 mg by mouth every day.) 100 Tablet 3    Glucosamine-Chondroit-Vit C-Mn (GLUCOSAMINE CHONDR 500 COMPLEX PO) Take 1 Tablet by mouth 2 times a day.      aspirin 81 MG tablet Take 81 mg by mouth every day. 100 Tab     COENZYME Q10 PO Take 1 Tab by mouth every morning.      Cholecalciferol (VITAMIN D) 2000 UNITS Cap Take 2,000 Units by mouth every day.      docosahexanoic acid (OMEGA 3 FA) 1000 MG CAPS Take 1,000 mg by mouth every day.      dutasteride (AVODART) 0.5 MG capsule Take 1 Capsule by mouth every day. (Patient not taking: Reported on 3/5/2023) 90 Capsule 1     No facility-administered encounter medications on file as of 3/14/2023.     ROS           Objective     BP (!) 140/56 (BP Location: Left arm, Patient Position: Sitting, BP Cuff Size: Adult)   Pulse 61   Resp 14   Ht 1.676 m (5' 6\")   Wt 91.2 kg (201 lb)   SpO2 94%   BMI 32.44 kg/m²     Physical Exam  Constitutional:       General: He is not in acute distress.     Appearance: He is not diaphoretic.   HENT:      Mouth/Throat:      Comments: Wearing a mask for COVID protocol  Eyes:      General: No scleral icterus.  Neck:      Vascular: No JVD.   Cardiovascular:      Rate and Rhythm: Normal rate.      Heart sounds: Murmur heard.     No friction rub. No gallop.   Pulmonary:      Effort: No respiratory distress.      Breath sounds: No wheezing or rales.   Abdominal:      General: Bowel sounds are normal.      Palpations: Abdomen is soft.   Musculoskeletal:      Right lower leg: No edema.      Left lower leg: No edema.   Skin:     Findings: No rash.   Neurological:      Mental Status: He is alert. Mental status is at baseline.   Psychiatric:         Mood and Affect: Mood normal.   SENILE PURPURA         We reviewed in person the most recent labs  Recent Results (from the past 5040 hour(s))   POCT " Protime    Collection Time: 09/07/22 12:00 AM   Result Value Ref Range    INR 3.10 2 - 3.5   POCT INR device results    Collection Time: 09/07/22 10:32 AM   Result Value Ref Range    POC INR 3.1 (H) 0.9 - 1.2   POCT Protime    Collection Time: 10/05/22 12:00 AM   Result Value Ref Range    INR 2.20 2 - 3.5   POCT INR device results    Collection Time: 10/05/22 10:32 AM   Result Value Ref Range    POC INR 2.2 (H) 0.9 - 1.2   HEMOGLOBIN A1C    Collection Time: 10/10/22 10:22 AM   Result Value Ref Range    Glycohemoglobin 6.4 (H) 4.0 - 5.6 %    Est Avg Glucose 137 mg/dL   POCT Protime    Collection Time: 10/24/22 12:00 AM   Result Value Ref Range    INR 1.40 (A) 2 - 3.5   POCT INR device results    Collection Time: 10/24/22 11:21 AM   Result Value Ref Range    POC INR 1.4 (H) 0.9 - 1.2   POCT Protime    Collection Time: 11/02/22 12:00 AM   Result Value Ref Range    INR 2.50 2 - 3.5   POCT INR device results    Collection Time: 11/02/22  8:23 AM   Result Value Ref Range    POC INR 2.5 (H) 0.9 - 1.2   POCT Protime    Collection Time: 11/28/22 12:00 AM   Result Value Ref Range    INR 1.80 (A) 2 - 3.5   POCT INR device results    Collection Time: 11/28/22 10:38 AM   Result Value Ref Range    POC INR 1.8 (H) 0.9 - 1.2   POCT Protime    Collection Time: 01/03/23 12:00 AM   Result Value Ref Range    INR 3.30 2 - 3.5   POCT INR device results    Collection Time: 01/03/23  1:09 PM   Result Value Ref Range    POC INR 3.3 (H) 0.9 - 1.2   POCT Protime    Collection Time: 01/30/23 12:00 AM   Result Value Ref Range    INR 2.00 2 - 3.5   POCT INR device results    Collection Time: 01/30/23 10:54 AM   Result Value Ref Range    POC INR 2.0 (H) 0.9 - 1.2   CBC WITH DIFFERENTIAL    Collection Time: 01/31/23  9:30 PM   Result Value Ref Range    WBC 6.9 4.8 - 10.8 K/uL    RBC 5.03 4.70 - 6.10 M/uL    Hemoglobin 15.7 14.0 - 18.0 g/dL    Hematocrit 46.7 42.0 - 52.0 %    MCV 92.8 81.4 - 97.8 fL    MCH 31.2 27.0 - 33.0 pg    MCHC 33.6 (L) 33.7  - 35.3 g/dL    RDW 46.8 35.9 - 50.0 fL    Platelet Count 269 164 - 446 K/uL    MPV 10.5 9.0 - 12.9 fL    Neutrophils-Polys 67.10 44.00 - 72.00 %    Lymphocytes 20.70 (L) 22.00 - 41.00 %    Monocytes 9.50 0.00 - 13.40 %    Eosinophils 1.50 0.00 - 6.90 %    Basophils 0.90 0.00 - 1.80 %    Immature Granulocytes 0.30 0.00 - 0.90 %    Nucleated RBC 0.00 /100 WBC    Neutrophils (Absolute) 4.62 1.82 - 7.42 K/uL    Lymphs (Absolute) 1.42 1.00 - 4.80 K/uL    Monos (Absolute) 0.65 0.00 - 0.85 K/uL    Eos (Absolute) 0.10 0.00 - 0.51 K/uL    Baso (Absolute) 0.06 0.00 - 0.12 K/uL    Immature Granulocytes (abs) 0.02 0.00 - 0.11 K/uL    NRBC (Absolute) 0.00 K/uL   CMP    Collection Time: 01/31/23  9:30 PM   Result Value Ref Range    Sodium 137 135 - 145 mmol/L    Potassium 4.1 3.6 - 5.5 mmol/L    Chloride 101 96 - 112 mmol/L    Co2 25 20 - 33 mmol/L    Anion Gap 11.0 7.0 - 16.0    Glucose 143 (H) 65 - 99 mg/dL    Bun 13 8 - 22 mg/dL    Creatinine 0.63 0.50 - 1.40 mg/dL    Calcium 9.3 8.5 - 10.5 mg/dL    AST(SGOT) 23 12 - 45 U/L    ALT(SGPT) 19 2 - 50 U/L    Alkaline Phosphatase 49 30 - 99 U/L    Total Bilirubin 1.0 0.1 - 1.5 mg/dL    Albumin 4.5 3.2 - 4.9 g/dL    Total Protein 7.3 6.0 - 8.2 g/dL    Globulin 2.8 1.9 - 3.5 g/dL    A-G Ratio 1.6 g/dL   D-DIMER    Collection Time: 01/31/23  9:30 PM   Result Value Ref Range    D-Dimer 0.28 0.00 - 0.50 ug/mL (FEU)   CORRECTED CALCIUM    Collection Time: 01/31/23  9:30 PM   Result Value Ref Range    Correct Calcium 8.9 8.5 - 10.5 mg/dL   ESTIMATED GFR    Collection Time: 01/31/23  9:30 PM   Result Value Ref Range    GFR (CKD-EPI) 100 >60 mL/min/1.73 m 2   Blood Culture,Hold    Collection Time: 01/31/23  9:30 PM   Result Value Ref Range    Blood Culture Hold Collected    Fluid Cell Count    Collection Time: 01/31/23 10:24 PM   Result Value Ref Range    Fluid Type Synovial     Color-Body Fluid Red     Character-Body Fluid Bloody     Total RBC Count 0265001 cells/uL    Total WBC 1408  cells/uL    Polys 83 %    Lymphs 14 %    Mononuclear Cells - Fluid 2 %    Eosinophils - CSF 1 %   FLUID CULTURE W/GRAM STAIN    Collection Time: 01/31/23 10:24 PM    Specimen: Synovial Fluid   Result Value Ref Range    Significant Indicator NEG     Source SYNO     Site Right Knee     Culture Result No growth at 72 hours.     Gram Stain Result Many WBCs.  No organisms seen.      FLUID CRYSTALS    Collection Time: 01/31/23 10:24 PM   Result Value Ref Range    Fluid Type Synovial     Crystals, Body Fluid None Seen None Seen   GRAM STAIN    Collection Time: 01/31/23 10:24 PM    Specimen: Synovial   Result Value Ref Range    Significant Indicator .     Source SYNO     Site Right Knee     Gram Stain Result Many WBCs.  No organisms seen.      HEMOGLOBIN A1C    Collection Time: 02/28/23  8:13 AM   Result Value Ref Range    Glycohemoglobin 6.5 (H) 4.0 - 5.6 %    Est Avg Glucose 140 mg/dL   POCT Protime    Collection Time: 03/06/23 12:00 AM   Result Value Ref Range    INR 1.70 (A) 2 - 3.5       Assessment & Plan     1. Coronary artery disease due to lipid rich plaque        2. Dyslipidemia        3. S/P CABG x 3 - 6/2015        4. Mild aortic stenosis        5. Atherosclerosis of aorta (HCC)        6. Secondary hypercoagulable state (HCC)        7. Bilateral carotid artery stenosis        8. Primary hypertension            Medical Decision Making: Today's Assessment/Status/Plan:      It was my pleasure to meet with Mr. Chan.    We addressed the management of hypertension at today's visit. Blood pressure is well controlled.  We specifically assessed the labs on hypertension treatment    We addressed the management of dyslipidemia and atherosclerosis at today's visit. He is on appropriate statin.    We addressed the management of atherosclerotic artery disease.  He is on proper antiplatelet, cholesterol management and beta-blockers as appropriate.  We addressed the potential side effects and laboratory follow-up for these  medications.    We addressed the management of chronic anticoagulation at today's visit. He understands the risks and benefits of chronic anticoagulation.  We reviewed and verified the results of annual testing for anemia and kidney function.    STRONGLY CONSIDER JARDIANCE    I will see Mr. Chan back in 1 year time and encouraged him to follow up with us over the phone or electronically using my MyChart as issues arise.    It is my pleasure to participate in the care of Mr. Chan.  Please do not hesitate to contact me with questions or concerns.    Ta Wright MD PhD [facility]  Cardiologist [facility]    Please note that this dictation was created using voice recognition software. There may be errors I did not discover before finalizing the note.

## 2023-03-14 NOTE — PATIENT INSTRUCTIONS
Make sure you are taking dutasteride (AVODART) for prostate    We discussed that you should talk with primary care (or endocrinology) about oral medication: empagliflozin (JARDIANCE®  Preferred for CAD), dapagliflozin (FARXIGA®, preferred for CKD),  there are cardiovascular benefits but there are also risks.  You may also want to consider liraglutide (Victoza®), semaglutide (Ozempic®), dulaglutide (Trulicity®) which are injection with cardiovascular benefits but also risks.    We discussed adding Jardiance (empagliflozin) 10 mg daily to your regimen to prevent complications from heart disease and help with blood sugar.  Based on large clinical trials Jardiance is expected to reduce your risk by 38% of heart attack or dying from heart disease if you have coronary disease and diabetes (EMPA-REG Study).  In patients without diabetes but who have heart failure, there is a 21-25% relatively less likelihood of dying or being hospitalized for heart failure (EMPEROR Study).  While Jardiance has benefits (most importantly reduce risk for hospitalization for heart failure or heart attack, weight loss, blood pressure control and reduces death) it also has risks due to the fact that you will be urinating excess sugar out of your body. There is increased risk for urinary and yeast infection, which can be very serious, so please report any symptoms as soon as possible to urgent care, primary care or our office.  You should stay well-hydrated on this medication and report abdominal pains.  You are expected to lose weight over time with this medication and may need to reduce your other medications.  It is advised to check your chemistries after starting this medication within the month and then regularly as is typically required for your other medications.

## 2023-03-27 ENCOUNTER — ANTICOAGULATION VISIT (OUTPATIENT)
Dept: VASCULAR LAB | Facility: MEDICAL CENTER | Age: 73
End: 2023-03-27
Attending: INTERNAL MEDICINE
Payer: MEDICARE

## 2023-03-27 VITALS — HEART RATE: 56 BPM | SYSTOLIC BLOOD PRESSURE: 132 MMHG | DIASTOLIC BLOOD PRESSURE: 67 MMHG

## 2023-03-27 DIAGNOSIS — G45.9 TIA (TRANSIENT ISCHEMIC ATTACK): ICD-10-CM

## 2023-03-27 LAB — INR PPP: 2.5 (ref 2–3.5)

## 2023-03-27 PROCEDURE — 85610 PROTHROMBIN TIME: CPT

## 2023-03-27 PROCEDURE — 99211 OFF/OP EST MAY X REQ PHY/QHP: CPT

## 2023-03-27 NOTE — PROGRESS NOTES
Anticoagulation Summary  As of 3/27/2023      INR goal:  2.0-3.0   TTR:  58.0 % (7.8 y)   INR used for dosin.50 (3/27/2023)   Warfarin maintenance plan:  3.75 mg (7.5 mg x 0.5) every Wed, Fri; 7.5 mg (7.5 mg x 1) all other days   Weekly warfarin total:  45 mg   Plan last modified:  Kim Everett (3/6/2023)   Next INR check:  2023   Priority:  Acute   Target end date:  Indefinite    Indications    DVT (deep venous thrombosis) (HCC) (Resolved) [I82.409]  TIA (transient ischemic attack) [G45.9]  Pulmonary embolism [415.19] (Resolved) [I26.99]                 Anticoagulation Episode Summary       INR check location:  Anticoagulation Clinic    Preferred lab:  [facility]    Send INR reminders to:      Comments:  ASA 81 mg for TIA, CAD hx - Lifelong per Dr. Wright 3/7/22          Anticoagulation Care Providers       Provider Role Specialty Phone number    Patricia Damon M.D. Referring Internal Medicine [phone]    Jona QuinnD Responsible      [facility] Responsible  [phone]                  Refer to Patient Findings for HPI:      Vitals:    23 1042   BP: 132/67   Pulse: (!) 56       Verified current warfarin dosing schedule.    Medications reconciled   ASA 81 mg for TIA, CAD hx - Lifelong per Dr. Wright      A/P   INR  therapeutic.     Warfarin dosing recommendation: Pt is to continue with current warfarin dosing regimen.     Pt educated to contact our clinic with any changes in medications or s/s of bleeding or thrombosis. Pt is aware to seek immediate medical attention for falls, head injury or deep cuts.    Follow up appointment in 5 week(s).    Hong Brooks, JonaD

## 2023-03-30 RX ORDER — TRAZODONE HYDROCHLORIDE 50 MG/1
100 TABLET ORAL NIGHTLY
Qty: 180 TABLET | Refills: 2 | Status: SHIPPED | OUTPATIENT
Start: 2023-03-30

## 2023-03-31 ENCOUNTER — TELEPHONE (OUTPATIENT)
Dept: CARDIOLOGY | Facility: MEDICAL CENTER | Age: 73
End: 2023-03-31
Payer: MEDICARE

## 2023-03-31 DIAGNOSIS — I10 HYPERTENSION, UNSPECIFIED TYPE: ICD-10-CM

## 2023-03-31 RX ORDER — LISINOPRIL 5 MG/1
5 TABLET ORAL DAILY
Qty: 100 TABLET | Refills: 3 | Status: SHIPPED | OUTPATIENT
Start: 2023-03-31

## 2023-03-31 NOTE — TELEPHONE ENCOUNTER
Is the patient due for a refill? Yes    Was the patient seen the past year? Yes    Date of last office visit: 3/14/2023    Does the patient have an upcoming appointment?  No    Provider to refill:CW    Does the patients insurance require a 100 day supply?  Yes

## 2023-03-31 NOTE — TELEPHONE ENCOUNTER
CW        Caller: Rene Chan    Medication Name and Dosage: lisinopril (PRINIVIL) 5 MG Tab, metoprolol tartrate (LOPRESSOR) 25 MG Tab     Please call your pharmacy and have them send us a refill request or speak to a live representative, RX number may have changed.    Medication amount left: 0    Preferred Pharmacy: [facility] #[id number] - [place], [place] - [address]    Other questions (Topic): N/A    Callback Number (Will only call for issues): [phone]    Thank you    -Jonnathan PELAYO

## 2023-04-05 RX ORDER — LEVOTHYROXINE SODIUM 0.07 MG/1
TABLET ORAL
Qty: 90 TABLET | Refills: 0 | Status: SHIPPED | OUTPATIENT
Start: 2023-04-05 | End: 2023-07-06

## 2023-05-01 ENCOUNTER — ANTICOAGULATION VISIT (OUTPATIENT)
Dept: VASCULAR LAB | Facility: MEDICAL CENTER | Age: 73
End: 2023-05-01
Attending: INTERNAL MEDICINE
Payer: MEDICARE

## 2023-05-01 DIAGNOSIS — G45.9 TIA (TRANSIENT ISCHEMIC ATTACK): ICD-10-CM

## 2023-05-01 LAB — INR PPP: 2.8 (ref 2–3.5)

## 2023-05-01 PROCEDURE — 99211 OFF/OP EST MAY X REQ PHY/QHP: CPT

## 2023-05-01 PROCEDURE — 85610 PROTHROMBIN TIME: CPT

## 2023-05-01 NOTE — PROGRESS NOTES
Anticoagulation Summary  As of 2023      INR goal:  2.0-3.0   TTR:  58.5 % (7.9 y)   INR used for dosin.80 (2023)   Warfarin maintenance plan:  3.75 mg (7.5 mg x 0.5) every Wed, Fri; 7.5 mg (7.5 mg x 1) all other days   Weekly warfarin total:  45 mg   Plan last modified:  Kim Everett (3/6/2023)   Next INR check:  2023   Priority:  Acute   Target end date:  Indefinite    Indications    DVT (deep venous thrombosis) (HCC) (Resolved) [I82.409]  TIA (transient ischemic attack) [G45.9]  Pulmonary embolism [415.19] (Resolved) [I26.99]                 Anticoagulation Episode Summary       INR check location:  Anticoagulation Clinic    Preferred lab:  [facility]    Send INR reminders to:      Comments:  ASA 81 mg for TIA, CAD hx - Lifelong per Dr. Wright 3/7/22          Anticoagulation Care Providers       Provider Role Specialty Phone number    Patricia Damon M.D. Referring Internal Medicine [phone]    Laisha Méndez PharmD Responsible      [facility] Anticoagulation Services Responsible  [phone]                Refer to Patient Findings for HPI:  Patient Findings       Positives:  Change in medications (Taking Avodart now), Change in diet/appetite (Slight decrease), Bruising (Nothing out of the ordinary for him)    Negatives:  Signs/symptoms of thrombosis, Signs/symptoms of bleeding, Laboratory test error suspected, Change in health, Change in alcohol use, Change in activity, Upcoming invasive procedure, Emergency department visit, Upcoming dental procedure, Missed doses, Extra doses, Hospital admission, Other complaints          There were no vitals filed for this visit.  The pt declined vitals today     Patient seen in clinic today for follow up on anticoagulation therapy with warfarin (a high risk medication) for hx of DVT, PE, TIA  Verified current warfarin dosing schedule.  Patient  denies any missed doses of warfarin.    Medications reconciled   Pt is on ASA 81 mg as  antiplatelet therapy for TIA, CAD, hx and must be reviewed again on lifelong per Dr. Wright.      A/P   INR is therapeutic today at 2.8.     Warfarin dosing recommendation: continue with 3.75 mg W and F, 7.5 mg all other days.    Pt educated to contact our clinic with any changes in medications or s/s of bleeding or thrombosis. Pt is aware to seek immediate medical attention for falls, head injury or deep cuts.    Follow up appointment in 6 week(s).     Next appt: Monday, 06//12/23 @ 8185    Lupe Everett PharmD

## 2023-05-22 PROBLEM — F31.75 BIPOLAR DISORDER, IN PARTIAL REMISSION, MOST RECENT EPISODE DEPRESSED (HCC): Status: RESOLVED | Noted: 2022-04-19 | Resolved: 2023-05-22

## 2023-05-25 ENCOUNTER — TELEPHONE (OUTPATIENT)
Dept: HEALTH INFORMATION MANAGEMENT | Facility: OTHER | Age: 73
End: 2023-05-25

## 2023-06-07 RX ORDER — ATORVASTATIN CALCIUM 80 MG/1
80 TABLET, FILM COATED ORAL
Qty: 100 TABLET | Refills: 0 | Status: SHIPPED | OUTPATIENT
Start: 2023-06-07 | End: 2023-09-11

## 2023-06-12 ENCOUNTER — ANTICOAGULATION VISIT (OUTPATIENT)
Dept: VASCULAR LAB | Facility: MEDICAL CENTER | Age: 73
End: 2023-06-12
Attending: INTERNAL MEDICINE
Payer: MEDICARE

## 2023-06-12 DIAGNOSIS — D68.69 SECONDARY HYPERCOAGULABLE STATE (HCC): Chronic | ICD-10-CM

## 2023-06-12 DIAGNOSIS — Z86.718 HISTORY OF DVT (DEEP VEIN THROMBOSIS): ICD-10-CM

## 2023-06-12 DIAGNOSIS — Z86.711 HX PULMONARY EMBOLISM: ICD-10-CM

## 2023-06-12 DIAGNOSIS — G45.9 TIA (TRANSIENT ISCHEMIC ATTACK): ICD-10-CM

## 2023-06-12 DIAGNOSIS — I82.502 LEG DVT (DEEP VENOUS THROMBOEMBOLISM), CHRONIC, LEFT (HCC): ICD-10-CM

## 2023-06-12 LAB — INR PPP: 3.5 (ref 2–3.5)

## 2023-06-12 PROCEDURE — 99212 OFFICE O/P EST SF 10 MIN: CPT | Performed by: NURSE PRACTITIONER

## 2023-06-12 PROCEDURE — 85610 PROTHROMBIN TIME: CPT

## 2023-06-12 NOTE — PROGRESS NOTES
Anticoagulation Summary  As of 6/12/2023      INR goal:  2.0-3.0   TTR:  58.1 % (8 y)   INR used for dosing:  3.50 (6/12/2023)   Warfarin maintenance plan:  3.75 mg (7.5 mg x 0.5) every Mon, Fri; 7.5 mg (7.5 mg x 1) all other days   Weekly warfarin total:  45 mg   Plan last modified:  Miya Acosta A.P.NPaul (6/12/2023)   Next INR check:  6/26/2023   Priority:  Acute   Target end date:  Indefinite    Indications    DVT (deep venous thrombosis) (HCC) (Resolved) [I82.409]  TIA (transient ischemic attack) [G45.9]  Pulmonary embolism [415.19] (Resolved) [I26.99]                 Anticoagulation Episode Summary       INR check location:  Anticoagulation Clinic    Preferred lab:  [facility]    Send INR reminders to:      Comments:  ASA 81 mg for TIA, CAD hx - Lifelong per Dr. Wright 3/7/22          Anticoagulation Care Providers       Provider Role Specialty Phone number    Patricia Damon M.D. Referring Internal Medicine [phone]    Jona QuinnD Responsible      [facility] Anticoagulation Services Responsible  [phone]                  Refer to Patient Findings for HPI:  Patient Findings       Positives:  Extra doses    Negatives:  Signs/symptoms of thrombosis, Signs/symptoms of bleeding, Laboratory test error suspected, Change in health, Change in alcohol use, Change in activity, Upcoming invasive procedure, Emergency department visit, Upcoming dental procedure, Missed doses, Change in medications, Change in diet/appetite, Hospital admission, Bruising, Other complaints    Comments:  He has been taking 3.75 mg only once a week on Mondays (not twice a week as instructed) by mistake due to losing his dosing calendar.            There were no vitals filed for this visit.    Verified current warfarin dosing schedule.    Medications reconciled   ASA 81 mg for CAD indefinitely per cardiology.      A/P   INR  supra-therapeutic. INR high likely from patient taken higher than instructed dose. Will have pt  began taking his previously intended dose.    Warfarin dosing recommendation: HOLD tonight's dose then take 3.75 mg on Mondays/Fridays, 7.5 mg AODs.    Pt educated to contact our clinic with any changes in medications or s/s of bleeding or thrombosis. Pt is aware to seek immediate medical attention for falls, head injury or deep cuts.    Follow up appointment in 2 week(s).    TOREY Bucio.

## 2023-06-19 DIAGNOSIS — Z79.01 CHRONIC ANTICOAGULATION: ICD-10-CM

## 2023-06-19 RX ORDER — WARFARIN SODIUM 7.5 MG/1
TABLET ORAL
Qty: 90 TABLET | Refills: 1 | Status: SHIPPED | OUTPATIENT
Start: 2023-06-19 | End: 2024-01-29

## 2023-06-28 PROBLEM — M17.11 OSTEOARTHRITIS OF RIGHT KNEE: Status: ACTIVE | Noted: 2023-06-28

## 2023-06-30 ENCOUNTER — ANTICOAGULATION VISIT (OUTPATIENT)
Dept: VASCULAR LAB | Facility: MEDICAL CENTER | Age: 73
End: 2023-06-30
Attending: INTERNAL MEDICINE
Payer: MEDICARE

## 2023-06-30 DIAGNOSIS — G45.9 TIA (TRANSIENT ISCHEMIC ATTACK): ICD-10-CM

## 2023-06-30 LAB — INR PPP: 1.5 (ref 2–3.5)

## 2023-06-30 PROCEDURE — 85610 PROTHROMBIN TIME: CPT

## 2023-06-30 PROCEDURE — 99212 OFFICE O/P EST SF 10 MIN: CPT

## 2023-06-30 ASSESSMENT — FIBROSIS 4 INDEX: FIB4 SCORE: 1.43

## 2023-06-30 NOTE — PROGRESS NOTES
Anticoagulation Summary  As of 2023      INR goal:  2.0-3.0   TTR:  58.0 % (8 y)   INR used for dosin.50 (2023)   Warfarin maintenance plan:  3.75 mg (7.5 mg x 0.5) every Mon, Fri; 7.5 mg (7.5 mg x 1) all other days   Weekly warfarin total:  45 mg   Plan last modified:  Miya Acosta A.P.NPaul (2023)   Next INR check:  2023   Priority:  Acute   Target end date:  Indefinite    Indications    DVT (deep venous thrombosis) (HCC) (Resolved) [I82.409]  TIA (transient ischemic attack) [G45.9]  Pulmonary embolism [415.19] (Resolved) [I26.99]                 Anticoagulation Episode Summary       INR check location:  Anticoagulation Clinic    Preferred lab:  [facility]    Send INR reminders to:      Comments:  ASA 81 mg for TIA, CAD hx - Lifelong per Dr. Wright 3/7/22          Anticoagulation Care Providers       Provider Role Specialty Phone number    Patricia Damon M.D. Referring Internal Medicine [phone]    Laisha Méndez PharmD Responsible      [facility] Anticoagulation Services Responsible  [phone]                  Refer to Patient Findings for HPI:  Patient Findings       Positives:  Change in diet/appetite (pt stated that he is eating more salads), Bruising (patient noticed more bruising)    Negatives:  Signs/symptoms of thrombosis, Signs/symptoms of bleeding, Laboratory test error suspected, Change in health, Change in alcohol use, Change in activity, Upcoming invasive procedure, Emergency department visit, Upcoming dental procedure, Missed doses, Extra doses, Change in medications, Hospital admission, Other complaints            There were no vitals filed for this visit.    Verified current warfarin dosing schedule.    Medications reconciled   ASA 81 mg for CAD indefinitely per cardiology.      A/P   INR  therapeutic.     Warfarin dosing recommendation: pt was sub therapeutic. Pt instructed to take a bolus dose today of 11.25 mg and then continue same regimen.      Recommended pt bridge w/ Lovenox today - he declines. Will bolus pt more aggressively given this.    Pt educated to contact our clinic with any changes in medications or s/s of bleeding or thrombosis. Pt is aware to seek immediate medical attention for falls, head injury or deep cuts.    Follow up appointment in 1 week(s).  Shawna Beard, Pharmacy Intern   Tiburcio Baker, PharmD, BCACP

## 2023-07-05 ENCOUNTER — ANTICOAGULATION VISIT (OUTPATIENT)
Dept: VASCULAR LAB | Facility: MEDICAL CENTER | Age: 73
End: 2023-07-05
Attending: INTERNAL MEDICINE
Payer: MEDICARE

## 2023-07-05 DIAGNOSIS — G45.9 TIA (TRANSIENT ISCHEMIC ATTACK): ICD-10-CM

## 2023-07-05 LAB — INR PPP: 2.5 (ref 2–3.5)

## 2023-07-05 PROCEDURE — 99211 OFF/OP EST MAY X REQ PHY/QHP: CPT | Performed by: PHARMACIST

## 2023-07-05 PROCEDURE — 85610 PROTHROMBIN TIME: CPT

## 2023-07-05 NOTE — PROGRESS NOTES
Anticoagulation Summary  As of 2023      INR goal:  2.0-3.0   TTR:  58.0 % (8 y)   INR used for dosin.50 (2023)   Warfarin maintenance plan:  3.75 mg (7.5 mg x 0.5) every Mon, Fri; 7.5 mg (7.5 mg x 1) all other days   Weekly warfarin total:  45 mg   Plan last modified:  Miya Acosta A.P.NPaul (2023)   Next INR check:  2023   Priority:  Acute   Target end date:  Indefinite    Indications    DVT (deep venous thrombosis) (HCC) (Resolved) [I82.409]  TIA (transient ischemic attack) [G45.9]  Pulmonary embolism [415.19] (Resolved) [I26.99]                 Anticoagulation Episode Summary       INR check location:  Anticoagulation Clinic    Preferred lab:  [facility]    Send INR reminders to:      Comments:  ASA 81 mg for TIA, CAD hx - Lifelong per Dr. Wirght 3/7/22          Anticoagulation Care Providers       Provider Role Specialty Phone number    Patricia Damon M.D. Referring Internal Medicine [phone]    Jona QuinnD Responsible      [facility] Anticoagulation Services Responsible  [phone]                  Refer to Patient Findings for HPI:  Patient Findings       Negatives:  Signs/symptoms of thrombosis, Signs/symptoms of bleeding, Laboratory test error suspected, Change in health, Change in alcohol use, Change in activity, Upcoming invasive procedure, Emergency department visit, Upcoming dental procedure, Missed doses, Extra doses, Change in medications, Change in diet/appetite, Hospital admission, Bruising, Other complaints            There were no vitals filed for this visit.  pt declined vitals    Verified current warfarin dosing schedule.    Medications reconciled  Pt is on ASA 81mg as antiplatelet therapy for CAD and must be reviewed again on 2023.      A/P   INR  now -therapeutic at 2.5.     Warfarin dosing recommendation: Pt is to continue with current warfarin dosing regimen.    Pt educated to contact our clinic with any changes in medications or s/s of  bleeding or thrombosis. Pt is aware to seek immediate medical attention for falls, head injury or deep cuts.    Follow up appointment in 4 week(s) per patient preference.    Isidro Mc, PharmD, BCACP

## 2023-07-06 RX ORDER — LEVOTHYROXINE SODIUM 0.07 MG/1
TABLET ORAL
Qty: 90 TABLET | Refills: 1 | Status: SHIPPED | OUTPATIENT
Start: 2023-07-06 | End: 2024-01-15

## 2023-07-11 ENCOUNTER — HOSPITAL ENCOUNTER (OUTPATIENT)
Dept: LAB | Facility: MEDICAL CENTER | Age: 73
End: 2023-07-11
Attending: FAMILY MEDICINE
Payer: MEDICARE

## 2023-07-11 DIAGNOSIS — E11.9 DIABETES MELLITUS WITH COINCIDENT HYPERTENSION (HCC): ICD-10-CM

## 2023-07-11 DIAGNOSIS — E03.9 ACQUIRED HYPOTHYROIDISM: ICD-10-CM

## 2023-07-11 DIAGNOSIS — I10 PRIMARY HYPERTENSION: ICD-10-CM

## 2023-07-11 DIAGNOSIS — Z12.5 PROSTATE CANCER SCREENING: ICD-10-CM

## 2023-07-11 DIAGNOSIS — I10 DIABETES MELLITUS WITH COINCIDENT HYPERTENSION (HCC): ICD-10-CM

## 2023-07-11 LAB
ALBUMIN SERPL BCP-MCNC: 4.1 G/DL (ref 3.2–4.9)
ALBUMIN/GLOB SERPL: 1.6 G/DL
ALP SERPL-CCNC: 44 U/L (ref 30–99)
ALT SERPL-CCNC: 18 U/L (ref 2–50)
ANION GAP SERPL CALC-SCNC: 11 MMOL/L (ref 7–16)
AST SERPL-CCNC: 15 U/L (ref 12–45)
BASOPHILS # BLD AUTO: 0.6 % (ref 0–1.8)
BASOPHILS # BLD: 0.05 K/UL (ref 0–0.12)
BILIRUB SERPL-MCNC: 1.1 MG/DL (ref 0.1–1.5)
BUN SERPL-MCNC: 14 MG/DL (ref 8–22)
CALCIUM ALBUM COR SERPL-MCNC: 9.1 MG/DL (ref 8.5–10.5)
CALCIUM SERPL-MCNC: 9.2 MG/DL (ref 8.5–10.5)
CHLORIDE SERPL-SCNC: 103 MMOL/L (ref 96–112)
CHOLEST SERPL-MCNC: 125 MG/DL (ref 100–199)
CO2 SERPL-SCNC: 26 MMOL/L (ref 20–33)
CREAT SERPL-MCNC: 0.86 MG/DL (ref 0.5–1.4)
EOSINOPHIL # BLD AUTO: 0.25 K/UL (ref 0–0.51)
EOSINOPHIL NFR BLD: 2.8 % (ref 0–6.9)
ERYTHROCYTE [DISTWIDTH] IN BLOOD BY AUTOMATED COUNT: 47.3 FL (ref 35.9–50)
EST. AVERAGE GLUCOSE BLD GHB EST-MCNC: 148 MG/DL
GFR SERPLBLD CREATININE-BSD FMLA CKD-EPI: 91 ML/MIN/1.73 M 2
GLOBULIN SER CALC-MCNC: 2.5 G/DL (ref 1.9–3.5)
GLUCOSE SERPL-MCNC: 111 MG/DL (ref 65–99)
HBA1C MFR BLD: 6.8 % (ref 4–5.6)
HCT VFR BLD AUTO: 45.3 % (ref 42–52)
HDLC SERPL-MCNC: 38 MG/DL
HGB BLD-MCNC: 15.1 G/DL (ref 14–18)
IMM GRANULOCYTES # BLD AUTO: 0.04 K/UL (ref 0–0.11)
IMM GRANULOCYTES NFR BLD AUTO: 0.4 % (ref 0–0.9)
LDLC SERPL CALC-MCNC: 72 MG/DL
LYMPHOCYTES # BLD AUTO: 2.06 K/UL (ref 1–4.8)
LYMPHOCYTES NFR BLD: 23.1 % (ref 22–41)
MCH RBC QN AUTO: 31.8 PG (ref 27–33)
MCHC RBC AUTO-ENTMCNC: 33.3 G/DL (ref 32.3–36.5)
MCV RBC AUTO: 95.4 FL (ref 81.4–97.8)
MONOCYTES # BLD AUTO: 1.13 K/UL (ref 0–0.85)
MONOCYTES NFR BLD AUTO: 12.7 % (ref 0–13.4)
NEUTROPHILS # BLD AUTO: 5.4 K/UL (ref 1.82–7.42)
NEUTROPHILS NFR BLD: 60.4 % (ref 44–72)
NRBC # BLD AUTO: 0 K/UL
NRBC BLD-RTO: 0 /100 WBC (ref 0–0.2)
PLATELET # BLD AUTO: 297 K/UL (ref 164–446)
PMV BLD AUTO: 10.7 FL (ref 9–12.9)
POTASSIUM SERPL-SCNC: 4.5 MMOL/L (ref 3.6–5.5)
PROT SERPL-MCNC: 6.6 G/DL (ref 6–8.2)
PSA SERPL-MCNC: 0.94 NG/ML (ref 0–4)
RBC # BLD AUTO: 4.75 M/UL (ref 4.7–6.1)
SODIUM SERPL-SCNC: 140 MMOL/L (ref 135–145)
TRIGL SERPL-MCNC: 76 MG/DL (ref 0–149)
TSH SERPL DL<=0.005 MIU/L-ACNC: 2.66 UIU/ML (ref 0.38–5.33)
WBC # BLD AUTO: 8.9 K/UL (ref 4.8–10.8)

## 2023-07-11 PROCEDURE — 80053 COMPREHEN METABOLIC PANEL: CPT

## 2023-07-11 PROCEDURE — 85025 COMPLETE CBC W/AUTO DIFF WBC: CPT

## 2023-07-11 PROCEDURE — 84153 ASSAY OF PSA TOTAL: CPT

## 2023-07-11 PROCEDURE — 83036 HEMOGLOBIN GLYCOSYLATED A1C: CPT

## 2023-07-11 PROCEDURE — 80061 LIPID PANEL: CPT

## 2023-07-11 PROCEDURE — 36415 COLL VENOUS BLD VENIPUNCTURE: CPT

## 2023-07-11 PROCEDURE — 84443 ASSAY THYROID STIM HORMONE: CPT

## 2023-07-12 ENCOUNTER — HOSPITAL ENCOUNTER (OUTPATIENT)
Facility: MEDICAL CENTER | Age: 73
End: 2023-07-12
Attending: FAMILY MEDICINE
Payer: MEDICARE

## 2023-07-12 LAB
APPEARANCE UR: CLEAR
BILIRUB UR QL STRIP.AUTO: NEGATIVE
COLOR UR: YELLOW
GLUCOSE UR STRIP.AUTO-MCNC: NEGATIVE MG/DL
KETONES UR STRIP.AUTO-MCNC: NEGATIVE MG/DL
LEUKOCYTE ESTERASE UR QL STRIP.AUTO: NEGATIVE
MICRO URNS: NORMAL
NITRITE UR QL STRIP.AUTO: NEGATIVE
PH UR STRIP.AUTO: 7 [PH] (ref 5–8)
PROT UR QL STRIP: NEGATIVE MG/DL
RBC UR QL AUTO: NEGATIVE
SP GR UR STRIP.AUTO: 1.01
UROBILINOGEN UR STRIP.AUTO-MCNC: 0.2 MG/DL

## 2023-07-12 PROCEDURE — 81003 URINALYSIS AUTO W/O SCOPE: CPT

## 2023-07-12 PROCEDURE — 82043 UR ALBUMIN QUANTITATIVE: CPT

## 2023-07-12 PROCEDURE — 82570 ASSAY OF URINE CREATININE: CPT

## 2023-07-13 LAB
CREAT UR-MCNC: 27.94 MG/DL
MICROALBUMIN UR-MCNC: <1.2 MG/DL
MICROALBUMIN/CREAT UR: NORMAL MG/G (ref 0–30)

## 2023-07-17 ENCOUNTER — OFFICE VISIT (OUTPATIENT)
Dept: MEDICAL GROUP | Facility: PHYSICIAN GROUP | Age: 73
End: 2023-07-17
Payer: MEDICARE

## 2023-07-17 VITALS
SYSTOLIC BLOOD PRESSURE: 118 MMHG | HEIGHT: 68 IN | WEIGHT: 185 LBS | DIASTOLIC BLOOD PRESSURE: 66 MMHG | BODY MASS INDEX: 28.04 KG/M2 | RESPIRATION RATE: 18 BRPM | TEMPERATURE: 97.8 F | HEART RATE: 56 BPM | OXYGEN SATURATION: 95 %

## 2023-07-17 DIAGNOSIS — E11.9 DIABETES MELLITUS WITH COINCIDENT HYPERTENSION (HCC): ICD-10-CM

## 2023-07-17 DIAGNOSIS — I48.20 CHRONIC ATRIAL FIBRILLATION (HCC): ICD-10-CM

## 2023-07-17 DIAGNOSIS — I10 DIABETES MELLITUS WITH COINCIDENT HYPERTENSION (HCC): ICD-10-CM

## 2023-07-17 DIAGNOSIS — Z00.00 ADULT GENERAL MEDICAL EXAM: ICD-10-CM

## 2023-07-17 PROCEDURE — 99214 OFFICE O/P EST MOD 30 MIN: CPT | Performed by: FAMILY MEDICINE

## 2023-07-17 PROCEDURE — 3074F SYST BP LT 130 MM HG: CPT | Performed by: FAMILY MEDICINE

## 2023-07-17 PROCEDURE — 3078F DIAST BP <80 MM HG: CPT | Performed by: FAMILY MEDICINE

## 2023-07-17 ASSESSMENT — FIBROSIS 4 INDEX: FIB4 SCORE: 0.87

## 2023-07-17 NOTE — PROGRESS NOTES
Subjective:     CC: Here for his medical exams and to review his lab work.    HPI:   Rene presents today with the following medical concerns:    Diabetes mellitus with coincident hypertension (HCC)  These are both chronic problems under good control.  Patient is here for follow-up.  His last hemoglobin A1c was slightly higher at 6.8% but still normal without medications.  He is working on losing weight and has dropped about 19 pounds in the last several months.  He is also watching his diet.    Chronic atrial fibrillation (HCC)  This is a chronic problem.  He is followed by cardiology.    Adult general medical exam  Here today for his medical exam.  States he is actually feeling very well.  Has been working on his diet and losing some weight.  He is planning to have his right knee replaced in October and recently had an injection to help with the discomfort in the meantime.  Labs were done and will be reviewed with him today.    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    Colon polyps     Diabetes mellitus, type II (HCC)     diet controlled    Gout     Hammer toe     Hyperlipidemia     Hypertension     Hypothyroidism     Impaired fasting glucose     Mild aortic stenosis - echo 2018        Social History     Tobacco Use    Smoking status: Former     Packs/day: 2.00     Years: 10.00     Pack years: 20.00     Types: Cigarettes     Quit date: 1976     Years since quittin.5    Smokeless tobacco: Never    Tobacco comments:     quit x 32 yrs; 2ppd x 7 years   Vaping Use    Vaping Use: Never used   Substance Use Topics    Alcohol  use: No    Drug use: No     Comment: IV drug use in teens       Current Outpatient Medications Ordered in Epic   Medication Sig Dispense Refill    levothyroxine (SYNTHROID) 75 MCG Tab TAKE 1 TABLET BY MOUTH EVERY DAY IN THE EVENING 90 Tablet 1    warfarin (COUMADIN) 7.5 MG Tab TAKE 1/2 TO 1 TABLET BY MOUTH DAILY AS DIRECTED BY [facility] ANTICOAGULATION SERVICES 90 Tablet 1    atorvastatin (LIPITOR) 80 MG tablet Take 1 Tablet by mouth every day. 100 Tablet 0    lisinopril (PRINIVIL) 5 MG Tab Take 1 Tablet by mouth every day. 100 Tablet 3    metoprolol tartrate (LOPRESSOR) 25 MG Tab Take 0.5 Tablets by mouth every day. 50 Tablet 2    traZODone (DESYREL) 50 MG Tab TAKE 2 TABLETS BY MOUTH EVERY EVENING. FOR INSOMNIA (Patient taking differently: Take 100 mg by mouth every evening. For insomnia  Takes 1 - 1.5 tabs a day) 180 Tablet 2    dutasteride (AVODART) 0.5 MG capsule Take 1 Capsule by mouth every day. 90 Capsule 1    divalproex (DEPAKOTE) 125 MG EC tablet Take 1 po  Tablet 3    divalproex (DEPAKOTE) 250 MG Tablet Delayed Response Take  2 po at night 270 Tablet 3    predniSONE (DELTASONE) 20 MG Tab Take 2 po a day for 5 days and then 1 a day for 1 week prn gout flare. 30 Tablet 2    aspirin 81 MG tablet Take 81 mg by mouth every day. 100 Tab     Glucosamine-Chondroit-Vit C-Mn (GLUCOSAMINE CHONDR 500 COMPLEX PO) Take 1 Tablet by mouth 2 times a day.      COENZYME Q10 PO Take 1 Tab by mouth every morning.      Cholecalciferol (VITAMIN D) 2000 UNITS Cap Take 2,000 Units by mouth every day.      docosahexanoic acid (OMEGA 3 FA) 1000 MG CAPS Take 1,000 mg by mouth every day.       No current [facility]-ordered facility-administered medications on file.       Allergies:  Penicillins    Health Maintenance: Completed    ROS:  Gen: no fevers/chills, loss in weight through diet and exercise  Eyes: no changes in vision  ENT: no sore throat, no hearing loss, no bloody nose  Pulm: no sob, no cough  CV: no chest pain, no  "palpitations  GI: no nausea/vomiting, no diarrhea  : no dysuria  MSk: no myalgias  Skin: no rash  Neuro: no headaches, no numbness/tingling  Heme/Lymph: no easy bruising      Objective:       Exam:  /66 (BP Location: Right arm, Patient Position: Sitting, BP Cuff Size: Adult)   Pulse (!) 56   Temp 36.6 °C (97.8 °F) (Temporal)   Resp 18   Ht 1.727 m (5' 8\")   Wt 83.9 kg (185 lb)   SpO2 95%   BMI 28.13 kg/m²  Body mass index is 28.13 kg/m².    Gen: Alert and oriented, No apparent distress.  Eyes:   Extraocular motions intact.  No scleral icterus seen.  Ears:   Ear canals have a small amount of wax present but TMs appear normal.  Neck: Neck is supple without lymphadenopathy.  Thyroid exam is normal.  Lungs: Normal effort, CTA bilaterally, no wheezes, rhonchi, or rales  CV: Regular rate and rhythm. No murmurs, rubs, or gallops.  No carotid bruits heard.  Abdomen: Soft, nontender, no organomegaly or masses.  Normal bowel sounds.  Ext: No clubbing, cyanosis, edema.  Neuro: Cranial nerves II through VIII are grossly intact.  No lateralized signs are seen.  Gait is normal.  Monofilament testing with a 10 gram force: sensation intact: intact bilaterally  Visual Inspection: Feet without maceration, ulcers, fissures.  Pedal pulses: intact bilaterally        Labs: Reviewed with patient    Assessment & Plan:     72 y.o. male with the following -     1. Diabetes mellitus with coincident hypertension (HCC)  These are both chronic problems under good control.  Continue to monitor.  We will recheck the hemoglobin A1c in 3 to 4 months.  He continues to be diet controlled.  Blood pressures under very good control.  - HEMOGLOBIN A1C; Future    2. Chronic atrial fibrillation (HCC)  This is a chronic problem.  Continue current medications through his cardiologist.  He is on anticoagulant treatment.    3. Adult general medical exam  Patient's medical exam today appears unremarkable.  Continue to follow.  Health care issues " addressed.    HCC Gap Form    Diagnosis to address: I48.20 - Chronic atrial fibrillation (HCC)  Assessment and plan: Chronic, stable, as based on today's assessment and impact on other conditions evaluated today. Continue with current treatment plan: On warfarin and followed by cardiology.  Follow-up with specialist as directed, but at least annually.  Last edited 07/17/23 10:19 PDT by Michael Sethi III, M.D.         Return for Long.    Please note that this dictation was created using voice recognition software. I have made every reasonable attempt to correct obvious errors, but I expect that there are errors of grammar and possibly content that I did not discover before finalizing the note.

## 2023-07-17 NOTE — ASSESSMENT & PLAN NOTE
These are both chronic problems under good control.  Patient is here for follow-up.  His last hemoglobin A1c was slightly higher at 6.8% but still normal without medications.  He is working on losing weight and has dropped about 19 pounds in the last several months.  He is also watching his diet.

## 2023-07-17 NOTE — ASSESSMENT & PLAN NOTE
Here today for his medical exam.  States he is actually feeling very well.  Has been working on his diet and losing some weight.  He is planning to have his right knee replaced in October and recently had an injection to help with the discomfort in the meantime.  Labs were done and will be reviewed with him today.

## 2023-08-01 ENCOUNTER — ANTICOAGULATION VISIT (OUTPATIENT)
Dept: VASCULAR LAB | Facility: MEDICAL CENTER | Age: 73
End: 2023-08-01
Attending: INTERNAL MEDICINE
Payer: MEDICARE

## 2023-08-01 VITALS — DIASTOLIC BLOOD PRESSURE: 71 MMHG | HEART RATE: 54 BPM | SYSTOLIC BLOOD PRESSURE: 115 MMHG

## 2023-08-01 DIAGNOSIS — G45.9 TIA (TRANSIENT ISCHEMIC ATTACK): ICD-10-CM

## 2023-08-01 LAB — INR PPP: 2.6 (ref 2–3.5)

## 2023-08-01 PROCEDURE — 99211 OFF/OP EST MAY X REQ PHY/QHP: CPT

## 2023-08-01 PROCEDURE — 85610 PROTHROMBIN TIME: CPT

## 2023-08-01 NOTE — PROGRESS NOTES
Anticoagulation Summary  As of 2023      INR goal:  2.0-3.0   TTR:  58.6 % (8.1 y)   INR used for dosin.60 (2023)   Warfarin maintenance plan:  3.75 mg (7.5 mg x 0.5) every Mon, Fri; 7.5 mg (7.5 mg x 1) all other days   Weekly warfarin total:  45 mg   Plan last modified:  Miya Acosta APaulP.NPaul (2023)   Next INR check:  2023   Priority:  Acute   Target end date:  Indefinite    Indications    DVT (deep venous thrombosis) (HCC) (Resolved) [I82.409]  TIA (transient ischemic attack) [G45.9]  Pulmonary embolism [415.19] (Resolved) [I26.99]                 Anticoagulation Episode Summary       INR check location:  Anticoagulation Clinic    Preferred lab:  [facility]    Send INR reminders to:      Comments:  ASA 81 mg for TIA, CAD hx - Lifelong per Dr. Wright 3/7/22          Anticoagulation Care Providers       Provider Role Specialty Phone number    Patricia Damon M.D. Referring Internal Medicine [phone]    Jona QuinnD Responsible      [facility] Anticoagulation Services Responsible  [phone]          Refer to Patient Findings for HPI:  Patient Findings       Negatives:  Signs/symptoms of thrombosis, Signs/symptoms of bleeding, Laboratory test error suspected, Change in health, Change in alcohol use, Change in activity, Upcoming invasive procedure, Emergency department visit, Upcoming dental procedure, Missed doses, Extra doses, Change in medications, Change in diet/appetite, Hospital admission, Bruising, Other complaints          Vitals:    23 1034   BP: 115/71   Pulse: (!) 54         Patient seen in clinic today for follow up on anticoagulation therapy with warfarin (a high risk medication) for hx of PE, DVT and TIA  Verified current warfarin dosing schedule.  Patient denies any missed doses of warfarin.    Medications reconciled   Pt is on ASA 81mg  as antiplatelet therapy for CAD/TIA  and must be reviewed again on next follow up with cards.      A/P   INR is  PDMP reviewed; no aberrant behavior identified, prescription authorized. therapeutic today at 2.6.     Warfarin dosing recommendation: Continue with the current dosing regimen.   Patient will follow up again in 5 weeks.     Pt educated to contact our clinic with any changes in medications or s/s of bleeding or thrombosis. Pt is aware to seek immediate medical attention for falls, head injury or deep cuts.    Follow up appointment in 5 week(s).    Next appt: Tues, Sept 5 @ 10:30am     Adrienne Ham PGY-1  Lupe TenorioD

## 2023-09-05 ENCOUNTER — ANTICOAGULATION VISIT (OUTPATIENT)
Dept: VASCULAR LAB | Facility: MEDICAL CENTER | Age: 73
End: 2023-09-05
Attending: INTERNAL MEDICINE
Payer: MEDICARE

## 2023-09-05 VITALS — SYSTOLIC BLOOD PRESSURE: 149 MMHG | HEART RATE: 57 BPM | DIASTOLIC BLOOD PRESSURE: 76 MMHG

## 2023-09-05 DIAGNOSIS — G45.9 TIA (TRANSIENT ISCHEMIC ATTACK): ICD-10-CM

## 2023-09-05 LAB — INR PPP: 2.8 (ref 2–3.5)

## 2023-09-05 PROCEDURE — 99211 OFF/OP EST MAY X REQ PHY/QHP: CPT

## 2023-09-05 PROCEDURE — 85610 PROTHROMBIN TIME: CPT

## 2023-09-05 NOTE — PROGRESS NOTES
Anticoagulation Summary  As of 2023      INR goal:  2.0-3.0   TTR:  59.1 % (8.2 y)   INR used for dosin.80 (2023)   Warfarin maintenance plan:  3.75 mg (7.5 mg x 0.5) every Mon, Fri; 7.5 mg (7.5 mg x 1) all other days   Weekly warfarin total:  45 mg   No change documented:  Jona SparksD   Plan last modified:  Miya Acosta A.P.NPaul (2023)   Next INR check:  10/17/2023   Priority:  Acute   Target end date:  Indefinite    Indications    DVT (deep venous thrombosis) (HCC) (Resolved) [I82.409]  TIA (transient ischemic attack) [G45.9]  Pulmonary embolism [415.19] (Resolved) [I26.99]                 Anticoagulation Episode Summary       INR check location:  Anticoagulation Clinic    Preferred lab:  [facility]    Send INR reminders to:      Comments:  ASA 81 mg for TIA, CAD hx - Lifelong per Dr. Wright 3/7/22          Anticoagulation Care Providers       Provider Role Specialty Phone number    Patricia Damon M.D. Referring Internal Medicine [phone]    Jona QuinnD Responsible      [facility] Anticoagulation Services Responsible  [phone]             Refer to Patient Findings for HPI:  Patient Findings       Positives:  Upcoming invasive procedure (TKA scheduled on 10/24/23)    Negatives:  Signs/symptoms of thrombosis, Signs/symptoms of bleeding, Laboratory test error suspected, Change in health, Change in alcohol use, Change in activity, Emergency department visit, Upcoming dental procedure, Missed doses, Extra doses, Change in medications, Change in diet/appetite, Hospital admission, Bruising, Other complaints          Vitals:    23 1040   BP: (!) 149/76   Pulse: (!) 57   Patient was talking while BP was being checked.    Verified current warfarin dosing schedule.    Medications reconciled: Yes  Pt is on ASA 81mg as antiplatelet therapy for CAD/TIA  and must be reviewed again on next follow up with cards.    A/P   INR is therapeutic.  TKA scheduled on  10/24/23. Pt will need to hold warfarin and bridge with LMWH given hx of DVT/PE/TIA. Will discuss bridging plan at next visit.    Warfarin dosing recommendation: Continue regimen as listed above.    Pt educated to contact our clinic with any changes in medications or s/s of bleeding or thrombosis. Pt is aware to seek immediate medical attention for falls, head injury or deep cuts.    Follow up appointment in 6 week(s).    Thom Shultz, JonaD

## 2023-09-08 RX ORDER — DUTASTERIDE 0.5 MG/1
0.5 CAPSULE, LIQUID FILLED ORAL
Qty: 90 CAPSULE | Refills: 3 | Status: SHIPPED | OUTPATIENT
Start: 2023-09-08

## 2023-09-11 RX ORDER — ATORVASTATIN CALCIUM 80 MG/1
80 TABLET, FILM COATED ORAL
Qty: 100 TABLET | Refills: 3 | Status: SHIPPED | OUTPATIENT
Start: 2023-09-11

## 2023-09-19 ENCOUNTER — APPOINTMENT (OUTPATIENT)
Dept: ADMISSIONS | Facility: MEDICAL CENTER | Age: 73
End: 2023-09-19
Attending: ORTHOPAEDIC SURGERY
Payer: MEDICARE

## 2023-09-22 DIAGNOSIS — Z86.718 HISTORY OF DVT (DEEP VEIN THROMBOSIS): ICD-10-CM

## 2023-10-03 ENCOUNTER — TELEPHONE (OUTPATIENT)
Dept: CARDIOLOGY | Facility: MEDICAL CENTER | Age: 73
End: 2023-10-03
Payer: MEDICARE

## 2023-10-03 ENCOUNTER — PRE-ADMISSION TESTING (OUTPATIENT)
Dept: ADMISSIONS | Facility: MEDICAL CENTER | Age: 73
End: 2023-10-03
Attending: ORTHOPAEDIC SURGERY
Payer: MEDICARE

## 2023-10-03 VITALS — HEIGHT: 68 IN | BODY MASS INDEX: 28.34 KG/M2

## 2023-10-03 RX ORDER — WHEAT DEXTRIN/ASPARTAME 3 G/6 G
POWDER IN PACKET (EA) ORAL DAILY
COMMUNITY

## 2023-10-03 RX ORDER — SENNOSIDES 8.6 MG
650 CAPSULE ORAL NIGHTLY PRN
COMMUNITY

## 2023-10-03 NOTE — TELEPHONE ENCOUNTER
Returned pt call, [phone], and reviewed concerns.  Advised pt to reach out to his surgeon to contact our office as it is common practice should they need our advise.  He states their office should be reaching out to us.  He verbalizes understanding and states no other concerns or questions at this time.  Rene  is appreciative of information given.    awaiting for clearance request to be received

## 2023-10-03 NOTE — PREPROCEDURE INSTRUCTIONS
Pre-admit telephone appointment completed. Reviewed the Preparing for your procedure handout with patient over the phone.  Patient instructed per pharmacy guidelines regarding taking or holding regularly prescribed medications before surgery. Instructed to take the following medications the day of surgery with a sip of water per pharmacy medication guidelines: Depakote, synthroid, and if he had to take prednisone for gout flare up.    Pt has follow up with coumadin clinic one week prior to surgery for instructions on bridging with lovenox and coumadin holding. Pt will inform his cardiologist of procedure also.

## 2023-10-03 NOTE — TELEPHONE ENCOUNTER
CW    Caller: Rene Chan    Topic/issue: Patient calling and states he has right knee replacement surgery scheduled on 10/24/23 and is wanting Dr. Wright to be aware. Patient also asking if there is anything that he needs to do prior to this procedure or if Dr. Wright needs to see him? Please advise.    Callback Number: [phone]    Thank you,   Luiza CALABRESE

## 2023-10-09 ENCOUNTER — PRE-ADMISSION TESTING (OUTPATIENT)
Dept: ADMISSIONS | Facility: MEDICAL CENTER | Age: 73
End: 2023-10-09
Attending: ORTHOPAEDIC SURGERY
Payer: MEDICARE

## 2023-10-09 DIAGNOSIS — M17.11 PRIMARY OSTEOARTHRITIS OF RIGHT KNEE: ICD-10-CM

## 2023-10-09 DIAGNOSIS — E11.9 DIABETES MELLITUS WITH COINCIDENT HYPERTENSION (HCC): ICD-10-CM

## 2023-10-09 DIAGNOSIS — I10 DIABETES MELLITUS WITH COINCIDENT HYPERTENSION (HCC): ICD-10-CM

## 2023-10-09 LAB
ANION GAP SERPL CALC-SCNC: 11 MMOL/L (ref 7–16)
APPEARANCE UR: CLEAR
APTT PPP: 30.8 SEC (ref 24.7–36)
BASOPHILS # BLD AUTO: 1.2 % (ref 0–1.8)
BASOPHILS # BLD: 0.07 K/UL (ref 0–0.12)
BILIRUB UR QL STRIP.AUTO: NEGATIVE
BUN SERPL-MCNC: 10 MG/DL (ref 8–22)
CALCIUM SERPL-MCNC: 8.6 MG/DL (ref 8.4–10.2)
CHLORIDE SERPL-SCNC: 104 MMOL/L (ref 96–112)
CO2 SERPL-SCNC: 22 MMOL/L (ref 20–33)
COLOR UR: YELLOW
CREAT SERPL-MCNC: 0.67 MG/DL (ref 0.5–1.4)
EOSINOPHIL # BLD AUTO: 0.08 K/UL (ref 0–0.51)
EOSINOPHIL NFR BLD: 1.3 % (ref 0–6.9)
ERYTHROCYTE [DISTWIDTH] IN BLOOD BY AUTOMATED COUNT: 44.4 FL (ref 35.9–50)
EST. AVERAGE GLUCOSE BLD GHB EST-MCNC: 137 MG/DL
GFR SERPLBLD CREATININE-BSD FMLA CKD-EPI: 98 ML/MIN/1.73 M 2
GLUCOSE SERPL-MCNC: 128 MG/DL (ref 65–99)
GLUCOSE UR STRIP.AUTO-MCNC: NEGATIVE MG/DL
HBA1C MFR BLD: 6.4 % (ref 4–5.6)
HCT VFR BLD AUTO: 42.9 % (ref 42–52)
HGB BLD-MCNC: 14.3 G/DL (ref 14–18)
HIV 1+2 AB+HIV1 P24 AG SERPL QL IA: NORMAL
IMM GRANULOCYTES # BLD AUTO: 0.03 K/UL (ref 0–0.11)
IMM GRANULOCYTES NFR BLD AUTO: 0.5 % (ref 0–0.9)
INR PPP: 2.24 (ref 0.87–1.13)
KETONES UR STRIP.AUTO-MCNC: NEGATIVE MG/DL
LEUKOCYTE ESTERASE UR QL STRIP.AUTO: NEGATIVE
LYMPHOCYTES # BLD AUTO: 1.56 K/UL (ref 1–4.8)
LYMPHOCYTES NFR BLD: 25.7 % (ref 22–41)
MCH RBC QN AUTO: 31.6 PG (ref 27–33)
MCHC RBC AUTO-ENTMCNC: 33.3 G/DL (ref 32.3–36.5)
MCV RBC AUTO: 94.9 FL (ref 81.4–97.8)
MICRO URNS: NORMAL
MONOCYTES # BLD AUTO: 0.88 K/UL (ref 0–0.85)
MONOCYTES NFR BLD AUTO: 14.5 % (ref 0–13.4)
NEUTROPHILS # BLD AUTO: 3.44 K/UL (ref 1.82–7.42)
NEUTROPHILS NFR BLD: 56.8 % (ref 44–72)
NITRITE UR QL STRIP.AUTO: NEGATIVE
NRBC # BLD AUTO: 0 K/UL
NRBC BLD-RTO: 0 /100 WBC (ref 0–0.2)
PH UR STRIP.AUTO: 6.5 [PH] (ref 5–8)
PLATELET # BLD AUTO: 288 K/UL (ref 164–446)
PMV BLD AUTO: 10.3 FL (ref 9–12.9)
POTASSIUM SERPL-SCNC: 4.4 MMOL/L (ref 3.6–5.5)
PROT UR QL STRIP: NEGATIVE MG/DL
PROTHROMBIN TIME: 25.6 SEC (ref 12–14.6)
RBC # BLD AUTO: 4.52 M/UL (ref 4.7–6.1)
RBC UR QL AUTO: NEGATIVE
SCCMEC + MECA PNL NOSE NAA+PROBE: NEGATIVE
SCCMEC + MECA PNL NOSE NAA+PROBE: NEGATIVE
SODIUM SERPL-SCNC: 137 MMOL/L (ref 135–145)
SP GR UR STRIP.AUTO: <=1.005
WBC # BLD AUTO: 6.1 K/UL (ref 4.8–10.8)

## 2023-10-09 PROCEDURE — 85610 PROTHROMBIN TIME: CPT

## 2023-10-09 PROCEDURE — 83036 HEMOGLOBIN GLYCOSYLATED A1C: CPT

## 2023-10-09 PROCEDURE — 80048 BASIC METABOLIC PNL TOTAL CA: CPT

## 2023-10-09 PROCEDURE — 85025 COMPLETE CBC W/AUTO DIFF WBC: CPT

## 2023-10-09 PROCEDURE — 36415 COLL VENOUS BLD VENIPUNCTURE: CPT

## 2023-10-09 PROCEDURE — 93005 ELECTROCARDIOGRAM TRACING: CPT

## 2023-10-09 PROCEDURE — 87640 STAPH A DNA AMP PROBE: CPT | Mod: XU

## 2023-10-09 PROCEDURE — 87389 HIV-1 AG W/HIV-1&-2 AB AG IA: CPT

## 2023-10-09 PROCEDURE — 87641 MR-STAPH DNA AMP PROBE: CPT

## 2023-10-09 PROCEDURE — 81003 URINALYSIS AUTO W/O SCOPE: CPT

## 2023-10-09 PROCEDURE — 85730 THROMBOPLASTIN TIME PARTIAL: CPT

## 2023-10-09 NOTE — DISCHARGE PLANNING
DISCHARGE PLANNING NOTE - TOTAL JOINT    Procedure: Procedure(s):  RIGHT TOTAL KNEE ARTHROPLASTY  Procedure Date: 10/24/2023  Insurance: Payor: [facility] SENIOR CARE PLUS / Plan: [facility] PREFERRED  / Product Type: Medicare Advantage /    Equipment currently available at home?  front-wheel walker and ice machine  Steps into the home? 2  Steps within the home? 0  Toilet height? ADA  Type of shower? walk-in shower  Home Oxygen? No  Portable tank?    Oxygen Provider:  Who will be with you during your recovery? spouse  Is Outpatient Physical Therapy set up after surgery? Yes  Did you take the Total Joint Class and where? Yes, received NAON book.  Planning same day discharge?Yes     This writer met with pt and educated to preop showers, nasal mrsa swab and potential for overnight stay. CHG kit given to pt. Home safety checklist and equipment resource guide sent home with pt. Pt educated to dc criteria. All questions answered and verbalizes understanding of all instructions. No dc needs identified at this time. Anticipate dc to home without barriers.

## 2023-10-10 LAB — EKG IMPRESSION: NORMAL

## 2023-10-10 PROCEDURE — 93010 ELECTROCARDIOGRAM REPORT: CPT | Performed by: INTERNAL MEDICINE

## 2023-10-17 ENCOUNTER — TELEPHONE (OUTPATIENT)
Dept: CARDIOLOGY | Facility: MEDICAL CENTER | Age: 73
End: 2023-10-17

## 2023-10-17 ENCOUNTER — ANTICOAGULATION VISIT (OUTPATIENT)
Dept: VASCULAR LAB | Facility: MEDICAL CENTER | Age: 73
End: 2023-10-17
Attending: INTERNAL MEDICINE
Payer: MEDICARE

## 2023-10-17 DIAGNOSIS — G45.9 TIA (TRANSIENT ISCHEMIC ATTACK): ICD-10-CM

## 2023-10-17 LAB — INR PPP: 2.3 (ref 2–3.5)

## 2023-10-17 PROCEDURE — 85610 PROTHROMBIN TIME: CPT

## 2023-10-17 PROCEDURE — 99212 OFFICE O/P EST SF 10 MIN: CPT

## 2023-10-17 NOTE — TELEPHONE ENCOUNTER
Hey!     PT came in today to ask DR FRENCH if he can continue to take his 81mg tablet of Aspirin leading up to his knee surgery which is scheduled for 10.24.2023 or if he needs to discontinue taking it for any amount of time prior?    Thank you for your help with this!

## 2023-10-17 NOTE — PROGRESS NOTES
Anticoagulation Summary  As of 10/17/2023      INR goal:  2.0-3.0   TTR:  59.6 % (8.3 y)   INR used for dosin.30 (10/17/2023)   Warfarin maintenance plan:  3.75 mg (7.5 mg x 0.5) every Mon, Fri; 7.5 mg (7.5 mg x 1) all other days   Weekly warfarin total:  45 mg   Plan last modified:  Miya Acosta APaulP.NPaul (2023)   Next INR check:  10/31/2023   Priority:  Acute   Target end date:  Indefinite    Indications    DVT (deep venous thrombosis) (HCC) (Resolved) [I82.409]  TIA (transient ischemic attack) [G45.9]  Pulmonary embolism [415.19] (Resolved) [I26.99]                 Anticoagulation Episode Summary       INR check location:  Anticoagulation Clinic    Preferred lab:  [facility]    Send INR reminders to:      Comments:  ASA 81 mg for TIA, CAD hx - [facility] per Dr. Wright 3/7/22          Anticoagulation Care Providers       Provider Role Specialty Phone number    Patricia Damon M.D. Referring Internal Medicine [phone]    Jona QuinnD Responsible      [facility] Anticoagulation Services Responsible  [phone]                  Refer to Patient Findings for HPI:  Patient Findings       Positives:  Upcoming invasive procedure (TKA on 10/24)    Negatives:  Signs/symptoms of thrombosis, Signs/symptoms of bleeding, Laboratory test error suspected, Change in health, Change in alcohol use, Change in activity, Emergency department visit, Upcoming dental procedure, Missed doses, Extra doses, Change in medications, Change in diet/appetite, Hospital admission, Bruising, Other complaints            There were no vitals filed for this visit.    Verified current warfarin dosing schedule.    Medications reconciled: No  Pt is on antiplatelet therapy with ASA 81mg for CAD.      A/P   INR is therapeutic    Warfarin dosing recommendation: Continue regimen as listed above.    Pt will be having TKA on 10/24. Pt is on warfarin for hx of VTE+TIA. Per  CHEST guidelines, pt is at low to moderate risk of  VTE given last event was years ago. Will forgo bridging with Lovenox. Pt will hold warfarin 5 days prior to procedure, then bolus with 11.25mg x 2 days post op and continue warfarin regimen.    Pt educated to contact our clinic with any changes in medications or s/s of bleeding or thrombosis. Pt is aware to seek immediate medical attention for falls, head injury or deep cuts.    Follow up appointment in 2 week(s).    Nandini Hill, PharmD

## 2023-10-17 NOTE — TELEPHONE ENCOUNTER
Vigme message sent to pt regarding recommendations.    Awaiting for clearance request to be received.

## 2023-10-23 NOTE — H&P
Surgery Orthopedic History & Physical Note    Date  03/10/2024    Primary Care Physician  Michael Sethi III, M.D.    CC  Pre-Op Diagnosis Codes:     * Osteoarthritis of right knee [M17.11]    HPI  This is a 73 y.o. male who presented with severe pain bilateral knees.    He had a left knee replacement in July 2022 and is recovered well.  His right knee continues to bother him, which has been going on for several years.  Hurts getting up and down from seated position.  Hurts walking short distances.  Activities day living which require standing walking very difficult.  He did well over 3 months of physical therapy in the past and does not help.  He is done anti-inflammatories and assistive devices and these have not helped either.  Pain Assessment  Pain Assessment: 0-10  Pain Score: 6    Past Medical History:   Diagnosis Date    Anticoagulation monitoring, special range     Arthritis     Bipolar disorder ([facility])     Bipolar disorder, in partial remission, most recent episode depressed ([facility]) 04/19/2022    Blood clotting disorder ([facility]) 06/24/2015    hx of PE    Blood clotting disorder ([facility]) 03/04/2020    DVT left leg    CAD (coronary artery disease) 06/04/2015    CAD (coronary artery disease)     6/2015: CABG-3 VESSEL    Carotid artery occlusion     L 60-79% occluded; R 59% occluded per pt report; 2011    Cataract     bilateral IOL    Clotting disorder ([facility])     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 10/3/23-does not check glucose at home.    Gout     Hammer toe     Heart murmur     Hiatus hernia syndrome     High cholesterol     Hyperlipidemia     Hypertension     Hypothyroidism     Impaired fasting glucose     Mild aortic stenosis - echo 4/2018     Pain     Right knee pain 10/03/2023    Stroke ([facility]) 05/26/2015    TIA-hx of 2 or 3. No residual.       Past Surgical History:   Procedure Laterality Date    PB TOTAL  KNEE ARTHROPLASTY Left 07/12/2022    Procedure: LEFT ARTHROPLASTY, KNEE, TOTAL;  Surgeon: Arturo Boyle M.D.;  Location: SURGERY [facility];  Service: Orthopedics    MULTIPLE CORONARY ARTERY BYPASS ENDO VEIN HARVEST  06/04/2015    Procedure: MULTIPLE CORONARY ARTERY BYPASS Grafting  x 3   ENDO VEIN HARVEST right leg;  Surgeon: Christophe Lemus M.D.;  Location: SURGERY [facility];  Service:     RECOVERY  06/01/2015    Procedure:  CATH LAB  [facility] w[facility] ICD; 794.31;  Surgeon: Recoveryonly Surgery;  Location: SURGERY PRE-POST PROC UNIT [facility];  Service:     UMBILICAL HERNIA REPAIR  02/2000    OTHER ORTHOPEDIC SURGERY  1976    L wrist repair     CLOSED REDUCTION LOWER EXTREMITY Left 1968    NO SEDATION    APPENDECTOMY  1963    COLONOSCOPY      hx of several       No current facility-administered medications for this encounter.     Current Outpatient Medications   Medication Sig Dispense Refill    acetaminophen (TYLENOL 8 HOUR ARTHRITIS PAIN) 650 MG CR tablet Take 650 mg by mouth at bedtime as needed.      Wheat Dextrin (BENEFIBER DRINK MIX) Pack Take  by mouth every day.      atorvastatin (LIPITOR) 80 MG tablet TAKE 1 TABLET BY MOUTH EVERY DAY (Patient taking differently: Take 80 mg by mouth every evening.) 100 Tablet 3    dutasteride (AVODART) 0.5 MG capsule TAKE ONE CAPSULE BY MOUTH EVERY DAY (Patient taking differently: Take 0.5 mg by mouth every evening.) 90 Capsule 3    levothyroxine (SYNTHROID) 75 MCG Tab TAKE 1 TABLET BY MOUTH EVERY DAY IN THE EVENING (Patient taking differently: Take 75 mcg by mouth every morning on an empty stomach. 3AM) 90 Tablet 1    warfarin (COUMADIN) 7.5 MG Tab TAKE 1/2 TO 1 TABLET BY MOUTH DAILY AS DIRECTED BY [facility] ANTICOAGULATION SERVICES 90 Tablet 1    lisinopril (PRINIVIL) 5 MG Tab Take 1 Tablet by mouth every day. (Patient taking differently: Take 5 mg by mouth at bedtime.) 100 Tablet 3    metoprolol tartrate (LOPRESSOR) 25 MG Tab Take 0.5 Tablets by mouth every  day. 50 Tablet 2    traZODone (DESYREL) 50 MG Tab TAKE 2 TABLETS BY MOUTH EVERY EVENING. FOR INSOMNIA (Patient taking differently: Take 100 mg by mouth every evening. For insomnia  Takes 1 - 1.5 tabs a day) 180 Tablet 2    divalproex (DEPAKOTE) 125 MG EC tablet Take 1 po  Tablet 3    divalproex (DEPAKOTE) 250 MG Tablet Delayed Response Take  2 po at night 270 Tablet 3    predniSONE (DELTASONE) 20 MG Tab Take 2 po a day for 5 days and then 1 a day for 1 week prn gout flare. 30 Tablet 2    Glucosamine-Chondroit-Vit C-Mn (GLUCOSAMINE CHONDR 500 COMPLEX PO) Take 1 Tablet by mouth 2 times a day.      aspirin 81 MG tablet Take 81 mg by mouth every day. 100 Tab     COENZYME Q10 PO Take 1 Tab by mouth every morning.      Cholecalciferol (VITAMIN D) 2000 UNITS Cap Take 2,000 Units by mouth every day.      docosahexanoic acid (OMEGA 3 FA) 1000 MG CAPS Take 1,000 mg by mouth every day.         Social History     Occupational History    Occupation: [employer]      Comment: Build house for 35 years   Tobacco Use    Smoking status: Former     Current packs/day: 0.00     Average packs/day: 2.2 packs/day for 15.3 years (33.9 ttl pk-yrs)     Types: Cigarettes     Start date: 1966     Quit date: 1977     Years since quittin.8    Smokeless tobacco: Never    Tobacco comments:     quit x 32 yrs; 2ppd x 7 years   Vaping Use    Vaping Use: Never used   Substance and Sexual Activity    Alcohol use: Not Currently     Alcohol/week: 19.8 oz     Types: 30 Cans of beer, 3 Shots of liquor per week     Comment: no alcohol since     Drug use: Not Currently     Types: Intravenous, Inhaled, Oral     Comment: no drug use since     Sexual activity: Not Currently     Partners: Female       Family History   Problem Relation Age of Onset    Heart Disease Sister 50    Stroke Sister 55        CVA    Heart Failure Father     Psychiatric Illness Son        Allergies  Penicillins    Review of Systems  Pertinent ROS in HPI. Other  ROS reviewed and found to be otherwise unremarkable.       Physical Exam  Cardiovascular:      Pulses: Normal pulses.   Pulmonary:      Effort: Pulmonary effort is normal.     Patient walks antalgic gait favoring the right knee  Moderate joint effusion right knee  Stable varus valgus posterior drawer right knee  Range of motion is 5-125 right knee  Joint line tenderness   Neurovascular intact bilateral lower extremities    Radiology:  Severe degenerative arthritis plus joint space ossified production cyst formation right knee.      Assessment/Plan:  Pre-Op Diagnosis Codes:     * Osteoarthritis of right knee [M17.11]  Procedure(s):  RIGHT TOTAL KNEE ARTHROPLASTY    We discussed treatment options at length.  Significant symptoms are present with even basic everyday activities such as walking less than 2 city blocks, bending and squatting, twisting, etc.  Imaging and physical exam are consistent with intraarticular pathology. The patient has tried appropriate conservative measures which have failed including therapy, injections, medications, and activity modifications.  At this point, they are interested in knee replacement, which I think the most predictable option for long-term pain and function relief.  We reviewed the risks, benefits, and alternatives to surgery, the patient agreed to proceed.  We specifically discussed the risk of persistent pain, stiffness, fracture, wound problems or infection, instability, loosening or wearing of the parts, blood clots and other medical problems.  We discussed the types of implants which were to be used.  Nonoperative alternatives were discussed.  No guarantees were made, and we decided together on this plan of action.  A front wheel walker and will be required in the perioperative period to assist with balance, weakness, and pain during ambulation, and to help prevent falls.   Patient has been educated about choice for planned prosthesis and rationale for its use.  Patient  understands and wishes to proceed.

## 2023-11-03 ENCOUNTER — ANTICOAGULATION VISIT (OUTPATIENT)
Dept: VASCULAR LAB | Facility: MEDICAL CENTER | Age: 73
End: 2023-11-03
Attending: INTERNAL MEDICINE
Payer: MEDICARE

## 2023-11-03 VITALS — DIASTOLIC BLOOD PRESSURE: 57 MMHG | SYSTOLIC BLOOD PRESSURE: 121 MMHG | HEART RATE: 57 BPM

## 2023-11-03 DIAGNOSIS — G45.9 TIA (TRANSIENT ISCHEMIC ATTACK): ICD-10-CM

## 2023-11-03 LAB — INR PPP: 2.7 (ref 2–3.5)

## 2023-11-03 PROCEDURE — 99211 OFF/OP EST MAY X REQ PHY/QHP: CPT

## 2023-11-03 PROCEDURE — 85610 PROTHROMBIN TIME: CPT

## 2023-11-03 NOTE — PROGRESS NOTES
Anticoagulation Summary  As of 11/3/2023      INR goal:  2.0-3.0   TTR:  59.9 % (8.4 y)   INR used for dosin.70 (11/3/2023)   Warfarin maintenance plan:  3.75 mg (7.5 mg x 0.5) every Mon, Wed; 7.5 mg (7.5 mg x 1) all other days   Weekly warfarin total:  45 mg   Plan last modified:  Kim Everett (11/3/2023)   Next INR check:  2023   Priority:  Acute   Target end date:  Indefinite    Indications    DVT (deep venous thrombosis) (HCC) (Resolved) [I82.409]  TIA (transient ischemic attack) [G45.9]  Pulmonary embolism [415.19] (Resolved) [I26.99]                 Anticoagulation Episode Summary       INR check location:  Anticoagulation Clinic    Preferred lab:  [facility]    Send INR reminders to:      Comments:  ASA 81 mg for TIA, CAD hx - [facility] per Dr. Wright 3/7/22          Anticoagulation Care Providers       Provider Role Specialty Phone number    Patricia Damon M.D. Referring Internal Medicine [phone]    Jona QuinnD Responsible      [facility] Anticoagulation Services Responsible  [phone]          Refer to Patient Findings for HPI:  Patient Findings       Negatives:  Signs/symptoms of thrombosis, Signs/symptoms of bleeding, Laboratory test error suspected, Change in health, Change in alcohol use, Change in activity, Upcoming invasive procedure, Emergency department visit, Upcoming dental procedure, Missed doses, Extra doses, Change in medications, Change in diet/appetite, Hospital admission, Bruising, Other complaints          Vitals:    23 0912   BP: 121/57   Pulse: (!) 57       Patient seen in clinic today for follow up on anticoagulation therapy with warfarin (a high risk medication) for hx of DVT,PE and TIA  Verified current warfarin dosing schedule. Patient taking half tablets on Mon and Wed instead of Mon and Friday so will make that change in the calendar.   Patient denies any missed doses of warfarin.    Medications reconciled   Pt is on  antiplatelet therapy with ASA 81mg for CAD      A/P   INR is therapeutic today at 2.7.     Warfarin dosing recommendation: Continue with the current dosing regimen.   Patient will follow up again in 4 weeks.     Pt educated to contact our clinic with any changes in medications or s/s of bleeding or thrombosis. Pt is aware to seek immediate medical attention for falls, head injury or deep cuts.    Follow up appointment in 4 week(s).    Next appt: Friday, Dec 1 @ 10:30am     Lupe Everett PharmD

## 2023-12-01 ENCOUNTER — ANTICOAGULATION VISIT (OUTPATIENT)
Dept: VASCULAR LAB | Facility: MEDICAL CENTER | Age: 73
End: 2023-12-01
Attending: INTERNAL MEDICINE
Payer: MEDICARE

## 2023-12-01 VITALS — DIASTOLIC BLOOD PRESSURE: 72 MMHG | SYSTOLIC BLOOD PRESSURE: 132 MMHG | HEART RATE: 64 BPM

## 2023-12-01 DIAGNOSIS — G45.9 TIA (TRANSIENT ISCHEMIC ATTACK): ICD-10-CM

## 2023-12-01 LAB — INR PPP: 3.7 (ref 2–3.5)

## 2023-12-01 PROCEDURE — 85610 PROTHROMBIN TIME: CPT

## 2023-12-01 PROCEDURE — 99212 OFFICE O/P EST SF 10 MIN: CPT

## 2023-12-01 NOTE — PROGRESS NOTES
Anticoagulation Summary  As of 12/1/2023      INR goal:  2.0-3.0   TTR:  59.6 % (8.5 y)   INR used for dosing:  3.70 (12/1/2023)   Warfarin maintenance plan:  3.75 mg (7.5 mg x 0.5) every Wed, Fri; 7.5 mg (7.5 mg x 1) all other days   Weekly warfarin total:  45 mg   Plan last modified:  Kim Everett (12/1/2023)   Next INR check:  12/29/2023   Priority:  Acute   Target end date:  Indefinite    Indications    DVT (deep venous thrombosis) (HCC) (Resolved) [I82.409]  TIA (transient ischemic attack) [G45.9]  Pulmonary embolism [415.19] (Resolved) [I26.99]                 Anticoagulation Episode Summary       INR check location:  Anticoagulation Clinic    Preferred lab:  [facility]    Send INR reminders to:      Comments:  ASA 81 mg for TIA, CAD hx - [facility] per Dr. Wright 3/7/22          Anticoagulation Care Providers       Provider Role Specialty Phone number    Patricia Damon M.D. Referring Internal Medicine [phone]    Jona QuinnD Responsible      [facility] Anticoagulation Services Responsible  [phone]          Refer to Patient Findings for HPI:  Patient Findings       Positives:  Upcoming invasive procedure, Change in diet/appetite (had some cranberries for the holiday)    Negatives:  Signs/symptoms of thrombosis, Signs/symptoms of bleeding, Laboratory test error suspected, Change in health, Change in alcohol use, Change in activity, Emergency department visit, Upcoming dental procedure, Missed doses, Extra doses, Change in medications, Hospital admission, Bruising, Other complaints          Vitals:    12/01/23 1052   BP: 132/72   Pulse: 64       Patient seen in clinic today for follow up on anticoagulation therapy with warfarin (a high risk medication) for hx of DVT, PE and TIA  Verified current warfarin dosing schedule.  Patient denies any missed doses of warfarin.    Medications reconciled   Pt is on antiplatelet therapy with ASA 81mg for CAD     Patient will be having  rescheduled TKA on 1/09/24. Pt is on warfarin for hx of VTE+TIA. Per 2022 CHEST guidelines, pt is at low to moderate risk of VTE given last event was years ago. Will forgo bridging with Lovenox. Pt will hold warfarin 5 days prior to procedure, then bolus with 11.25mg x 2 days post op and continue warfarin regimen.     A/P   INR is SUPRA-therapeutic today at 3.7.     Warfarin dosing recommendation: Patient will HOLD his usually 3.75mg TONIGHT, then reduce dose to 3.75mg tomorrow, then will resume his current dosing regimen.   Patient asked to return in about 2 weeks, but prefers to return in 4 weeks.     Pt educated to contact our clinic with any changes in medications or s/s of bleeding or thrombosis. Pt is aware to seek immediate medical attention for falls, head injury or deep cuts.    Follow up appointment in 4 week(s).    Next appt: Friday, Dec 29 @ 10:30am     Lupe Everett PharmD

## 2023-12-05 ENCOUNTER — APPOINTMENT (OUTPATIENT)
Dept: ADMISSIONS | Facility: MEDICAL CENTER | Age: 73
End: 2023-12-05
Attending: ORTHOPAEDIC SURGERY
Payer: MEDICARE

## 2023-12-12 ENCOUNTER — PRE-ADMISSION TESTING (OUTPATIENT)
Dept: ADMISSIONS | Facility: MEDICAL CENTER | Age: 73
End: 2023-12-12
Attending: ORTHOPAEDIC SURGERY
Payer: MEDICARE

## 2023-12-12 VITALS — HEIGHT: 68 IN | BODY MASS INDEX: 28.13 KG/M2

## 2023-12-12 DIAGNOSIS — Z01.812 PRE-OPERATIVE LABORATORY EXAMINATION: ICD-10-CM

## 2023-12-12 NOTE — PREPROCEDURE INSTRUCTIONS
Pre-admit telephone appointment completed. Reviewed the Preparing for your procedure handout with patient over the phone.  Patient instructed per pharmacy guidelines regarding taking, holding, or contacting provider for instructions on regularly prescribed medications before surgery. Instructed to take the following medications the day of surgery with a sip of water per pharmacy medication guidelines: depakote, synthroid, lopressor, and if needed-tylenol, and prednisone if gout flare up.     Pt reports he just had root canal done today and knows he is supposed to avoid dental work prior to and after TKA for a set time. Instructed pt to knotify Dr Boyle office and pt will do today.   Case message sent to Dr Montana AGGARWAL and surgery scheduler to inform of dental work.

## 2023-12-29 ENCOUNTER — ANTICOAGULATION VISIT (OUTPATIENT)
Dept: VASCULAR LAB | Facility: MEDICAL CENTER | Age: 73
End: 2023-12-29
Attending: INTERNAL MEDICINE
Payer: MEDICARE

## 2023-12-29 DIAGNOSIS — G45.9 TIA (TRANSIENT ISCHEMIC ATTACK): ICD-10-CM

## 2023-12-29 LAB — INR PPP: 2.8 (ref 2–3.5)

## 2023-12-29 PROCEDURE — 99211 OFF/OP EST MAY X REQ PHY/QHP: CPT

## 2023-12-29 PROCEDURE — 85610 PROTHROMBIN TIME: CPT

## 2023-12-29 NOTE — PROGRESS NOTES
Anticoagulation Summary  As of 2023      INR goal:  2.0-3.0   TTR:  59.3 % (8.5 y)   INR used for dosin.80 (2023)   Warfarin maintenance plan:  3.75 mg (7.5 mg x 0.5) every Mon, Wed; 7.5 mg (7.5 mg x 1) all other days   Weekly warfarin total:  45 mg   Plan last modified:  Aleksandra Grijalva PharmD (2023)   Next INR check:  2024   Priority:  Acute   Target end date:  Indefinite    Indications    DVT (deep venous thrombosis) (HCC) (Resolved) [I82.409]  TIA (transient ischemic attack) [G45.9]  Pulmonary embolism [415.19] (Resolved) [I26.99]                 Anticoagulation Episode Summary       INR check location:  Anticoagulation Clinic    Preferred lab:  [facility]    Send INR reminders to:      Comments:  ASA 81 mg for TIA, CAD hx - [facility] per Dr. Wright 3/7/22          Anticoagulation Care Providers       Provider Role Specialty Phone number    Patricia Damon M.D. Referring Internal Medicine [phone]    Jona QuinnD Responsible      [facility] Anticoagulation Services Responsible  [phone]                  Refer to Patient Findings for HPI:  Patient Findings       Positives:  Upcoming invasive procedure (R total knee procedure on 3/12/24)    Negatives:  Signs/symptoms of thrombosis, Signs/symptoms of bleeding, Laboratory test error suspected, Change in health, Change in alcohol use, Change in activity, Emergency department visit, Upcoming dental procedure, Missed doses, Extra doses, Change in medications, Change in diet/appetite, Hospital admission, Bruising, Other complaints            There were no vitals filed for this visit.  Pt declined vitals    Verified current warfarin dosing schedule.    Medications reconciled: No  Pt is on antiplatelet therapy with ASA 81mg for CAD        A/P   INR is therapeutic    Warfarin dosing recommendation: Continue regimen as listed above.    Patient will be having rescheduled TKA on 3/12/24. Pt is on warfarin for hx of VTE+TIA.  Per 2022 CHEST guidelines, pt is at low to moderate risk of VTE given last event was years ago. Will forgo bridging with Lovenox. Pt will hold warfarin 5 days prior to procedure, then bolus with 11.25mg x 1 days post op and continue warfarin regimen.     Pt educated to contact our clinic with any changes in medications or s/s of bleeding or thrombosis. Pt is aware to seek immediate medical attention for falls, head injury or deep cuts.    Request pt to return in 1 month(s). Pt agrees.    Aleksandra Grijalva, JonaD

## 2024-01-10 ENCOUNTER — HOSPITAL ENCOUNTER (OUTPATIENT)
Dept: LAB | Facility: MEDICAL CENTER | Age: 74
End: 2024-01-10
Attending: ORTHOPAEDIC SURGERY
Payer: MEDICARE

## 2024-01-10 DIAGNOSIS — Z01.812 PRE-OPERATIVE LABORATORY EXAMINATION: ICD-10-CM

## 2024-01-10 LAB
ANION GAP SERPL CALC-SCNC: 13 MMOL/L (ref 7–16)
BUN SERPL-MCNC: 12 MG/DL (ref 8–22)
CALCIUM SERPL-MCNC: 9 MG/DL (ref 8.5–10.5)
CHLORIDE SERPL-SCNC: 101 MMOL/L (ref 96–112)
CO2 SERPL-SCNC: 23 MMOL/L (ref 20–33)
CREAT SERPL-MCNC: 0.63 MG/DL (ref 0.5–1.4)
ERYTHROCYTE [DISTWIDTH] IN BLOOD BY AUTOMATED COUNT: 45.3 FL (ref 35.9–50)
EST. AVERAGE GLUCOSE BLD GHB EST-MCNC: 148 MG/DL
GFR SERPLBLD CREATININE-BSD FMLA CKD-EPI: 100 ML/MIN/1.73 M 2
GLUCOSE SERPL-MCNC: 105 MG/DL (ref 65–99)
HBA1C MFR BLD: 6.8 % (ref 4–5.6)
HCT VFR BLD AUTO: 43.4 % (ref 42–52)
HGB BLD-MCNC: 14.7 G/DL (ref 14–18)
MCH RBC QN AUTO: 31.6 PG (ref 27–33)
MCHC RBC AUTO-ENTMCNC: 33.9 G/DL (ref 32.3–36.5)
MCV RBC AUTO: 93.3 FL (ref 81.4–97.8)
PLATELET # BLD AUTO: 265 K/UL (ref 164–446)
PMV BLD AUTO: 10.7 FL (ref 9–12.9)
POTASSIUM SERPL-SCNC: 4.4 MMOL/L (ref 3.6–5.5)
RBC # BLD AUTO: 4.65 M/UL (ref 4.7–6.1)
SODIUM SERPL-SCNC: 137 MMOL/L (ref 135–145)
WBC # BLD AUTO: 8.2 K/UL (ref 4.8–10.8)

## 2024-01-10 PROCEDURE — 83036 HEMOGLOBIN GLYCOSYLATED A1C: CPT

## 2024-01-10 PROCEDURE — 80048 BASIC METABOLIC PNL TOTAL CA: CPT

## 2024-01-10 PROCEDURE — 85027 COMPLETE CBC AUTOMATED: CPT

## 2024-01-10 PROCEDURE — 36415 COLL VENOUS BLD VENIPUNCTURE: CPT

## 2024-01-15 RX ORDER — LEVOTHYROXINE SODIUM 0.07 MG/1
75 TABLET ORAL
Qty: 90 TABLET | Refills: 1 | Status: SHIPPED | OUTPATIENT
Start: 2024-01-15

## 2024-01-26 ENCOUNTER — ANTICOAGULATION VISIT (OUTPATIENT)
Dept: VASCULAR LAB | Facility: MEDICAL CENTER | Age: 74
End: 2024-01-26
Attending: INTERNAL MEDICINE
Payer: MEDICARE

## 2024-01-26 VITALS — SYSTOLIC BLOOD PRESSURE: 106 MMHG | DIASTOLIC BLOOD PRESSURE: 62 MMHG | HEART RATE: 60 BPM

## 2024-01-26 DIAGNOSIS — G45.9 TIA (TRANSIENT ISCHEMIC ATTACK): ICD-10-CM

## 2024-01-26 LAB — INR PPP: 2.8 (ref 2–3.5)

## 2024-01-26 PROCEDURE — 85610 PROTHROMBIN TIME: CPT

## 2024-01-26 PROCEDURE — 99211 OFF/OP EST MAY X REQ PHY/QHP: CPT

## 2024-01-26 NOTE — PROGRESS NOTES
Anticoagulation Summary  As of 2024      INR goal:  2.0-3.0   TTR:  59.6 % (8.6 y)   INR used for dosin.80 (2024)   Warfarin maintenance plan:  3.75 mg (7.5 mg x 0.5) every Mon, Wed; 7.5 mg (7.5 mg x 1) all other days   Weekly warfarin total:  45 mg   Plan last modified:  Aleksandra Grijalva PharmD (2023)   Next INR check:  3/1/2024   Priority:  Acute   Target end date:  Indefinite    Indications    DVT (deep venous thrombosis) (HCC) (Resolved) [I82.409]  TIA (transient ischemic attack) [G45.9]  Pulmonary embolism [415.19] (Resolved) [I26.99]                 Anticoagulation Episode Summary       INR check location:  Anticoagulation Clinic    Preferred lab:  [facility]    Send INR reminders to:      Comments:  ASA 81 mg for TIA, CAD hx - [facility] per Dr. Wright 3/7/22          Anticoagulation Care Providers       Provider Role Specialty Phone number    Patricia Damon M.D. Referring Internal Medicine [phone]    Jona QuinnD Responsible      [facility] Anticoagulation Services Responsible  [phone]          Refer to Patient Findings for HPI:  Patient Findings       Positives:  Change in diet/appetite (more greens in last week than usual)    Negatives:  Signs/symptoms of thrombosis, Signs/symptoms of bleeding, Laboratory test error suspected, Change in health, Change in alcohol use, Change in activity, Upcoming invasive procedure, Emergency department visit, Upcoming dental procedure, Missed doses, Extra doses, Change in medications, Hospital admission, Bruising, Other complaints          Vitals:    24 1049   BP: 106/62   Pulse: 60       Patient seen in clinic today for follow up on anticoagulation therapy with warfarin (a high risk medication) for hx of PE, DVT and TIA  Verified current warfarin dosing schedule.  Patient denies any missed doses of warfarin.    Medications reconciled   Pt is on antiplatelet therapy with ASA 81mg for CAD        A/P   INR is therapeutic  today at 2.8.     Warfarin dosing recommendation: Continue with the current dosing regimen. Patient will follow up again in 5 weeks.     Pt educated to contact our clinic with any changes in medications or s/s of bleeding or thrombosis. Pt is aware to seek immediate medical attention for falls, head injury or deep cuts.    Follow up appointment in 5 week(s).    Next appt: Friday, March 1 @ 10:30am     Lupe Everett PharmD

## 2024-01-28 DIAGNOSIS — Z86.718 HISTORY OF DVT (DEEP VEIN THROMBOSIS): ICD-10-CM

## 2024-01-28 DIAGNOSIS — Z79.01 CHRONIC ANTICOAGULATION: ICD-10-CM

## 2024-01-29 ENCOUNTER — APPOINTMENT (OUTPATIENT)
Dept: ADMISSIONS | Facility: MEDICAL CENTER | Age: 74
End: 2024-01-29
Attending: ORTHOPAEDIC SURGERY
Payer: MEDICARE

## 2024-01-29 RX ORDER — WARFARIN SODIUM 7.5 MG/1
TABLET ORAL
Qty: 90 TABLET | Refills: 1 | Status: SHIPPED | OUTPATIENT
Start: 2024-01-29

## 2024-01-29 RX ORDER — DIVALPROEX SODIUM 125 MG/1
TABLET, DELAYED RELEASE ORAL
Qty: 180 TABLET | Refills: 1 | Status: SHIPPED | OUTPATIENT
Start: 2024-01-29

## 2024-01-30 NOTE — TELEPHONE ENCOUNTER
Received request via: Pharmacy    Was the patient seen in the last year in this department? Yes    Does the patient have an active prescription (recently filled or refills available) for medication(s) requested? No    Pharmacy Name: [facility]/pharmacy #[id number] - [place], [place] - [address]     Does the patient have FPC Plus and need 100 day supply (blood pressure, diabetes and cholesterol meds only)? Patient does not have SCP

## 2024-02-02 ENCOUNTER — PRE-ADMISSION TESTING (OUTPATIENT)
Dept: ADMISSIONS | Facility: MEDICAL CENTER | Age: 74
End: 2024-02-02
Attending: ORTHOPAEDIC SURGERY
Payer: MEDICARE

## 2024-02-02 VITALS — BODY MASS INDEX: 28.13 KG/M2 | HEIGHT: 68 IN

## 2024-02-02 DIAGNOSIS — Z01.812 PRE-OPERATIVE LABORATORY EXAMINATION: ICD-10-CM

## 2024-02-02 NOTE — PREPROCEDURE INSTRUCTIONS
Pre-admit telephone appointment completed. Reviewed the Preparing for your procedure handout with patient over the phone. Patient instructed per pharmacy guidelines regarding taking, holding, or contacting provider for instructions on regularly prescribed medications before surgery. Instructed to take the following medications the day of surgery with a sip of water per pharmacy medication guidelines: Depakote, Synthroid, Metoprolol.    Denies anesthesia complications      
none

## 2024-02-05 ENCOUNTER — TELEPHONE (OUTPATIENT)
Dept: HEALTH INFORMATION MANAGEMENT | Facility: OTHER | Age: 74
End: 2024-02-05
Payer: MEDICARE

## 2024-02-05 RX ORDER — DIVALPROEX SODIUM 250 MG/1
TABLET, DELAYED RELEASE ORAL
Qty: 180 TABLET | Refills: 3 | Status: SHIPPED | OUTPATIENT
Start: 2024-02-05

## 2024-02-05 NOTE — TELEPHONE ENCOUNTER
Received request via: Patient    Was the patient seen in the last year in this department? Yes    Does the patient have an active prescription (recently filled or refills available) for medication(s) requested? No    Pharmacy Name: [facility]/pharmacy #[id number] - [place], [place] - [address]     Does the patient have senior living Plus and need 100 day supply (blood pressure, diabetes and cholesterol meds only)? Medication is not for cholesterol, blood pressure or diabetes

## 2024-02-08 ENCOUNTER — OFFICE VISIT (OUTPATIENT)
Dept: MEDICAL GROUP | Facility: PHYSICIAN GROUP | Age: 74
End: 2024-02-08
Payer: MEDICARE

## 2024-02-08 VITALS
DIASTOLIC BLOOD PRESSURE: 72 MMHG | SYSTOLIC BLOOD PRESSURE: 124 MMHG | TEMPERATURE: 98.4 F | HEIGHT: 68 IN | OXYGEN SATURATION: 94 % | WEIGHT: 198 LBS | BODY MASS INDEX: 30.01 KG/M2 | RESPIRATION RATE: 18 BRPM | HEART RATE: 60 BPM

## 2024-02-08 DIAGNOSIS — I99.8 VASCULAR INSUFFICIENCY: ICD-10-CM

## 2024-02-08 DIAGNOSIS — D68.69 SECONDARY HYPERCOAGULABLE STATE (HCC): ICD-10-CM

## 2024-02-08 DIAGNOSIS — Z23 NEED FOR VACCINATION: ICD-10-CM

## 2024-02-08 DIAGNOSIS — F31.70 BIPOLAR DISORDER IN FULL REMISSION, MOST RECENT EPISODE UNSPECIFIED TYPE (HCC): ICD-10-CM

## 2024-02-08 DIAGNOSIS — I70.0 ATHEROSCLEROSIS OF AORTA (HCC): ICD-10-CM

## 2024-02-08 DIAGNOSIS — I10 DIABETES MELLITUS WITH COINCIDENT HYPERTENSION (HCC): ICD-10-CM

## 2024-02-08 DIAGNOSIS — D69.2 SENILE PURPURA (HCC): ICD-10-CM

## 2024-02-08 DIAGNOSIS — F10.21 ALCOHOL DEPENDENCE IN REMISSION (HCC): ICD-10-CM

## 2024-02-08 DIAGNOSIS — F11.21 OPIOID DEPENDENCE IN REMISSION (HCC): ICD-10-CM

## 2024-02-08 DIAGNOSIS — E11.9 DIABETES MELLITUS WITH COINCIDENT HYPERTENSION (HCC): ICD-10-CM

## 2024-02-08 DIAGNOSIS — E11.9 TYPE 2 DIABETES MELLITUS WITHOUT COMPLICATION, UNSPECIFIED WHETHER LONG TERM INSULIN USE (HCC): ICD-10-CM

## 2024-02-08 DIAGNOSIS — I48.20 CHRONIC ATRIAL FIBRILLATION (HCC): ICD-10-CM

## 2024-02-08 DIAGNOSIS — I82.502 LEG DVT (DEEP VENOUS THROMBOEMBOLISM), CHRONIC, LEFT (HCC): ICD-10-CM

## 2024-02-08 PROBLEM — Z86.718 HISTORY OF DVT (DEEP VEIN THROMBOSIS): Status: RESOLVED | Noted: 2022-04-19 | Resolved: 2024-02-08

## 2024-02-08 PROBLEM — R35.0 URINARY FREQUENCY: Status: RESOLVED | Noted: 2023-03-03 | Resolved: 2024-02-08

## 2024-02-08 PROCEDURE — 3078F DIAST BP <80 MM HG: CPT | Performed by: FAMILY MEDICINE

## 2024-02-08 PROCEDURE — 3074F SYST BP LT 130 MM HG: CPT | Performed by: FAMILY MEDICINE

## 2024-02-08 PROCEDURE — 99214 OFFICE O/P EST MOD 30 MIN: CPT | Performed by: FAMILY MEDICINE

## 2024-02-08 RX ORDER — FUROSEMIDE 20 MG/1
20 TABLET ORAL DAILY
Qty: 90 TABLET | Refills: 3 | Status: SHIPPED | OUTPATIENT
Start: 2024-02-08

## 2024-02-08 ASSESSMENT — FIBROSIS 4 INDEX: FIB4 SCORE: 0.99

## 2024-02-08 NOTE — PROGRESS NOTES
Subjective:     CC: Here primarily for swelling in his legs.    HPI:   Rene presents today with the following medical concerns:    Vascular insufficiency  This is a likely chronic problem.  Patient states he has had over the last several months to year gradual swelling in both lower legs.  He did have some weight gain recently with the holidays.  He states his legs are fine in the morning when he first gets up and by the end of the day other swollen over his socks.  Sometimes the right ankle looks a little darkish in color.  No pain.  He has had previous DVT in the left leg only.  No changes to medications.  He does not really have a lot of salt in his diet.  Previous echocardiograms did not show any congestive failure.  Lab work showed normal renal and liver function test.    Senile purpura (HCC)  This is a chronic problem.  Likely due to aging.    Secondary hypercoagulable state (HCC)  This is a chronic problem.  Patient is on anticoagulant treatment due to history of atrial fibrillation.    Opioid dependence in remission (HCC)  This is a chronic problem.  Remains in remission.    Leg DVT (deep venous thromboembolism), chronic, left (HCC)  This is a chronic stable condition.    DM (diabetes mellitus) (HCC)  This is a chronic problem.  Last hemoglobin A1c was 6.8%.  Patient is on diet management only.    Diabetes mellitus with coincident hypertension (HCC)  These are both chronic problems under good control.    Chronic atrial fibrillation (HCC)  This is a chronic problem.  Followed by cardiology.    Bipolar disorder in full remission (CMS-HCC) (HCC)  This is a chronic stable condition.    Atherosclerosis of aorta (HCC)  This is a chronic problem.  Patient is on a statin.    Alcohol dependence in remission ([facility])  This is a chronic problem.  Remains in remission.    Past Medical History:   Diagnosis Date    Anticoagulation monitoring, special range     Arthritis     Bipolar disorder (HCC)     Bipolar disorder, in  partial remission, most recent episode depressed ([facility]) 2022    Blood clotting disorder ([facility]) 2015    hx of PE    Blood clotting disorder ([facility]) 2020    DVT left leg    CAD (coronary artery disease) 2015    CAD (coronary artery disease)     2015: CABG-3 VESSEL    Carotid artery occlusion     L 60-79% occluded; R 59% occluded per pt report;     Cataract     bilateral IOL    Clotting disorder ([facility])     protein S elevation in '12 with recurrent DVT and PE-coumadin lifelong    Colon polyps     Diabetes mellitus, type II ([facility])     diet controlled. 10/3/23-does not check glucose at home.    Gout     Hammer toe     Heart murmur     Hiatus hernia syndrome     High cholesterol     Hyperlipidemia     Hypertension     Hypothyroidism     Impaired fasting glucose     Mild aortic stenosis - echo 2018     Pain     Right knee pain 2023    Stroke ([facility]) 2015    TIA-hx of 2 or 3. No residual.       Social History     Tobacco Use    Smoking status: Former     Current packs/day: 0.00     Average packs/day: 2.2 packs/day for 15.3 years (33.9 ttl pk-yrs)     Types: Cigarettes     Start date: 1966     Quit date: 1977     Years since quittin.1    Smokeless tobacco: Never    Tobacco comments:     quit x 32 yrs; 2ppd x 7 years   Vaping Use    Vaping Use: Never used   Substance Use Topics    Alcohol use: Not Currently     Alcohol/week: 19.8 oz     Types: 30 Cans of beer, 3 Shots of liquor per week     Comment: no alcohol since     Drug use: Not Currently     Types: Intravenous, Inhaled, Oral     Comment: no drug use since        Current Outpatient Medications Ordered in Epic   Medication Sig Dispense Refill    furosemide (LASIX) 20 MG Tab Take 1 Tablet by mouth every day. 90 Tablet 3    divalproex (DEPAKOTE) 250 MG Tablet Delayed Response Take  2 po at night 180 Tablet 3    divalproex (DEPAKOTE) 125 MG EC tablet TAKE 1 TABLET BY MOUTH TWICE A  Tablet 1    warfarin  (COUMADIN) 7.5 MG Tab TAKE 1/2 TO 1 TABLET BY MOUTH DAILY AS DIRECTED BY [facility] 90 Tablet 1    levothyroxine (SYNTHROID) 75 MCG Tab Take 1 Tablet by mouth every morning on an empty stomach. 90 Tablet 1    acetaminophen (TYLENOL 8 HOUR ARTHRITIS PAIN) 650 MG CR tablet Take 650 mg by mouth at bedtime as needed.      Wheat Dextrin (BENEFIBER DRINK MIX) Pack Take  by mouth every day.      atorvastatin (LIPITOR) 80 MG tablet TAKE 1 TABLET BY MOUTH EVERY DAY (Patient taking differently: Take 80 mg by mouth every evening.) 100 Tablet 3    dutasteride (AVODART) 0.5 MG capsule TAKE ONE CAPSULE BY MOUTH EVERY DAY (Patient taking differently: Take 0.5 mg by mouth every evening.) 90 Capsule 3    lisinopril (PRINIVIL) 5 MG Tab Take 1 Tablet by mouth every day. (Patient taking differently: Take 5 mg by mouth at bedtime.) 100 Tablet 3    metoprolol tartrate (LOPRESSOR) 25 MG Tab Take 0.5 Tablets by mouth every day. 50 Tablet 2    traZODone (DESYREL) 50 MG Tab TAKE 2 TABLETS BY MOUTH EVERY EVENING. FOR INSOMNIA (Patient taking differently: Take 100 mg by mouth every evening. For insomnia  Takes 1 - 1.5 tabs a day) 180 Tablet 2    predniSONE (DELTASONE) 20 MG Tab Take 2 po a day for 5 days and then 1 a day for 1 week prn gout flare. 30 Tablet 2    Glucosamine-Chondroit-Vit C-Mn (GLUCOSAMINE CHONDR 500 COMPLEX PO) Take 1 Tablet by mouth 2 times a day.      aspirin 81 MG tablet Take 81 mg by mouth every day. 100 Tab     COENZYME Q10 PO Take 1 Tab by mouth every morning.      Cholecalciferol (VITAMIN D) 2000 UNITS Cap Take 2,000 Units by mouth every day.      docosahexanoic acid (OMEGA 3 FA) 1000 MG CAPS Take 1,000 mg by mouth every day.       No current [facility]-ordered facility-administered medications on file.       Allergies:  Penicillins    Health Maintenance: Completed    ROS:  Gen: no fevers/chills, no changes in weight  Eyes: no changes in vision  ENT: no sore throat, no hearing loss, no bloody nose  Pulm:  "no sob, no cough  CV: no chest pain, no palpitations  GI: no nausea/vomiting, no diarrhea  : no dysuria  MSk: no myalgias  Skin: no rash  Neuro: no headaches, no numbness/tingling        Objective:       Exam:  /72 (BP Location: Right arm, Patient Position: Sitting, BP Cuff Size: Adult)   Pulse 60   Temp 36.9 °C (98.4 °F) (Temporal)   Resp 18   Ht 1.727 m (5' 8\")   Wt 89.8 kg (198 lb)   SpO2 94%   BMI 30.11 kg/m²  Body mass index is 30.11 kg/m².    Gen: Alert and oriented, No apparent distress.  Neck: Neck is supple without lymphadenopathy.  Lungs: Normal effort,   Ext: No clubbing, cyanosis.  Patient has 1-2+ pitting edema in the lower extremities.  It is symmetrical.  No cyanosis seen.  No cords felt.      Labs: Reviewed    Assessment & Plan:     73 y.o. male with the following -         1. Vascular insufficiency  This is likely chronic problem.  We talked about using compression stockings.  Also avoid excess salt in his diet.  Will start him on Lasix 20 mg a day and is to report back in 1 to 2 weeks.  If it is not doing anything and his weight is not coming down we will increase it to 40 mg at that time.    2. Atherosclerosis of aorta (HCC)  This is a chronic problem.  Continue on a statin.    3. Leg DVT (deep venous thromboembolism), chronic, left (HCC)  This is a chronic stable condition.    4. Type 2 diabetes mellitus without complication, unspecified whether long term insulin use (HCC)  This is a chronic problem.  Continue on his diet restrictions.    5. Diabetes mellitus with coincident hypertension (HCC)  These are both chronic problems under good control.    6. Secondary hypercoagulable state (HCC)  This is a chronic problem.  Continue on his anticoagulant treatment.    7. Senile purpura (HCC)  This is a chronic problem.  Likely due to aging.    8. Alcohol dependence in remission ([facility])  This is a chronic problem.  Remains in remission.    9. Opioid dependence in remission ([facility])  This is a " chronic problem.  Remains in remission.    10. Bipolar disorder in full remission, most recent episode unspecified type (HCC)  This is a chronic problem.  Currently well-controlled.    11. Chronic atrial fibrillation (HCC)  This is a chronic problem.  Continue care through cardiology.    [facility] Gap Form    Diagnosis to address: I70.0 - Atherosclerosis of aorta (HCC)  Assessment and plan: Chronic, stable. Continue with current defined treatment plan: On a statin.. Follow-up at least annually.  Diagnosis: I82.502 - Leg DVT (deep venous thromboembolism), chronic, left (HCC)  Assessment and plan: Chronic, stable. Continue with current defined treatment plan: Stable condition.. Follow-up at least annually.  Diagnosis: E11.9 - Type 2 diabetes mellitus without complication, unspecified whether long term insulin use (HCC)  Assessment and plan: Chronic, stable. Continue with current defined treatment plan: Controlled on diet alone.. Follow-up at least annually.  Diagnosis: E11.9, I10 - Diabetes mellitus with coincident hypertension (HCC)  Assessment and plan: Chronic, stable. Continue with current defined treatment plan: Both under good control.. Follow-up at least annually.  Diagnosis: D68.69 - Secondary hypercoagulable state (HCC)  Assessment and plan: Chronic, stable. Continue with current defined treatment plan: On  anticoagulant treatment due to atrial fibrillation.. Follow-up at least annually.  Diagnosis: D69.2 - Senile purpura (HCC)  Assessment and plan: Chronic, stable. Continue with current defined treatment plan: Likely due to aging.. Follow-up at least annually.  Diagnosis: F10.21 - Alcohol dependence in remission (HCC)  Assessment and plan: Chronic, stable. Continue with current defined treatment plan: Continues in remission.. Follow-up at least annually.  Diagnosis: F11.21 - Opioid dependence in remission ([facility])  Assessment and plan: Chronic, stable. Continue with current defined treatment plan: Continues in  remission.. Follow-up at least annually.  Diagnosis: F31.70 - Bipolar disorder in full remission, most recent episode unspecified type (HCC)  Assessment and plan: Chronic, stable. Continue with current defined treatment plan: Stable condition.. Follow-up at least annually.  Diagnosis: I48.20 - Chronic atrial fibrillation (HCC)  Assessment and plan: Chronic, stable, as based on today's assessment and impact on other conditions evaluated today. Continue with current treatment plan: Managed by cardiology.  Follow-up with specialist as directed, but at least annually.  Last edited 02/08/24 14:18 PST by Michael Sethi III, M.D.         Return in about 6 months (around 8/8/2024) for Long.    Please note that this dictation was created using voice recognition software. I have made every reasonable attempt to correct obvious errors, but I expect that there are errors of grammar and possibly content that I did not discover before finalizing the note.

## 2024-02-08 NOTE — ASSESSMENT & PLAN NOTE
This is a likely chronic problem.  Patient states he has had over the last several months to year gradual swelling in both lower legs.  He did have some weight gain recently with the holidays.  He states his legs are fine in the morning when he first gets up and by the end of the day other swollen over his socks.  Sometimes the right ankle looks a little darkish in color.  No pain.  He has had previous DVT in the left leg only.  No changes to medications.  He does not really have a lot of salt in his diet.  Previous echocardiograms did not show any congestive failure.  Lab work showed normal renal and liver function test.

## 2024-02-08 NOTE — ASSESSMENT & PLAN NOTE
This is a chronic problem.  Patient is on anticoagulant treatment due to history of atrial fibrillation.

## 2024-02-18 DIAGNOSIS — I10 HYPERTENSION, UNSPECIFIED TYPE: ICD-10-CM

## 2024-02-20 NOTE — TELEPHONE ENCOUNTER
Is the patient due for a refill? Yes    Was the patient seen the past year? Yes    Date of last office visit: 03/14/23    Does the patient have an upcoming appointment?  No    Provider to refill:CW    Does the patients insurance require a 100 day supply?  Yes

## 2024-02-26 ENCOUNTER — PRE-ADMISSION TESTING (OUTPATIENT)
Dept: ADMISSIONS | Facility: MEDICAL CENTER | Age: 74
End: 2024-02-26
Attending: ORTHOPAEDIC SURGERY
Payer: MEDICARE

## 2024-02-26 DIAGNOSIS — Z01.812 PRE-OPERATIVE LABORATORY EXAMINATION: ICD-10-CM

## 2024-02-26 LAB
ANION GAP SERPL CALC-SCNC: 11 MMOL/L (ref 7–16)
BUN SERPL-MCNC: 15 MG/DL (ref 8–22)
CALCIUM SERPL-MCNC: 9.3 MG/DL (ref 8.4–10.2)
CHLORIDE SERPL-SCNC: 103 MMOL/L (ref 96–112)
CO2 SERPL-SCNC: 23 MMOL/L (ref 20–33)
CREAT SERPL-MCNC: 0.83 MG/DL (ref 0.5–1.4)
ERYTHROCYTE [DISTWIDTH] IN BLOOD BY AUTOMATED COUNT: 47.8 FL (ref 35.9–50)
GFR SERPLBLD CREATININE-BSD FMLA CKD-EPI: 92 ML/MIN/1.73 M 2
GLUCOSE SERPL-MCNC: 171 MG/DL (ref 65–99)
HCT VFR BLD AUTO: 45.8 % (ref 42–52)
HGB BLD-MCNC: 15.3 G/DL (ref 14–18)
MCH RBC QN AUTO: 31.7 PG (ref 27–33)
MCHC RBC AUTO-ENTMCNC: 33.4 G/DL (ref 32.3–36.5)
MCV RBC AUTO: 95 FL (ref 81.4–97.8)
PLATELET # BLD AUTO: 288 K/UL (ref 164–446)
PMV BLD AUTO: 10.7 FL (ref 9–12.9)
POTASSIUM SERPL-SCNC: 4.4 MMOL/L (ref 3.6–5.5)
RBC # BLD AUTO: 4.82 M/UL (ref 4.7–6.1)
SCCMEC + MECA PNL NOSE NAA+PROBE: NEGATIVE
SCCMEC + MECA PNL NOSE NAA+PROBE: NEGATIVE
SODIUM SERPL-SCNC: 137 MMOL/L (ref 135–145)
WBC # BLD AUTO: 7.4 K/UL (ref 4.8–10.8)

## 2024-02-26 PROCEDURE — 85027 COMPLETE CBC AUTOMATED: CPT

## 2024-02-26 PROCEDURE — 80048 BASIC METABOLIC PNL TOTAL CA: CPT

## 2024-02-26 PROCEDURE — 87640 STAPH A DNA AMP PROBE: CPT

## 2024-02-26 PROCEDURE — 36415 COLL VENOUS BLD VENIPUNCTURE: CPT

## 2024-02-26 PROCEDURE — 87641 MR-STAPH DNA AMP PROBE: CPT

## 2024-02-26 NOTE — DISCHARGE PLANNING
DISCHARGE PLANNING NOTE - TOTAL JOINT    Procedure: Procedure(s):  RIGHT TOTAL KNEE ARTHROPLASTY  Procedure Date: 3/12/2024  Insurance: Payor: [facility] SENIOR CARE PLUS / Plan: [facility] PREFERRED  / Product Type: Medicare Advantage /    Equipment currently available at home?  front-wheel walker and shower chair  Steps into the home? 2  Steps within the home? 0  Toilet height? ADA  Type of shower? walk-in shower  Home Oxygen? No  Portable tank?    Oxygen Provider:  Who will be with you during your recovery? spouse  Is Outpatient Physical Therapy set up after surgery? Yes  Did you take the Total Joint Class and where? Yes, received NAON book.  Planning same day discharge?Yes     This writer met with pt and educated to preop showers, nasal mrsa swab which was obtained by this writer, and potential for overnight stay. CHG kit given to pt. Home safety checklist and equipment resource guide sent home with pt. Pt educated to dc criteria. All questions answered and verbalizes understanding of all instructions. No dc needs identified at this time. Anticipate dc to home without barriers.

## 2024-02-27 ENCOUNTER — TELEPHONE (OUTPATIENT)
Dept: MEDICAL GROUP | Facility: PHYSICIAN GROUP | Age: 74
End: 2024-02-27
Payer: MEDICARE

## 2024-02-27 NOTE — TELEPHONE ENCOUNTER
Patient called requesting a temporary handicap placard for at least 6 months for his upcoming Right knee surgery

## 2024-03-04 ENCOUNTER — ANTICOAGULATION VISIT (OUTPATIENT)
Dept: VASCULAR LAB | Facility: MEDICAL CENTER | Age: 74
End: 2024-03-04
Attending: INTERNAL MEDICINE
Payer: MEDICARE

## 2024-03-04 DIAGNOSIS — I82.502 LEG DVT (DEEP VENOUS THROMBOEMBOLISM), CHRONIC, LEFT (HCC): ICD-10-CM

## 2024-03-04 DIAGNOSIS — I48.20 CHRONIC ATRIAL FIBRILLATION (HCC): ICD-10-CM

## 2024-03-04 DIAGNOSIS — Z86.711 HX PULMONARY EMBOLISM: ICD-10-CM

## 2024-03-04 DIAGNOSIS — D68.69 SECONDARY HYPERCOAGULABLE STATE (HCC): Chronic | ICD-10-CM

## 2024-03-04 DIAGNOSIS — G45.9 TIA (TRANSIENT ISCHEMIC ATTACK): ICD-10-CM

## 2024-03-04 LAB — INR PPP: 4.1 (ref 2–3.5)

## 2024-03-04 PROCEDURE — 85610 PROTHROMBIN TIME: CPT

## 2024-03-04 PROCEDURE — 99212 OFFICE O/P EST SF 10 MIN: CPT | Performed by: NURSE PRACTITIONER

## 2024-03-05 NOTE — PROGRESS NOTES
Anticoagulation Summary  As of 3/4/2024      INR goal:  2.0-3.0   TTR:  59.1% (8.7 y)   INR used for dosin.10 (3/4/2024)   Warfarin maintenance plan:  3.75 mg (7.5 mg x 0.5) every Mon, Wed; 7.5 mg (7.5 mg x 1) all other days   Weekly warfarin total:  45 mg   Plan last modified:  Jona RingD (2023)   Next INR check:  3/18/2024   Priority:  Acute   Target end date:  Indefinite    Indications    DVT (deep venous thrombosis) (HCC) (Resolved) [I82.409]  TIA (transient ischemic attack) [G45.9]  Pulmonary embolism [415.19] (Resolved) [I26.99]                 Anticoagulation Episode Summary       INR check location:  Anticoagulation Clinic    Preferred lab:  [facility]    Send INR reminders to:      Comments:  ASA 81 mg for TIA, CAD hx - [facility] per Dr. Wright 3/7/22          Anticoagulation Care Providers       Provider Role Specialty Phone number    Patricia Damon M.D. Referring Internal Medicine [phone]    Jona QuinnD Responsible      [facility] Responsible  [phone]                  Refer to Patient Findings for HPI:  Patient Findings       Positives:  Upcoming invasive procedure    Negatives:  Signs/symptoms of thrombosis, Signs/symptoms of bleeding, Laboratory test error suspected, Change in health, Change in alcohol use, Change in activity, Emergency department visit, Upcoming dental procedure, Missed doses, Extra doses, Change in medications, Change in diet/appetite, Hospital admission, Bruising, Other complaints    Comments:  Patient will be having rescheduled TKA on 3/12/24. On warfarin for hx of VTE+TIA. Per  CHEST guidelines, pt is at low to moderate risk of VTE given last event was years ago. Discussed option to bridge with Lovenox, however, since he has bridged in the past. He wishes to forgo Lovenox.            There were no vitals filed for this visit.  Pt declined vitals    Verified current warfarin dosing  schedule.    Medications reconciled: Yes  Pt is on antiplatelet therapy with ASA 81 mg for CAD.      A/P   INR is supratherapeutic. Will have him hold tonight's dose of warfarin. For upcoming surgery, we will forgo bridging.. Will have him stop warfarin 5 days prior to surgery and resume taking the evening of surgery if okay with surgeon.     Warfarin dosing recommendation: Hold tonight's dose, then following dosing calendar.    Pt educated to contact our clinic with any changes in medications or s/s of bleeding or thrombosis. Pt is aware to seek immediate medical attention for falls, head injury or deep cuts.    Request pt to return in 2 week(s). Pt agrees.    TOREY Bucio.

## 2024-03-12 ENCOUNTER — TELEPHONE (OUTPATIENT)
Dept: VASCULAR LAB | Facility: MEDICAL CENTER | Age: 74
End: 2024-03-12

## 2024-03-12 ENCOUNTER — HOSPITAL ENCOUNTER (OUTPATIENT)
Facility: MEDICAL CENTER | Age: 74
End: 2024-03-12
Attending: ORTHOPAEDIC SURGERY | Admitting: ORTHOPAEDIC SURGERY
Payer: MEDICARE

## 2024-03-12 ENCOUNTER — PHARMACY VISIT (OUTPATIENT)
Dept: PHARMACY | Facility: MEDICAL CENTER | Age: 74
End: 2024-03-12
Payer: COMMERCIAL

## 2024-03-12 ENCOUNTER — ANESTHESIA (OUTPATIENT)
Dept: SURGERY | Facility: MEDICAL CENTER | Age: 74
End: 2024-03-12
Payer: MEDICARE

## 2024-03-12 ENCOUNTER — ANESTHESIA EVENT (OUTPATIENT)
Dept: SURGERY | Facility: MEDICAL CENTER | Age: 74
End: 2024-03-12
Payer: MEDICARE

## 2024-03-12 VITALS
HEART RATE: 71 BPM | OXYGEN SATURATION: 92 % | BODY MASS INDEX: 30.39 KG/M2 | DIASTOLIC BLOOD PRESSURE: 59 MMHG | TEMPERATURE: 96.8 F | SYSTOLIC BLOOD PRESSURE: 115 MMHG | RESPIRATION RATE: 18 BRPM | HEIGHT: 68 IN | WEIGHT: 200.51 LBS

## 2024-03-12 DIAGNOSIS — I25.10 CORONARY ARTERY DISEASE DUE TO LIPID RICH PLAQUE: ICD-10-CM

## 2024-03-12 DIAGNOSIS — I25.83 CORONARY ARTERY DISEASE DUE TO LIPID RICH PLAQUE: ICD-10-CM

## 2024-03-12 DIAGNOSIS — Z96.651 STATUS POST RIGHT KNEE REPLACEMENT: ICD-10-CM

## 2024-03-12 LAB
INR PPP: 1.08 (ref 0.87–1.13)
PROTHROMBIN TIME: 14.5 SEC (ref 12–14.6)

## 2024-03-12 PROCEDURE — 160048 HCHG OR STATISTICAL LEVEL 1-5: Performed by: ORTHOPAEDIC SURGERY

## 2024-03-12 PROCEDURE — 160041 HCHG SURGERY MINUTES - EA ADDL 1 MIN LEVEL 4: Performed by: ORTHOPAEDIC SURGERY

## 2024-03-12 PROCEDURE — 700101 HCHG RX REV CODE 250: Performed by: ORTHOPAEDIC SURGERY

## 2024-03-12 PROCEDURE — 97110 THERAPEUTIC EXERCISES: CPT

## 2024-03-12 PROCEDURE — 85610 PROTHROMBIN TIME: CPT

## 2024-03-12 PROCEDURE — A9270 NON-COVERED ITEM OR SERVICE: HCPCS | Performed by: ORTHOPAEDIC SURGERY

## 2024-03-12 PROCEDURE — 64447 NJX AA&/STRD FEMORAL NRV IMG: CPT | Performed by: ORTHOPAEDIC SURGERY

## 2024-03-12 PROCEDURE — 160046 HCHG PACU - 1ST 60 MINS PHASE II: Performed by: ORTHOPAEDIC SURGERY

## 2024-03-12 PROCEDURE — C1713 ANCHOR/SCREW BN/BN,TIS/BN: HCPCS | Performed by: ORTHOPAEDIC SURGERY

## 2024-03-12 PROCEDURE — 160025 RECOVERY II MINUTES (STATS): Performed by: ORTHOPAEDIC SURGERY

## 2024-03-12 PROCEDURE — 97116 GAIT TRAINING THERAPY: CPT

## 2024-03-12 PROCEDURE — 700105 HCHG RX REV CODE 258: Performed by: ORTHOPAEDIC SURGERY

## 2024-03-12 PROCEDURE — 27447 TOTAL KNEE ARTHROPLASTY: CPT | Mod: ASROC,RT | Performed by: PHYSICIAN ASSISTANT

## 2024-03-12 PROCEDURE — 160035 HCHG PACU - 1ST 60 MINS PHASE I: Performed by: ORTHOPAEDIC SURGERY

## 2024-03-12 PROCEDURE — 700102 HCHG RX REV CODE 250 W/ 637 OVERRIDE(OP): Performed by: ORTHOPAEDIC SURGERY

## 2024-03-12 PROCEDURE — 97161 PT EVAL LOW COMPLEX 20 MIN: CPT

## 2024-03-12 PROCEDURE — 160036 HCHG PACU - EA ADDL 30 MINS PHASE I: Performed by: ORTHOPAEDIC SURGERY

## 2024-03-12 PROCEDURE — 502000 HCHG MISC OR IMPLANTS RC 0278: Performed by: ORTHOPAEDIC SURGERY

## 2024-03-12 PROCEDURE — 700111 HCHG RX REV CODE 636 W/ 250 OVERRIDE (IP): Performed by: ORTHOPAEDIC SURGERY

## 2024-03-12 PROCEDURE — C1776 JOINT DEVICE (IMPLANTABLE): HCPCS | Performed by: ORTHOPAEDIC SURGERY

## 2024-03-12 PROCEDURE — 160009 HCHG ANES TIME/MIN: Performed by: ORTHOPAEDIC SURGERY

## 2024-03-12 PROCEDURE — 700101 HCHG RX REV CODE 250: Performed by: ANESTHESIOLOGY

## 2024-03-12 PROCEDURE — 36415 COLL VENOUS BLD VENIPUNCTURE: CPT

## 2024-03-12 PROCEDURE — 27447 TOTAL KNEE ARTHROPLASTY: CPT | Mod: RT | Performed by: ORTHOPAEDIC SURGERY

## 2024-03-12 PROCEDURE — 700102 HCHG RX REV CODE 250 W/ 637 OVERRIDE(OP): Performed by: ANESTHESIOLOGY

## 2024-03-12 PROCEDURE — 700111 HCHG RX REV CODE 636 W/ 250 OVERRIDE (IP): Performed by: ANESTHESIOLOGY

## 2024-03-12 PROCEDURE — 160029 HCHG SURGERY MINUTES - 1ST 30 MINS LEVEL 4: Performed by: ORTHOPAEDIC SURGERY

## 2024-03-12 PROCEDURE — 700111 HCHG RX REV CODE 636 W/ 250 OVERRIDE (IP): Mod: JZ | Performed by: ANESTHESIOLOGY

## 2024-03-12 PROCEDURE — 502240 HCHG MISC OR SUPPLY RC 0272: Performed by: ORTHOPAEDIC SURGERY

## 2024-03-12 PROCEDURE — 700111 HCHG RX REV CODE 636 W/ 250 OVERRIDE (IP): Mod: JZ | Performed by: ORTHOPAEDIC SURGERY

## 2024-03-12 PROCEDURE — RXMED WILLOW AMBULATORY MEDICATION CHARGE: Performed by: PHYSICIAN ASSISTANT

## 2024-03-12 PROCEDURE — 160002 HCHG RECOVERY MINUTES (STAT): Performed by: ORTHOPAEDIC SURGERY

## 2024-03-12 PROCEDURE — A9270 NON-COVERED ITEM OR SERVICE: HCPCS | Performed by: ANESTHESIOLOGY

## 2024-03-12 DEVICE — FEMUR TKA OXINIUM CR JOURNEY II NP RIGHT SIZE 6 (1EA): Type: IMPLANTABLE DEVICE | Site: KNEE | Status: FUNCTIONAL

## 2024-03-12 DEVICE — CEMENT BONE RALLY 40 GM RALLY: Type: IMPLANTABLE DEVICE | Site: KNEE | Status: FUNCTIONAL

## 2024-03-12 DEVICE — IMPLANTABLE DEVICE: Type: IMPLANTABLE DEVICE | Site: KNEE | Status: FUNCTIONAL

## 2024-03-12 RX ORDER — HYDROMORPHONE HYDROCHLORIDE 1 MG/ML
0.1 INJECTION, SOLUTION INTRAMUSCULAR; INTRAVENOUS; SUBCUTANEOUS
Status: DISCONTINUED | OUTPATIENT
Start: 2024-03-12 | End: 2024-03-12 | Stop reason: HOSPADM

## 2024-03-12 RX ORDER — HYDROMORPHONE HYDROCHLORIDE 1 MG/ML
0.2 INJECTION, SOLUTION INTRAMUSCULAR; INTRAVENOUS; SUBCUTANEOUS
Status: DISCONTINUED | OUTPATIENT
Start: 2024-03-12 | End: 2024-03-12 | Stop reason: HOSPADM

## 2024-03-12 RX ORDER — OXYCODONE HCL 10 MG/1
10 TABLET, FILM COATED, EXTENDED RELEASE ORAL ONCE
Status: COMPLETED | OUTPATIENT
Start: 2024-03-12 | End: 2024-03-12

## 2024-03-12 RX ORDER — OXYCODONE HYDROCHLORIDE 5 MG/1
5 TABLET ORAL EVERY 4 HOURS PRN
Qty: 42 TABLET | Refills: 0 | Status: SHIPPED | OUTPATIENT
Start: 2024-03-12 | End: 2024-03-19

## 2024-03-12 RX ORDER — ATORVASTATIN CALCIUM 40 MG/1
80 TABLET, FILM COATED ORAL EVERY EVENING
Status: CANCELLED | OUTPATIENT
Start: 2024-03-12

## 2024-03-12 RX ORDER — BUPIVACAINE HYDROCHLORIDE AND EPINEPHRINE 2.5; 5 MG/ML; UG/ML
INJECTION, SOLUTION EPIDURAL; INFILTRATION; INTRACAUDAL; PERINEURAL
Status: DISCONTINUED | OUTPATIENT
Start: 2024-03-12 | End: 2024-03-12 | Stop reason: HOSPADM

## 2024-03-12 RX ORDER — DUTASTERIDE 0.5 MG/1
0.5 CAPSULE, LIQUID FILLED ORAL EVERY EVENING
Status: CANCELLED | OUTPATIENT
Start: 2024-03-12

## 2024-03-12 RX ORDER — DEXTROSE MONOHYDRATE, SODIUM CHLORIDE, AND POTASSIUM CHLORIDE 50; 1.49; 4.5 G/1000ML; G/1000ML; G/1000ML
INJECTION, SOLUTION INTRAVENOUS CONTINUOUS
Status: DISCONTINUED | OUTPATIENT
Start: 2024-03-12 | End: 2024-03-12 | Stop reason: HOSPADM

## 2024-03-12 RX ORDER — DIVALPROEX SODIUM 125 MG/1
125 TABLET, DELAYED RELEASE ORAL DAILY
Status: CANCELLED | OUTPATIENT
Start: 2024-03-12

## 2024-03-12 RX ORDER — LEVOTHYROXINE SODIUM 0.07 MG/1
75 TABLET ORAL
Status: CANCELLED | OUTPATIENT
Start: 2024-03-12

## 2024-03-12 RX ORDER — MIDAZOLAM HYDROCHLORIDE 1 MG/ML
1 INJECTION INTRAMUSCULAR; INTRAVENOUS
Status: DISCONTINUED | OUTPATIENT
Start: 2024-03-12 | End: 2024-03-12 | Stop reason: HOSPADM

## 2024-03-12 RX ORDER — ENOXAPARIN SODIUM 100 MG/ML
40 INJECTION SUBCUTANEOUS DAILY
Status: CANCELLED | OUTPATIENT
Start: 2024-03-13

## 2024-03-12 RX ORDER — ONDANSETRON 2 MG/ML
4 INJECTION INTRAMUSCULAR; INTRAVENOUS
Status: DISCONTINUED | OUTPATIENT
Start: 2024-03-12 | End: 2024-03-12 | Stop reason: HOSPADM

## 2024-03-12 RX ORDER — SCOLOPAMINE TRANSDERMAL SYSTEM 1 MG/1
1 PATCH, EXTENDED RELEASE TRANSDERMAL
Status: CANCELLED | OUTPATIENT
Start: 2024-03-12

## 2024-03-12 RX ORDER — MAGNESIUM HYDROXIDE 1200 MG/15ML
LIQUID ORAL
Status: COMPLETED | OUTPATIENT
Start: 2024-03-12 | End: 2024-03-12

## 2024-03-12 RX ORDER — HYDROMORPHONE HYDROCHLORIDE 1 MG/ML
0.5 INJECTION, SOLUTION INTRAMUSCULAR; INTRAVENOUS; SUBCUTANEOUS
Status: CANCELLED | OUTPATIENT
Start: 2024-03-12

## 2024-03-12 RX ORDER — HALOPERIDOL 5 MG/ML
1 INJECTION INTRAMUSCULAR EVERY 6 HOURS PRN
Status: CANCELLED | OUTPATIENT
Start: 2024-03-12

## 2024-03-12 RX ORDER — LIDOCAINE HYDROCHLORIDE 20 MG/ML
INJECTION, SOLUTION EPIDURAL; INFILTRATION; INTRACAUDAL; PERINEURAL PRN
Status: DISCONTINUED | OUTPATIENT
Start: 2024-03-12 | End: 2024-03-12 | Stop reason: SURG

## 2024-03-12 RX ORDER — OMEPRAZOLE 20 MG/1
20 CAPSULE, DELAYED RELEASE ORAL 2 TIMES DAILY PRN
Status: CANCELLED | OUTPATIENT
Start: 2024-03-12 | End: 2024-03-22

## 2024-03-12 RX ORDER — ACETAMINOPHEN 500 MG
1000 TABLET ORAL ONCE
Status: COMPLETED | OUTPATIENT
Start: 2024-03-12 | End: 2024-03-12

## 2024-03-12 RX ORDER — POLYETHYLENE GLYCOL 3350 17 G/17G
1 POWDER, FOR SOLUTION ORAL 2 TIMES DAILY PRN
Status: CANCELLED | OUTPATIENT
Start: 2024-03-12

## 2024-03-12 RX ORDER — DIPHENHYDRAMINE HYDROCHLORIDE 50 MG/ML
25 INJECTION INTRAMUSCULAR; INTRAVENOUS EVERY 6 HOURS PRN
Status: CANCELLED | OUTPATIENT
Start: 2024-03-12

## 2024-03-12 RX ORDER — AMOXICILLIN 250 MG
1 CAPSULE ORAL NIGHTLY
Status: CANCELLED | OUTPATIENT
Start: 2024-03-12

## 2024-03-12 RX ORDER — IPRATROPIUM BROMIDE AND ALBUTEROL SULFATE 2.5; .5 MG/3ML; MG/3ML
3 SOLUTION RESPIRATORY (INHALATION)
Status: DISCONTINUED | OUTPATIENT
Start: 2024-03-12 | End: 2024-03-12 | Stop reason: HOSPADM

## 2024-03-12 RX ORDER — ONDANSETRON 2 MG/ML
4 INJECTION INTRAMUSCULAR; INTRAVENOUS EVERY 4 HOURS PRN
Status: CANCELLED | OUTPATIENT
Start: 2024-03-12

## 2024-03-12 RX ORDER — ENOXAPARIN SODIUM 100 MG/ML
40 INJECTION SUBCUTANEOUS DAILY
Qty: 5 EACH | Refills: 0 | Status: SHIPPED | OUTPATIENT
Start: 2024-03-12

## 2024-03-12 RX ORDER — OXYCODONE HCL 5 MG/5 ML
10 SOLUTION, ORAL ORAL
Status: COMPLETED | OUTPATIENT
Start: 2024-03-12 | End: 2024-03-12

## 2024-03-12 RX ORDER — SODIUM CHLORIDE, SODIUM LACTATE, POTASSIUM CHLORIDE, CALCIUM CHLORIDE 600; 310; 30; 20 MG/100ML; MG/100ML; MG/100ML; MG/100ML
INJECTION, SOLUTION INTRAVENOUS CONTINUOUS
Status: DISCONTINUED | OUTPATIENT
Start: 2024-03-12 | End: 2024-03-12 | Stop reason: HOSPADM

## 2024-03-12 RX ORDER — DIPHENHYDRAMINE HCL 25 MG
25 TABLET ORAL EVERY 6 HOURS PRN
Status: CANCELLED | OUTPATIENT
Start: 2024-03-12

## 2024-03-12 RX ORDER — OXYCODONE HYDROCHLORIDE 10 MG/1
10 TABLET ORAL
Status: CANCELLED | OUTPATIENT
Start: 2024-03-12

## 2024-03-12 RX ORDER — SODIUM CHLORIDE, SODIUM LACTATE, POTASSIUM CHLORIDE, CALCIUM CHLORIDE 600; 310; 30; 20 MG/100ML; MG/100ML; MG/100ML; MG/100ML
INJECTION, SOLUTION INTRAVENOUS CONTINUOUS
Status: ACTIVE | OUTPATIENT
Start: 2024-03-12 | End: 2024-03-12

## 2024-03-12 RX ORDER — HYDROMORPHONE HYDROCHLORIDE 1 MG/ML
0.4 INJECTION, SOLUTION INTRAMUSCULAR; INTRAVENOUS; SUBCUTANEOUS
Status: DISCONTINUED | OUTPATIENT
Start: 2024-03-12 | End: 2024-03-12 | Stop reason: HOSPADM

## 2024-03-12 RX ORDER — MEPERIDINE HYDROCHLORIDE 25 MG/ML
12.5 INJECTION INTRAMUSCULAR; INTRAVENOUS; SUBCUTANEOUS
Status: DISCONTINUED | OUTPATIENT
Start: 2024-03-12 | End: 2024-03-12 | Stop reason: HOSPADM

## 2024-03-12 RX ORDER — OXYCODONE HYDROCHLORIDE 5 MG/1
5 TABLET ORAL
Status: CANCELLED | OUTPATIENT
Start: 2024-03-12

## 2024-03-12 RX ORDER — TRAMADOL HYDROCHLORIDE 50 MG/1
50 TABLET ORAL EVERY 4 HOURS PRN
Status: CANCELLED | OUTPATIENT
Start: 2024-03-12

## 2024-03-12 RX ORDER — ENEMA 19; 7 G/133ML; G/133ML
1 ENEMA RECTAL
Status: CANCELLED | OUTPATIENT
Start: 2024-03-12

## 2024-03-12 RX ORDER — DEXAMETHASONE SODIUM PHOSPHATE 4 MG/ML
4 INJECTION, SOLUTION INTRA-ARTICULAR; INTRALESIONAL; INTRAMUSCULAR; INTRAVENOUS; SOFT TISSUE
Status: CANCELLED | OUTPATIENT
Start: 2024-03-12

## 2024-03-12 RX ORDER — DEXAMETHASONE SODIUM PHOSPHATE 4 MG/ML
INJECTION, SOLUTION INTRA-ARTICULAR; INTRALESIONAL; INTRAMUSCULAR; INTRAVENOUS; SOFT TISSUE PRN
Status: DISCONTINUED | OUTPATIENT
Start: 2024-03-12 | End: 2024-03-12 | Stop reason: SURG

## 2024-03-12 RX ORDER — LABETALOL HYDROCHLORIDE 5 MG/ML
5 INJECTION, SOLUTION INTRAVENOUS
Status: DISCONTINUED | OUTPATIENT
Start: 2024-03-12 | End: 2024-03-12 | Stop reason: HOSPADM

## 2024-03-12 RX ORDER — CEFAZOLIN SODIUM 1 G/3ML
2 INJECTION, POWDER, FOR SOLUTION INTRAMUSCULAR; INTRAVENOUS ONCE
Status: COMPLETED | OUTPATIENT
Start: 2024-03-12 | End: 2024-03-12

## 2024-03-12 RX ORDER — WARFARIN SODIUM 7.5 MG/1
7.5 TABLET ORAL DAILY
Status: CANCELLED | OUTPATIENT
Start: 2024-03-12

## 2024-03-12 RX ORDER — ACETAMINOPHEN 650 MG
TABLET, EXTENDED RELEASE ORAL
Status: DISCONTINUED | OUTPATIENT
Start: 2024-03-12 | End: 2024-03-12 | Stop reason: HOSPADM

## 2024-03-12 RX ORDER — DOCUSATE SODIUM 100 MG/1
100 CAPSULE, LIQUID FILLED ORAL 2 TIMES DAILY
Status: CANCELLED | OUTPATIENT
Start: 2024-03-12

## 2024-03-12 RX ORDER — HALOPERIDOL 5 MG/ML
1 INJECTION INTRAMUSCULAR
Status: DISCONTINUED | OUTPATIENT
Start: 2024-03-12 | End: 2024-03-12 | Stop reason: HOSPADM

## 2024-03-12 RX ORDER — BISACODYL 10 MG
10 SUPPOSITORY, RECTAL RECTAL
Status: CANCELLED | OUTPATIENT
Start: 2024-03-12

## 2024-03-12 RX ORDER — DIVALPROEX SODIUM 250 MG/1
250 TABLET, DELAYED RELEASE ORAL NIGHTLY
Status: CANCELLED | OUTPATIENT
Start: 2024-03-12

## 2024-03-12 RX ORDER — LISINOPRIL 5 MG/1
5 TABLET ORAL
Status: CANCELLED | OUTPATIENT
Start: 2024-03-12

## 2024-03-12 RX ORDER — ROPIVACAINE HYDROCHLORIDE 5 MG/ML
INJECTION, SOLUTION EPIDURAL; INFILTRATION; PERINEURAL
Status: COMPLETED | OUTPATIENT
Start: 2024-03-12 | End: 2024-03-12

## 2024-03-12 RX ORDER — ONDANSETRON 2 MG/ML
INJECTION INTRAMUSCULAR; INTRAVENOUS PRN
Status: DISCONTINUED | OUTPATIENT
Start: 2024-03-12 | End: 2024-03-12 | Stop reason: SURG

## 2024-03-12 RX ORDER — OXYCODONE HCL 5 MG/5 ML
5 SOLUTION, ORAL ORAL
Status: COMPLETED | OUTPATIENT
Start: 2024-03-12 | End: 2024-03-12

## 2024-03-12 RX ORDER — TRAZODONE HYDROCHLORIDE 50 MG/1
100 TABLET ORAL NIGHTLY
Status: CANCELLED | OUTPATIENT
Start: 2024-03-12

## 2024-03-12 RX ORDER — AMOXICILLIN 250 MG
1 CAPSULE ORAL
Status: CANCELLED | OUTPATIENT
Start: 2024-03-12

## 2024-03-12 RX ORDER — HYDRALAZINE HYDROCHLORIDE 20 MG/ML
5 INJECTION INTRAMUSCULAR; INTRAVENOUS
Status: DISCONTINUED | OUTPATIENT
Start: 2024-03-12 | End: 2024-03-12 | Stop reason: HOSPADM

## 2024-03-12 RX ORDER — FUROSEMIDE 20 MG/1
20 TABLET ORAL DAILY
Status: CANCELLED | OUTPATIENT
Start: 2024-03-12

## 2024-03-12 RX ORDER — SODIUM CHLORIDE 9 MG/ML
INJECTION, SOLUTION INTRAMUSCULAR; INTRAVENOUS; SUBCUTANEOUS
Status: DISCONTINUED | OUTPATIENT
Start: 2024-03-12 | End: 2024-03-12 | Stop reason: HOSPADM

## 2024-03-12 RX ORDER — KETOROLAC TROMETHAMINE 30 MG/ML
INJECTION, SOLUTION INTRAMUSCULAR; INTRAVENOUS
Status: DISCONTINUED | OUTPATIENT
Start: 2024-03-12 | End: 2024-03-12 | Stop reason: HOSPADM

## 2024-03-12 RX ORDER — TRANEXAMIC ACID 100 MG/ML
1000 INJECTION, SOLUTION INTRAVENOUS ONCE
Status: COMPLETED | OUTPATIENT
Start: 2024-03-12 | End: 2024-03-12

## 2024-03-12 RX ADMIN — CEFAZOLIN 2 G: 1 INJECTION, POWDER, FOR SOLUTION INTRAMUSCULAR; INTRAVENOUS at 07:04

## 2024-03-12 RX ADMIN — ONDANSETRON 4 MG: 2 INJECTION INTRAMUSCULAR; INTRAVENOUS at 07:01

## 2024-03-12 RX ADMIN — LIDOCAINE HYDROCHLORIDE 100 MG: 20 INJECTION, SOLUTION EPIDURAL; INFILTRATION; INTRACAUDAL at 07:01

## 2024-03-12 RX ADMIN — DEXAMETHASONE SODIUM PHOSPHATE 8 MG: 4 INJECTION INTRA-ARTICULAR; INTRALESIONAL; INTRAMUSCULAR; INTRAVENOUS; SOFT TISSUE at 07:01

## 2024-03-12 RX ADMIN — PROPOFOL 50 MG: 10 INJECTION, EMULSION INTRAVENOUS at 07:31

## 2024-03-12 RX ADMIN — PROPOFOL 130 MG: 10 INJECTION, EMULSION INTRAVENOUS at 07:01

## 2024-03-12 RX ADMIN — FENTANYL CITRATE 50 MCG: 50 INJECTION, SOLUTION INTRAMUSCULAR; INTRAVENOUS at 07:15

## 2024-03-12 RX ADMIN — SODIUM CHLORIDE, POTASSIUM CHLORIDE, SODIUM LACTATE AND CALCIUM CHLORIDE: 600; 310; 30; 20 INJECTION, SOLUTION INTRAVENOUS at 06:44

## 2024-03-12 RX ADMIN — ACETAMINOPHEN 1000 MG: 500 TABLET ORAL at 06:50

## 2024-03-12 RX ADMIN — OXYCODONE HYDROCHLORIDE 10 MG: 10 TABLET, FILM COATED, EXTENDED RELEASE ORAL at 06:50

## 2024-03-12 RX ADMIN — FENTANYL CITRATE 50 MCG: 50 INJECTION, SOLUTION INTRAMUSCULAR; INTRAVENOUS at 08:23

## 2024-03-12 RX ADMIN — TRANEXAMIC ACID 1000 MG: 100 INJECTION, SOLUTION INTRAVENOUS at 07:03

## 2024-03-12 RX ADMIN — FENTANYL CITRATE 50 MCG: 50 INJECTION, SOLUTION INTRAMUSCULAR; INTRAVENOUS at 08:17

## 2024-03-12 RX ADMIN — FENTANYL CITRATE 50 MCG: 50 INJECTION, SOLUTION INTRAMUSCULAR; INTRAVENOUS at 08:40

## 2024-03-12 RX ADMIN — FENTANYL CITRATE 50 MCG: 50 INJECTION, SOLUTION INTRAMUSCULAR; INTRAVENOUS at 06:49

## 2024-03-12 RX ADMIN — OXYCODONE HYDROCHLORIDE 10 MG: 5 SOLUTION ORAL at 08:39

## 2024-03-12 RX ADMIN — ROPIVACAINE HYDROCHLORIDE 25 ML: 5 INJECTION EPIDURAL; INFILTRATION; PERINEURAL at 06:50

## 2024-03-12 RX ADMIN — HYDRALAZINE HYDROCHLORIDE 5 MG: 20 INJECTION, SOLUTION INTRAMUSCULAR; INTRAVENOUS at 08:27

## 2024-03-12 ASSESSMENT — PAIN DESCRIPTION - PAIN TYPE
TYPE: SURGICAL PAIN
TYPE: CHRONIC PAIN
TYPE: SURGICAL PAIN

## 2024-03-12 ASSESSMENT — COGNITIVE AND FUNCTIONAL STATUS - GENERAL
STANDING UP FROM CHAIR USING ARMS: A LITTLE
SUGGESTED CMS G CODE MODIFIER MOBILITY: CJ
MOBILITY SCORE: 21
WALKING IN HOSPITAL ROOM: A LITTLE
CLIMB 3 TO 5 STEPS WITH RAILING: A LITTLE

## 2024-03-12 ASSESSMENT — GAIT ASSESSMENTS
DEVIATION: ANTALGIC
DISTANCE (FEET): 150
ASSISTIVE DEVICE: FRONT WHEEL WALKER
GAIT LEVEL OF ASSIST: SUPERVISED

## 2024-03-12 ASSESSMENT — FIBROSIS 4 INDEX: FIB4 SCORE: 0.91

## 2024-03-12 ASSESSMENT — PAIN SCALES - GENERAL: PAIN_LEVEL: 0

## 2024-03-12 NOTE — TELEPHONE ENCOUNTER
[facility] MED    Caller: Rene Chan    Topic/issue: MEDICAL ADVICE    Rene states that after his surgery today he was instructed to continue his anticoag med. However he is concerned because he has noticed some pretty significant bleeding and he would like for our office to be aware that he will not be taking this until he gets clarification that it would alright to proceed with this amount of bleeding. Please advise.    Thank you,  Jesse S.    Callback Number: [phone]    -------------------------------------------------------------------------------------------------------------------------------    LVM with him to please contact his surgeon and make sure when it is safe to restart warfarin.     Sebastian Novak, PharmD, MS, BCACP, [facility]  Phone: [phone]  Fax: [phone]  On call: [phone]  General scheduling/information [phone]  For emergencies please dial 911  Please do not use [employer] for urgent matters, call the phone numbers listed above.  [employer] messages are answered Monday through Friday.     This note was created using voice recognition software (Dragon). The accuracy of the dictation is limited by the abilities of the software. I have reviewed the note prior to signing, however some errors in grammar and context are still possible. If you have any questions related to this note please do not hesitate to contact our office.

## 2024-03-12 NOTE — OP REPORT
DIAGNOSIS: right knee osteoarthritis.    PROCEDURE: Total knee arthroplasty, right side.    ANESTHESIA: General.    COMPLICATIONS: None.    SURGEON: Arturo Boyle MD    ASSISTANT: Aiyana Moore    INDICATIONS: This is a patient with severe pain secondary to osteoarthritis having failed conservative treatments.    DESCRIPTION OF PROCEDURE: Patient was identified in the preop area, site was   marked, taken back to the operating room and underwent general anesthesia.   The right lower extremity was prepped and draped in sterile manner.   Preoperative timeout was held and antibiotics were given. Incision was made   over the anterior knee, soft tissue was dissected down to the fascia. The   fascia was split in medial parapatellar approach. The findings included severe osteoarthritis.  The step drill was placed,   cut was made at 6 degrees and then a femoral cut guide was placed. All cuts   were made for the 6. The tibia was cut and sized to the 5. The   patella was cut and sized to a 35. All the trials were removed and then the   6 CR J2 S&N femur,  5 tibia and 35 oval patellar button were all cemented into  place. Final trialing showed that a 10 poly provided stability throughout   the entire arc of motion and 10 poly was placed. The wound was soaked with   dilute Betadine solution and was injected with Marcaine. Vicryl was used for   the fascia, Monocryl, soft tissue, skin and Dermabond for the final skin   layer. Patient was woken up, taken back to PACU, will be weightbear as   tolerated.    A walker was provided to assist with ambulation following the above surgery. The patient understands the importance of using the device to safely ambulate until they regain strength and stability.

## 2024-03-12 NOTE — ANESTHESIA PREPROCEDURE EVALUATION
Case: [id number] Date/Time: 03/12/24 0645    Procedure: RIGHT TOTAL KNEE ARTHROPLASTY    Diagnosis: Osteoarthritis of right knee [M17.11]    Pre-op diagnosis: Osteoarthritis of right knee [M17.11]    Location: [place] [address] / SURGERY [facility]    Surgeons: Arturo Boyle M.D.            Denies angina, dyspnea, symptomatic GERD.     No new concerning health changes/symptoms.     No issues with prior anesthetics.     Hx TIA, CABG, CAD. Mild aortic stenosis per echo, denies any symptoms.     NPO.     Relevant Problems   NEURO   (positive) TIA (transient ischemic attack)      CARDIAC   (positive) Atherosclerosis of aorta (HCC)   (positive) Bilateral carotid artery stenosis   (positive) Chronic atrial fibrillation (HCC)   (positive) Coronary artery disease due to lipid rich plaque   (positive) Leg DVT (deep venous thromboembolism), chronic, left (HCC)   (positive) Mild aortic stenosis   (positive) Primary hypertension   (positive) Vascular insufficiency      ENDO   (positive) Acquired hypothyroidism      Other   (positive) Gout of foot       Physical Exam    Airway   Mallampati: II  TM distance: >3 FB  Neck ROM: full       Cardiovascular - normal exam  Rhythm: regular  Rate: normal  (-) murmur     Dental - normal exam           Pulmonary - normal exam  Breath sounds clear to auscultation     Abdominal    Neurological - normal exam                   Anesthesia Plan    ASA 3   ASA physical status 3 criteria: CAD/stents (> 3 months) and CVA or TIA - history (> 3 months)    Plan - general and peripheral nerve block     Peripheral nerve block will be post-op pain control  Airway plan will be LMA          Induction: intravenous    Postoperative Plan: Postoperative administration of opioids is intended.    Pertinent diagnostic labs and testing reviewed    Informed Consent:    Anesthetic plan and risks discussed with patient.    Use of blood products discussed with: patient whom consented to blood products.

## 2024-03-12 NOTE — DISCHARGE INSTRUCTIONS
If any questions arise, call your provider.  If your provider is not available, please feel free to call the Surgical Center at [phone].    MEDICATIONS: Resume taking daily medication.  Take prescribed pain medication with food.  If no medication is prescribed, you may take non-aspirin pain medication if needed.  PAIN MEDICATION CAN BE VERY CONSTIPATING.  Take a stool softener or laxative such as senokot, pericolace, or milk of magnesia if needed.    Last pain medication given at 8:39am Oxycodone 10mg.    What to Expect Post Anesthesia    Rest and take it easy for the first 24 hours.  A responsible adult is recommended to remain with you during that time.  It is normal to feel sleepy.  We encourage you to not do anything that requires balance, judgment or coordination.    FOR 24 HOURS DO NOT:  Drive, operate machinery or run household appliances.  Drink beer or alcoholic beverages.  Make important decisions or sign legal documents.    To avoid nausea, slowly advance diet as tolerated, avoiding spicy or greasy foods for the first day.  Add more substantial food to your diet according to your provider's instructions.  Babies can be fed formula or breast milk as soon as they are hungry.  INCREASE FLUIDS AND FIBER TO AVOID CONSTIPATION.    MILD FLU-LIKE SYMPTOMS ARE NORMAL.  YOU MAY EXPERIENCE GENERALIZED MUSCLE ACHES, THROAT IRRITATION, HEADACHE AND/OR SOME NAUSEA.    Activity:   The first week after your knee joint replacement is a time to recover from the surgery. We expect light exercise to keep you active and mobile.    RIGHT LEG:   WEIGHT BEARING AS TOLERATED  When you stand or walk, place only as much weight as feels comfortable on your injured leg.  Let pain be your guide. If you feel pain, place less weight on the leg.     ASSISTED DEVICES: You are being discharged with the following special equipment:  Walker    Dressing and Wound Care:    Keep the dressing clean and dry.     Shower / Bathing:    OK to  shower, keep dressing in place. Pat dressing dry, do not rub the incision.  Swimming pools, hot tubs, or baths are to be avoided until after your follow-up and then only if cleared by your surgeon.      Apply Ice Pack to Knee:   Apply ice packs to your knee (15 minutes on the knee, 15 minutes off the knee) for the first week, as you feel necessary to help with the pain and swelling.    SWELLING/BRUISING  Swelling is an expected response to surgery. To reduce swelling, elevate your surgical limb above heart level multiple times per day and apply ice (see Ice instructions). If your swelling becomes excessive, is limiting your ability to move, or becomes worrisome please contact your doctor's office.    Bruising often does not appear until after you arrive home and can be quite dramatic-appearing purple, black, or green. Bruising is typically not concerning and will subside without any treatment.     FOLLOW-UP  Make sure that you have an appointment 7-14 days following surgery. Your procedure/rehabilitation will be discussed and physical therapy may be prescribed at that time.

## 2024-03-12 NOTE — DISCHARGE INSTR - OTHER INFO
WBAT.  Leave dressing in place until follow-up in 2 weeks.  Ok to get bandage wet in shower.   Start PT in 1 week-call to make appt.

## 2024-03-12 NOTE — OR NURSING
0945: Report received from BRANDYN Lake. Patient awake, respirations are spontaneous and unlabored. VSS on room air. Dressing is CDI. Right ankle elevated. Cold pack in place. CMS: 2+ pedal  pulses, brisk cap refill present, warm to touch. Patient denies nausea, states pain is tolerable.     1000: Patient able to complete quad set assessment. Patient able to stand and march in place with help of this RN, CNA, and FWW. Meets criteria to transfer to Stage 2. Report to BRANDYN Rosales.

## 2024-03-12 NOTE — THERAPY
Physical Therapy   Initial Evaluation     Patient Name: Rene Chan  Age:  73 y.o., Sex:  male  Medical Record #: [id number]  Today's Date: 3/12/2024     Precautions  Precautions: Weight Bearing As Tolerated Right Lower Extremity    Assessment  Patient is 73 y.o. male who was seen s/p right TKA with WBAT orders for right lower extremity. Pt was agreeable to therapy evaluation and presented with safe upright functional mobility with AD use after therapy session. Pt was provided with education on elevation, icing, positioning, and supine/seated therapeutic exercises. Pt was able to return demo safe gait mechanics with use of AD on flat level surfaces with appropriate heel to toe gait mechanics. Pt was able to return demo safe mechanics in order to navigate stairs with use of FWW. Pt was able to demonstrate safe use of AD during transfers/transitions and general locomotion with no job LOB. Pt has no acute skilled PT needs at this time, anticipate pt to d/c home once medically clear with recs for FWW use and OP therapy services.     The pt was provided with the following skilled therapy txt: Pt was provided with VC, demo, and facilitation during gait training in order to return demo terminal knee extension and appropriate heel strike/flexion of knee in order to return demo appropriate heel to toe gait mechanics. Pt was provided with demo and VC to go up/down steps with safe mechanics with use of AD. Pt was provided with VC and TC for appropriate quad contraction and mechanics during supine/seated therapeutic exercises. Pt was provided with education on intensity, frequency, and duration of exercises.    Plan    Physical Therapy Initial Treatment Plan   Duration: (P) Evaluation only    DC Equipment Recommendations: (P) Front-Wheel Walker  Discharge Recommendations: (P) Recommend outpatient physical therapy services to address higher level deficits     Objective       03/12/24 1033   Initial Contact Note    Initial  Contact Note Order Received and Verified, Evaluation Only - Patient Does Not Require Further Acute Physical Therapy at this Time.  However, May Benefit from Post Acute Therapy for Higher Level Functional Deficits.   Precautions   Precautions Weight Bearing As Tolerated Right Lower Extremity   Pain 0 - 10 Group   Location Knee   Location Orientation Right   Description Aching   Therapist Pain Assessment Prior to Activity;During Activity;Post Activity;Nurse Notified;3   Prior Living Situation   Prior Services None   Housing / Facility 1 Story House   Steps Into Home 2   Steps In Home 0   Equipment Owned Front-Wheel Walker   Lives with - Patient's Self Care Capacity Spouse   Prior Level of Functional Mobility   Bed Mobility Independent   Transfer Status Independent   Ambulation Independent   Ambulation Distance   (community)   Assistive Devices Used None   Stairs Independent   History of Falls   History of Falls No   Cognition    Cognition / Consciousness WDL   Level of Consciousness Alert   Comments pleasant/cooperative   Passive ROM Lower Body   Passive ROM Lower Body X   Comments limited due to pain, able to demo 0-90 deg of R knee ROM   Active ROM Lower Body    Active ROM Lower Body  X   Comments same as above   Strength Lower Body   Lower Body Strength  X   Comments limited due to pain, able to demo functional strength for B LE   Sensation Lower Body   Lower Extremity Sensation   WDL   Lower Body Muscle Tone   Lower Body Muscle Tone  WDL   Neurological Concerns   Neurological Concerns No   Coordination Upper Body   Coordination WDL   Coordination Lower Body    Coordination Lower Body  WDL   Balance Assessment   Sitting Balance (Static) Good   Sitting Balance (Dynamic) Fair +   Standing Balance (Static) Good   Standing Balance (Dynamic) Fair +   Weight Shift Sitting Good   Weight Shift Standing Fair   Comments w/fww use   Bed Mobility    Comments found C   Gait Analysis   Gait Level Of Assist Supervised    Assistive Device Front Wheel Walker   Distance (Feet) 150   # of Times Distance was Traveled 1   Deviation Antalgic   # of Stairs Climbed 2   Level of Assist with Stairs Supervised   Weight Bearing Status WBAT L LE   Functional Mobility   Sit to Stand Supervised   Bed, Chair, Wheelchair Transfer Supervised   Transfer Method Stand Step   Mobility w/fww use   6 Clicks Assessment - How much HELP from from another person do you currently need... (If the patient hasn't done an activity recently, how much help from another person do you think he/she would need if he/she tried?)   Turning from your back to your side while in a flat bed without using bedrails? 4   Moving from lying on your back to sitting on the side of a flat bed without using bedrails? 4   Moving to and from a bed to a chair (including a wheelchair)? 4   Standing up from a chair using your arms (e.g., wheelchair, or bedside chair)? 3   Walking in hospital room? 3   Climbing 3-5 steps with a railing? 3   6 clicks Mobility Score 21   Activity Tolerance   Sitting in Chair functional   Sitting Edge of Bed NT   Standing 10 mins   Comments no adverse events noted   Edema / Skin Assessment   Edema / Skin  Not Assessed   Education Group   Education Provided Role of Physical Therapist   Role of Physical Therapist Patient Response Patient;Acceptance;Explanation;Demonstration;Verbal Demonstration;Action Demonstration   Physical Therapy Initial Treatment Plan    Duration Evaluation only   Anticipated Discharge Equipment and Recommendations   DC Equipment Recommendations Front-Wheel Walker   Discharge Recommendations Recommend outpatient physical therapy services to address higher level deficits   Interdisciplinary Plan of Care Collaboration   IDT Collaboration with  Nursing   Patient Position at End of Therapy Seated;Tray Table within Reach;Call Light within Reach;Phone within Reach;Family / Friend in Room   Collaboration Comments aware of visit and recs   Session  Information   Date / Session Number  3/12 eval only   Priority 0

## 2024-03-12 NOTE — OR NURSING
0570 PT TO PRE OP TO ASSUME CARE    4425 Patient allergies and NPO status verified, home medication reconciliation completed and belongings secured. Patient verbalizes understanding of pain scale, expected course of stay and plan of care. Surgical site verified with patient. IV access established. Sequentials placed on L Leg

## 2024-03-12 NOTE — OR NURSING
1006: Patient arrived to phase II from PACU 1 via gurney. Report received from RN. Respirations are spontaneous and unlabored. Dressing is CDI. VSS on RA. RLE; pedal pulse is 2+, cap refill less than 3 seconds, warm.    1010: Report to Enrique PEREZ.    1026: Report from Enrique PEREZ. Family and PT at bedside.     1032: Patient cleared by PT.     1040: Patient education completed, patient and family deny further questions.     1042: Patient ambulatory to bathroom.     1049: Meds 2 beds delivered.    1058: Patient wheeled to vehicle by CNA at this time. Dc'd to care of family post uneventful stay in PACU 2.

## 2024-03-12 NOTE — LETTER
July 5, 2023    Patient Name: Rene Chan  Surgeon Name: Arturo Boyle M.D.  Surgery [facility] [facility]  Surgery Date: 10/24/2023    The time of your surgery is not final and may change up to and until the day of your surgery. You will be contacted 24-48 hours prior to your surgery date with your check-in and surgery time.    If you will not be at one of the below numbers please call the surgery scheduler at [phone]  Preferred Phone: [phone]    BEFORE YOUR SURGERY   Pre Registration and/or Lab Work must be done within and no earlier than 28 days prior to your surgery date. Please call [facility] at [phone] for an appointment as soon as possible.    DAY OF YOUR SURGERY  Nothing to eat or drink after midnight     Refrain from smoking any substance after midnight prior to surgery. Smoking may interfere with the anesthetic and frequently produces nausea during the recovery period.    Continue taking all lifesaving medications. Including the morning of your surgery with small sip of water.    Please do NOT take on the day of surgery:  Diuretics: examples- furosemide (Lasix), spironolactone, hydrochlorothiazide  ACE-inhibitors: examples- lisinopril, ramipril, enalapril  “ARBs”: examples- losartan, Olmesartan, valsartan    Please arrive at the hospital/surgery center at the check-in time provided.     An adult will need to bring you and take you home after your surgery.     AFTER YOUR SURGERY  Post op Appointment:   Date: 11/8   Time: 11:15 AM   With: Arturo Boyle MD   Location: [address] [place] [address]        - Therapy- Your first appointment should be 1  week(s) after your surgery. For your convenience we have 4 Physical Therapy locations: [facility], [place], and [facility]. Call our office ASAP to schedule an appointment at [phone] or take the enclosed Therapy Prescription to a  facility of your choice.  - No dental work for 3-6 months after your surgery.  - You must have someone provide transportation post surgery and someone to monitor you for at least 24 hours post-surgery. If you don't have either of these your appointment will be canceled.    TIME OFF WORK  FMLA or Disability forms can be faxed directly to: [phone] or you may drop them off at [address] [place] [address]. Our office charges a $35.00 fee per form. Forms will be completed within 10 business days of drop off and payment received. For the status of your forms you may contact our disability office directly [phone].

## 2024-03-12 NOTE — ANESTHESIA POSTPROCEDURE EVALUATION
Patient: Rene Chan    Procedure Summary       Date: 03/12/24 Room / Location:  OR 06 / SURGERY [facility]    Anesthesia Start: 0657 Anesthesia Stop: 0806    Procedure: RIGHT TOTAL KNEE ARTHROPLASTY (Right: Knee) Diagnosis:       Osteoarthritis of right knee      (Osteoarthritis of right knee [M17.11])    Surgeons: Arturo Boyle M.D. Responsible Provider: Alex Wills M.D.    Anesthesia Type: general, peripheral nerve block ASA Status: 3            Final Anesthesia Type: general, peripheral nerve block  Last vitals  BP   Blood Pressure : (!) 165/63    Temp   36.5 °C (97.7 °F)    Pulse   65   Resp   14    SpO2   99 %      Anesthesia Post Evaluation    Patient location during evaluation: PACU  Patient participation: complete - patient participated  Level of consciousness: awake and alert  Pain score: 0    Airway patency: patent  Anesthetic complications: no  Cardiovascular status: hemodynamically stable  Respiratory status: acceptable  Hydration status: euvolemic    PONV: none          No notable events documented.

## 2024-03-12 NOTE — ANESTHESIA PROCEDURE NOTES
Airway    Date/Time: 3/12/2024 6:49 AM    Performed by: Alex Wills M.D.  Authorized by: Alex Wills M.D.    Location:  OR  Urgency:  Elective  Indications for Airway Management:  Anesthesia      Spontaneous Ventilation: absent    Sedation Level:  Deep  Preoxygenated: Yes    Mask Difficulty Assessment:  0 - not attempted  Final Airway Type:  Supraglottic airway  Final Supraglottic Airway:  Standard LMA    SGA Size:  4  Number of Attempts at Approach:  1

## 2024-03-12 NOTE — ANESTHESIA PROCEDURE NOTES
Peripheral Block    Date/Time: 3/12/2024 6:50 AM    Performed by: Alex Wills M.D.  Authorized by: Alex Wills M.D.    Patient Location:  Pre-op  Start Time:  3/12/2024 6:50 AM  End Time:  3/12/2024 6:53 AM  Reason for Block: at surgeon's request and post-op pain management ONLY    patient identified, IV checked, site marked, risks and benefits discussed, surgical consent, monitors and equipment checked, pre-op evaluation and timeout performed    Patient Position:  Supine  Prep: ChloraPrep    Monitoring:  Heart rate, continuous pulse ox and cardiac monitor  Block Region:  Lower Extremity  Lower Extremity - Block Type:  Selective FEMORAL nerve block at the Adductor Canal    Laterality:  Right  Procedures: ultrasound guided  Image captured, interpreted and electronically stored.  Local Infiltration:  Lidocaine  Strength:  1 %  Dose:  3 ml  Block Type:  Single-shot  Needle Length:  100mm  Needle Gauge:  21 G  Needle Localization:  Ultrasound guidance  Ultrasound picture in chart  Injection Assessment:  Negative aspiration for heme, no paresthesia on injection, incremental injection and local visualized surrounding nerve on ultrasound  Evidence of intravascular injection: No     US Guided Selective Femoral Nerve Block at Adductor Canal:   US probe placed at mid-thigh level on externally rotated leg and femur identified.  Probe directed medially until Sartorius Muscle (SM), Femoral Artery (FA) and Saphenous Nerve (SN) identified in Adductor Canal (AC).  Needle inserted anterolateral to probe in an in plane approach into a subsartorial perivascular perineural position.  After negative aspiration LA injected with ease and visualized spreading within the AC.

## 2024-03-12 NOTE — OR NURSING
0801  To PACU from OR via gurney, respirations spontaneous and non-laboredIcepack applied over c/d/i right knee surgical dressings.    0815 medicated for c/o pain    0823 medicated for continued c/o pain    0827 medicated for elevated bp with hydralazine per dr garcia at bedside    0840 medicated for  continued pain    0855 resting quietly, responds to verbal stim, states pain better/tolerable now    0910  no change    0925 no change    0940 awake, vss, pain tolerable able to lift leg off bed, juice given, dc'd supplemental o2, called wife with update    0945 report to alva cortez rn

## 2024-03-12 NOTE — ANESTHESIA TIME REPORT
Anesthesia Start and Stop Event Times       Date Time Event    3/12/2024 0657 Ready for Procedure     0657 Anesthesia Start     0806 Anesthesia Stop          Responsible Staff  03/12/24      Name Role Begin End    Alex Wills M.D. Anesth 0657 0806          Overtime Reason:  no overtime (within assigned shift)    Comments:

## 2024-03-18 ENCOUNTER — ANTICOAGULATION VISIT (OUTPATIENT)
Dept: VASCULAR LAB | Facility: MEDICAL CENTER | Age: 74
End: 2024-03-18
Attending: INTERNAL MEDICINE
Payer: MEDICARE

## 2024-03-18 VITALS — HEART RATE: 61 BPM | SYSTOLIC BLOOD PRESSURE: 129 MMHG | DIASTOLIC BLOOD PRESSURE: 69 MMHG

## 2024-03-18 DIAGNOSIS — G45.9 TIA (TRANSIENT ISCHEMIC ATTACK): ICD-10-CM

## 2024-03-18 LAB — INR PPP: 1.1 (ref 2–3.5)

## 2024-03-18 PROCEDURE — 99212 OFFICE O/P EST SF 10 MIN: CPT

## 2024-03-18 PROCEDURE — 85610 PROTHROMBIN TIME: CPT

## 2024-03-18 NOTE — PROGRESS NOTES
Anticoagulation Summary  As of 3/18/2024      INR goal:  2.0-3.0   TTR:  58.9% (8.7 y)   INR used for dosin.10 (3/18/2024)   Warfarin maintenance plan:  3.75 mg (7.5 mg x 0.5) every Mon, Wed; 7.5 mg (7.5 mg x 1) all other days   Weekly warfarin total:  45 mg   Plan last modified:  Aleksandra Grijalva PharmD (2023)   Next INR check:  3/25/2024   Priority:  Acute   Target end date:  Indefinite    Indications    DVT (deep venous thrombosis) (HCC) (Resolved) [I82.409]  TIA (transient ischemic attack) [G45.9]  Pulmonary embolism [415.19] (Resolved) [I26.99]                 Anticoagulation Episode Summary       INR check location:  Anticoagulation Clinic    Preferred lab:  [facility]    Send INR reminders to:      Comments:  ASA 81 mg for TIA, CAD hx - [facility] per Dr. Wright 3/7/22          Anticoagulation Care Providers       Provider Role Specialty Phone number    Patricia Damon M.D. Referring Internal Medicine [phone]    Jona QuinnD Responsible      [facility] Responsible  [phone]          Refer to Patient Findings for HPI:    Vitals:    24 1050   BP: 129/69   Pulse: 61       Patient seen in clinic today for follow up on anticoagulation therapy with warfarin (a high risk medication) for hx of DVT, PE and TIA  Verified current warfarin dosing schedule.  Patient has been HOLDing warfarin for procedure (TKA) last Tuesday, then was instructed to HOLD warfarin x 5 more days post surgery for a bit of internal hemorrhage. Patient restarted warfarin last night.     Medications reconciled   Pt is on ASA 81mg as antiplatelet therapy for CAD and must be reviewed again on next follow up with cards.      A/P   INR is SUB-therapeutic today at 1.1.     Warfarin dosing recommendation: Patient will bolus with7.5mg TONIGHT, then resume his current dosing regimen.   Patient will follow up again in 1 week.     Pt educated to contact our clinic with any changes in  medications or s/s of bleeding or thrombosis. Pt is aware to seek immediate medical attention for falls, head injury or deep cuts.    Follow up appointment in 1 week(s).    Next appt: Monday, March 25 @ 10:30am     Lupe Everett PharmD

## 2024-04-12 ENCOUNTER — ANTICOAGULATION VISIT (OUTPATIENT)
Dept: VASCULAR LAB | Facility: MEDICAL CENTER | Age: 74
End: 2024-04-12
Attending: INTERNAL MEDICINE
Payer: MEDICARE

## 2024-04-12 VITALS — DIASTOLIC BLOOD PRESSURE: 65 MMHG | HEART RATE: 76 BPM | SYSTOLIC BLOOD PRESSURE: 106 MMHG

## 2024-04-12 DIAGNOSIS — G45.9 TIA (TRANSIENT ISCHEMIC ATTACK): ICD-10-CM

## 2024-04-12 LAB — INR PPP: 1.8 (ref 2–3.5)

## 2024-04-12 PROCEDURE — 99212 OFFICE O/P EST SF 10 MIN: CPT

## 2024-04-12 PROCEDURE — 85610 PROTHROMBIN TIME: CPT

## 2024-04-12 NOTE — PROGRESS NOTES
Anticoagulation Summary  As of 2024      INR goal:  2.0-3.0   TTR:  58.5% (8.8 y)   INR used for dosin.80 (2024)   Warfarin maintenance plan:  3.75 mg (7.5 mg x 0.5) every Mon, Wed; 7.5 mg (7.5 mg x 1) all other days   Weekly warfarin total:  45 mg   Plan last modified:  Aleksandra Grijalva PharmD (2023)   Next INR check:  2024   Priority:  Acute   Target end date:  Indefinite    Indications    DVT (deep venous thrombosis) (HCC) (Resolved) [I82.409]  TIA (transient ischemic attack) [G45.9]  Pulmonary embolism [415.19] (Resolved) [I26.99]                 Anticoagulation Episode Summary       INR check location:  Anticoagulation Clinic    Preferred lab:  [facility]    Send INR reminders to:      Comments:  ASA 81 mg for TIA, CAD hx - [facility] per Dr. Wright 3/7/22          Anticoagulation Care Providers       Provider Role Specialty Phone number    Patricia Damon M.D. Referring Internal Medicine [phone]    Jona QuinnD Responsible      [facility] Responsible  [phone]                  Refer to Patient Findings for HPI:  Patient Findings       Negatives:  Signs/symptoms of thrombosis, Signs/symptoms of bleeding, Laboratory test error suspected, Change in health, Change in alcohol use, Change in activity, Upcoming invasive procedure, Emergency department visit, Upcoming dental procedure, Missed doses, Extra doses, Change in medications, Change in diet/appetite, Hospital admission, Bruising, Other complaints            Vitals:    24 1349   BP: 106/65   Pulse: 76         Verified current warfarin dosing schedule.    Medications reconciled: No  Pt is on antiplatelet therapy with ASA 81 mg for TIA.      A/P   INR is slightly subtherapeutic    Warfarin dosing recommendation: Bolus 11.25 mg today, then Pt is to continue with current warfarin dosing regimen.      Pt educated to contact our clinic with any changes in medications or s/s of bleeding  or thrombosis. Pt is aware to seek immediate medical attention for falls, head injury or deep cuts.    Request pt to return in 2 week(s). Pt agrees.    Adrienne Ham, PharmD

## 2024-04-26 ENCOUNTER — APPOINTMENT (OUTPATIENT)
Dept: VASCULAR LAB | Facility: MEDICAL CENTER | Age: 74
End: 2024-04-26
Attending: INTERNAL MEDICINE
Payer: MEDICARE

## 2024-04-28 DIAGNOSIS — I10 HYPERTENSION, UNSPECIFIED TYPE: ICD-10-CM

## 2024-04-29 NOTE — TELEPHONE ENCOUNTER
Is the patient due for a refill? Yes    Was the patient seen the past year? No    Date of last office visit: 03/14/2023    Does the patient have an upcoming appointment?  No       Provider to refill:CW    Does the patients insurance require a 100 day supply?  Yes

## 2024-05-01 RX ORDER — LISINOPRIL 5 MG/1
5 TABLET ORAL DAILY
Qty: 100 TABLET | Refills: 0 | Status: SHIPPED | OUTPATIENT
Start: 2024-05-01

## 2024-05-09 RX ORDER — FUROSEMIDE 20 MG/1
20 TABLET ORAL DAILY
Qty: 90 TABLET | Refills: 3 | Status: SHIPPED | OUTPATIENT
Start: 2024-05-09

## 2024-05-09 NOTE — TELEPHONE ENCOUNTER
Patient called requesting furosemide rx be sent to [facility] as Postal prescriptions are no longer covered with [facility]

## 2024-05-14 DIAGNOSIS — Z79.01 CHRONIC ANTICOAGULATION: ICD-10-CM

## 2024-05-17 ENCOUNTER — ANTICOAGULATION VISIT (OUTPATIENT)
Dept: VASCULAR LAB | Facility: MEDICAL CENTER | Age: 74
End: 2024-05-17
Attending: INTERNAL MEDICINE
Payer: MEDICARE

## 2024-05-17 DIAGNOSIS — Z79.01 CHRONIC ANTICOAGULATION: ICD-10-CM

## 2024-05-17 DIAGNOSIS — G45.9 TIA (TRANSIENT ISCHEMIC ATTACK): ICD-10-CM

## 2024-05-17 LAB — INR PPP: 2.1 (ref 2–3.5)

## 2024-05-17 NOTE — PROGRESS NOTES
Anticoagulation Summary  As of 2024      INR goal:  2.0-3.0   TTR:  58.2% (8.9 y)   INR used for dosin.10 (2024)   Warfarin maintenance plan:  3.75 mg (7.5 mg x 0.5) every Mon, Wed; 7.5 mg (7.5 mg x 1) all other days   Weekly warfarin total:  45 mg   No change documented:  Jona DongD   Plan last modified:  Jona RingD (2023)   Next INR check:  2024   Priority:  Acute   Target end date:  Indefinite    Indications    DVT (deep venous thrombosis) (HCC) (Resolved) [I82.409]  TIA (transient ischemic attack) [G45.9]  Pulmonary embolism [415.19] (Resolved) [I26.99]                 Anticoagulation Episode Summary       INR check location:  Anticoagulation Clinic    Preferred lab:  [email or web address]    Send INR reminders to:      Comments:  ASA 81 mg for TIA, CAD hx - Lifelong per Dr. Wright 3/7/22          Anticoagulation Care Providers       Provider Role Specialty Phone number    Patricia Damon M.D. Referring Internal Medicine [phone]    Jona QuinnD Responsible      [facility] Anticoagulation [facility] Responsible  [phone]                  Refer to Patient Findings for HPI:  Patient Findings       Negatives:  Signs/symptoms of thrombosis, Signs/symptoms of bleeding, Laboratory test error suspected, Change in health, Change in alcohol use, Change in activity, Upcoming invasive procedure, Emergency department visit, Upcoming dental procedure, Missed doses, Extra doses, Change in medications, Change in diet/appetite, Hospital admission, Bruising, Other complaints            There were no vitals filed for this visit.  Pt declined vitals    Verified current warfarin dosing schedule.    Medications reconciled: No  Pt is on antiplatelet therapy with Aspirin for TIA, CAD.      A/P   INR is therapeutic    Warfarin dosing recommendation: Continue regimen as listed above.    Pt educated to contact our clinic with any changes in medications or s/s of bleeding or  thrombosis. Pt is aware to seek immediate medical attention for falls, head injury or deep cuts.    Request pt to return in 3 week(s). Pt agrees.    Dottie Panchal, Pharmacy Intern    Adrienne Ham, PharmD

## 2024-06-07 ENCOUNTER — APPOINTMENT (OUTPATIENT)
Dept: VASCULAR LAB | Facility: MEDICAL CENTER | Age: 74
End: 2024-06-07
Attending: INTERNAL MEDICINE
Payer: MEDICARE

## 2024-06-11 ENCOUNTER — ANTICOAGULATION VISIT (OUTPATIENT)
Dept: VASCULAR LAB | Facility: MEDICAL CENTER | Age: 74
End: 2024-06-11
Attending: INTERNAL MEDICINE
Payer: MEDICARE

## 2024-06-11 VITALS — HEART RATE: 54 BPM | SYSTOLIC BLOOD PRESSURE: 117 MMHG | DIASTOLIC BLOOD PRESSURE: 51 MMHG

## 2024-06-11 DIAGNOSIS — G45.9 TIA (TRANSIENT ISCHEMIC ATTACK): ICD-10-CM

## 2024-06-11 LAB — INR PPP: 2.2 (ref 2–3.5)

## 2024-06-11 PROCEDURE — 85610 PROTHROMBIN TIME: CPT

## 2024-06-11 PROCEDURE — 99211 OFF/OP EST MAY X REQ PHY/QHP: CPT

## 2024-06-11 NOTE — PROGRESS NOTES
Anticoagulation Summary  As of 2024      INR goal:  2.0-3.0   TTR:  58.5% (9 y)   INR used for dosin.20 (2024)   Warfarin maintenance plan:  3.75 mg (7.5 mg x 0.5) every Mon, Wed; 7.5 mg (7.5 mg x 1) all other days   Weekly warfarin total:  45 mg   Plan last modified:  Aleksandra Grijalva PharmD (2023)   Next INR check:  2024   Priority:  Acute   Target end date:  Indefinite    Indications    DVT (deep venous thrombosis) (HCC) (Resolved) [I82.409]  TIA (transient ischemic attack) [G45.9]  Pulmonary embolism [415.19] (Resolved) [I26.99]                 Anticoagulation Episode Summary       INR check location:  Anticoagulation Clinic    Preferred lab:  [facility]    Send INR reminders to:      Comments:  ASA 81 mg for TIA, CAD hx - Lif[facility] per Dr. Wright 3/7/22          Anticoagulation Care Providers       Provider Role Specialty Phone number    Patricia Damon M.D. Referring Internal Medicine [phone]    Jona QuinnD Responsible      [facility] Anticoagulation Services Responsible  [phone]             Refer to Patient Findings for HPI:  Patient Findings       Negatives:  Signs/symptoms of thrombosis, Signs/symptoms of bleeding, Laboratory test error suspected, Change in health, Change in alcohol use, Change in activity, Upcoming invasive procedure, Emergency department visit, Upcoming dental procedure, Missed doses, Extra doses, Change in medications, Change in diet/appetite, Hospital admission, Bruising, Other complaints            Vitals:    24 1006   BP: 117/51   Pulse: (!) 54       Verified current warfarin dosing schedule.    Medications reconciled: Yes  Pt is on antiplatelet therapy with Aspirin for TIA and CAD.       A/P   INR is therapeutic    Warfarin dosing recommendation: Continue regimen as listed above.    Pt educated to contact our clinic with any changes in medications or s/s of bleeding or thrombosis. Pt is aware to seek immediate medical attention  for falls, head injury or deep cuts.    Request pt to return in 4 week(s). Pt agrees.    Thom Shultz, JonaD

## 2024-06-13 ENCOUNTER — ANTICOAGULATION MONITORING (OUTPATIENT)
Dept: VASCULAR LAB | Facility: MEDICAL CENTER | Age: 74
End: 2024-06-13

## 2024-06-13 ENCOUNTER — OFFICE VISIT (OUTPATIENT)
Dept: MEDICAL GROUP | Facility: PHYSICIAN GROUP | Age: 74
End: 2024-06-13
Payer: MEDICARE

## 2024-06-13 ENCOUNTER — TELEPHONE (OUTPATIENT)
Dept: VASCULAR LAB | Facility: MEDICAL CENTER | Age: 74
End: 2024-06-13

## 2024-06-13 VITALS
RESPIRATION RATE: 16 BRPM | SYSTOLIC BLOOD PRESSURE: 124 MMHG | WEIGHT: 197 LBS | BODY MASS INDEX: 30.92 KG/M2 | TEMPERATURE: 97.8 F | HEIGHT: 67 IN | OXYGEN SATURATION: 95 % | HEART RATE: 68 BPM | DIASTOLIC BLOOD PRESSURE: 70 MMHG

## 2024-06-13 DIAGNOSIS — Z12.5 PROSTATE CANCER SCREENING: ICD-10-CM

## 2024-06-13 DIAGNOSIS — E11.9 DIABETES MELLITUS WITH COINCIDENT HYPERTENSION (HCC): Primary | ICD-10-CM

## 2024-06-13 DIAGNOSIS — E03.9 ACQUIRED HYPOTHYROIDISM: ICD-10-CM

## 2024-06-13 DIAGNOSIS — I10 PRIMARY HYPERTENSION: ICD-10-CM

## 2024-06-13 DIAGNOSIS — G45.9 TIA (TRANSIENT ISCHEMIC ATTACK): ICD-10-CM

## 2024-06-13 DIAGNOSIS — F43.21 GRIEF: ICD-10-CM

## 2024-06-13 DIAGNOSIS — I10 DIABETES MELLITUS WITH COINCIDENT HYPERTENSION (HCC): Primary | ICD-10-CM

## 2024-06-13 DIAGNOSIS — M54.16 LUMBAR RADICULAR PAIN: ICD-10-CM

## 2024-06-13 PROCEDURE — 3078F DIAST BP <80 MM HG: CPT | Performed by: FAMILY MEDICINE

## 2024-06-13 PROCEDURE — 3074F SYST BP LT 130 MM HG: CPT | Performed by: FAMILY MEDICINE

## 2024-06-13 PROCEDURE — 99214 OFFICE O/P EST MOD 30 MIN: CPT | Performed by: FAMILY MEDICINE

## 2024-06-13 PROCEDURE — G2211 COMPLEX E/M VISIT ADD ON: HCPCS | Performed by: FAMILY MEDICINE

## 2024-06-13 RX ORDER — METHYLPREDNISOLONE 4 MG/1
TABLET ORAL
Qty: 21 TABLET | Refills: 0 | Status: SHIPPED | OUTPATIENT
Start: 2024-06-13 | End: 2024-06-18 | Stop reason: SDUPTHER

## 2024-06-13 ASSESSMENT — FIBROSIS 4 INDEX: FIB4 SCORE: 0.91

## 2024-06-13 NOTE — ASSESSMENT & PLAN NOTE
This is a new issue.  His wife passed away a month ago from complications of pancreatitis.  He still dealing with that but has good family support.

## 2024-06-13 NOTE — ASSESSMENT & PLAN NOTE
This is a recurrent chronic problem.  Patient states about 10 days ago he bent over and picked up something in his yard and started having the radicular pain down his left leg again.  The the last severe episode of this was about 6 years ago.  He has never had any x-rays or MRI done of it.  He was treated in the past with Medrol and that did very well.  States the pain really occurs when he is walking and not when he is sitting down.

## 2024-06-13 NOTE — TELEPHONE ENCOUNTER
[facility]    Pt prescribed medrol.  Left VM requesting pt to call the clinic to address DDI with warfarin.    Hong Brooks, JonaD

## 2024-06-13 NOTE — PROGRESS NOTES
Subjective:     CC: Here for couple of issues.    HPI:   Rene presents today with the following medical concern:    Lumbar radicular pain  This is a recurrent chronic problem.  Patient states about 10 days ago he bent over and picked up something in his yard and started having the radicular pain down his left leg again.  The the last severe episode of this was about 6 years ago.  He has never had any x-rays or MRI done of it.  He was treated in the past with Medrol and that did very well.  States the pain really occurs when he is walking and not when he is sitting down.    Grief  This is a new issue.  His wife passed away a month ago from complications of pancreatitis.  He still dealing with that but has good family support.    Diabetes mellitus with coincident hypertension (HCC)  This is a chronic problem.  He is due for labs next month and we will order those for him.  Blood pressures under good control.    Acquired hypothyroidism  This is a chronic problem.  He is on supplementation.  Lab ordered for follow-up.    Primary hypertension  This is a chronic problem.  Blood pressure is well-controlled.  Labs ordered.    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 10/3/23-does not check glucose at home.    Gout     Hammer toe     Heart murmur      Hiatus hernia syndrome     High cholesterol     Hyperlipidemia     Hypertension     Hypothyroidism     Impaired fasting glucose     Mild aortic stenosis - echo 2018     Pain     Right knee pain 2023    Stroke (HCC) 2015    TIA-hx of 2 or 3. No residual.       Social History     Tobacco Use    Smoking status: Former     Current packs/day: 0.00     Average packs/day: 2.2 packs/day for 15.3 years (33.9 ttl pk-yrs)     Types: Cigarettes     Start date: 1966     Quit date: 1977     Years since quittin.4    Smokeless tobacco: Never    Tobacco comments:     quit x 32 yrs; 2ppd x 7 years   Vaping Use    Vaping status: Never Used   Substance Use Topics    Alcohol use: Not Currently     Alcohol/week: 19.8 oz     Types: 30 Cans of beer, 3 Shots of liquor per week     Comment: no alcohol since     Drug use: Not Currently     Types: Intravenous, Inhaled, Oral     Comment: no drug use since        Current Outpatient Medications Ordered in Epic   Medication Sig Dispense Refill    methylPREDNISolone (MEDROL DOSEPAK) 4 MG Tablet Therapy Pack As directed on the packaging label. 21 Tablet 0    furosemide (LASIX) 20 MG Tab Take 1 Tablet by mouth every day. 90 Tablet 3    lisinopril (PRINIVIL) 5 MG Tab TAKE 1 TABLET BY MOUTH EVERY  Tablet 0    metoprolol tartrate (LOPRESSOR) 25 MG Tab TAKE 1/2 TABLET BY MOUTH EVERY DAY 50 Tablet 2    divalproex (DEPAKOTE) 250 MG Tablet Delayed Response Take  2 po at night 180 Tablet 3    divalproex (DEPAKOTE) 125 MG EC tablet TAKE 1 TABLET BY MOUTH TWICE A  Tablet 1    warfarin (COUMADIN) 7.5 MG Tab TAKE 1/2 TO 1 TABLET BY MOUTH DAILY AS DIRECTED BY [facility] ANTICOAGULATION SERVICES 90 Tablet 1    levothyroxine (SYNTHROID) 75 MCG Tab Take 1 Tablet by mouth every morning on an empty stomach. 90 Tablet 1    acetaminophen (TYLENOL 8 HOUR ARTHRITIS PAIN) 650 MG CR tablet Take 650 mg by mouth at bedtime as needed.      Wheat Dextrin (BENEFIBER DRINK MIX) Pack  "Take  by mouth every day.      atorvastatin (LIPITOR) 80 MG tablet TAKE 1 TABLET BY MOUTH EVERY DAY (Patient taking differently: Take 80 mg by mouth every evening.) 100 Tablet 3    dutasteride (AVODART) 0.5 MG capsule TAKE ONE CAPSULE BY MOUTH EVERY DAY (Patient taking differently: Take 0.5 mg by mouth every evening.) 90 Capsule 3    traZODone (DESYREL) 50 MG Tab TAKE 2 TABLETS BY MOUTH EVERY EVENING. FOR INSOMNIA (Patient taking differently: Take 100 mg by mouth every evening. For insomnia  Takes 1 - 1.5 tabs a day) 180 Tablet 2    Glucosamine-Chondroit-Vit C-Mn (GLUCOSAMINE CHONDR 500 COMPLEX PO) Take 1 Tablet by mouth 2 times a day.      aspirin 81 MG tablet Take 81 mg by mouth every day. 100 Tab     COENZYME Q10 PO Take 1 Tab by mouth every morning.      Cholecalciferol (VITAMIN D) 2000 UNITS Cap Take 2,000 Units by mouth every day.      docosahexanoic acid (OMEGA 3 FA) 1000 MG CAPS Take 1,000 mg by mouth every day.       No current [facility]-ordered facility-administered medications on file.       Allergies:  Penicillins    Health Maintenance: Completed    ROS:  Gen: no fevers/chills, no changes in weight  Eyes: no changes in vision  ENT: no sore throat, no hearing loss, no bloody nose  Pulm: no sob, no cough  CV: no chest pain, no palpitations  GI: no nausea/vomiting, no diarrhea  : no dysuria  MSk: no myalgias  Skin: no rash  Neuro: no headaches, no numbness/tingling  Heme/Lymph: no easy bruising      Objective:       Exam:  /70 (BP Location: Right arm, Patient Position: Sitting, BP Cuff Size: Adult)   Pulse 68   Temp 36.6 °C (97.8 °F) (Temporal)   Resp 16   Ht 1.702 m (5' 7\")   Wt 89.4 kg (197 lb)   SpO2 95%   BMI 30.85 kg/m²  Body mass index is 30.85 kg/m².    Gen: Alert and oriented, No apparent distress.  Lungs: Normal effort,   Ext: No clubbing, cyanosis, edema.  Straight leg raise is negative.  Gait is normal.  Psych: Patient is alert cooperative.  No unusual thought Prashant expressed.  Insight " and judgment is good.  He does appear to be a little sad when talking about the passing of his wife.    Labs: Reviewed and ordered    Assessment & Plan:     73 y.o. male with the following -     1. Primary hypertension  Is a chronic stable condition.  Continue current medications.  Baseline labs ordered.  - Comp Metabolic Panel; Future  - Lipid Profile; Future  - URINALYSIS,CULTURE IF INDICATED; Future  - CBC WITH DIFFERENTIAL; Future  - ESTIMATED GFR; Future    2. Diabetes mellitus with coincident hypertension (HCC)  This is a chronic problem.  Labs ordered.  - Comp Metabolic Panel; Future  - HEMOGLOBIN A1C; Future  - Lipid Profile; Future  - URINALYSIS,CULTURE IF INDICATED; Future  - CBC WITH DIFFERENTIAL; Future  - ESTIMATED GFR; Future  - MICROALBUMIN CREAT RATIO URINE; Future    3. Acquired hypothyroidism  This is a chronic problem.  Continue supplementation.  Lab ordered.  - TSH WITH REFLEX TO FT4; Future    4. Prostate cancer screening  This is screening issue.  Lab ordered.  - PROSTATE SPECIFIC AG SCREENING; Future    5. Lumbar radicular pain  This is an exacerbation of a chronic problem.  I told he most likely has some nerve impingement that is aggravated by certain movements of his lower back.  For now we will try him on the Medrol Dosepak.  If that does not help he is to let me know and we will get an x-ray and an MRI.    6. Grief  This is a new problem.  We talked about the passing of his wife and will continue to support him as needed.      Return in about 6 months (around 12/13/2024) for Long.    Please note that this dictation was created using voice recognition software. I have made every reasonable attempt to correct obvious errors, but I expect that there are errors of grammar and possibly content that I did not discover before finalizing the note.

## 2024-06-13 NOTE — PROGRESS NOTES
[facility]    S/w pt. Pt will start medrol dose rayna when it gets shipped to his home.  I suggest he reduces warfarin by 50% on the day he starts the dose rayna and calls our clinic to make an appointment to get his next INR.    Hong Brooks, PharmD

## 2024-06-13 NOTE — TELEPHONE ENCOUNTER
Received request via: Pharmacy    Was the patient seen in the last year in this department? Yes    Does the patient have an active prescription (recently filled or refills available) for medication(s) requested? No    Pharmacy Name: [facility]/pharmacy #[id number] - [place], [place] - [address]     Does the patient have correction Plus and need 100 day supply (blood pressure, diabetes and cholesterol meds only)? Medication is not for cholesterol, blood pressure or diabetes

## 2024-06-13 NOTE — ASSESSMENT & PLAN NOTE
This is a chronic problem.  He is due for labs next month and we will order those for him.  Blood pressures under good control.

## 2024-06-14 RX ORDER — TRAZODONE HYDROCHLORIDE 50 MG/1
100 TABLET ORAL NIGHTLY
Qty: 180 TABLET | Refills: 2 | Status: SHIPPED | OUTPATIENT
Start: 2024-06-14

## 2024-06-17 ENCOUNTER — TELEPHONE (OUTPATIENT)
Dept: MEDICAL GROUP | Facility: PHYSICIAN GROUP | Age: 74
End: 2024-06-17
Payer: MEDICARE

## 2024-06-17 NOTE — TELEPHONE ENCOUNTER
Name: Rene Chan  Phone number: [phone]    Message: Patient left a voicemail, patient stated that he was calling because he was seen on Friday by Dr. Michael Sethi and they discussed handling two medications. Patient stated that he had talked with Dr. Michael Sethi about Advert and also about a medication for help with Sciatic, patient believes he was told about Predisone, and taking 6 pills the first day, 5 pills the next day and 4 the next and so on.

## 2024-06-18 RX ORDER — METHYLPREDNISOLONE 4 MG/1
TABLET ORAL
Qty: 21 TABLET | Refills: 0 | Status: SHIPPED | OUTPATIENT
Start: 2024-06-18

## 2024-06-24 ENCOUNTER — ANTICOAGULATION VISIT (OUTPATIENT)
Dept: VASCULAR LAB | Facility: MEDICAL CENTER | Age: 74
End: 2024-06-24
Attending: INTERNAL MEDICINE
Payer: MEDICARE

## 2024-06-24 VITALS — SYSTOLIC BLOOD PRESSURE: 118 MMHG | HEART RATE: 49 BPM | DIASTOLIC BLOOD PRESSURE: 72 MMHG

## 2024-06-24 DIAGNOSIS — G45.9 TIA (TRANSIENT ISCHEMIC ATTACK): ICD-10-CM

## 2024-06-24 LAB — INR PPP: 1.2 (ref 2–3.5)

## 2024-06-24 PROCEDURE — 99212 OFFICE O/P EST SF 10 MIN: CPT

## 2024-06-24 PROCEDURE — 85610 PROTHROMBIN TIME: CPT

## 2024-06-24 NOTE — PROGRESS NOTES
Anticoagulation Summary  As of 2024      INR goal:  2.0-3.0   TTR:  58.4% (9 y)   INR used for dosin.20 (2024)   Warfarin maintenance plan:  3.75 mg (7.5 mg x 0.5) every Mon, Wed; 7.5 mg (7.5 mg x 1) all other days   Weekly warfarin total:  45 mg   Plan last modified:  Aleksandra Grijalva PharmD (2023)   Next INR check:  2024   Priority:  Acute   Target end date:  Indefinite    Indications    DVT (deep venous thrombosis) (HCC) (Resolved) [I82.409]  TIA (transient ischemic attack) [G45.9]  Pulmonary embolism [415.19] (Resolved) [I26.99]                 Anticoagulation Episode Summary       INR check location:  Anticoagulation Clinic    Preferred lab:  [facility]    Send INR reminders to:      Comments:  ASA 81 mg for TIA, CAD hx - [facility] per Dr. Wright 3/7/22          Anticoagulation Care Providers       Provider Role Specialty Phone number    Patricia Damon M.D. Referring Internal Medicine [phone]    Laisha Méndez PharmD Responsible      [facility] Anticoagulation Services Responsible  [phone]                  Refer to Patient Findings for HPI:  Patient Findings       Positives:  Missed doses (was taking 3.75mg daily over the past couple days), Change in medications (started Medrol 2 days ago; might have taken corticosteroids before that)    Negatives:  Signs/symptoms of thrombosis, Signs/symptoms of bleeding, Laboratory test error suspected, Change in health, Change in alcohol use, Change in activity, Upcoming invasive procedure, Emergency department visit, Upcoming dental procedure, Extra doses, Change in diet/appetite, Hospital admission, Bruising, Other complaints            There were no vitals filed for this visit.    Verified current warfarin dosing schedule.    Medications reconciled: Yes  Pt is not on antiplatelet therapy.      A/P   INR is subtherapeutic    Warfarin dosing recommendation: Continue regimen as listed above. Will refrain from bolusing given DDI w/  Medrol.    Pt educated to contact our clinic with any changes in medications or s/s of bleeding or thrombosis. Pt is aware to seek immediate medical attention for falls, head injury or deep cuts.    Request pt to return in 1 week(s). Pt agrees.    Nandini Hill, PharmD

## 2024-07-01 ENCOUNTER — ANTICOAGULATION VISIT (OUTPATIENT)
Dept: VASCULAR LAB | Facility: MEDICAL CENTER | Age: 74
End: 2024-07-01
Attending: INTERNAL MEDICINE
Payer: MEDICARE

## 2024-07-01 VITALS — DIASTOLIC BLOOD PRESSURE: 46 MMHG | HEART RATE: 60 BPM | SYSTOLIC BLOOD PRESSURE: 118 MMHG

## 2024-07-01 DIAGNOSIS — G45.9 TIA (TRANSIENT ISCHEMIC ATTACK): ICD-10-CM

## 2024-07-01 LAB — INR PPP: 2.3 (ref 2–3.5)

## 2024-07-01 PROCEDURE — 99211 OFF/OP EST MAY X REQ PHY/QHP: CPT

## 2024-07-01 PROCEDURE — 85610 PROTHROMBIN TIME: CPT

## 2024-07-02 RX ORDER — DUTASTERIDE 0.5 MG/1
0.5 CAPSULE, LIQUID FILLED ORAL
Qty: 10 CAPSULE | Refills: 0 | Status: SHIPPED | OUTPATIENT
Start: 2024-07-02

## 2024-07-09 ENCOUNTER — APPOINTMENT (OUTPATIENT)
Dept: VASCULAR LAB | Facility: MEDICAL CENTER | Age: 74
End: 2024-07-09
Payer: MEDICARE

## 2024-07-10 ENCOUNTER — HOSPITAL ENCOUNTER (OUTPATIENT)
Dept: LAB | Facility: MEDICAL CENTER | Age: 74
End: 2024-07-10
Attending: FAMILY MEDICINE
Payer: MEDICARE

## 2024-07-10 DIAGNOSIS — Z12.5 PROSTATE CANCER SCREENING: ICD-10-CM

## 2024-07-10 DIAGNOSIS — E03.9 ACQUIRED HYPOTHYROIDISM: ICD-10-CM

## 2024-07-10 DIAGNOSIS — I10 DIABETES MELLITUS WITH COINCIDENT HYPERTENSION (HCC): ICD-10-CM

## 2024-07-10 DIAGNOSIS — I10 PRIMARY HYPERTENSION: ICD-10-CM

## 2024-07-10 DIAGNOSIS — E11.9 DIABETES MELLITUS WITH COINCIDENT HYPERTENSION (HCC): ICD-10-CM

## 2024-07-10 LAB
ALBUMIN SERPL BCP-MCNC: 3.9 G/DL (ref 3.2–4.9)
ALBUMIN/GLOB SERPL: 1.8 G/DL
ALP SERPL-CCNC: 39 U/L (ref 30–99)
ALT SERPL-CCNC: 11 U/L (ref 2–50)
ANION GAP SERPL CALC-SCNC: 9 MMOL/L (ref 7–16)
APPEARANCE UR: CLEAR
AST SERPL-CCNC: 12 U/L (ref 12–45)
BASOPHILS # BLD AUTO: 0.9 % (ref 0–1.8)
BASOPHILS # BLD: 0.07 K/UL (ref 0–0.12)
BILIRUB SERPL-MCNC: 0.8 MG/DL (ref 0.1–1.5)
BILIRUB UR QL STRIP.AUTO: NEGATIVE
BUN SERPL-MCNC: 13 MG/DL (ref 8–22)
CALCIUM ALBUM COR SERPL-MCNC: 9 MG/DL (ref 8.5–10.5)
CALCIUM SERPL-MCNC: 8.9 MG/DL (ref 8.5–10.5)
CHLORIDE SERPL-SCNC: 100 MMOL/L (ref 96–112)
CHOLEST SERPL-MCNC: 127 MG/DL (ref 100–199)
CO2 SERPL-SCNC: 26 MMOL/L (ref 20–33)
COLOR UR: YELLOW
CREAT SERPL-MCNC: 0.73 MG/DL (ref 0.5–1.4)
CREAT UR-MCNC: 41.79 MG/DL
EOSINOPHIL # BLD AUTO: 0.19 K/UL (ref 0–0.51)
EOSINOPHIL NFR BLD: 2.6 % (ref 0–6.9)
ERYTHROCYTE [DISTWIDTH] IN BLOOD BY AUTOMATED COUNT: 47 FL (ref 35.9–50)
EST. AVERAGE GLUCOSE BLD GHB EST-MCNC: 143 MG/DL
FASTING STATUS PATIENT QL REPORTED: NORMAL
GFR SERPLBLD CREATININE-BSD FMLA CKD-EPI: 95 ML/MIN/1.73 M 2
GLOBULIN SER CALC-MCNC: 2.2 G/DL (ref 1.9–3.5)
GLUCOSE SERPL-MCNC: 108 MG/DL (ref 65–99)
GLUCOSE UR STRIP.AUTO-MCNC: NEGATIVE MG/DL
HBA1C MFR BLD: 6.6 % (ref 4–5.6)
HCT VFR BLD AUTO: 45.8 % (ref 42–52)
HDLC SERPL-MCNC: 32 MG/DL
HGB BLD-MCNC: 15.4 G/DL (ref 14–18)
IMM GRANULOCYTES # BLD AUTO: 0.02 K/UL (ref 0–0.11)
IMM GRANULOCYTES NFR BLD AUTO: 0.3 % (ref 0–0.9)
KETONES UR STRIP.AUTO-MCNC: NEGATIVE MG/DL
LDLC SERPL CALC-MCNC: 66 MG/DL
LEUKOCYTE ESTERASE UR QL STRIP.AUTO: NEGATIVE
LYMPHOCYTES # BLD AUTO: 1.96 K/UL (ref 1–4.8)
LYMPHOCYTES NFR BLD: 26.6 % (ref 22–41)
MCH RBC QN AUTO: 31.4 PG (ref 27–33)
MCHC RBC AUTO-ENTMCNC: 33.6 G/DL (ref 32.3–36.5)
MCV RBC AUTO: 93.3 FL (ref 81.4–97.8)
MICRO URNS: NORMAL
MICROALBUMIN UR-MCNC: <1.2 MG/DL
MICROALBUMIN/CREAT UR: NORMAL MG/G (ref 0–30)
MONOCYTES # BLD AUTO: 0.82 K/UL (ref 0–0.85)
MONOCYTES NFR BLD AUTO: 11.1 % (ref 0–13.4)
NEUTROPHILS # BLD AUTO: 4.31 K/UL (ref 1.82–7.42)
NEUTROPHILS NFR BLD: 58.5 % (ref 44–72)
NITRITE UR QL STRIP.AUTO: NEGATIVE
NRBC # BLD AUTO: 0 K/UL
NRBC BLD-RTO: 0 /100 WBC (ref 0–0.2)
PH UR STRIP.AUTO: 6 [PH] (ref 5–8)
PLATELET # BLD AUTO: 249 K/UL (ref 164–446)
PMV BLD AUTO: 11.2 FL (ref 9–12.9)
POTASSIUM SERPL-SCNC: 4.3 MMOL/L (ref 3.6–5.5)
PROT SERPL-MCNC: 6.1 G/DL (ref 6–8.2)
PROT UR QL STRIP: NEGATIVE MG/DL
PSA SERPL-MCNC: 0.77 NG/ML (ref 0–4)
RBC # BLD AUTO: 4.91 M/UL (ref 4.7–6.1)
RBC UR QL AUTO: NEGATIVE
SODIUM SERPL-SCNC: 135 MMOL/L (ref 135–145)
SP GR UR STRIP.AUTO: 1.01
TRIGL SERPL-MCNC: 145 MG/DL (ref 0–149)
TSH SERPL DL<=0.005 MIU/L-ACNC: 1.89 UIU/ML (ref 0.38–5.33)
UROBILINOGEN UR STRIP.AUTO-MCNC: 0.2 MG/DL
WBC # BLD AUTO: 7.4 K/UL (ref 4.8–10.8)

## 2024-07-10 PROCEDURE — 36415 COLL VENOUS BLD VENIPUNCTURE: CPT

## 2024-07-10 PROCEDURE — 82043 UR ALBUMIN QUANTITATIVE: CPT

## 2024-07-10 PROCEDURE — 85025 COMPLETE CBC W/AUTO DIFF WBC: CPT

## 2024-07-10 PROCEDURE — 84443 ASSAY THYROID STIM HORMONE: CPT

## 2024-07-10 PROCEDURE — 81003 URINALYSIS AUTO W/O SCOPE: CPT

## 2024-07-10 PROCEDURE — 83036 HEMOGLOBIN GLYCOSYLATED A1C: CPT

## 2024-07-10 PROCEDURE — 84153 ASSAY OF PSA TOTAL: CPT

## 2024-07-10 PROCEDURE — 80061 LIPID PANEL: CPT

## 2024-07-10 PROCEDURE — 82570 ASSAY OF URINE CREATININE: CPT

## 2024-07-10 PROCEDURE — 80053 COMPREHEN METABOLIC PANEL: CPT

## 2024-07-11 RX ORDER — LEVOTHYROXINE SODIUM 0.07 MG/1
75 TABLET ORAL
Qty: 90 TABLET | Refills: 3 | Status: SHIPPED | OUTPATIENT
Start: 2024-07-11

## 2024-07-15 ENCOUNTER — APPOINTMENT (OUTPATIENT)
Dept: VASCULAR LAB | Facility: MEDICAL CENTER | Age: 74
End: 2024-07-15
Attending: INTERNAL MEDICINE
Payer: MEDICARE

## 2024-07-16 ENCOUNTER — APPOINTMENT (OUTPATIENT)
Dept: VASCULAR LAB | Facility: MEDICAL CENTER | Age: 74
End: 2024-07-16
Attending: INTERNAL MEDICINE
Payer: MEDICARE

## 2024-07-19 ENCOUNTER — ANTICOAGULATION VISIT (OUTPATIENT)
Dept: VASCULAR LAB | Facility: MEDICAL CENTER | Age: 74
End: 2024-07-19
Attending: INTERNAL MEDICINE
Payer: MEDICARE

## 2024-07-19 DIAGNOSIS — G45.9 TIA (TRANSIENT ISCHEMIC ATTACK): ICD-10-CM

## 2024-07-19 LAB — INR PPP: 2 (ref 2–3.5)

## 2024-07-19 PROCEDURE — 85610 PROTHROMBIN TIME: CPT

## 2024-07-19 PROCEDURE — 99211 OFF/OP EST MAY X REQ PHY/QHP: CPT

## 2024-07-28 DIAGNOSIS — Z79.01 CHRONIC ANTICOAGULATION: ICD-10-CM

## 2024-07-28 DIAGNOSIS — Z86.718 HISTORY OF DVT (DEEP VEIN THROMBOSIS): ICD-10-CM

## 2024-07-29 RX ORDER — WARFARIN SODIUM 7.5 MG/1
TABLET ORAL
Qty: 100 TABLET | Refills: 1 | Status: SHIPPED | OUTPATIENT
Start: 2024-07-29

## 2024-07-31 RX ORDER — DIVALPROEX SODIUM 125 MG/1
TABLET, DELAYED RELEASE ORAL
Qty: 180 TABLET | Refills: 0 | Status: SHIPPED | OUTPATIENT
Start: 2024-07-31

## 2024-08-08 DIAGNOSIS — I10 HYPERTENSION, UNSPECIFIED TYPE: ICD-10-CM

## 2024-08-08 RX ORDER — LISINOPRIL 5 MG/1
5 TABLET ORAL DAILY
Qty: 100 TABLET | Refills: 0 | OUTPATIENT
Start: 2024-08-08

## 2024-08-08 NOTE — TELEPHONE ENCOUNTER
Upon chart review, last seen 3/2023, no FV scheduled, courtesy refill sent 5/2024.    Medication refused.

## 2024-08-09 ENCOUNTER — ANTICOAGULATION VISIT (OUTPATIENT)
Dept: VASCULAR LAB | Facility: MEDICAL CENTER | Age: 74
End: 2024-08-09
Attending: INTERNAL MEDICINE
Payer: MEDICARE

## 2024-08-09 VITALS — SYSTOLIC BLOOD PRESSURE: 112 MMHG | HEART RATE: 51 BPM | DIASTOLIC BLOOD PRESSURE: 61 MMHG

## 2024-08-09 DIAGNOSIS — G45.9 TIA (TRANSIENT ISCHEMIC ATTACK): ICD-10-CM

## 2024-08-09 LAB — INR PPP: 3.5 (ref 2–3.5)

## 2024-08-09 PROCEDURE — 85610 PROTHROMBIN TIME: CPT

## 2024-08-09 PROCEDURE — 99212 OFFICE O/P EST SF 10 MIN: CPT

## 2024-08-09 NOTE — PROGRESS NOTES
Anticoagulation Summary  As of 8/9/2024      INR goal:  2.0-3.0   TTR:  58.6% (9.1 y)   INR used for dosing:  3.50 (8/9/2024)   Warfarin maintenance plan:  3.75 mg (7.5 mg x 0.5) every Mon, Wed; 7.5 mg (7.5 mg x 1) all other days   Weekly warfarin total:  45 mg   Plan last modified:  Aleksandra Grijalva PharmD (12/29/2023)   Next INR check:  8/23/2024   Priority:  Acute   Target end date:  Indefinite    Indications    DVT (deep venous thrombosis) (HCC) (Resolved) [I82.409]  TIA (transient ischemic attack) [G45.9]  Pulmonary embolism [415.19] (Resolved) [I26.99]                 Anticoagulation Episode Summary       INR check location:  Anticoagulation Clinic    Preferred lab:  "[facility]    Send INR reminders to:      Comments:  ASA 81 mg for TIA, CAD hx - Lifelong per Dr. Wright 3/7/22          Anticoagulation Care Providers       Provider Role Specialty Phone number    Patricia Damon M.D. Referring Internal Medicine [phone]    Jona QuinnD Responsible      [facility] Anticoagulation Services Responsible  [phone]                  Refer to Patient Findings for HPI:  Patient Findings       Negatives:  Signs/symptoms of thrombosis, Signs/symptoms of bleeding, Laboratory test error suspected, Change in health, Change in alcohol use, Change in activity, Upcoming invasive procedure, Emergency department visit, Upcoming dental procedure, Missed doses, Extra doses, Change in medications, Change in diet/appetite, Hospital admission, Bruising, Other complaints            Vitals:    08/09/24 1054   BP: 112/61   Pulse: (!) 51       Verified current warfarin dosing schedule.    Medications reconciled: No  ASA 81 mg for TIA, CAD hx - Lifelong per Dr. Wright      A/P   INR is supratherapeutic    Warfarin dosing recommendation: Decrease to Warfarin 3.75 mg and then resume previous warfarin regimen.     Pt educated to contact our clinic with any changes in medications or s/s of bleeding or thrombosis. Pt is  aware to seek immediate medical attention for falls, head injury or deep cuts.    Request pt to return in 2 week(s). Pt agrees.    Jona RingD

## 2024-08-13 RX ORDER — DUTASTERIDE 0.5 MG/1
0.5 CAPSULE, LIQUID FILLED ORAL DAILY
Qty: 90 CAPSULE | Refills: 3 | Status: SHIPPED | OUTPATIENT
Start: 2024-08-13

## 2024-08-13 NOTE — TELEPHONE ENCOUNTER
Received request via: Pharmacy    Was the patient seen in the last year in this department? Yes    Does the patient have an active prescription (recently filled or refills available) for medication(s) requested? No    Pharmacy Name:   [facility] - [address]          Does the patient have snf Plus and need 100-day supply? (This applies to ALL medications) Yes, quantity updated to 100 days

## 2024-08-21 ENCOUNTER — ANTICOAGULATION VISIT (OUTPATIENT)
Dept: VASCULAR LAB | Facility: MEDICAL CENTER | Age: 74
End: 2024-08-21
Attending: INTERNAL MEDICINE
Payer: MEDICARE

## 2024-08-21 DIAGNOSIS — Z86.711 HX PULMONARY EMBOLISM: ICD-10-CM

## 2024-08-21 DIAGNOSIS — I82.502 LEG DVT (DEEP VENOUS THROMBOEMBOLISM), CHRONIC, LEFT (HCC): ICD-10-CM

## 2024-08-21 DIAGNOSIS — I48.20 CHRONIC ATRIAL FIBRILLATION (HCC): ICD-10-CM

## 2024-08-21 DIAGNOSIS — D68.69 SECONDARY HYPERCOAGULABLE STATE (HCC): Chronic | ICD-10-CM

## 2024-08-21 DIAGNOSIS — G45.9 TIA (TRANSIENT ISCHEMIC ATTACK): ICD-10-CM

## 2024-08-21 LAB — INR PPP: 2.3 (ref 2–3.5)

## 2024-08-21 PROCEDURE — 99211 OFF/OP EST MAY X REQ PHY/QHP: CPT | Performed by: NURSE PRACTITIONER

## 2024-08-21 PROCEDURE — 85610 PROTHROMBIN TIME: CPT

## 2024-08-21 NOTE — PROGRESS NOTES
Anticoagulation Summary  As of 2024      INR goal:  2.0-3.0   TTR:  58.6% (9.2 y)   INR used for dosin.30 (2024)   Warfarin maintenance plan:  3.75 mg (7.5 mg x 0.5) every Mon, Wed; 7.5 mg (7.5 mg x 1) all other days   Weekly warfarin total:  45 mg   Plan last modified:  Jona RingD (2023)   Next INR check:  2024   Priority:  Acute   Target end date:  Indefinite    Indications    DVT (deep venous thrombosis) (HCC) (Resolved) [I82.409]  TIA (transient ischemic attack) [G45.9]  Pulmonary embolism [415.19] (Resolved) [I26.99]                 Anticoagulation Episode Summary       INR check location:  Anticoagulation Clinic    Preferred lab:  [facility]    Send INR reminders to:  --    Comments:  ASA 81 mg for TIA, CAD hx - Lifelong per Dr. Wright 3/7/22          Anticoagulation Care Providers       Provider Role Specialty Phone number    Patricia Damon M.D. Referring Internal Medicine [phone]    Jona QuinnD Responsible      [facility] Anticoagulation Services Responsible  [phone]                  Refer to Patient Findings for HPI:  Patient Findings       Negatives:  Signs/symptoms of thrombosis, Signs/symptoms of bleeding, Laboratory test error suspected, Change in health, Change in alcohol use, Change in activity, Upcoming invasive procedure, Emergency department visit, Upcoming dental procedure, Missed doses, Extra doses, Change in medications, Change in diet/appetite, Hospital admission, Bruising, Other complaints            Verified current warfarin dosing schedule.    Medications reconciled.  Pt is on antiplatelet therapy with ASA 81 mg for CAD, TIA.      A/P   INR is therapeutic  Reason(s) for out of range INR today: N/A      Warfarin dosing recommendation: Continue regimen as listed above.    Pt educated to contact our clinic with any changes in medications or s/s of bleeding or thrombosis. Pt is aware to seek immediate medical attention for falls,  head injury or deep cuts.    Request pt to return in 3 week(s). Pt agrees.    TOREY Bucio.

## 2024-08-23 ENCOUNTER — APPOINTMENT (OUTPATIENT)
Dept: VASCULAR LAB | Facility: MEDICAL CENTER | Age: 74
End: 2024-08-23
Attending: INTERNAL MEDICINE
Payer: MEDICARE

## 2024-09-03 NOTE — PROGRESS NOTES
Chief Complaint   Patient presents with    Coronary Artery Disease    Follow-Up          Subjective:   Rene Chan is a 73 y.o. male who presents today for follow-up.     Patient of Dr. Wright.  Current medical problems include CAD post CABG h/o DVT on chronic anticoagualtion, dyslipidemia, bilateral carotid artery stenosis and hypertension. Their last clinic visit was 3/14/2023 with Dr. Wright.    Today's visit:  Patient doing overall well from a cardiac perspective. He has no chest pain, shortness of breath, edema, dizziness/lightheadedness, or palpitations.He has been taking all his medications as prescribed. He reports decreased activity and has not been watching his diet recently. His wife passed away 5 months ago and he has been feeling lonely. He is involved in his [employer] and trying to get more active. He had a knee replacement in March and is going to start walking around his home. He does not take his blood pressure at home but states that he does not feel headache, blurred vision or symptoms.     Cardiovascular Risk Factors:  1. Smoking status: Former smoker  2. Type II Diabetes Mellitus: Yes   Lab Results   Component Value Date/Time    HBA1C 6.6 (H) 07/10/2024 08:38 AM    HBA1C 6.8 (H) 01/10/2024 02:09 PM     3. Hypertension: Yes  4. Dyslipidemia: Yes   Cholesterol,Tot   Date Value Ref Range Status   07/10/2024 127 100 - 199 mg/dL Final     LDL   Date Value Ref Range Status   07/10/2024 66 <100 mg/dL Final     HDL   Date Value Ref Range Status   07/10/2024 32 (A) >=40 mg/dL Final     Triglycerides   Date Value Ref Range Status   07/10/2024 145 0 - 149 mg/dL Final       Past Medical History:   Diagnosis Date    Anticoagulation monitoring, special range     Arthritis     Bipolar disorder (HCC)     Bipolar disorder, in partial remission, most recent episode depressed ([facility]) 04/19/2022    Blood clotting disorder ([facility]) 06/24/2015    hx of PE    Blood clotting disorder ([facility]) 03/04/2020    DVT left leg     CAD (coronary artery disease) 2015    CAD (coronary artery disease)     2015: CABG-3 VESSEL    Carotid artery occlusion     L 60-79% occluded; R 59% occluded per pt report;     Cataract     bilateral IOL    Clotting disorder (HCC)     protein S elevation in '12 with recurrent DVT and PE-coumadin lifelong    Colon polyps     Diabetes mellitus, type II (HCC)     diet controlled. 10/3/23-does not check glucose at home.    Gout     Hammer toe     Heart murmur     Hiatus hernia syndrome     High cholesterol     Hyperlipidemia     Hypertension     Hypothyroidism     Impaired fasting glucose     Mild aortic stenosis - echo 2018     Pain     Right knee pain 2023    Stroke (HCC) 2015    TIA-hx of 2 or 3. No residual.         Family History   Problem Relation Age of Onset    Heart Disease Sister 50    Stroke Sister 55        CVA    Heart Failure Father     Psychiatric Illness Son          Social History     Tobacco Use    Smoking status: Former     Current packs/day: 0.00     Average packs/day: 2.2 packs/day for 15.3 years (33.9 ttl pk-yrs)     Types: Cigarettes     Start date: 1966     Quit date: 1977     Years since quittin.7    Smokeless tobacco: Never    Tobacco comments:     quit x 32 yrs; 2ppd x 7 years   Vaping Use    Vaping status: Never Used   Substance Use Topics    Alcohol use: Not Currently     Alcohol/week: 19.8 oz     Types: 30 Cans of beer, 3 Shots of liquor per week     Comment: no alcohol since     Drug use: Not Currently     Types: Intravenous, Inhaled, Oral     Comment: no drug use since          Allergies   Allergen Reactions    Penicillins Unspecified     Childhood; does know what the reaction was         Current Outpatient Medications   Medication Sig    lisinopril (PRINIVIL) 5 MG Tab Take 1 Tablet by mouth every day.    metoprolol tartrate (LOPRESSOR) 25 MG Tab Take 0.5 Tablets by mouth every day.    Empagliflozin (JARDIANCE) 10 MG Tab tablet Take 1  "Tablet by mouth every day.    dutasteride (AVODART) 0.5 MG capsule TAKE 1 CAPSULE BY MOUTH DAILY    divalproex (DEPAKOTE) 125 MG EC tablet TAKE 1 TABLET BY MOUTH TWICE A DAY    warfarin (COUMADIN) 7.5 MG Tab TAKE 1/2 TO 1 TABLET BY MOUTH DAILY AS DIRECTED BY [facility]    levothyroxine (SYNTHROID) 75 MCG Tab TAKE 1 TABLET BY MOUTH EVERY DAY IN THE MORNING ON AN EMPTY STOMACH    traZODone (DESYREL) 50 MG Tab TAKE 2 TABLETS BY MOUTH EVERY EVENING. FOR INSOMNIA    divalproex (DEPAKOTE) 250 MG Tablet Delayed Response Take  2 po at night    acetaminophen (TYLENOL 8 HOUR ARTHRITIS PAIN) 650 MG CR tablet Take 650 mg by mouth at bedtime as needed.    Wheat Dextrin (BENEFIBER DRINK MIX) Pack Take  by mouth every day.    atorvastatin (LIPITOR) 80 MG tablet TAKE 1 TABLET BY MOUTH EVERY DAY (Patient taking differently: Take 80 mg by mouth every evening.)    Glucosamine-Chondroit-Vit C-Mn (GLUCOSAMINE CHONDR 500 COMPLEX PO) Take 1 Tablet by mouth 2 times a day.    aspirin 81 MG tablet Take 81 mg by mouth every day.    COENZYME Q10 PO Take 1 Tab by mouth every morning.    Cholecalciferol (VITAMIN D) 2000 UNITS Cap Take 2,000 Units by mouth every day.    docosahexanoic acid (OMEGA 3 FA) 1000 MG CAPS Take 1,000 mg by mouth every day.    furosemide (LASIX) 20 MG Tab Take 1 Tablet by mouth every day.         Review of Systems   Constitutional: Negative for malaise/fatigue.   Cardiovascular:  Negative for chest pain, dyspnea on exertion, leg swelling, near-syncope, orthopnea, palpitations, paroxysmal nocturnal dyspnea and syncope.   Respiratory:  Negative for shortness of breath.    Neurological:  Negative for dizziness, headaches and light-headedness.           Objective:   /70 (BP Location: Left arm, Patient Position: Sitting, BP Cuff Size: Adult)   Pulse (!) 55   Resp 16   Ht 1.727 m (5' 8\")   Wt 88.5 kg (195 lb)   SpO2 95%  Body mass index is 29.65 kg/m².         Physical Exam  Vitals reviewed. " "  Constitutional:       General: He is not in acute distress.     Appearance: Normal appearance.   HENT:      Head: Normocephalic and atraumatic.   Cardiovascular:      Rate and Rhythm: Normal rate and regular rhythm.      Pulses: Normal pulses.      Heart sounds: Murmur heard.   Pulmonary:      Effort: Pulmonary effort is normal. No respiratory distress.      Breath sounds: Normal breath sounds.   Musculoskeletal:      Right lower leg: Edema present.      Left lower leg: No edema.   Neurological:      Mental Status: He is alert and oriented to person, place, and time.      Gait: Gait normal.   Psychiatric:         Behavior: Behavior normal.             Lab Results   Component Value Date/Time    SODIUM 135 07/10/2024 08:38 AM    POTASSIUM 4.3 07/10/2024 08:38 AM    CHLORIDE 100 07/10/2024 08:38 AM    CO2 26 07/10/2024 08:38 AM    GLUCOSE 108 (H) 07/10/2024 08:38 AM    BUN 13 07/10/2024 08:38 AM    CREATININE 0.73 07/10/2024 08:38 AM    CREATININE 1.0 07/09/2008 05:05 AM      Lab Results   Component Value Date/Time    WBC 7.4 07/10/2024 08:38 AM    RBC 4.91 07/10/2024 08:38 AM    HEMOGLOBIN 15.4 07/10/2024 08:38 AM    HEMATOCRIT 45.8 07/10/2024 08:38 AM    MCV 93.3 07/10/2024 08:38 AM    MCH 31.4 07/10/2024 08:38 AM    MCHC 33.6 07/10/2024 08:38 AM    MPV 11.2 07/10/2024 08:38 AM    NEUTSPOLYS 58.50 07/10/2024 08:38 AM    LYMPHOCYTES 26.60 07/10/2024 08:38 AM    MONOCYTES 11.10 07/10/2024 08:38 AM    EOSINOPHILS 2.60 07/10/2024 08:38 AM    EOSINOPHILS 1 01/31/2023 10:24 PM    BASOPHILS 0.90 07/10/2024 08:38 AM      Lab Results   Component Value Date/Time    CHOLSTRLTOT 127 07/10/2024 08:38 AM    LDL 66 07/10/2024 08:38 AM    HDL 32 (A) 07/10/2024 08:38 AM    TRIGLYCERIDE 145 07/10/2024 08:38 AM       Lab Results   Component Value Date/Time    TROPONINT 14 05/31/2022 0952     No results found for: \"NTPROBNP\"  Assessment:   1. Hypertension, unspecified type  - lisinopril (PRINIVIL) 5 MG Tab; Take 1 Tablet by mouth " every day.  Dispense: 100 Tablet; Refill: 3  - metoprolol tartrate (LOPRESSOR) 25 MG Tab; Take 0.5 Tablets by mouth every day.  Dispense: 50 Tablet; Refill: 3    2. Atherosclerosis of aorta (HCC)    3. Coronary artery disease due to lipid rich plaque  - Empagliflozin (JARDIANCE) 10 MG Tab tablet; Take 1 Tablet by mouth every day.  Dispense: 100 Tablet; Refill: 3    4. Dyslipidemia    5. S/P CABG x 3 - 6/2015    6. Mild aortic stenosis    7. Type 2 diabetes mellitus without complication, without long-term current use of insulin (HCC)        Medical Decision Making:  Today's Assessment / Plan:   Hypertension  - Good control  - continue lisinopril 5 mg daily and metoprolol 12.5 mg daily  - goal < 130/80    Atherosclerosis of aorta  CAD s/p CABG x3 2015  Hyperlipidemia  DM2  -Most recent LDL 66  -Continue atorvastatin 80 every evening  -Goal of less than 70  -Check lipid panel in 12 months  -Continue warfarin and follow with anticoagulation clinic  -Continue asa 81 mg daily  -Start Jardiance 10 mg daily and follow with PCP for A1C  -Increase activity as tolerated  -Continue to make diet and lifestyle modifications     Mild Aortic Stenosis  -Continue to monitor symptoms for surveillance echocardiograms  -Stable    Return in about 1 year (around 9/4/2025) for Rebecca ORDONEZ  Sooner if problems.    DION [place].

## 2024-09-04 ENCOUNTER — OFFICE VISIT (OUTPATIENT)
Dept: CARDIOLOGY | Facility: MEDICAL CENTER | Age: 74
End: 2024-09-04
Payer: MEDICARE

## 2024-09-04 VITALS
OXYGEN SATURATION: 95 % | WEIGHT: 195 LBS | BODY MASS INDEX: 29.55 KG/M2 | SYSTOLIC BLOOD PRESSURE: 108 MMHG | HEIGHT: 68 IN | DIASTOLIC BLOOD PRESSURE: 70 MMHG | RESPIRATION RATE: 16 BRPM | HEART RATE: 55 BPM

## 2024-09-04 DIAGNOSIS — I70.0 ATHEROSCLEROSIS OF AORTA (HCC): ICD-10-CM

## 2024-09-04 DIAGNOSIS — Z95.1 S/P CABG X 3: ICD-10-CM

## 2024-09-04 DIAGNOSIS — E11.9 TYPE 2 DIABETES MELLITUS WITHOUT COMPLICATION, WITHOUT LONG-TERM CURRENT USE OF INSULIN (HCC): ICD-10-CM

## 2024-09-04 DIAGNOSIS — I25.83 CORONARY ARTERY DISEASE DUE TO LIPID RICH PLAQUE: ICD-10-CM

## 2024-09-04 DIAGNOSIS — I10 HYPERTENSION, UNSPECIFIED TYPE: ICD-10-CM

## 2024-09-04 DIAGNOSIS — I35.0 MILD AORTIC STENOSIS: ICD-10-CM

## 2024-09-04 DIAGNOSIS — I25.10 CORONARY ARTERY DISEASE DUE TO LIPID RICH PLAQUE: ICD-10-CM

## 2024-09-04 DIAGNOSIS — E78.5 DYSLIPIDEMIA: ICD-10-CM

## 2024-09-04 PROCEDURE — 99213 OFFICE O/P EST LOW 20 MIN: CPT

## 2024-09-04 PROCEDURE — 99214 OFFICE O/P EST MOD 30 MIN: CPT

## 2024-09-04 PROCEDURE — 3074F SYST BP LT 130 MM HG: CPT

## 2024-09-04 PROCEDURE — 3078F DIAST BP <80 MM HG: CPT

## 2024-09-04 RX ORDER — METOPROLOL TARTRATE 25 MG/1
12.5 TABLET, FILM COATED ORAL
Qty: 50 TABLET | Refills: 3 | Status: SHIPPED | OUTPATIENT
Start: 2024-09-04

## 2024-09-04 RX ORDER — EMPAGLIFLOZIN 10 MG/1
10 TABLET, FILM COATED ORAL DAILY
Qty: 100 TABLET | Refills: 3 | Status: SHIPPED | OUTPATIENT
Start: 2024-09-04

## 2024-09-04 RX ORDER — LISINOPRIL 5 MG/1
5 TABLET ORAL DAILY
Qty: 100 TABLET | Refills: 3 | Status: SHIPPED | OUTPATIENT
Start: 2024-09-04

## 2024-09-04 ASSESSMENT — ENCOUNTER SYMPTOMS
DYSPNEA ON EXERTION: 0
HEADACHES: 0
SHORTNESS OF BREATH: 0
PND: 0
DIZZINESS: 0
ORTHOPNEA: 0
SYNCOPE: 0
PALPITATIONS: 0
NEAR-SYNCOPE: 0
LIGHT-HEADEDNESS: 0

## 2024-09-04 ASSESSMENT — FIBROSIS 4 INDEX: FIB4 SCORE: 1.08

## 2024-09-13 ENCOUNTER — APPOINTMENT (OUTPATIENT)
Dept: VASCULAR LAB | Facility: MEDICAL CENTER | Age: 74
End: 2024-09-13
Attending: INTERNAL MEDICINE
Payer: MEDICARE

## 2024-09-17 ENCOUNTER — ANTICOAGULATION VISIT (OUTPATIENT)
Dept: VASCULAR LAB | Facility: MEDICAL CENTER | Age: 74
End: 2024-09-17
Attending: INTERNAL MEDICINE
Payer: MEDICARE

## 2024-09-17 DIAGNOSIS — G45.9 TIA (TRANSIENT ISCHEMIC ATTACK): ICD-10-CM

## 2024-09-17 LAB — INR PPP: 4.9 (ref 2–3.5)

## 2024-09-17 PROCEDURE — 85610 PROTHROMBIN TIME: CPT

## 2024-09-17 PROCEDURE — 99212 OFFICE O/P EST SF 10 MIN: CPT

## 2024-09-17 NOTE — PROGRESS NOTES
Anticoagulation Summary  As of 2024      INR goal:  2.0-3.0   TTR:  58.3% (9.2 y)   INR used for dosin.90 (2024)   Warfarin maintenance plan:  3.75 mg (7.5 mg x 0.5) every Mon, Wed; 7.5 mg (7.5 mg x 1) all other days   Weekly warfarin total:  45 mg   Plan last modified:  Jona RingD (2023)   Next INR check:  2024   Priority:  Acute   Target end date:  Indefinite    Indications    DVT (deep venous thrombosis) (HCC) (Resolved) [I82.409]  TIA (transient ischemic attack) [G45.9]  Pulmonary embolism [415.19] (Resolved) [I26.99]                 Anticoagulation Episode Summary       INR check location:  Anticoagulation Clinic    Preferred lab:  [facility]    Send INR reminders to:  --    Comments:  ASA 81 mg for TIA, CAD hx - [facility] per Dr. Wright 3/7/22          Anticoagulation Care Providers       Provider Role Specialty Phone number    Patricia Damon M.D. Referring Internal Medicine [phone]    Jona QuinnD Responsible      [facility] Responsible  [phone]                  Refer to Patient Findings for HPI:  Patient Findings       Positives:  Change in medications (Started on Jardiance), Change in diet/appetite (Reports eating cranberry products x 4 days and states he probably ate less greens that usual.)    Negatives:  Signs/symptoms of thrombosis, Signs/symptoms of bleeding, Laboratory test error suspected, Change in health, Change in alcohol use, Change in activity, Upcoming invasive procedure, Emergency department visit, Upcoming dental procedure, Missed doses, Extra doses, Hospital admission, Bruising, Other complaints            There were no vitals filed for this visit.  Pt declined vitals    Verified current warfarin dosing schedule.    Medications reconciled.  Pt is on antiplatelet therapy with ASA 81 mg for TIA and CAD.      A/P   INR is supratherapeutic  Reason(s) for out of range INR today: Decreased vitamin K intake  and cranberry products x 4 days.        Warfarin dosing recommendation: Hold warfarin x 2 days, then resume current warfarin dosing regimen.    Pt educated to contact our clinic with any changes in medications or s/s of bleeding or thrombosis. Pt is aware to seek immediate medical attention for falls, head injury or deep cuts.    Request pt to return in 1 week(s) per pt availability. Pt agrees.    Sinan Gregory, PharmD

## 2024-09-26 ENCOUNTER — ANTICOAGULATION VISIT (OUTPATIENT)
Dept: VASCULAR LAB | Facility: MEDICAL CENTER | Age: 74
End: 2024-09-26
Attending: INTERNAL MEDICINE
Payer: MEDICARE

## 2024-09-26 DIAGNOSIS — G45.9 TIA (TRANSIENT ISCHEMIC ATTACK): ICD-10-CM

## 2024-09-26 LAB — INR PPP: 1.9 (ref 2–3.5)

## 2024-09-26 PROCEDURE — 85610 PROTHROMBIN TIME: CPT

## 2024-09-26 PROCEDURE — 99211 OFF/OP EST MAY X REQ PHY/QHP: CPT | Performed by: NURSE PRACTITIONER

## 2024-09-26 NOTE — PROGRESS NOTES
Anticoagulation Summary  As of 2024      INR goal:  2.0-3.0   TTR:  58.3% (9.3 y)   INR used for dosin.90 (2024)   Warfarin maintenance plan:  3.75 mg (7.5 mg x 0.5) every Mon, Wed; 7.5 mg (7.5 mg x 1) all other days   Weekly warfarin total:  45 mg   Plan last modified:  MISTI BucioPPaulNPaul (2024)   Next INR check:  10/10/2024   Priority:  Acute   Target end date:  Indefinite    Indications    DVT (deep venous thrombosis) (HCC) (Resolved) [I82.409]  TIA (transient ischemic attack) [G45.9]  Pulmonary embolism [415.19] (Resolved) [I26.99]                 Anticoagulation Episode Summary       INR check location:  Anticoagulation Clinic    Preferred lab:  [facility]    Send INR reminders to:  --    Comments:  ASA 81 mg for TIA, CAD hx - [facility] per Dr. Wright 3/7/22          Anticoagulation Care Providers       Provider Role Specialty Phone number    Patricia Damon M.D. Referring Internal Medicine [phone]    Jona QuinnD Responsible      [facility] Responsible  [phone]                  Refer to Patient Findings for HPI:  Patient Findings       Negatives:  Signs/symptoms of thrombosis, Signs/symptoms of bleeding, Laboratory test error suspected, Change in health, Change in alcohol use, Change in activity, Upcoming invasive procedure, Emergency department visit, Upcoming dental procedure, Missed doses, Extra doses, Change in medications, Change in diet/appetite, Hospital admission, Bruising, Other complaints            There were no vitals filed for this visit.  Pt declined vitals    Verified current warfarin dosing schedule.    Medications reconciled.  Pt is on antiplatelet therapy with ASA 81 mg for TIA and CAD.       A/P   INR is slightly subtherapeutic today at 1.9, however, INR down from 4.9 last week with two dose holds. INR high likely from cranberries which he is no longer eating. He wants to resume his previous warfarin  regimen.  Reason(s) for out of range INR today: Determining dose requirements      Warfarin dosing recommendation: Continue regimen as listed above.    Pt educated to contact our clinic with any changes in medications or s/s of bleeding or thrombosis. Pt is aware to seek immediate medical attention for falls, head injury or deep cuts.    Request pt to return in 1 week(s). Pt declines despite warning of extending their follow up interval. Pt opts to RTC in 2 week(s).    TOREY Bucio.

## 2024-09-27 DIAGNOSIS — Z86.718 HISTORY OF DVT (DEEP VEIN THROMBOSIS): ICD-10-CM

## 2024-10-10 ENCOUNTER — ANTICOAGULATION VISIT (OUTPATIENT)
Dept: VASCULAR LAB | Facility: MEDICAL CENTER | Age: 74
End: 2024-10-10
Attending: INTERNAL MEDICINE
Payer: MEDICARE

## 2024-10-10 VITALS — SYSTOLIC BLOOD PRESSURE: 125 MMHG | HEART RATE: 51 BPM | DIASTOLIC BLOOD PRESSURE: 56 MMHG

## 2024-10-10 DIAGNOSIS — G45.9 TIA (TRANSIENT ISCHEMIC ATTACK): ICD-10-CM

## 2024-10-10 LAB — INR PPP: 2.3 (ref 2–3.5)

## 2024-10-10 PROCEDURE — 99211 OFF/OP EST MAY X REQ PHY/QHP: CPT

## 2024-10-10 PROCEDURE — 85610 PROTHROMBIN TIME: CPT

## 2024-10-17 ENCOUNTER — DOCUMENTATION (OUTPATIENT)
Dept: VASCULAR LAB | Facility: MEDICAL CENTER | Age: 74
End: 2024-10-17
Payer: MEDICARE

## 2024-10-17 RX ORDER — ATORVASTATIN CALCIUM 80 MG/1
80 TABLET, FILM COATED ORAL
Qty: 100 TABLET | Refills: 3 | Status: SHIPPED | OUTPATIENT
Start: 2024-10-17

## 2024-10-24 ENCOUNTER — DOCUMENTATION (OUTPATIENT)
Dept: VASCULAR LAB | Facility: MEDICAL CENTER | Age: 74
End: 2024-10-24
Payer: MEDICARE

## 2024-10-24 RX ORDER — DUTASTERIDE 0.5 MG/1
0.5 CAPSULE, LIQUID FILLED ORAL DAILY
Qty: 90 CAPSULE | Refills: 2 | Status: SHIPPED | OUTPATIENT
Start: 2024-10-24

## 2024-10-31 ENCOUNTER — ANTICOAGULATION VISIT (OUTPATIENT)
Dept: VASCULAR LAB | Facility: MEDICAL CENTER | Age: 74
End: 2024-10-31
Attending: INTERNAL MEDICINE
Payer: MEDICARE

## 2024-10-31 VITALS — HEART RATE: 55 BPM | SYSTOLIC BLOOD PRESSURE: 119 MMHG | DIASTOLIC BLOOD PRESSURE: 75 MMHG

## 2024-10-31 DIAGNOSIS — Z86.718 HISTORY OF DVT (DEEP VEIN THROMBOSIS): ICD-10-CM

## 2024-10-31 DIAGNOSIS — G45.9 TIA (TRANSIENT ISCHEMIC ATTACK): ICD-10-CM

## 2024-10-31 DIAGNOSIS — Z79.01 CHRONIC ANTICOAGULATION: ICD-10-CM

## 2024-10-31 LAB — INR PPP: 2.4 (ref 2–3.5)

## 2024-10-31 PROCEDURE — 85610 PROTHROMBIN TIME: CPT

## 2024-10-31 PROCEDURE — 99211 OFF/OP EST MAY X REQ PHY/QHP: CPT | Performed by: NURSE PRACTITIONER

## 2024-10-31 RX ORDER — WARFARIN SODIUM 7.5 MG/1
TABLET ORAL
Qty: 100 TABLET | Refills: 1 | Status: SHIPPED | OUTPATIENT
Start: 2024-10-31

## 2024-11-04 RX ORDER — DIVALPROEX SODIUM 125 MG/1
TABLET, DELAYED RELEASE ORAL
Qty: 200 TABLET | Refills: 3 | Status: SHIPPED | OUTPATIENT
Start: 2024-11-04

## 2024-11-04 NOTE — TELEPHONE ENCOUNTER
Received request via: Pharmacy    Was the patient seen in the last year in this department? Yes    Does the patient have an active prescription (recently filled or refills available) for medication(s) requested? No    Pharmacy Name:   [facility]/pharmacy #[id number] - [place] - [address]  [place] [address]  Phone: [phone] Fax: [phone]        Does the patient have long-term Plus and need 100-day supply? (This applies to ALL medications) Yes, quantity updated to 100 days

## 2024-11-12 RX ORDER — TRAZODONE HYDROCHLORIDE 50 MG/1
100 TABLET, FILM COATED ORAL NIGHTLY
Qty: 180 TABLET | Refills: 2 | OUTPATIENT
Start: 2024-11-12

## 2024-11-12 NOTE — TELEPHONE ENCOUNTER
Received request via: Patient    Was the patient seen in the last year in this department? Yes    Does the patient have an active prescription (recently filled or refills available) for medication(s) requested? No      Does the patient have senior living Plus and need 100-day supply? (This applies to ALL medications) Yes, quantity updated to 100 days

## 2024-11-25 PROCEDURE — RXMED WILLOW AMBULATORY MEDICATION CHARGE

## 2024-11-25 RX ORDER — DIVALPROEX SODIUM 250 MG/1
TABLET, DELAYED RELEASE ORAL
Qty: 200 TABLET | Refills: 0 | Status: SHIPPED | OUTPATIENT
Start: 2024-11-25

## 2024-11-25 NOTE — TELEPHONE ENCOUNTER
Received request via: Pharmacy    Was the patient seen in the last year in this department? Yes    Does the patient have an active prescription (recently filled or refills available) for medication(s) requested? Yes. Pharmacy change    Pharmacy Name:   [facility]    Does the patient have [facility] Plus and need 100-day supply? (This applies to ALL medications) Yes, quantity updated to 100 days

## 2024-11-26 ENCOUNTER — PHARMACY VISIT (OUTPATIENT)
Dept: PHARMACY | Facility: MEDICAL CENTER | Age: 74
End: 2024-11-26
Payer: COMMERCIAL

## 2024-12-02 ENCOUNTER — ANTICOAGULATION VISIT (OUTPATIENT)
Dept: VASCULAR LAB | Facility: MEDICAL CENTER | Age: 74
End: 2024-12-02
Attending: INTERNAL MEDICINE
Payer: MEDICARE

## 2024-12-02 VITALS — SYSTOLIC BLOOD PRESSURE: 123 MMHG | DIASTOLIC BLOOD PRESSURE: 60 MMHG | HEART RATE: 57 BPM

## 2024-12-02 DIAGNOSIS — G45.9 TIA (TRANSIENT ISCHEMIC ATTACK): ICD-10-CM

## 2024-12-02 LAB — INR PPP: 4.6 (ref 2–3.5)

## 2024-12-02 PROCEDURE — 99212 OFFICE O/P EST SF 10 MIN: CPT

## 2024-12-02 PROCEDURE — 85610 PROTHROMBIN TIME: CPT

## 2024-12-02 NOTE — PROGRESS NOTES
Anticoagulation Summary  As of 2024      INR goal:  2.0-3.0   TTR:  58.3% (9.5 y)   INR used for dosin.60 (2024)   Warfarin maintenance plan:  3.75 mg (7.5 mg x 0.5) every Mon, Wed; 7.5 mg (7.5 mg x 1) all other days   Weekly warfarin total:  45 mg   Plan last modified:  MISTI BucioPLESLIE (2024)   Next INR check:  2024   Priority:  Acute   Target end date:  Indefinite    Indications    DVT (deep venous thrombosis) (HCC) (Resolved) [I82.409]  TIA (transient ischemic attack) [G45.9]  Pulmonary embolism [415.19] (Resolved) [I26.99]                 Anticoagulation Episode Summary       INR check location:  Anticoagulation Clinic    Preferred lab:  [facility]    Send INR reminders to:  --    Comments:  ASA 81 mg for TIA, CAD hx - [facility] per Dr. Wright 3/7/22          Anticoagulation Care Providers       Provider Role Specialty Phone number    Patricia Damon M.D. Referring Internal Medicine [phone]    Jona QuinnD Responsible      [facility] Responsible  [phone]          Refer to Patient Findings for HPI:  Patient Findings       Positives:  Change in diet/appetite (pt had few tablespoonfuls of cranberry yesterday)    Negatives:  Signs/symptoms of thrombosis, Signs/symptoms of bleeding, Laboratory test error suspected, Change in health, Change in alcohol use, Change in activity, Upcoming invasive procedure, Emergency department visit, Upcoming dental procedure, Missed doses, Extra doses, Change in medications, Hospital admission, Bruising, Other complaints          Vitals:    24 1020   BP: 123/60   Pulse: (!) 57       Patient seen in clinic today for follow up on anticoagulation therapy with warfarin (a high risk medication) for hx of DVT, PE and TIA  Verified current warfarin dosing schedule.  Patient denies any missed doses of warfarin.    Medications reconciled   Pt is on ASA 81mg QD as antiplatelet therapy for TIA and CAD and  must be reviewed again on next follow up.      A/P   INR is SUPRA-therapeutic today at 4.6.     Warfarin dosing recommendation: Patient will HOLD warfarin x 2 days, then will resume his current dosing regimen.   Patient was asked to return in 2 weeks, but due to knowing cause of elevated INR, pt prefers to return in 4 weeks instead.     Pt educated to contact our clinic with any changes in medications or s/s of bleeding or thrombosis. Pt is aware to seek immediate medical attention for falls, head injury or deep cuts.    Follow up appointment in 4 week(s).    Next appt: Mon, Dec 30 @ 10:30am     Lupe Everett PharmD

## 2024-12-11 ENCOUNTER — PHARMACY VISIT (OUTPATIENT)
Dept: PHARMACY | Facility: MEDICAL CENTER | Age: 74
End: 2024-12-11
Payer: COMMERCIAL

## 2024-12-11 PROCEDURE — RXMED WILLOW AMBULATORY MEDICATION CHARGE

## 2024-12-23 PROCEDURE — RXMED WILLOW AMBULATORY MEDICATION CHARGE

## 2024-12-26 ENCOUNTER — PHARMACY VISIT (OUTPATIENT)
Dept: PHARMACY | Facility: MEDICAL CENTER | Age: 74
End: 2024-12-26
Payer: COMMERCIAL

## 2024-12-30 ENCOUNTER — ANTICOAGULATION VISIT (OUTPATIENT)
Dept: VASCULAR LAB | Facility: MEDICAL CENTER | Age: 74
End: 2024-12-30
Attending: INTERNAL MEDICINE
Payer: MEDICARE

## 2024-12-30 DIAGNOSIS — G45.9 TIA (TRANSIENT ISCHEMIC ATTACK): ICD-10-CM

## 2024-12-30 LAB — INR PPP: 4.8 (ref 2–3.5)

## 2024-12-30 PROCEDURE — 99212 OFFICE O/P EST SF 10 MIN: CPT

## 2024-12-30 PROCEDURE — 85610 PROTHROMBIN TIME: CPT

## 2024-12-30 NOTE — PROGRESS NOTES
Anticoagulation Summary  As of 2024      INR goal:  2.0-3.0   TTR:  57.8% (9.5 y)   INR used for dosin.80 (2024)   Warfarin maintenance plan:  3.75 mg (7.5 mg x 0.5) every Mon, Wed; 7.5 mg (7.5 mg x 1) all other days   Weekly warfarin total:  45 mg   Plan last modified:  Miya Acosta A.P.NPaul (2024)   Next INR check:  --   Priority:  Acute   Target end date:  Indefinite    Indications    DVT (deep venous thrombosis) (HCC) (Resolved) [I82.409]  TIA (transient ischemic attack) [G45.9]  Pulmonary embolism [415.19] (Resolved) [I26.99]                 Anticoagulation Episode Summary       INR check location:  Anticoagulation Clinic    Preferred lab:  [facility]    Send INR reminders to:  --    Comments:  ASA 81 mg for TIA, CAD hx - [facility] per Dr. Wright 3/7/22          Anticoagulation Care Providers       Provider Role Specialty Phone number    Patricia Damon M.D. Referring Internal Medicine [phone]    Jona QuinnD Responsible      [facility] Services Responsible  [phone]                  Refer to Patient Findings for HPI:  Patient Findings       Positives:  Extra doses (possible extra dose some time this week)    Negatives:  Signs/symptoms of thrombosis, Signs/symptoms of bleeding, Laboratory test error suspected, Change in health, Change in alcohol use, Change in activity, Upcoming invasive procedure, Emergency department visit, Upcoming dental procedure, Missed doses, Change in medications, Change in diet/appetite, Hospital admission, Bruising, Other complaints            There were no vitals filed for this visit.  Pt declined vitals    Verified current warfarin dosing schedule.    Medications reconciled.  Pt is on antiplatelet therapy with aspirin 81mg for CAD.      A/P   INR is supratherapeutic  Reason(s) for out of range INR today:  unclear, has been in high stress situation for past month or so which definitely may contribute.        Warfarin  dosing recommendation: Hold today and tomorrow's dose, then continue with decreased regimen of 3.75mg MWF, 7.5mg AOD     Pt educated to contact our clinic with any changes in medications or s/s of bleeding or thrombosis. Pt is aware to seek immediate medical attention for falls, head injury or deep cuts.    Request pt to return in 1 week(s). Pt declines despite warning of extending their follow up interval. Pt opts to RTC in 2 week(s).    Emilie Lopez, JonaD

## 2025-01-13 ENCOUNTER — ANTICOAGULATION VISIT (OUTPATIENT)
Dept: VASCULAR LAB | Facility: MEDICAL CENTER | Age: 75
End: 2025-01-13
Attending: INTERNAL MEDICINE
Payer: MEDICARE

## 2025-01-13 VITALS — HEART RATE: 57 BPM | SYSTOLIC BLOOD PRESSURE: 138 MMHG | DIASTOLIC BLOOD PRESSURE: 59 MMHG

## 2025-01-13 DIAGNOSIS — D68.69 SECONDARY HYPERCOAGULABLE STATE (HCC): Chronic | ICD-10-CM

## 2025-01-13 DIAGNOSIS — G45.9 TIA (TRANSIENT ISCHEMIC ATTACK): ICD-10-CM

## 2025-01-13 DIAGNOSIS — I82.502 LEG DVT (DEEP VENOUS THROMBOEMBOLISM), CHRONIC, LEFT (HCC): ICD-10-CM

## 2025-01-13 LAB — INR PPP: 1.3 (ref 2–3.5)

## 2025-01-13 PROCEDURE — 99212 OFFICE O/P EST SF 10 MIN: CPT | Performed by: NURSE PRACTITIONER

## 2025-01-13 PROCEDURE — RXMED WILLOW AMBULATORY MEDICATION CHARGE: Performed by: FAMILY MEDICINE

## 2025-01-13 PROCEDURE — 85610 PROTHROMBIN TIME: CPT

## 2025-01-13 NOTE — Clinical Note
Maria Hale!  Could you let me know the cost Xarelto 20 mg and Eliquis 5 mg for this patient when you get a chance? He's thinking about switching from warfarin. Thanks so much!

## 2025-01-13 NOTE — PROGRESS NOTES
Addendum (1/15/24) per our rx coordinator:    The estimated copay price for either Xarelto or Eliquis will be $117.50 for 90 or 100DS or 47 for 30DS. If pt would like to use our mail order pharmacy, we can get his copay down to $94 for 90 or 100DS.     Will discuss with pt next visit along with doac ddi w/ depakote.    Anticoagulation Summary  As of 2025      INR goal:  2.0-3.0   TTR:  57.7% (9.6 y)   INR used for dosin.30 (2025)   Warfarin maintenance plan:  3.75 mg (7.5 mg x 0.5) every Mon, Wed, Fri; 7.5 mg (7.5 mg x 1) all other days   Weekly warfarin total:  41.25 mg   Plan last modified:  Jona HugginsD (2024)   Next INR check:  2025   Priority:  Acute   Target end date:  Indefinite    Indications    DVT (deep venous thrombosis) (HCC) (Resolved) [I82.409]  TIA (transient ischemic attack) [G45.9]  Pulmonary embolism [415.19] (Resolved) [I26.99]                 Anticoagulation Episode Summary       INR check location:  Anticoagulation Clinic    Preferred lab:  [facility]    Send INR reminders to:  --    Comments:  ASA 81 mg for TIA, CAD hx - [facility] per Dr. Wright 3/7/22          Anticoagulation Care Providers       Provider Role Specialty Phone number    Patricia Damon M.D. Referring Internal Medicine [phone]    Jona QuinnD Responsible      [facility] Anticoagulation Services Responsible  [phone]                  Refer to Patient Findings for HPI:  Patient Findings       Positives:  Change in diet/appetite    Negatives:  Signs/symptoms of thrombosis, Signs/symptoms of bleeding, Laboratory test error suspected, Change in health, Change in alcohol use, Change in activity, Upcoming invasive procedure, Emergency department visit, Upcoming dental procedure, Missed doses, Extra doses, Change in medications, Hospital admission, Bruising, Other complaints    Comments:  He has been eating more greens lately.            Vitals:    25 1002   BP:  138/59   Pulse: (!) 57       Verified current warfarin dosing schedule.    Medications reconciled.  Pt is on antiplatelet therapy with ASA 81 mg for TIA, CAD per cards.      A/P   INR is subtherapeutic today at 1.3. INR decreased from 4.8 last visit. No recent VTEs/TIA. Reminded pt to stay consistent with his vit k intake. He is planning to continue with his current (increased) amount of greens. Discussed option of DOAC. He does take Depakote which may reduce the efficacy of DOACs. TTR 57.7%. He is interested in possibly transitioning but wants to find out the costs. Will discuss with our rx coordinators and let pt know next visit.   Reason(s) for out of range INR today: Increased vitamin K intake      Warfarin dosing recommendation: Take 7.5 mg tonight, then follow your dosing calendar.    Pt educated to contact our clinic with any changes in medications or s/s of bleeding or thrombosis. Pt is aware to seek immediate medical attention for falls, head injury or deep cuts.    Request pt to return in 1 week(s). Pt agrees.    TOREY Bucio.    Cc: [facility] rx coordinators

## 2025-01-17 PROCEDURE — RXMED WILLOW AMBULATORY MEDICATION CHARGE: Performed by: FAMILY MEDICINE

## 2025-01-21 ENCOUNTER — ANTICOAGULATION VISIT (OUTPATIENT)
Dept: VASCULAR LAB | Facility: MEDICAL CENTER | Age: 75
End: 2025-01-21
Attending: INTERNAL MEDICINE
Payer: MEDICARE

## 2025-01-21 DIAGNOSIS — Z86.718 HISTORY OF DVT (DEEP VEIN THROMBOSIS): ICD-10-CM

## 2025-01-21 DIAGNOSIS — Z79.01 CHRONIC ANTICOAGULATION: ICD-10-CM

## 2025-01-21 DIAGNOSIS — G45.9 TIA (TRANSIENT ISCHEMIC ATTACK): ICD-10-CM

## 2025-01-21 LAB — INR PPP: 2.1 (ref 2–3.5)

## 2025-01-21 PROCEDURE — RXMED WILLOW AMBULATORY MEDICATION CHARGE: Performed by: NURSE PRACTITIONER

## 2025-01-21 PROCEDURE — 99212 OFFICE O/P EST SF 10 MIN: CPT | Performed by: PHARMACIST

## 2025-01-21 PROCEDURE — 85610 PROTHROMBIN TIME: CPT

## 2025-01-21 RX ORDER — DIVALPROEX SODIUM 250 MG/1
TABLET, DELAYED RELEASE ORAL
Qty: 200 TABLET | Refills: 0 | Status: SHIPPED | OUTPATIENT
Start: 2025-01-21

## 2025-01-21 RX ORDER — WARFARIN SODIUM 7.5 MG/1
TABLET ORAL
Qty: 100 TABLET | Refills: 1 | Status: SHIPPED | OUTPATIENT
Start: 2025-01-21

## 2025-01-21 NOTE — TELEPHONE ENCOUNTER
Received request via: Pharmacy    Was the patient seen in the last year in this department? Yes    Does the patient have an active prescription (recently filled or refills available) for medication(s) requested? No    Pharmacy Name: [facility]/pharmacy #[id number] - [place], [place] - [address]      Does the patient have care home Plus and need 100-day supply? (This applies to ALL medications) Yes, quantity updated to 100 days

## 2025-01-21 NOTE — PROGRESS NOTES
Anticoagulation Summary  As of 2025      INR goal:  2.0-3.0   TTR:  57.6% (9.6 y)   INR used for dosin.10 (2025)   Warfarin maintenance plan:  3.75 mg (7.5 mg x 0.5) every Wed; 7.5 mg (7.5 mg x 1) all other days   Weekly warfarin total:  48.75 mg   Plan last modified:  Jona GrayD (2025)   Next INR check:  2025   Priority:  Acute   Target end date:  Indefinite    Indications    DVT (deep venous thrombosis) (HCC) (Resolved) [I82.409]  TIA (transient ischemic attack) [G45.9]  Pulmonary embolism [415.19] (Resolved) [I26.99]                 Anticoagulation Episode Summary       INR check location:  Anticoagulation Clinic    Preferred lab:  [facility]    Send INR reminders to:  --    Comments:  ASA 81 mg for TIA, CAD hx - [facility] per Dr. Wright 3/7/22          Anticoagulation Care Providers       Provider Role Specialty Phone number    Patricia Damon M.D. Referring Internal Medicine [phone]    Jona QuinnD Responsible      [facility] Anticoagulation Services Responsible  [phone]                  Refer to Patient Findings for HPI:  Patient Findings       Positives:  Change in diet/appetite (Eating more salad lately - plans to continue.)    Negatives:  Signs/symptoms of thrombosis, Signs/symptoms of bleeding, Laboratory test error suspected, Change in health, Change in alcohol use, Change in activity, Upcoming invasive procedure, Emergency department visit, Upcoming dental procedure, Missed doses, Extra doses, Change in medications, Hospital admission, Bruising, Other complaints            There were no vitals filed for this visit.  (Taken if pt amenable)    Verified current warfarin dosing schedule.    Medications reconciled: Yes  Pt is on antiplatelet therapy with ASA 81 mg for hx of TIA & CAD per cards.       A/P   INR is therapeutic    Warfarin dosing recommendation: Instructed pt to begin newly increased regimen of 3.75 mg Weds and 7.5 mg ROW.    Of  note, pt declines transition to DOAC. Prefers to remain on warfarin.    Pt educated to contact our clinic with any changes in medications or s/s of bleeding or thrombosis. Pt is aware to seek immediate medical attention for falls, head injury or deep cuts.    Request pt to return in 1 week(s). Pt agrees.    Tiburcio Baker, JonaD, BCACP

## 2025-01-22 ENCOUNTER — PHARMACY VISIT (OUTPATIENT)
Dept: PHARMACY | Facility: MEDICAL CENTER | Age: 75
End: 2025-01-22
Payer: COMMERCIAL

## 2025-01-28 ENCOUNTER — ANTICOAGULATION VISIT (OUTPATIENT)
Dept: VASCULAR LAB | Facility: MEDICAL CENTER | Age: 75
End: 2025-01-28
Attending: INTERNAL MEDICINE
Payer: MEDICARE

## 2025-01-28 DIAGNOSIS — G45.9 TIA (TRANSIENT ISCHEMIC ATTACK): ICD-10-CM

## 2025-01-28 LAB — INR PPP: 2.7 (ref 2–3.5)

## 2025-01-28 PROCEDURE — 99211 OFF/OP EST MAY X REQ PHY/QHP: CPT | Performed by: PHARMACIST

## 2025-01-28 PROCEDURE — 85610 PROTHROMBIN TIME: CPT

## 2025-01-28 NOTE — PROGRESS NOTES
Anticoagulation Summary  As of 2025      INR goal:  2.0-3.0   TTR:  57.7% (9.6 y)   INR used for dosin.70 (2025)   Warfarin maintenance plan:  3.75 mg (7.5 mg x 0.5) every Wed; 7.5 mg (7.5 mg x 1) all other days   Weekly warfarin total:  48.75 mg   Plan last modified:  Jona GrayD (2025)   Next INR check:  2025   Priority:  Acute   Target end date:  Indefinite    Indications    DVT (deep venous thrombosis) (HCC) (Resolved) [I82.409]  TIA (transient ischemic attack) [G45.9]  Pulmonary embolism [415.19] (Resolved) [I26.99]                 Anticoagulation Episode Summary       INR check location:  Anticoagulation Clinic    Preferred lab:  [facility]    Send INR reminders to:  --    Comments:  ASA 81 mg for TIA, CAD hx - [facility] per Dr. Wright 3/7/22          Anticoagulation Care Providers       Provider Role Specialty Phone number    Patricia Damon M.D. Referring Internal Medicine [phone]    Jona QuinnD Responsible      [facility] Anticoagulation Services Responsible  [phone]                  Refer to Patient Findings for HPI:  Patient Findings       Negatives:  Signs/symptoms of thrombosis, Signs/symptoms of bleeding, Laboratory test error suspected, Change in health, Change in alcohol use, Change in activity, Upcoming invasive procedure, Emergency department visit, Upcoming dental procedure, Missed doses, Extra doses, Change in medications, Change in diet/appetite, Hospital admission, Bruising, Other complaints            There were no vitals filed for this visit.  (Taken if pt amenable)    Verified current warfarin dosing schedule.    Medications reconciled: Yes  Pt is on antiplatelet therapy with ASA 81 mg for hx of TIA & CAD per cards.       A/P   INR is therapeutic    Warfarin dosing recommendation: Instructed pt to continue on with current regimen.    Pt educated to contact our clinic with any changes in medications or s/s of bleeding or  thrombosis. Pt is aware to seek immediate medical attention for falls, head injury or deep cuts.    Request pt to return in 2 week(s). Pt agrees.    Tiburcio Baker, JonaD, BCACP

## 2025-02-10 PROCEDURE — RXMED WILLOW AMBULATORY MEDICATION CHARGE: Performed by: FAMILY MEDICINE

## 2025-02-11 ENCOUNTER — PHARMACY VISIT (OUTPATIENT)
Dept: PHARMACY | Facility: MEDICAL CENTER | Age: 75
End: 2025-02-11
Payer: COMMERCIAL

## 2025-02-25 ENCOUNTER — ANTICOAGULATION VISIT (OUTPATIENT)
Dept: VASCULAR LAB | Facility: MEDICAL CENTER | Age: 75
End: 2025-02-25
Attending: INTERNAL MEDICINE
Payer: MEDICARE

## 2025-02-25 DIAGNOSIS — G45.9 TIA (TRANSIENT ISCHEMIC ATTACK): ICD-10-CM

## 2025-02-25 LAB — INR PPP: 5.1 (ref 2–3.5)

## 2025-02-25 PROCEDURE — 99212 OFFICE O/P EST SF 10 MIN: CPT | Performed by: PHARMACIST

## 2025-02-25 PROCEDURE — 85610 PROTHROMBIN TIME: CPT

## 2025-02-25 NOTE — PROGRESS NOTES
Anticoagulation Summary  As of 2025      INR goal:  2.0-3.0   TTR:  57.3% (9.7 y)   INR used for dosin.10 (2025)   Warfarin maintenance plan:  3.75 mg (7.5 mg x 0.5) every Wed, Sat; 7.5 mg (7.5 mg x 1) all other days   Weekly warfarin total:  45 mg   Plan last modified:  Jona GrayD (2025)   Next INR check:  3/4/2025   Priority:  Acute   Target end date:  Indefinite    Indications    DVT (deep venous thrombosis) (HCC) (Resolved) [I82.409]  TIA (transient ischemic attack) [G45.9]  Pulmonary embolism [415.19] (Resolved) [I26.99]                 Anticoagulation Episode Summary       INR check location:  Anticoagulation Clinic    Preferred lab:  [facility]    Send INR reminders to:  --    Comments:  ASA 81 mg for TIA, CAD hx - [facility] per Dr. Wright 3/7/22          Anticoagulation Care Providers       Provider Role Specialty Phone number    Patricia Damon M.D. Referring Internal Medicine [phone]    Jona QuinnD Responsible      [facility] Anticoagulation Services Responsible  [phone]                  Refer to Patient Findings for HPI:  Patient Findings       Positives:  Change in diet/appetite (Not eating as many greens lately - plans to continue)    Negatives:  Signs/symptoms of thrombosis, Signs/symptoms of bleeding, Laboratory test error suspected, Change in health, Change in alcohol use, Change in activity, Upcoming invasive procedure, Emergency department visit, Upcoming dental procedure, Missed doses, Extra doses, Change in medications, Hospital admission, Bruising, Other complaints            Date Referral Placed: 24      There were no vitals filed for this visit.  (Taken if pt amenable)    Verified current warfarin dosing schedule.    Medications reconciled.  Pt is on antiplatelet therapy with ASA 81 mg for hx of TIA & CAD per cards.       A/P   INR is supratherapeutic    Warfarin dosing recommendation: Instructed pt to hold x 2 doses and to then  begin newly decreased regimen of 3.75 mg Wed/Sat and 7.5 mg ROW.    Pt educated to contact our clinic with any changes in medications or s/s of bleeding or thrombosis. Pt is aware to seek immediate medical attention for falls, head injury or deep cuts.    Request pt to return in 1 week(s). Pt agrees.    Tiburcio Baker, JonaD, BCACP

## 2025-03-06 ENCOUNTER — ANTICOAGULATION VISIT (OUTPATIENT)
Dept: VASCULAR LAB | Facility: MEDICAL CENTER | Age: 75
End: 2025-03-06
Attending: INTERNAL MEDICINE
Payer: MEDICARE

## 2025-03-06 VITALS — HEART RATE: 52 BPM | DIASTOLIC BLOOD PRESSURE: 55 MMHG | SYSTOLIC BLOOD PRESSURE: 124 MMHG

## 2025-03-06 DIAGNOSIS — G45.9 TIA (TRANSIENT ISCHEMIC ATTACK): ICD-10-CM

## 2025-03-06 LAB — INR PPP: 3.8 (ref 2–3.5)

## 2025-03-06 PROCEDURE — 99212 OFFICE O/P EST SF 10 MIN: CPT | Performed by: NURSE PRACTITIONER

## 2025-03-06 PROCEDURE — 85610 PROTHROMBIN TIME: CPT

## 2025-03-06 NOTE — PROGRESS NOTES
Anticoagulation Summary  As of 3/6/2025      INR goal:  2.0-3.0   TTR:  57.2% (9.7 y)   INR used for dosing:  3.80 (3/6/2025)   Warfarin maintenance plan:  3.75 mg (7.5 mg x 0.5) every Mon, Wed, Fri; 7.5 mg (7.5 mg x 1) all other days   Weekly warfarin total:  41.25 mg   Plan last modified:  LAMAR Bucio (3/6/2025)   Next INR check:  3/17/2025   Priority:  Acute   Target end date:  Indefinite    Indications    DVT (deep venous thrombosis) (HCC) (Resolved) [I82.409]  TIA (transient ischemic attack) [G45.9]  Pulmonary embolism [415.19] (Resolved) [I26.99]                 Anticoagulation Episode Summary       INR check location:  Anticoagulation Clinic    Preferred lab:  [email or web address]    Send INR reminders to:  --    Comments:  ASA 81 mg for TIA, CAD hx - [facility] per Dr. Wright 3/7/22          Anticoagulation Care Providers       Provider Role Specialty Phone number    Patricia Damon M.D. Referring Internal Medicine [phone]    Jona QuinnD Responsible      [facility] Anticoagulation Services Responsible  [phone]                Refer to Patient Findings for HPI:  Patient Findings       Negatives:  Signs/symptoms of thrombosis, Signs/symptoms of bleeding, Laboratory test error suspected, Change in health, Change in alcohol use, Change in activity, Upcoming invasive procedure, Emergency department visit, Upcoming dental procedure, Missed doses, Extra doses, Change in medications, Change in diet/appetite, Hospital admission, Bruising, Other complaints            Date Referral Placed:9/27/24      Vitals:  Vitals:    03/06/25 1326   BP: 124/55   Pulse: (!) 52       Verified current warfarin dosing schedule.    Medications reconciled.  Pt is on antiplatelet therapy with ASA 81 mg for TIA.      A/P   INR is supratherapeutic today at 3.8. INR down from 5.1 last week. Will reduce his regimen further.    Reason(s) for out of range INR today: Determining dose requirements      Warfarin dosing  recommendation: Began taking 3.75 mg M-W-F, 7.5 mg AODs.    Pt educated to contact our clinic with any changes in medications or s/s of bleeding or thrombosis. Pt is aware to seek immediate medical attention for falls, head injury or deep cuts.    Request pt to return in 1.5 week(s). Pt agrees.    TOREY Bucio.

## 2025-03-11 PROCEDURE — RXMED WILLOW AMBULATORY MEDICATION CHARGE

## 2025-03-12 ENCOUNTER — PHARMACY VISIT (OUTPATIENT)
Dept: PHARMACY | Facility: MEDICAL CENTER | Age: 75
End: 2025-03-12
Payer: COMMERCIAL

## 2025-03-17 ENCOUNTER — ANTICOAGULATION VISIT (OUTPATIENT)
Dept: VASCULAR LAB | Facility: MEDICAL CENTER | Age: 75
End: 2025-03-17
Attending: INTERNAL MEDICINE
Payer: MEDICARE

## 2025-03-17 DIAGNOSIS — G45.9 TIA (TRANSIENT ISCHEMIC ATTACK): ICD-10-CM

## 2025-03-17 LAB — INR PPP: 4.1 (ref 2–3.5)

## 2025-03-17 PROCEDURE — 85610 PROTHROMBIN TIME: CPT

## 2025-03-17 PROCEDURE — 99212 OFFICE O/P EST SF 10 MIN: CPT

## 2025-03-17 NOTE — PROGRESS NOTES
Anticoagulation Summary  As of 3/17/2025      INR goal:  2.0-3.0   TTR:  57.0% (9.7 y)   INR used for dosin.10 (3/17/2025)   Warfarin maintenance plan:  3.75 mg (7.5 mg x 0.5) every Mon, Wed, Fri; 7.5 mg (7.5 mg x 1) all other days   Weekly warfarin total:  41.25 mg   Plan last modified:  LAMAR Bucio (3/6/2025)   Next INR check:  3/24/2025   Priority:  Acute   Target end date:  Indefinite    Indications    DVT (deep venous thrombosis) (HCC) (Resolved) [I82.409]  TIA (transient ischemic attack) [G45.9]  Pulmonary embolism [415.19] (Resolved) [I26.99]                 Anticoagulation Episode Summary       INR check location:  Anticoagulation Clinic    Preferred lab:  [facility]    Send INR reminders to:  --    Comments:  ASA 81 mg for TIA, CAD hx - [facility] per Dr. Wright 3/7/22          Anticoagulation Care Providers       Provider Role Specialty Phone number    Patricia Damon M.D. Referring Internal Medicine [phone]    Jona QuinnD Responsible      [facility] Anticoagulation Services Responsible  [phone]          Refer to Patient Findings for HPI:  Patient Findings       Positives:  Extra doses (pt forgot to take 3.75mg on friday and took 7.5mg), Change in diet/appetite (eating less greens overall)    Negatives:  Signs/symptoms of thrombosis, Signs/symptoms of bleeding, Laboratory test error suspected, Change in health, Change in alcohol use, Change in activity, Upcoming invasive procedure, Emergency department visit, Upcoming dental procedure, Missed doses, Change in medications, Hospital admission, Bruising, Other complaints          Date Referral Placed: 24    Vitals:  There were no vitals filed for this visit.  Pt declined vitals    Verified current warfarin dosing schedule.    Medications reconciled.  Pt is on antiplatelet therapy with ASA 81mg for TIA.      A/P   INR is supratherapeutic  Reason(s) for out of range INR today: Determining dose requirements and  Decreased vitamin K intake      Warfarin dosing recommendation: Hold TODAY AND TOMORROW, then resume newly reduced weekly regimen. Patient will retest again in 1 week.     Pt educated to contact our clinic with any changes in medications or s/s of bleeding or thrombosis. Pt is aware to seek immediate medical attention for falls, head injury or deep cuts.    Request pt to return in 1 week(s). Pt agrees.    Lupe TenorioD

## 2025-03-24 ENCOUNTER — ANTICOAGULATION VISIT (OUTPATIENT)
Dept: VASCULAR LAB | Facility: MEDICAL CENTER | Age: 75
End: 2025-03-24
Attending: INTERNAL MEDICINE
Payer: MEDICARE

## 2025-03-24 DIAGNOSIS — G45.9 TIA (TRANSIENT ISCHEMIC ATTACK): ICD-10-CM

## 2025-03-24 LAB — INR PPP: 2.1 (ref 2–3.5)

## 2025-03-24 PROCEDURE — 99211 OFF/OP EST MAY X REQ PHY/QHP: CPT

## 2025-03-24 PROCEDURE — 85610 PROTHROMBIN TIME: CPT

## 2025-03-24 NOTE — PROGRESS NOTES
Anticoagulation Summary  As of 3/24/2025      INR goal:  2.0-3.0   TTR:  57.0% (9.8 y)   INR used for dosin.10 (3/24/2025)   Warfarin maintenance plan:  3.75 mg (7.5 mg x 0.5) every Mon, Wed, Fri; 7.5 mg (7.5 mg x 1) all other days   Weekly warfarin total:  41.25 mg   Plan last modified:  Jona HugginsD (3/24/2025)   Next INR check:  2025   Priority:  Acute   Target end date:  Indefinite    Indications    DVT (deep venous thrombosis) (HCC) (Resolved) [I82.409]  TIA (transient ischemic attack) [G45.9]  Pulmonary embolism [415.19] (Resolved) [I26.99]                 Anticoagulation Episode Summary       INR check location:  Anticoagulation Clinic    Preferred lab:  [facility]    Send INR reminders to:  --    Comments:  ASA 81 mg for TIA, CAD hx - [facility] per Dr. Wright 3/7/22          Anticoagulation Care Providers       Provider Role Specialty Phone number    Patricia Damon M.D. Referring Internal Medicine [phone]    Jona QuinnD Responsible      [facility] Anticoagulation Services Responsible  [phone]                Refer to Patient Findings for HPI:  Patient Findings       Negatives:  Signs/symptoms of thrombosis, Signs/symptoms of bleeding, Laboratory test error suspected, Change in health, Change in alcohol use, Change in activity, Upcoming invasive procedure, Emergency department visit, Upcoming dental procedure, Missed doses, Extra doses, Change in medications, Change in diet/appetite, Hospital admission, Bruising, Other complaints              Vitals:  There were no vitals filed for this visit.  Pt declined vitals    Verified current warfarin dosing schedule.    Medications reconciled.  Pt is not on antiplatelet therapy.      A/P   INR is therapeutic  Reason(s) for out of range INR today: N/A      Warfarin dosing recommendation: Continue regimen as listed above.    Pt educated to contact our clinic with any changes in medications or s/s of bleeding or  thrombosis. Pt is aware to seek immediate medical attention for falls, head injury or deep cuts.    Request pt to return in 2 week(s). Pt agrees.     Emilie Lopez, JonaD

## 2025-03-26 ENCOUNTER — APPOINTMENT (OUTPATIENT)
Dept: RADIOLOGY | Facility: MEDICAL CENTER | Age: 75
End: 2025-03-26
Attending: EMERGENCY MEDICINE
Payer: MEDICARE

## 2025-03-26 ENCOUNTER — HOSPITAL ENCOUNTER (EMERGENCY)
Facility: MEDICAL CENTER | Age: 75
End: 2025-03-26
Attending: EMERGENCY MEDICINE
Payer: MEDICARE

## 2025-03-26 VITALS
OXYGEN SATURATION: 92 % | BODY MASS INDEX: 29.98 KG/M2 | DIASTOLIC BLOOD PRESSURE: 63 MMHG | HEIGHT: 67 IN | TEMPERATURE: 96.8 F | RESPIRATION RATE: 17 BRPM | HEART RATE: 56 BPM | SYSTOLIC BLOOD PRESSURE: 137 MMHG | WEIGHT: 191 LBS

## 2025-03-26 DIAGNOSIS — S09.90XA CLOSED HEAD INJURY, INITIAL ENCOUNTER: ICD-10-CM

## 2025-03-26 DIAGNOSIS — S01.01XA LACERATION OF SCALP, INITIAL ENCOUNTER: ICD-10-CM

## 2025-03-26 LAB
ALBUMIN SERPL BCP-MCNC: 4.2 G/DL (ref 3.2–4.9)
ALBUMIN/GLOB SERPL: 1.6 G/DL
ALP SERPL-CCNC: 49 U/L (ref 30–99)
ALT SERPL-CCNC: 10 U/L (ref 2–50)
ANION GAP SERPL CALC-SCNC: 11 MMOL/L (ref 7–16)
AST SERPL-CCNC: 20 U/L (ref 12–45)
BASOPHILS # BLD AUTO: 0.7 % (ref 0–1.8)
BASOPHILS # BLD: 0.06 K/UL (ref 0–0.12)
BILIRUB SERPL-MCNC: 1.2 MG/DL (ref 0.1–1.5)
BUN SERPL-MCNC: 16 MG/DL (ref 8–22)
CALCIUM ALBUM COR SERPL-MCNC: 8.9 MG/DL (ref 8.5–10.5)
CALCIUM SERPL-MCNC: 9.1 MG/DL (ref 8.5–10.5)
CHLORIDE SERPL-SCNC: 104 MMOL/L (ref 96–112)
CO2 SERPL-SCNC: 24 MMOL/L (ref 20–33)
CREAT SERPL-MCNC: 0.92 MG/DL (ref 0.5–1.4)
EOSINOPHIL # BLD AUTO: 0.16 K/UL (ref 0–0.51)
EOSINOPHIL NFR BLD: 1.9 % (ref 0–6.9)
ERYTHROCYTE [DISTWIDTH] IN BLOOD BY AUTOMATED COUNT: 49.1 FL (ref 35.9–50)
GFR SERPLBLD CREATININE-BSD FMLA CKD-EPI: 87 ML/MIN/1.73 M 2
GLOBULIN SER CALC-MCNC: 2.6 G/DL (ref 1.9–3.5)
GLUCOSE SERPL-MCNC: 132 MG/DL (ref 65–99)
HCT VFR BLD AUTO: 49.2 % (ref 42–52)
HGB BLD-MCNC: 16.5 G/DL (ref 14–18)
IMM GRANULOCYTES # BLD AUTO: 0.03 K/UL (ref 0–0.11)
IMM GRANULOCYTES NFR BLD AUTO: 0.4 % (ref 0–0.9)
INR PPP: 2.28 (ref 0.87–1.13)
LYMPHOCYTES # BLD AUTO: 2.07 K/UL (ref 1–4.8)
LYMPHOCYTES NFR BLD: 24.2 % (ref 22–41)
MCH RBC QN AUTO: 31.9 PG (ref 27–33)
MCHC RBC AUTO-ENTMCNC: 33.5 G/DL (ref 32.3–36.5)
MCV RBC AUTO: 95.2 FL (ref 81.4–97.8)
MONOCYTES # BLD AUTO: 0.82 K/UL (ref 0–0.85)
MONOCYTES NFR BLD AUTO: 9.6 % (ref 0–13.4)
NEUTROPHILS # BLD AUTO: 5.43 K/UL (ref 1.82–7.42)
NEUTROPHILS NFR BLD: 63.2 % (ref 44–72)
NRBC # BLD AUTO: 0 K/UL
NRBC BLD-RTO: 0 /100 WBC (ref 0–0.2)
PLATELET # BLD AUTO: 259 K/UL (ref 164–446)
PMV BLD AUTO: 10.2 FL (ref 9–12.9)
POTASSIUM SERPL-SCNC: 4.2 MMOL/L (ref 3.6–5.5)
PROT SERPL-MCNC: 6.8 G/DL (ref 6–8.2)
PROTHROMBIN TIME: 25.2 SEC (ref 12–14.6)
RBC # BLD AUTO: 5.17 M/UL (ref 4.7–6.1)
SODIUM SERPL-SCNC: 139 MMOL/L (ref 135–145)
WBC # BLD AUTO: 8.6 K/UL (ref 4.8–10.8)

## 2025-03-26 PROCEDURE — 72125 CT NECK SPINE W/O DYE: CPT

## 2025-03-26 PROCEDURE — 90471 IMMUNIZATION ADMIN: CPT

## 2025-03-26 PROCEDURE — 85610 PROTHROMBIN TIME: CPT

## 2025-03-26 PROCEDURE — 80053 COMPREHEN METABOLIC PANEL: CPT

## 2025-03-26 PROCEDURE — 99284 EMERGENCY DEPT VISIT MOD MDM: CPT

## 2025-03-26 PROCEDURE — 305308 HCHG STAPLER,SKIN,DISP.

## 2025-03-26 PROCEDURE — 700111 HCHG RX REV CODE 636 W/ 250 OVERRIDE (IP): Performed by: EMERGENCY MEDICINE

## 2025-03-26 PROCEDURE — 90715 TDAP VACCINE 7 YRS/> IM: CPT | Performed by: EMERGENCY MEDICINE

## 2025-03-26 PROCEDURE — 304999 HCHG REPAIR-SIMPLE/INTERMED LEVEL 1

## 2025-03-26 PROCEDURE — 304217 HCHG IRRIGATION SYSTEM

## 2025-03-26 PROCEDURE — 70450 CT HEAD/BRAIN W/O DYE: CPT

## 2025-03-26 PROCEDURE — 36415 COLL VENOUS BLD VENIPUNCTURE: CPT

## 2025-03-26 PROCEDURE — 85025 COMPLETE CBC W/AUTO DIFF WBC: CPT

## 2025-03-26 RX ADMIN — CLOSTRIDIUM TETANI TOXOID ANTIGEN (FORMALDEHYDE INACTIVATED), CORYNEBACTERIUM DIPHTHERIAE TOXOID ANTIGEN (FORMALDEHYDE INACTIVATED), BORDETELLA PERTUSSIS TOXOID ANTIGEN (GLUTARALDEHYDE INACTIVATED), BORDETELLA PERTUSSIS FILAMENTOUS HEMAGGLUTININ ANTIGEN (FORMALDEHYDE INACTIVATED), BORDETELLA PERTUSSIS PERTACTIN ANTIGEN, AND BORDETELLA PERTUSSIS FIMBRIAE 2/3 ANTIGEN 0.5 ML: 5; 2; 2.5; 5; 3; 5 INJECTION, SUSPENSION INTRAMUSCULAR at 08:27

## 2025-03-26 ASSESSMENT — PAIN DESCRIPTION - PAIN TYPE: TYPE: ACUTE PAIN

## 2025-03-26 ASSESSMENT — FIBROSIS 4 INDEX: FIB4 SCORE: 1.09

## 2025-03-26 NOTE — ED TRIAGE NOTES
".  Chief Complaint   Patient presents with    T-5000 Head Injury     Patient reports he had a nightmare and fell out of bed, +HS +Coumadin, Scrape to posterior scalp.          75 yo male came into triage for above complaint. GCS 15 on arrival, bleeding controlled to scalp.    TBI Alert called. Patient met ERP at the charge desk. Patient to CT scanner before being roomed.     Patient to [facility], reported off to Maggie AHUMADA.     BP (!) 155/62   Pulse 66   Temp 36 °C (96.8 °F) (Temporal)   Resp 18   Ht 1.702 m (5' 7\")   Wt 86.6 kg (191 lb)   SpO2 95%       "

## 2025-03-26 NOTE — DISCHARGE INSTRUCTIONS
1.  Please return to the emergency department if you develop any new or worsening symptoms including worsening headache, nausea, vomiting, confusion, or any further concerns.    2.  Please follow-up with your primary care physician in 5 days for wound recheck.  You may take Tylenol or ibuprofen as needed for pain.  Gently clean around the wound for the first 24 hours, after 24 hours, you may shower and wash the wound normally.  Do not soak the area underwater for long periods of time until sutures are removed.  Return to the emergency department immediately if you develop redness or swelling around the wound, pain in the area of the wound, streaking from the wound, drainage from the wound, fevers, or if you develop any other new or worsening symptoms.

## 2025-03-26 NOTE — ED PROVIDER NOTES
Emergency Physician Note    Chief Concern:  Chief Complaint   Patient presents with    T-5000 Head Injury     Patient reports he had a nightmare and fell out of bed, +HS +Coumadin, Scrape to posterior scalp.          External Records Reviewed:  Outpatient records reviewed: Pharm.D. anticoagulation note reviewed from 3/20/2025.  Patient currently anticoagulated on Coumadin due to history of DVT, INR goal is 2.0-3.0.    HPI/ROS     HPI:  Rene Chan is a 74 y.o. male who presents to the emergency department today for evaluation of a code TBI activation.  He is anticoagulated on Coumadin, states he had a bad dream and jumped, causing him to fall out of bed.  He struck the back of his head, did have some bleeding and an associated abrasion.  Reports that he did not lose consciousness.  He has no neck pain at this time.  He also states that he initially had some discomfort and soreness to the right knee, however has been able to easily ambulate, can fully flex and extend the knee, and can put full weight on the knee.  He has a history of right knee replacement done about a year ago.  He reports no other injuries, no pain or swelling to the upper or lower extremities, no other lacerations or abrasions.    PAST MEDICAL HISTORY  Past Medical History:   Diagnosis Date    Anticoagulation monitoring, special range     Arthritis     Bipolar disorder ([facility])     Bipolar disorder, in partial remission, most recent episode depressed ([facility]) 04/19/2022    Blood clotting disorder ([facility]) 06/24/2015    hx of PE    Blood clotting disorder ([facility]) 03/04/2020    DVT left leg    CAD (coronary artery disease) 06/04/2015    CAD (coronary artery disease)     6/2015: CABG-3 VESSEL    Carotid artery occlusion     L 60-79% occluded; R 59% occluded per pt report; 2011    Cataract     bilateral IOL    Clotting disorder ([facility])     protein S elevation in '12 with recurrent DVT and PE-coumadin lifelong    Colon polyps     Diabetes mellitus, type II  ([facility])     diet controlled. 10/3/23-does not check glucose at home.    Gout     Hammer toe     Heart murmur     Hiatus hernia syndrome     High cholesterol     Hyperlipidemia     Hypertension     Hypothyroidism     Impaired fasting glucose     Mild aortic stenosis - echo 4/2018     Pain     Right knee pain 12/12/2023    Stroke ([facility]) 05/26/2015    TIA-hx of 2 or 3. No residual.       SURGICAL HISTORY  Past Surgical History:   Procedure Laterality Date    PB TOTAL KNEE ARTHROPLASTY Right 3/12/2024    Procedure: RIGHT TOTAL KNEE ARTHROPLASTY;  Surgeon: Arturo Boyle M.D.;  Location: SURGERY [facility];  Service: Orthopedics    PB TOTAL KNEE ARTHROPLASTY Left 07/12/2022    Procedure: LEFT ARTHROPLASTY, KNEE, TOTAL;  Surgeon: Arturo Boyle M.D.;  Location: SURGERY [facility];  Service: Orthopedics    MULTIPLE CORONARY ARTERY BYPASS ENDO VEIN HARVEST  06/04/2015    Procedure: MULTIPLE CORONARY ARTERY BYPASS Grafting  x 3   ENDO VEIN HARVEST right leg;  Surgeon: Christophe Lemus M.D.;  Location: SURGERY [facility];  Service:     RECOVERY  06/01/2015    Procedure:  CATH LAB  [facility] w/POSS [facility] ICD; 794.31;  Surgeon: Recoveryon Surgery;  [place] [facility] [place]  Service:     UMBILICAL HERNIA REPAIR  02/2000    OTHER ORTHOPEDIC SURGERY  1976    L wrist repair     CLOSED REDUCTION LOWER EXTREMITY Left 1968    NO SEDATION    APPENDECTOMY  1963    COLONOSCOPY      hx of several       FAMILY HISTORY  Family History   Problem Relation Age of Onset    Heart Disease Sister 50    Stroke Sister 55        CVA    Heart Failure Father     Psychiatric Illness Son        SOCIAL HISTORY   reports that he quit smoking about 47 years ago. His smoking use included cigarettes. He started smoking about 59 years ago. He has a 33.9 pack-year smoking history. He has never used smokeless tobacco. He reports that he does not currently use alcohol after a past usage of about 19.8 oz of alcohol per week. He  reports that he does not currently use drugs after having used the following drugs: Intravenous, Inhaled, and Oral.    CURRENT MEDICATIONS  Discharge Medication List as of 3/26/2025  9:19 AM        CONTINUE these medications which have NOT CHANGED    Details   !! divalproex (DEPAKOTE) 250 MG Tablet Delayed Response TAKE 2 TABLET BY MOUTH AT NIGHTNeeds an appointment and labs before further refillsDisp-200 Tablet, R-0, Normal      warfarin (COUMADIN) 7.5 MG Tab TAKE 1/2 TO 1 TABLET BY MOUTH DAILY AS DIRECTED BY [facility] please. This prescription is transmitted by a pharmacist under the authority of a collaborative practice agreement.Disp-100 Tablet, R-1, Normal      ketoconazole (NIZORAL) 2 % shampoo Wash scalp 2-3 times weekly, allow to sit for 1-2 minutes prior to rinsing., Disp-120 mL, R-6, Normal      !! divalproex (DEPAKOTE) 125 MG EC tablet TAKE 1 TABLET BY MOUTH TWICE A DAY, Disp-200 Tablet, R-3, Normal      dutasteride (AVODART) 0.5 MG capsule Take 1 Capsule by mouth every day.Please send a replace/new response with 100-Day Supply if appropriate to maximize member benefit. Requesting 1 year supply.Disp-90 Capsule, R-2, Normal      atorvastatin (LIPITOR) 80 MG tablet TAKE 1 TABLET BY MOUTH EVERY DAY, Disp-100 Tablet, R-3, Normal      lisinopril (PRINIVIL) 5 MG Tab Take 1 Tablet by mouth every day., Disp-100 Tablet, R-3, Normal      metoprolol tartrate (LOPRESSOR) 25 MG Tab Take 0.5 Tablets by mouth every day., Disp-50 Tablet, R-3, Normal      Empagliflozin (JARDIANCE) 10 MG Tab tablet Take 1 Tablet by mouth every day., Disp-100 Tablet, R-3, Normal      levothyroxine (SYNTHROID) 75 MCG Tab TAKE 1 TABLET BY MOUTH EVERY DAY IN THE MORNING ON AN EMPTY STOMACH, Disp-90 Tablet, R-3, Normal      traZODone (DESYREL) 50 MG Tab TAKE 2 TABLETS BY MOUTH EVERY EVENING. FOR INSOMNIA, Disp-180 Tablet, R-2, Normal      furosemide (LASIX) 20 MG Tab Take 1 Tablet by mouth every day., Disp-90 Tablet,  "R-3, Normal      acetaminophen (TYLENOL 8 HOUR ARTHRITIS PAIN) 650 MG CR tablet Take 650 mg by mouth at bedtime as needed., Historical Med      Wheat Dextrin (BENEFIBER DRINK MIX) Pack Take  by mouth every day., Historical Med      Glucosamine-Chondroit-Vit C-Mn (GLUCOSAMINE CHONDR 500 COMPLEX PO) Take 1 Tablet by mouth 2 times a day., Historical Med      aspirin 81 MG tablet Take 81 mg by mouth every day., Disp-100 Tab, OTC      COENZYME Q10 PO Take 1 Tab by mouth every morning., Historical Med      Cholecalciferol (VITAMIN D) 2000 UNITS Cap Take 2,000 Units by mouth every day., Historical Med      docosahexanoic acid (OMEGA 3 FA) 1000 MG CAPS Take 1,000 mg by mouth every day., Historical Med       !! - Potential duplicate medications found. Please discuss with provider.          ALLERGIES  Penicillins    PHYSICAL EXAM  Vital Signs: /63   Pulse (!) 56   Temp 36 °C (96.8 °F) (Temporal)   Resp 17   Ht 1.702 m (5' 7\")   Wt 86.6 kg (191 lb)   SpO2 92%   BMI 29.91 kg/m²   Constitutional: Alert, no acute distress  HENT: Subcentimeter laceration to the occipital scalp with oozing due to anticoagulation, no brisk bleed  Neck: No bony midline tenderness to palpation  Cardiovascular: No tachycardia  Pulmonary: No respiratory distress, normal work of breathing  Skin: Warm, dry, no rashes or lesions excepting as documented in HEENT examination  Musculoskeletal: Normal range of motion in all extremities, no swelling or deformity noted  Neurologic: Alert, oriented, normal motor function, no speech deficits    Diagnostic Studies & Procedures    Labs:  All labs reviewed by me as noted below.    Radiology:  The attending Emergency Physician has independently interpreted the following imaging:  I independently reviewed the head CT, no intracranial bleed identified.    CT-CSPINE WITHOUT PLUS RECONS   Final Result         1. No acute osseous injury identified.   2. Degenerative changes.   3. Carotid artery plaque.    "   CT-HEAD W/O   Final Result      No acute process.                   Course and Medical Decision Making    Initial Assessment and Plan:  Mr. Chan presents to the emergency department today for evaluation after a fall out of bed as documented above.  He has no neck pain on arrival, does have a small laceration to the occipital scalp with associated bleeding.  This was repaired according to below procedure note.  He has no evidence of neurovascular compromise.  Did have some soreness to the right knee initially, but is able to easily flex and extend the knee, and bear weight without pain, do not believe imaging of the right knee is necessary at this time.    CT of the head and cervical spine are negative for acute injury.  Degenerative changes seen on CT of the cervical spine.    Screening lab work was obtained patient's INR is 2.28, which is within goal range.  No abnormalities on CBC or CMP.    Plan this time is for discharge home with outpatient follow-up for suture removal.  He remains well-appearing in the emergency department with no new or worsening symptoms.  Return precautions were discussed with the patient, and provided in written form with the patient's discharge instructions.       LACERATION REPAIR PROCEDURE NOTE  Indication: Laceration  Procedure: The patient was placed in the appropriate position and anesthesia around the laceration was not performed at the patient's request. The wound was minimally contaminated .The area was then irrigated with normal saline. The laceration was closed with staples. There were no additional lacerations requiring repair.   Total repaired wound length: 1 cm.   Other Items: Staple Count - #1  The patient tolerated the procedure well.  Complications: None    Additional Problems and Disposition    Escalation of care considered, and ultimately not performed:  1.  Plain film of the right knee considered, however he is not having knee pain at this time, no indication for  x-ray    Disposition:  Discharged in stable condition    FINAL IMPRESSION   1. Closed head injury, initial encounter    2. Laceration of scalp, initial encounter        FOLLOW UP:  Michael Sethi III, M.D.  [facility] [facility] [id number] [phone]    Schedule an appointment as soon as possible for a visit in 5 days  For staple removal    [facility], Emergency Dept  [address] [address] [phone]  Go to   If symptoms worsen

## 2025-03-26 NOTE — DISCHARGE PLANNING
TCN following. HTH/SCP chart review completed. Note pt currently in ED 2' to fall from bed with head strike. Note at baseline, pt with standing visits to vascular clinical for INR draws for his anti coagulation routine. Will monitor and attempt to assist with outpatient PCP follow up if indicated. No documented mobility currently in chart from this visit, though would note in past pt has been ambulatory with no AD or with use of a FWW. At this time anticipating that pt will dc to home with outpatient follow ups (either directly from ED or after admission to [facility] if warranted). If pt admits to [facility], TCN will monitor and assist with transitional dc planning as indicated. Note that if pt is unable to functionally dc from ED to home and does not warrant admission/inpatient status to [facility], please reach out to TCN to assist with SCP auth for direct admission to SNF from ED if indicated. Please reach out to TCN via VOALTE if any post acute transitional care needs are warranted for dc planning.      Update @11:08AM: pt has dc'd from ED to home; steady gait at time of dc; TCN sent information to  for transitional PCP follow up if able

## 2025-03-26 NOTE — ED NOTES
Bedside report from trauma RNMadiha. PIV inserted, blood drawn, and sent to lab. Pt resting with even chest rise and fall, reports no needs at this time, call light available and in reach.

## 2025-03-26 NOTE — ED NOTES
Pt medicated per MAR. Pt provided water, okay per ERP. Pt resting with even chest rise and fall, reports no needs at this time, call light available and in reach.

## 2025-03-27 ENCOUNTER — TELEPHONE (OUTPATIENT)
Dept: MEDICAL GROUP | Facility: PHYSICIAN GROUP | Age: 75
End: 2025-03-27
Payer: MEDICARE

## 2025-03-27 NOTE — TELEPHONE ENCOUNTER
Patient called stating that he had a nightmare and fell off his bed and hit his head then he went to the ER and got a staple placed. He stated he received a call to see Dr. Sethi but he rather got to the urgent care on Tuesday as it closer to his house. I asked patient if he would like to see Dr. Sethi on Tuesday as we do have some openings that day he stated he didn't think that was necessary as it would just be a staple removal and he has nothing else to talk to the doctor about. Patient stated thank you and disconnected call.

## 2025-03-31 ENCOUNTER — OFFICE VISIT (OUTPATIENT)
Dept: URGENT CARE | Facility: CLINIC | Age: 75
End: 2025-03-31
Payer: MEDICARE

## 2025-03-31 VITALS
OXYGEN SATURATION: 93 % | HEIGHT: 67 IN | WEIGHT: 186 LBS | HEART RATE: 54 BPM | SYSTOLIC BLOOD PRESSURE: 106 MMHG | DIASTOLIC BLOOD PRESSURE: 58 MMHG | RESPIRATION RATE: 16 BRPM | TEMPERATURE: 97 F | BODY MASS INDEX: 29.19 KG/M2

## 2025-03-31 DIAGNOSIS — S09.90XD: ICD-10-CM

## 2025-03-31 DIAGNOSIS — Z48.02 REMOVAL OF STAPLE: ICD-10-CM

## 2025-03-31 PROCEDURE — 99212 OFFICE O/P EST SF 10 MIN: CPT

## 2025-03-31 PROCEDURE — 3074F SYST BP LT 130 MM HG: CPT

## 2025-03-31 PROCEDURE — 3078F DIAST BP <80 MM HG: CPT

## 2025-03-31 ASSESSMENT — ENCOUNTER SYMPTOMS
SPEECH CHANGE: 0
LOSS OF CONSCIOUSNESS: 0
HEADACHES: 0
SENSORY CHANGE: 0
DIZZINESS: 0

## 2025-03-31 ASSESSMENT — FIBROSIS 4 INDEX: FIB4 SCORE: 1.83

## 2025-03-31 NOTE — PROGRESS NOTES
"Subjective     Rene Chan is a 74 y.o. male who presents with Suture / Staple Removal (Head Staple needs removal )      Patient presented for removal of staple x 1 in left anteroposterior aspect of scalp. Injury sustained five days ago with staple placed at that time. He sustained an injury after hitting his head on a bed frame due to an abrupt awakening from a nightmare.     Edges of healed laceration well approximated, no signs of secondary infection, no erythema or edema noted in surrounding tissue.   Patient tolerated procedure well and denied presence of dizziness, headache, changes in vision or consciousness.   Patient reported mild tenderness in area.          Suture / Staple Removal        Review of Systems   Neurological:  Negative for dizziness, sensory change, speech change, loss of consciousness and headaches.              Objective     /58   Pulse (!) 54   Temp 36.1 °C (97 °F) (Temporal)   Resp 16   Ht 1.702 m (5' 7\")   Wt 84.4 kg (186 lb)   SpO2 93%   BMI 29.13 kg/m²      Physical Exam  HENT:      Head:     Skin:     Findings: Bruising, signs of injury (healed) and laceration present. No erythema. Wound: healed.                                 Assessment & Plan       1. Removal of staple  ***    2. Injury of back of head, subsequent encounter  ***           " Follow-up with your primary care provider in 3-5 days. Red flag symptoms discussed. All side effects of medication discussed including allergic response, GI upset, tendon injury, rash, sedation etc.     Please note that this dictation was created using voice recognition software. I have made a reasonable attempt to correct obvious errors, but I expect that there are errors of grammar and possibly content that I did not discover before finalizing the note.     This note was electronically signed by ELENA Mcclelland

## 2025-03-31 NOTE — PROGRESS NOTES
Subjective:   Rene Chan is a 74 y.o. male who presents for Suture / Staple Removal (Head Staple needs removal )      HPI:        ROS As above in HPI    Medications:    Current Outpatient Medications on File Prior to Visit   Medication Sig Dispense Refill   • divalproex (DEPAKOTE) 250 MG Tablet Delayed Response TAKE 2 TABLET BY MOUTH AT NIGHT 200 Tablet 0   • warfarin (COUMADIN) 7.5 MG Tab TAKE 1/2 TO 1 TABLET BY MOUTH DAILY AS DIRECTED BY [facility] 100 Tablet 1   • ketoconazole (NIZORAL) 2 % shampoo Wash scalp 2-3 times weekly, allow to sit for 1-2 minutes prior to rinsing. 120 mL 6   • divalproex (DEPAKOTE) 125 MG EC tablet TAKE 1 TABLET BY MOUTH TWICE A  Tablet 3   • dutasteride (AVODART) 0.5 MG capsule Take 1 Capsule by mouth every day. 90 Capsule 2   • atorvastatin (LIPITOR) 80 MG tablet TAKE 1 TABLET BY MOUTH EVERY  Tablet 3   • lisinopril (PRINIVIL) 5 MG Tab Take 1 Tablet by mouth every day. 100 Tablet 3   • metoprolol tartrate (LOPRESSOR) 25 MG Tab Take 0.5 Tablets by mouth every day. 50 Tablet 3   • Empagliflozin (JARDIANCE) 10 MG Tab tablet Take 1 Tablet by mouth every day. 100 Tablet 3   • levothyroxine (SYNTHROID) 75 MCG Tab TAKE 1 TABLET BY MOUTH EVERY DAY IN THE MORNING ON AN EMPTY STOMACH 90 Tablet 3   • traZODone (DESYREL) 50 MG Tab TAKE 2 TABLETS BY MOUTH EVERY EVENING. FOR INSOMNIA 180 Tablet 2   • furosemide (LASIX) 20 MG Tab Take 1 Tablet by mouth every day. 90 Tablet 3   • acetaminophen (TYLENOL 8 HOUR ARTHRITIS PAIN) 650 MG CR tablet Take 650 mg by mouth at bedtime as needed.     • Wheat Dextrin (BENEFIBER DRINK MIX) Pack Take  by mouth every day.     • Glucosamine-Chondroit-Vit C-Mn (GLUCOSAMINE CHONDR 500 COMPLEX PO) Take 1 Tablet by mouth 2 times a day.     • aspirin 81 MG tablet Take 81 mg by mouth every day. 100 Tab    • COENZYME Q10 PO Take 1 Tab by mouth every morning.     • Cholecalciferol (VITAMIN D) 2000 UNITS Cap Take 2,000 Units by mouth  every day.     • docosahexanoic acid (OMEGA 3 FA) 1000 MG CAPS Take 1,000 mg by mouth every day.       No current facility-administered medications on file prior to visit.        Allergies:   Penicillins    Problem List:   Patient Active Problem List   Diagnosis   • Long term (current) use of anticoagulants   • TIA (transient ischemic attack)   • Coronary artery disease due to lipid rich plaque   • S/P CABG x 3 - 6/2015   • Dyslipidemia   • Hx pulmonary embolism   • Acquired hypothyroidism   • Gout of foot   • Adenomatous polyp of colon   • Bipolar disorder in full remission ([facility])   • Bilateral carotid artery stenosis   • Actinic keratoses   • Mild aortic stenosis   • Obesity (BMI 30-39.9)   • Leg DVT (deep venous thromboembolism), chronic, left (HCC)   • BMI 31.0-31.9,adult   • Primary hypertension   • Alcohol dependence in remission ([facility])   • Opioid dependence in remission ([facility])   • Osteoarthritis of left knee   • DM (diabetes mellitus) ([facility])   • Atherosclerosis of aorta ([facility])   • Osteoarthritis of left knee, unspecified osteoarthritis type   • Diabetes mellitus with coincident hypertension ([facility])   • Secondary hypercoagulable state ([facility])   • Senile purpura ([facility])   • Osteoarthritis of right knee   • Chronic atrial fibrillation ([facility])   • Adult general medical exam   • Vascular insufficiency   • Lumbar radicular pain   • Grief        Surgical History:  Past Surgical History:   Procedure Laterality Date   • PB TOTAL KNEE ARTHROPLASTY Right 3/12/2024    Procedure: RIGHT TOTAL KNEE ARTHROPLASTY;  Surgeon: Arturo Boyle M.D.;  Location: SURGERY [facility];  Service: Orthopedics   • PB TOTAL KNEE ARTHROPLASTY Left 07/12/2022    Procedure: LEFT ARTHROPLASTY, KNEE, TOTAL;  Surgeon: Arturo Boyle M.D.;  Location: SURGERY [facility];  Service: Orthopedics   • MULTIPLE CORONARY ARTERY BYPASS ENDO VEIN HARVEST  06/04/2015    Procedure: MULTIPLE CORONARY ARTERY BYPASS Grafting  x 3   ENDO VEIN HARVEST right leg;   "Surgeon: Christophe Lemus M.D.;  Location: SURGERY [facility];  Service:    • RECOVERY  2015    Procedure:  CATH LAB  [facility] w[facility]OSS [facility] ICD; 794.31;  Surgeon: [facility];  [place] [facility] [place]  Service:    • UMBILICAL HERNIA REPAIR  2000   • OTHER ORTHOPEDIC SURGERY      L wrist repair    • CLOSED REDUCTION LOWER EXTREMITY Left     NO SEDATION   • APPENDECTOMY     • COLONOSCOPY      hx of several       Past Social Hx:   Social History     Tobacco Use   • Smoking status: Former     Current packs/day: 0.00     Average packs/day: 2.2 packs/day for 15.3 years (33.9 ttl pk-yrs)     Types: Cigarettes     Start date: 1966     Quit date: 1977     Years since quittin.2   • Smokeless tobacco: Never   • Tobacco comments:     quit x 32 yrs; 2ppd x 7 years   Vaping Use   • Vaping status: Never Used   Substance Use Topics   • Alcohol use: Not Currently     Alcohol/week: 19.8 oz     Types: 30 Cans of beer, 3 Shots of liquor per week     Comment: no alcohol since    • Drug use: Not Currently     Types: Intravenous, Inhaled, Oral     Comment: no drug use since           Problem list, medications, and allergies reviewed by myself today in Epic.     Objective:     /58   Pulse (!) 54   Temp 36.1 °C (97 °F) (Temporal)   Resp 16   Ht 1.702 m (5' 7\")   Wt 84.4 kg (186 lb)   SpO2 93%   BMI 29.13 kg/m²     Physical Exam    Assessment/Plan:       Results for orders placed or performed during the hospital encounter of 25   CBC WITH DIFFERENTIAL    Collection Time: 25  8:05 AM   Result Value Ref Range    WBC 8.6 4.8 - 10.8 K/uL    RBC 5.17 4.70 - 6.10 M/uL    Hemoglobin 16.5 14.0 - 18.0 g/dL    Hematocrit 49.2 42.0 - 52.0 %    MCV 95.2 81.4 - 97.8 fL    MCH 31.9 27.0 - 33.0 pg    MCHC 33.5 32.3 - 36.5 g/dL    RDW 49.1 35.9 - 50.0 fL    Platelet Count 259 164 - 446 K/uL    MPV 10.2 9.0 - 12.9 fL    Neutrophils-Polys 63.20 44.00 - 72.00 % "    Lymphocytes 24.20 22.00 - 41.00 %    Monocytes 9.60 0.00 - 13.40 %    Eosinophils 1.90 0.00 - 6.90 %    Basophils 0.70 0.00 - 1.80 %    Immature Granulocytes 0.40 0.00 - 0.90 %    Nucleated RBC 0.00 0.00 - 0.20 /100 WBC    Neutrophils (Absolute) 5.43 1.82 - 7.42 K/uL    Lymphs (Absolute) 2.07 1.00 - 4.80 K/uL    Monos (Absolute) 0.82 0.00 - 0.85 K/uL    Eos (Absolute) 0.16 0.00 - 0.51 K/uL    Baso (Absolute) 0.06 0.00 - 0.12 K/uL    Immature Granulocytes (abs) 0.03 0.00 - 0.11 K/uL    NRBC (Absolute) 0.00 K/uL   CMP    Collection Time: 03/26/25  8:05 AM   Result Value Ref Range    Sodium 139 135 - 145 mmol/L    Potassium 4.2 3.6 - 5.5 mmol/L    Chloride 104 96 - 112 mmol/L    Co2 24 20 - 33 mmol/L    Anion Gap 11.0 7.0 - 16.0    Glucose 132 (H) 65 - 99 mg/dL    Bun 16 8 - 22 mg/dL    Creatinine 0.92 0.50 - 1.40 mg/dL    Calcium 9.1 8.5 - 10.5 mg/dL    Correct Calcium 8.9 8.5 - 10.5 mg/dL    AST(SGOT) 20 12 - 45 U/L    ALT(SGPT) 10 2 - 50 U/L    Alkaline Phosphatase 49 30 - 99 U/L    Total Bilirubin 1.2 0.1 - 1.5 mg/dL    Albumin 4.2 3.2 - 4.9 g/dL    Total Protein 6.8 6.0 - 8.2 g/dL    Globulin 2.6 1.9 - 3.5 g/dL    A-G Ratio 1.6 g/dL   PT/INR    Collection Time: 03/26/25  8:05 AM   Result Value Ref Range    PT 25.2 (H) 12.0 - 14.6 sec    INR 2.28 (H) 0.87 - 1.13   ESTIMATED GFR    Collection Time: 03/26/25  8:05 AM   Result Value Ref Range    GFR (CKD-EPI) 87 >60 mL/min/1.73 m 2     *Note: Due to a large number of results and/or encounters for the requested time period, some results have not been displayed. A complete set of results can be found in Results Review.       Diagnosis and associated orders:   There are no diagnoses linked to this encounter.     Comments/MDM:         ***       Return to clinic or go to ED if symptoms worsen or persist. Indications for ED discussed at length. Patient/Parent/Guardian voices understanding. Follow-up with your primary care provider in 3-5 days. Red flag symptoms  discussed. All side effects of medication discussed including allergic response, GI upset, tendon injury, rash, sedation etc.    Please note that this dictation was created using voice recognition software. I have made a reasonable attempt to correct obvious errors, but I expect that there are errors of grammar and possibly content that I did not discover before finalizing the note.    This note was electronically signed by ELENA Mcclelland

## 2025-04-04 PROCEDURE — RXMED WILLOW AMBULATORY MEDICATION CHARGE: Performed by: FAMILY MEDICINE

## 2025-04-04 PROCEDURE — RXMED WILLOW AMBULATORY MEDICATION CHARGE

## 2025-04-07 ENCOUNTER — PHARMACY VISIT (OUTPATIENT)
Dept: PHARMACY | Facility: MEDICAL CENTER | Age: 75
End: 2025-04-07
Payer: COMMERCIAL

## 2025-04-07 ENCOUNTER — ANTICOAGULATION VISIT (OUTPATIENT)
Dept: VASCULAR LAB | Facility: MEDICAL CENTER | Age: 75
End: 2025-04-07
Attending: INTERNAL MEDICINE
Payer: MEDICARE

## 2025-04-07 DIAGNOSIS — G45.9 TIA (TRANSIENT ISCHEMIC ATTACK): ICD-10-CM

## 2025-04-07 LAB — INR PPP: 3.4 (ref 2–3.5)

## 2025-04-07 PROCEDURE — 85610 PROTHROMBIN TIME: CPT

## 2025-04-07 PROCEDURE — 99212 OFFICE O/P EST SF 10 MIN: CPT

## 2025-04-07 NOTE — PROGRESS NOTES
Anticoagulation Summary  As of 4/7/2025      INR goal:  2.0-3.0   TTR:  57.0% (9.8 y)   INR used for dosing:  3.40 (4/7/2025)   Warfarin maintenance plan:  7.5 mg (7.5 mg x 1) every Sun, Tue, Thu; 3.75 mg (7.5 mg x 0.5) all other days   Weekly warfarin total:  37.5 mg   Plan last modified:  Kim Everett (4/7/2025)   Next INR check:  4/21/2025   Priority:  Acute   Target end date:  Indefinite    Indications    DVT (deep venous thrombosis) (HCC) (Resolved) [I82.409]  TIA (transient ischemic attack) [G45.9]  Pulmonary embolism [415.19] (Resolved) [I26.99]                 Anticoagulation Episode Summary       INR check location:  Anticoagulation Clinic    Preferred lab:  [facility]    Send INR reminders to:  --    Comments:  ASA 81 mg for TIA, CAD hx - [facility] per Dr. Wrihgt 3/7/22          Anticoagulation Care Providers       Provider Role Specialty Phone number    Patricia Damon M.D. Referring Internal Medicine [phone]    Jona QuinnD Responsible      [facility] Responsible  [phone]          Refer to Patient Findings for HPI:  Patient Findings       Positives:  Change in diet/appetite (less greens in diet)    Negatives:  Signs/symptoms of thrombosis, Signs/symptoms of bleeding, Laboratory test error suspected, Change in health, Change in alcohol use, Change in activity, Upcoming invasive procedure, Emergency department visit, Upcoming dental procedure, Missed doses, Extra doses, Change in medications, Hospital admission, Bruising, Other complaints          Clinical Outcomes       Negatives:  Major bleeding event, Thromboembolic event, Anticoagulation-related hospital admission, Anticoagulation-related ED visit, Anticoagulation-related fatality        Date Referral Placed: 9/27/24    Vitals:  There were no vitals filed for this visit.  NO BP cuff available today    Verified current warfarin dosing schedule.    Medications reconciled.  Pt is not on  antiplatelet therapy.      A/P   INR is supratherapeutic at 3.4  Reason(s) for out of range INR today: Decreased vitamin K intake      Warfarin dosing recommendation:  Patient instructed to HOLD warfarin dose TONIGHT ONLY, then to begin a reduced weekly regimen of 7.5mg on sun, Tues, Thurs and 3.75mg ROW.     Pt educated to contact our clinic with any changes in medications or s/s of bleeding or thrombosis. Pt is aware to seek immediate medical attention for falls, head injury or deep cuts.    Request pt to return in 2 week(s). Pt agrees.    Lupe TenorioD

## 2025-04-21 ENCOUNTER — ANTICOAGULATION VISIT (OUTPATIENT)
Dept: VASCULAR LAB | Facility: MEDICAL CENTER | Age: 75
End: 2025-04-21
Attending: INTERNAL MEDICINE
Payer: MEDICARE

## 2025-04-21 VITALS — SYSTOLIC BLOOD PRESSURE: 145 MMHG | DIASTOLIC BLOOD PRESSURE: 73 MMHG | HEART RATE: 60 BPM

## 2025-04-21 DIAGNOSIS — G45.9 TIA (TRANSIENT ISCHEMIC ATTACK): ICD-10-CM

## 2025-04-21 LAB — INR PPP: 2 (ref 2–3.5)

## 2025-04-21 PROCEDURE — 85610 PROTHROMBIN TIME: CPT

## 2025-04-21 PROCEDURE — 99211 OFF/OP EST MAY X REQ PHY/QHP: CPT

## 2025-04-21 NOTE — PROGRESS NOTES
Anticoagulation Summary  As of 2025      INR goal:  2.0-3.0   TTR:  57.1% (9.8 y)   INR used for dosin.00 (2025)   Warfarin maintenance plan:  7.5 mg (7.5 mg x 1) every Sun, Tue, Thu; 3.75 mg (7.5 mg x 0.5) all other days   Weekly warfarin total:  37.5 mg   Plan last modified:  Kim Everett (2025)   Next INR check:  2025   Priority:  Acute   Target end date:  Indefinite    Indications    DVT (deep venous thrombosis) (HCC) (Resolved) [I82.409]  TIA (transient ischemic attack) [G45.9]  Pulmonary embolism [415.19] (Resolved) [I26.99]                 Anticoagulation Episode Summary       INR check location:  Anticoagulation Clinic    Preferred lab:  [facility]    Send INR reminders to:  --    Comments:  ASA 81 mg for TIA, CAD hx - [facility] per Dr. Wright 3/7/22          Anticoagulation Care Providers       Provider Role Specialty Phone number    Patricia Damon M.D. Referring Internal Medicine [phone]    Jona QuinnD Responsible      [facility] Anticoagulation Services Responsible  [phone]                Refer to Patient Findings for HPI:  Patient Findings       Positives:  Change in diet/appetite (slight increase in diet)    Negatives:  Signs/symptoms of thrombosis, Signs/symptoms of bleeding, Laboratory test error suspected, Change in health, Change in alcohol use, Change in activity, Upcoming invasive procedure, Emergency department visit, Upcoming dental procedure, Missed doses, Extra doses, Change in medications, Hospital admission, Bruising, Other complaints          Clinical Outcomes       Negatives:  Major bleeding event, Thromboembolic event, Anticoagulation-related hospital admission, Anticoagulation-related ED visit, Anticoagulation-related fatality             Vitals:  Vitals:    25 1057   BP: (!) 145/73   Pulse: 60     Pt declined vitals    Verified current warfarin dosing schedule.    Medications reconciled.  Concomitant antiplatelet  therapy must be reviewed by cardiology.      A/P   INR is therapeutic  Reason(s) for out of range INR today: N/A      Warfarin dosing recommendation: Continue regimen as listed above.    Pt educated to contact our clinic with any changes in medications or s/s of bleeding or thrombosis. Pt is aware to seek immediate medical attention for falls, head injury or deep cuts.    Request pt to return in 3 week(s). Pt agrees.    Emilie Lopez, JonaD

## 2025-04-23 ENCOUNTER — PHARMACY VISIT (OUTPATIENT)
Dept: PHARMACY | Facility: MEDICAL CENTER | Age: 75
End: 2025-04-23
Payer: COMMERCIAL

## 2025-04-23 PROCEDURE — RXMED WILLOW AMBULATORY MEDICATION CHARGE: Performed by: FAMILY MEDICINE

## 2025-05-06 ENCOUNTER — OFFICE VISIT (OUTPATIENT)
Dept: MEDICAL GROUP | Facility: PHYSICIAN GROUP | Age: 75
End: 2025-05-06
Payer: MEDICARE

## 2025-05-06 VITALS
WEIGHT: 183.6 LBS | HEART RATE: 58 BPM | TEMPERATURE: 97.8 F | RESPIRATION RATE: 16 BRPM | SYSTOLIC BLOOD PRESSURE: 124 MMHG | OXYGEN SATURATION: 94 % | HEIGHT: 67 IN | BODY MASS INDEX: 28.82 KG/M2 | DIASTOLIC BLOOD PRESSURE: 70 MMHG

## 2025-05-06 DIAGNOSIS — F11.21 OPIOID DEPENDENCE IN REMISSION (HCC): ICD-10-CM

## 2025-05-06 DIAGNOSIS — E11.9 DIABETES MELLITUS WITH COINCIDENT HYPERTENSION (HCC): ICD-10-CM

## 2025-05-06 DIAGNOSIS — M20.41 HAMMER TOES OF BOTH FEET: ICD-10-CM

## 2025-05-06 DIAGNOSIS — I48.20 CHRONIC ATRIAL FIBRILLATION (HCC): ICD-10-CM

## 2025-05-06 DIAGNOSIS — M20.42 HAMMER TOES OF BOTH FEET: ICD-10-CM

## 2025-05-06 DIAGNOSIS — F10.21 ALCOHOL DEPENDENCE IN REMISSION (HCC): ICD-10-CM

## 2025-05-06 DIAGNOSIS — G89.29 CHRONIC PAIN OF RIGHT WRIST: ICD-10-CM

## 2025-05-06 DIAGNOSIS — I82.502 LEG DVT (DEEP VENOUS THROMBOEMBOLISM), CHRONIC, LEFT (HCC): ICD-10-CM

## 2025-05-06 DIAGNOSIS — M25.531 CHRONIC PAIN OF RIGHT WRIST: ICD-10-CM

## 2025-05-06 DIAGNOSIS — Z12.5 PROSTATE CANCER SCREENING: ICD-10-CM

## 2025-05-06 DIAGNOSIS — I10 DIABETES MELLITUS WITH COINCIDENT HYPERTENSION (HCC): ICD-10-CM

## 2025-05-06 PROBLEM — E66.9 OBESITY (BMI 30-39.9): Status: RESOLVED | Noted: 2020-12-03 | Resolved: 2025-05-06

## 2025-05-06 PROBLEM — M17.12 OSTEOARTHRITIS OF LEFT KNEE, UNSPECIFIED OSTEOARTHRITIS TYPE: Status: RESOLVED | Noted: 2022-07-12 | Resolved: 2025-05-06

## 2025-05-06 LAB
HBA1C MFR BLD: 6.2 % (ref ?–5.8)
POCT INT CON NEG: NEGATIVE
POCT INT CON POS: POSITIVE

## 2025-05-06 PROCEDURE — 83036 HEMOGLOBIN GLYCOSYLATED A1C: CPT | Performed by: FAMILY MEDICINE

## 2025-05-06 PROCEDURE — 99214 OFFICE O/P EST MOD 30 MIN: CPT | Performed by: FAMILY MEDICINE

## 2025-05-06 PROCEDURE — 3074F SYST BP LT 130 MM HG: CPT | Performed by: FAMILY MEDICINE

## 2025-05-06 PROCEDURE — 3078F DIAST BP <80 MM HG: CPT | Performed by: FAMILY MEDICINE

## 2025-05-06 ASSESSMENT — FIBROSIS 4 INDEX: FIB4 SCORE: 1.83

## 2025-05-06 NOTE — ASSESSMENT & PLAN NOTE
This is a chronic problem.  His hemoglobin A1c is due so we will do it today.  He wants to make sure that his diabetes is under control.  His blood pressure is under very good control.  He states occasionally he gets a little numb and numbness and tingling to the bottom of his feet but if he walks around it goes away.  He has been working to change his diet and exercising so has lost a little weight.

## 2025-05-06 NOTE — ASSESSMENT & PLAN NOTE
This is a chronic problem.  Patient has hammertoes involving the 3rd and 4th toe on the left foot and a little bit on the third toe of the right foot.  It is uncomfortable at times.  He like to see a podiatrist for treatment.

## 2025-05-06 NOTE — PROGRESS NOTES
Subjective:     CC: Here for several issues.    HPI:   Rene presents today with the following medical concerns:    Hammer toes of both feet  This is a chronic problem.  Patient has hammertoes involving the 3rd and 4th toe on the left foot and a little bit on the third toe of the right foot.  It is uncomfortable at times.  He like to see a podiatrist for treatment.    Diabetes mellitus with coincident hypertension (HCC)  This is a chronic problem.  His hemoglobin A1c is due so we will do it today.  He wants to make sure that his diabetes is under control.  His blood pressure is under very good control.  He states occasionally he gets a little numb and numbness and tingling to the bottom of his feet but if he walks around it goes away.  He has been working to change his diet and exercising so has lost a little weight.    Chronic pain of right wrist  This is a new problem.  States has had troubles with pain in his right wrist for some time but it seems to be getting little worse.  He is worried that it could be carpal tunnel syndrome but he points to the medial aspect of the wrist is where the discomfort is.  Pain happens when he  something and picks it up.  No history of injury.    Chronic atrial fibrillation (HCC)  This is a chronic problem.  Followed by cardiology.  Under good control.  On anticoagulant treatment.    Alcohol dependence in remission ([facility])  This is a chronic problem.  This remains in remission.    Leg DVT (deep venous thromboembolism), chronic, left ([facility])  This is a chronic problem.  On warfarin.    Opioid dependence in remission ([facility])  This is a chronic stable condition.  Remains seen in remission.    Past Medical History:   Diagnosis Date    Anticoagulation monitoring, special range     Arthritis     Bipolar disorder ([facility])     Bipolar disorder, in partial remission, most recent episode depressed ([facility]) 04/19/2022    Blood clotting disorder (HCC) 06/24/2015    hx of PE    Blood clotting disorder  (HCC) 2020    DVT left leg    CAD (coronary artery disease) 2015    CAD (coronary artery disease)     2015: CABG-3 VESSEL    Carotid artery occlusion     L 60-79% occluded; R 59% occluded per pt report;     Cataract     bilateral IOL    Clotting disorder (HCC)     protein S elevation in '12 with recurrent DVT and PE-coumadin lifelong    Colon polyps     Diabetes mellitus, type II (HCC)     diet controlled. 10/3/23-does not check glucose at home.    Gout     Hammer toe     Heart murmur     Hiatus hernia syndrome     High cholesterol     Hyperlipidemia     Hypertension     Hypothyroidism     Impaired fasting glucose     Mild aortic stenosis - echo 2018     Pain     Right knee pain 2023    Stroke (HCC) 2015    TIA-hx of 2 or 3. No residual.       Social History     Tobacco Use    Smoking status: Former     Current packs/day: 0.00     Average packs/day: 2.2 packs/day for 15.3 years (33.9 ttl pk-yrs)     Types: Cigarettes     Start date: 1966     Quit date: 1977     Years since quittin.3    Smokeless tobacco: Never    Tobacco comments:     quit x 32 yrs; 2ppd x 7 years   Vaping Use    Vaping status: Never Used   Substance Use Topics    Alcohol use: Not Currently     Alcohol/week: 19.8 oz     Types: 30 Cans of beer, 3 Shots of liquor per week     Comment: no alcohol since     Drug use: Not Currently     Types: Intravenous, Inhaled, Oral     Comment: no drug use since        Current Outpatient Medications Ordered in Epic   Medication Sig Dispense Refill    warfarin (COUMADIN) 7.5 MG Tab TAKE 1/2 TO 1 TABLET BY MOUTH DAILY AS DIRECTED BY [facility] 100 Tablet 1    divalproex (DEPAKOTE) 125 MG EC tablet TAKE 1 TABLET BY MOUTH TWICE A  Tablet 3    dutasteride (AVODART) 0.5 MG capsule Take 1 Capsule by mouth every day. 90 Capsule 2    atorvastatin (LIPITOR) 80 MG tablet TAKE 1 TABLET BY MOUTH EVERY  Tablet 3    lisinopril (PRINIVIL) 5 MG  Tab Take 1 Tablet by mouth every day. 100 Tablet 3    metoprolol tartrate (LOPRESSOR) 25 MG Tab Take 0.5 Tablets by mouth every day. 50 Tablet 3    Empagliflozin (JARDIANCE) 10 MG Tab tablet Take 1 Tablet by mouth every day. 100 Tablet 3    levothyroxine (SYNTHROID) 75 MCG Tab TAKE 1 TABLET BY MOUTH EVERY DAY IN THE MORNING ON AN EMPTY STOMACH 90 Tablet 3    traZODone (DESYREL) 50 MG Tab TAKE 2 TABLETS BY MOUTH EVERY EVENING. FOR INSOMNIA 180 Tablet 2    aspirin 81 MG tablet Take 81 mg by mouth every day. 100 Tab     COENZYME Q10 PO Take 1 Tab by mouth every morning.      Cholecalciferol (VITAMIN D) 2000 UNITS Cap Take 2,000 Units by mouth every day.      divalproex (DEPAKOTE) 250 MG Tablet Delayed Response TAKE 2 TABLET BY MOUTH AT NIGHT 200 Tablet 0    ketoconazole (NIZORAL) 2 % shampoo Wash scalp 2-3 times weekly, allow to sit for 1-2 minutes prior to rinsing. 120 mL 6    furosemide (LASIX) 20 MG Tab Take 1 Tablet by mouth every day. 90 Tablet 3    acetaminophen (TYLENOL 8 HOUR ARTHRITIS PAIN) 650 MG CR tablet Take 650 mg by mouth at bedtime as needed.      Wheat Dextrin (BENEFIBER DRINK MIX) Pack Take  by mouth every day.      Glucosamine-Chondroit-Vit C-Mn (GLUCOSAMINE CHONDR 500 COMPLEX PO) Take 1 Tablet by mouth 2 times a day.      docosahexanoic acid (OMEGA 3 FA) 1000 MG CAPS Take 1,000 mg by mouth every day.       No current [facility]-ordered facility-administered medications on file.       Allergies:  Penicillins    Health Maintenance: Completed    ROS:  Gen: no fevers/chills, no changes in weight  Eyes: no changes in vision  ENT: no sore throat, no hearing loss, no bloody nose  Pulm: no sob, no cough  CV: no chest pain, no palpitations  GI: no nausea/vomiting, no diarrhea  : no dysuria  Skin: no rash  Neuro: no headaches, no numbness/tingling  Heme/Lymph: no easy bruising      Objective:       Exam:  /70 (BP Location: Right arm, Patient Position: Sitting, BP Cuff Size: Adult)   Pulse (!) 58   Temp  "36.6 °C (97.8 °F) (Temporal)   Resp 16   Ht 1.702 m (5' 7\")   Wt 83.3 kg (183 lb 9.6 oz)   SpO2 94%   BMI 28.76 kg/m²  Body mass index is 28.76 kg/m².    Gen: Alert and oriented, No apparent distress.  Neck: Neck is supple without lymphadenopathy.  Lungs: Normal effort,   Ext: No clubbing, cyanosis, edema.  Patient has normal range of motion of the right wrist without any obvious abnormality.  No swelling noted.  He points to the medial aspect of the wrist joint as to where he feels the discomfort.  Examination of his feet shows hammertoes involving the 3rd and 4th toe on the left foot and a mild hammertoe of the third toe of the right foot.  No redness seen.      Labs: Labs ordered to be done before next visit.  Point-of-care hemoglobin A1c is 6.2%.    Assessment & Plan:     74 y.o. male with the following -     1. Diabetes mellitus with coincident hypertension (HCC)  This is a chronic problem.  Diabetes under good control.  Labs ordered to be done before his next visit.  We talked about healthy diet and he will continue to work on that.  - POCT  A1C  - POCT Retinal Eye Exam  - Comp Metabolic Panel; Future  - Lipid Profile; Future  - URINALYSIS,CULTURE IF INDICATED; Future  - ESTIMATED GFR; Future  - CBC WITH DIFFERENTIAL; Future  - MICROALBUMIN CREAT RATIO URINE; Future    2. Hammer toes of both feet  This is a chronic problem.  Referral to podiatry made.  - Referral to Podiatry    3. Chronic pain of right wrist  This is a chronic problem.  Referral to orthopedics for evaluation.  I told him it could be arthritis of the joint and even some tendinitis associated with it.  - Referral to Orthopedics    4. Opioid dependence in remission ([facility])  This is a chronic stable condition.  In remission.    5. Alcohol dependence in remission ([facility])  This is a chronic stable condition.  In remission.    6. Chronic atrial fibrillation (HCC)  This is a chronic well-controlled.  Followed by cardiology.    7. Leg DVT (deep venous " thromboembolism), chronic, left (HCC)  This is a chronic problem.  On anticoagulant treatment.    8. Prostate cancer screening  This is a screening issue.  This to be done before next visit.  - PROSTATE SPECIFIC AG SCREENING; Future    HCC Gap Form    Diagnosis to address: F11.21 - Opioid dependence in remission (HCC)  Assessment and plan: Chronic, stable. Continue with current defined treatment plan: Chronic issue that remains in remission.. Follow-up at least annually.  Diagnosis: F10.21 - Alcohol dependence in remission (HCC)  Assessment and plan: Chronic, stable. Continue with current defined treatment plan: Chronic issue that remains in remission.. Follow-up at least annually.  Diagnosis: I48.20 - Chronic atrial fibrillation (HCC)  Assessment and plan: Chronic, stable, as based on today's assessment and impact on other conditions evaluated today. Continue with current treatment plan: Condition under control and managed by cardiology.  Follow-up with specialist as directed, but at least annually.  Diagnosis: I82.502 - Leg DVT (deep venous thromboembolism), chronic, left (HCC)  Assessment and plan: Chronic, stable. Continue with current defined treatment plan: Chronic problem on anticoagulation.. Follow-up at least annually.  Last edited 05/06/25 14:16 PDT by Michael Sethi III, M.D.         Return in about 3 months (around 8/6/2025) for [facility]rich f/view labs, annual exam.    Please note that this dictation was created using voice recognition software. I have made every reasonable attempt to correct obvious errors, but I expect that there are errors of grammar and possibly content that I did not discover before finalizing the note.

## 2025-05-06 NOTE — ASSESSMENT & PLAN NOTE
This is a chronic problem.  Followed by cardiology.  Under good control.  On anticoagulant treatment.

## 2025-05-06 NOTE — ASSESSMENT & PLAN NOTE
This is a new problem.  States has had troubles with pain in his right wrist for some time but it seems to be getting little worse.  He is worried that it could be carpal tunnel syndrome but he points to the medial aspect of the wrist is where the discomfort is.  Pain happens when he  something and picks it up.  No history of injury.

## 2025-05-12 ENCOUNTER — ANTICOAGULATION VISIT (OUTPATIENT)
Dept: VASCULAR LAB | Facility: MEDICAL CENTER | Age: 75
End: 2025-05-12
Attending: INTERNAL MEDICINE
Payer: MEDICARE

## 2025-05-12 DIAGNOSIS — G45.9 TIA (TRANSIENT ISCHEMIC ATTACK): ICD-10-CM

## 2025-05-12 LAB — INR PPP: 2.1 (ref 2–3.5)

## 2025-05-12 PROCEDURE — 99211 OFF/OP EST MAY X REQ PHY/QHP: CPT

## 2025-05-12 PROCEDURE — 85610 PROTHROMBIN TIME: CPT

## 2025-05-12 NOTE — PROGRESS NOTES
Anticoagulation Summary  As of 2025      INR goal:  2.0-3.0   TTR:  57.3% (9.9 y)   INR used for dosin.10 (2025)   Warfarin maintenance plan:  7.5 mg (7.5 mg x 1) every Sun, Tue, Thu; 3.75 mg (7.5 mg x 0.5) all other days   Weekly warfarin total:  37.5 mg   Plan last modified:  Kim Everett (2025)   Next INR check:  2025   Priority:  Acute   Target end date:  Indefinite    Indications    DVT (deep venous thrombosis) (HCC) (Resolved) [I82.409]  TIA (transient ischemic attack) [G45.9]  Pulmonary embolism [415.19] (Resolved) [I26.99]                 Anticoagulation Episode Summary       INR check location:  Anticoagulation Clinic    Preferred lab:  [facility]    Send INR reminders to:  --    Comments:  ASA 81 mg for TIA, CAD hx - [facility] per Dr. Wright 3/7/22          Anticoagulation Care Providers       Provider Role Specialty Phone number    Patricia Damon M.D. Referring Internal Medicine [phone]    Jona QuinnD Responsible      [facility]ticoagulation Services Responsible  [phone]        Refer to Patient Findings for HPI:  Patient Findings       Negatives:  Signs/symptoms of thrombosis, Signs/symptoms of bleeding, Laboratory test error suspected, Change in health, Change in alcohol use, Change in activity, Upcoming invasive procedure, Emergency department visit, Upcoming dental procedure, Missed doses, Extra doses, Change in medications, Change in diet/appetite, Hospital admission, Bruising, Other complaints          Clinical Outcomes       Negatives:  Major bleeding event, Thromboembolic event, Anticoagulation-related hospital admission, Anticoagulation-related ED visit, Anticoagulation-related fatality        Date Referral Placed: 24    Vitals:  There were no vitals filed for this visit.  Pt declined vitals    Verified current warfarin dosing schedule.    Medications reconciled.  Pt is on antiplatelet therapy with ASA 81mg QD       A/P   INR  is therapeutic at 2.1  Reason(s) for out of range INR today: N/A      Warfarin dosing recommendation: Continue regimen as listed above.    Pt educated to contact our clinic with any changes in medications or s/s of bleeding or thrombosis. Pt is aware to seek immediate medical attention for falls, head injury or deep cuts.    Request pt to return in 4 week(s). Pt agrees.    Lupe TenorioD

## 2025-05-14 PROCEDURE — RXMED WILLOW AMBULATORY MEDICATION CHARGE: Performed by: FAMILY MEDICINE

## 2025-05-14 RX ORDER — FUROSEMIDE 20 MG/1
20 TABLET ORAL DAILY
Qty: 100 TABLET | Refills: 3 | Status: SHIPPED | OUTPATIENT
Start: 2025-05-14

## 2025-05-14 NOTE — TELEPHONE ENCOUNTER
Received request via: Pharmacy    Was the patient seen in the last year in this department? Yes    Does the patient have an active prescription (recently filled or refills available) for medication(s) requested? No    Pharmacy Name: [facility]    Does the patient have [facility] and need 100-day supply? (This applies to ALL medications) Patient does not have [facility]

## 2025-05-16 ENCOUNTER — PHARMACY VISIT (OUTPATIENT)
Dept: PHARMACY | Facility: MEDICAL CENTER | Age: 75
End: 2025-05-16
Payer: COMMERCIAL

## 2025-05-16 PROCEDURE — RXMED WILLOW AMBULATORY MEDICATION CHARGE: Performed by: FAMILY MEDICINE

## 2025-05-28 PROCEDURE — RXMED WILLOW AMBULATORY MEDICATION CHARGE: Performed by: FAMILY MEDICINE

## 2025-05-29 ENCOUNTER — PHARMACY VISIT (OUTPATIENT)
Dept: PHARMACY | Facility: MEDICAL CENTER | Age: 75
End: 2025-05-29
Payer: COMMERCIAL

## 2025-06-04 RX ORDER — DIVALPROEX SODIUM 250 MG/1
TABLET, DELAYED RELEASE ORAL
Qty: 200 TABLET | Refills: 3 | Status: SHIPPED | OUTPATIENT
Start: 2025-06-04

## 2025-06-09 ENCOUNTER — APPOINTMENT (OUTPATIENT)
Dept: VASCULAR LAB | Facility: MEDICAL CENTER | Age: 75
End: 2025-06-09
Payer: MEDICARE

## 2025-06-11 ENCOUNTER — TELEPHONE (OUTPATIENT)
Dept: HEALTH INFORMATION MANAGEMENT | Facility: OTHER | Age: 75
End: 2025-06-11
Payer: MEDICARE

## 2025-06-11 RX ORDER — ATORVASTATIN CALCIUM 80 MG/1
80 TABLET, FILM COATED ORAL
Qty: 100 TABLET | Refills: 0 | Status: SHIPPED | OUTPATIENT
Start: 2025-06-11

## 2025-06-12 PROCEDURE — RXMED WILLOW AMBULATORY MEDICATION CHARGE: Performed by: FAMILY MEDICINE

## 2025-06-16 ENCOUNTER — PHARMACY VISIT (OUTPATIENT)
Dept: PHARMACY | Facility: MEDICAL CENTER | Age: 75
End: 2025-06-16
Payer: COMMERCIAL

## 2025-06-16 ENCOUNTER — ANTICOAGULATION VISIT (OUTPATIENT)
Dept: VASCULAR LAB | Facility: MEDICAL CENTER | Age: 75
End: 2025-06-16
Attending: INTERNAL MEDICINE
Payer: MEDICARE

## 2025-06-16 VITALS — SYSTOLIC BLOOD PRESSURE: 139 MMHG | DIASTOLIC BLOOD PRESSURE: 77 MMHG | HEART RATE: 50 BPM

## 2025-06-16 DIAGNOSIS — Z86.711 HX PULMONARY EMBOLISM: ICD-10-CM

## 2025-06-16 DIAGNOSIS — G45.9 TIA (TRANSIENT ISCHEMIC ATTACK): Primary | ICD-10-CM

## 2025-06-16 DIAGNOSIS — D68.69 SECONDARY HYPERCOAGULABLE STATE (HCC): Chronic | ICD-10-CM

## 2025-06-16 LAB — INR PPP: 1.9 (ref 2–3.5)

## 2025-06-16 PROCEDURE — 99212 OFFICE O/P EST SF 10 MIN: CPT | Performed by: NURSE PRACTITIONER

## 2025-06-16 PROCEDURE — 85610 PROTHROMBIN TIME: CPT

## 2025-06-16 NOTE — PROGRESS NOTES
Anticoagulation Summary  As of 2025      INR goal:  2.0-3.0   TTR:  57.3% (10 y)   INR used for dosin.90 (2025)   Warfarin maintenance plan:  7.5 mg (7.5 mg x 1) every Sun, e, Thu; 3.75 mg (7.5 mg x 0.5) all other days   Weekly warfarin total:  37.5 mg   Plan last modified:  Kim Everett (2025)   Next INR check:  2025   Priority:  Acute   Target end date:  Indefinite    Indications    DVT (deep venous thrombosis) (HCC) (Resolved) [I82.409]  TIA (transient ischemic attack) [G45.9]  Pulmonary embolism [415.19] (Resolved) [I26.99]                 Anticoagulation Episode Summary       INR check location:  Anticoagulation Clinic    Preferred lab:  [facility]    Send INR reminders to:  --    Comments:  ASA 81 mg for TIA, CAD hx - [facility] per Dr. Wright 3/7/22          Anticoagulation Care Providers       Provider Role Specialty Phone number    Patricia Damon M.D. Referring Internal Medicine [phone]    Jona QuinnD Responsible      [facility] Responsible  [phone]                Refer to Patient Findings for HPI:  Patient Findings       Positives:  Change in diet/appetite    Negatives:  Signs/symptoms of thrombosis, Signs/symptoms of bleeding, Laboratory test error suspected, Change in health, Change in alcohol use, Change in activity, Upcoming invasive procedure, Emergency department visit, Upcoming dental procedure, Missed doses, Extra doses, Change in medications, Hospital admission, Bruising, Other complaints    Comments:  Ate a little more greens lately due to traveling but will resume his normal diet now.          Clinical Outcomes       Negatives:  Major bleeding event, Thromboembolic event, Anticoagulation-related hospital admission, Anticoagulation-related ED visit, Anticoagulation-related fatality    Comments:  Ate a little more greens lately due to traveling but will resume his normal diet now.            Date Referral  Placed: 9/27/24      Vitals:  Vitals:    06/16/25 1412   BP: 139/77   Pulse: (!) 50       Verified current warfarin dosing schedule.    Medications reconciled.  Pt is on antiplatelet therapy with ASA 81 mg for TIA per cards.      A/P   INR is slightly subtherapeutic  Reason(s) for out of range INR today: Increased vitamin K intake        Warfarin dosing recommendation: Take 7.5 mg tonight, then resume your previous regimen.    Pt educated to contact our clinic with any changes in medications or s/s of bleeding or thrombosis. Pt is aware to seek immediate medical attention for falls, head injury or deep cuts.    Request pt to return in 2 week(s). Pt declines despite warning of extending their follow up interval. Pt opts to RTC in 4 week(s).    TOREY Bucio.

## 2025-06-17 PROCEDURE — RXMED WILLOW AMBULATORY MEDICATION CHARGE: Performed by: FAMILY MEDICINE

## 2025-06-19 ENCOUNTER — PHARMACY VISIT (OUTPATIENT)
Dept: PHARMACY | Facility: MEDICAL CENTER | Age: 75
End: 2025-06-19
Payer: COMMERCIAL

## 2025-06-19 PROCEDURE — RXMED WILLOW AMBULATORY MEDICATION CHARGE

## 2025-06-19 PROCEDURE — RXMED WILLOW AMBULATORY MEDICATION CHARGE: Performed by: NURSE PRACTITIONER

## 2025-06-20 PROCEDURE — RXMED WILLOW AMBULATORY MEDICATION CHARGE: Performed by: FAMILY MEDICINE

## 2025-06-23 ENCOUNTER — HOSPITAL ENCOUNTER (OUTPATIENT)
Dept: LAB | Facility: MEDICAL CENTER | Age: 75
End: 2025-06-23
Attending: FAMILY MEDICINE
Payer: MEDICARE

## 2025-06-23 ENCOUNTER — PHARMACY VISIT (OUTPATIENT)
Dept: PHARMACY | Facility: MEDICAL CENTER | Age: 75
End: 2025-06-23
Payer: COMMERCIAL

## 2025-06-23 DIAGNOSIS — Z12.5 PROSTATE CANCER SCREENING: ICD-10-CM

## 2025-06-23 DIAGNOSIS — E11.9 DIABETES MELLITUS WITH COINCIDENT HYPERTENSION (HCC): ICD-10-CM

## 2025-06-23 DIAGNOSIS — I10 DIABETES MELLITUS WITH COINCIDENT HYPERTENSION (HCC): ICD-10-CM

## 2025-06-23 LAB
ALBUMIN SERPL BCP-MCNC: 3.7 G/DL (ref 3.2–4.9)
ALBUMIN/GLOB SERPL: 1.4 G/DL
ALP SERPL-CCNC: 47 U/L (ref 30–99)
ALT SERPL-CCNC: 12 U/L (ref 2–50)
ANION GAP SERPL CALC-SCNC: 11 MMOL/L (ref 7–16)
APPEARANCE UR: CLEAR
AST SERPL-CCNC: 22 U/L (ref 12–45)
BASOPHILS # BLD AUTO: 0.5 % (ref 0–1.8)
BASOPHILS # BLD: 0.03 K/UL (ref 0–0.12)
BILIRUB SERPL-MCNC: 0.7 MG/DL (ref 0.1–1.5)
BILIRUB UR QL STRIP.AUTO: NEGATIVE
BUN SERPL-MCNC: 12 MG/DL (ref 8–22)
CALCIUM ALBUM COR SERPL-MCNC: 9.1 MG/DL (ref 8.5–10.5)
CALCIUM SERPL-MCNC: 8.9 MG/DL (ref 8.5–10.5)
CHLORIDE SERPL-SCNC: 102 MMOL/L (ref 96–112)
CHOLEST SERPL-MCNC: 169 MG/DL (ref 100–199)
CO2 SERPL-SCNC: 23 MMOL/L (ref 20–33)
COLOR UR: YELLOW
CREAT SERPL-MCNC: 0.89 MG/DL (ref 0.5–1.4)
CREAT UR-MCNC: 43.7 MG/DL
EOSINOPHIL # BLD AUTO: 0.12 K/UL (ref 0–0.51)
EOSINOPHIL NFR BLD: 2 % (ref 0–6.9)
ERYTHROCYTE [DISTWIDTH] IN BLOOD BY AUTOMATED COUNT: 51.4 FL (ref 35.9–50)
FASTING STATUS PATIENT QL REPORTED: NORMAL
GFR SERPLBLD CREATININE-BSD FMLA CKD-EPI: 89 ML/MIN/1.73 M 2
GLOBULIN SER CALC-MCNC: 2.6 G/DL (ref 1.9–3.5)
GLUCOSE SERPL-MCNC: 95 MG/DL (ref 65–99)
GLUCOSE UR STRIP.AUTO-MCNC: >=1000 MG/DL
HCT VFR BLD AUTO: 47.8 % (ref 42–52)
HDLC SERPL-MCNC: 33 MG/DL
HGB BLD-MCNC: 15.8 G/DL (ref 14–18)
IMM GRANULOCYTES # BLD AUTO: 0.02 K/UL (ref 0–0.11)
IMM GRANULOCYTES NFR BLD AUTO: 0.3 % (ref 0–0.9)
KETONES UR STRIP.AUTO-MCNC: NEGATIVE MG/DL
LDLC SERPL CALC-MCNC: 97 MG/DL
LEUKOCYTE ESTERASE UR QL STRIP.AUTO: NEGATIVE
LYMPHOCYTES # BLD AUTO: 0.81 K/UL (ref 1–4.8)
LYMPHOCYTES NFR BLD: 13.6 % (ref 22–41)
MCH RBC QN AUTO: 31.9 PG (ref 27–33)
MCHC RBC AUTO-ENTMCNC: 33.1 G/DL (ref 32.3–36.5)
MCV RBC AUTO: 96.6 FL (ref 81.4–97.8)
MICRO URNS: ABNORMAL
MICROALBUMIN UR-MCNC: <1.2 MG/DL
MICROALBUMIN/CREAT UR: NORMAL MG/G (ref 0–30)
MONOCYTES # BLD AUTO: 0.8 K/UL (ref 0–0.85)
MONOCYTES NFR BLD AUTO: 13.5 % (ref 0–13.4)
NEUTROPHILS # BLD AUTO: 4.16 K/UL (ref 1.82–7.42)
NEUTROPHILS NFR BLD: 70.1 % (ref 44–72)
NITRITE UR QL STRIP.AUTO: NEGATIVE
NRBC # BLD AUTO: 0 K/UL
NRBC BLD-RTO: 0 /100 WBC (ref 0–0.2)
PH UR STRIP.AUTO: 5.5 [PH] (ref 5–8)
PLATELET # BLD AUTO: 240 K/UL (ref 164–446)
PMV BLD AUTO: 11 FL (ref 9–12.9)
POTASSIUM SERPL-SCNC: 4.5 MMOL/L (ref 3.6–5.5)
PROT SERPL-MCNC: 6.3 G/DL (ref 6–8.2)
PROT UR QL STRIP: NEGATIVE MG/DL
PSA SERPL DL<=0.01 NG/ML-MCNC: 0.42 NG/ML (ref 0–4)
RBC # BLD AUTO: 4.95 M/UL (ref 4.7–6.1)
RBC UR QL AUTO: NEGATIVE
SODIUM SERPL-SCNC: 136 MMOL/L (ref 135–145)
SP GR UR STRIP.AUTO: 1.02
TRIGL SERPL-MCNC: 194 MG/DL (ref 0–149)
UROBILINOGEN UR STRIP.AUTO-MCNC: 0.2 EU/DL
WBC # BLD AUTO: 5.9 K/UL (ref 4.8–10.8)

## 2025-06-23 PROCEDURE — 81003 URINALYSIS AUTO W/O SCOPE: CPT

## 2025-06-23 PROCEDURE — 82043 UR ALBUMIN QUANTITATIVE: CPT

## 2025-06-23 PROCEDURE — 82570 ASSAY OF URINE CREATININE: CPT

## 2025-06-23 PROCEDURE — 36415 COLL VENOUS BLD VENIPUNCTURE: CPT

## 2025-06-23 PROCEDURE — 85025 COMPLETE CBC W/AUTO DIFF WBC: CPT

## 2025-06-23 PROCEDURE — 80053 COMPREHEN METABOLIC PANEL: CPT

## 2025-06-23 PROCEDURE — 84153 ASSAY OF PSA TOTAL: CPT

## 2025-06-23 PROCEDURE — 80061 LIPID PANEL: CPT

## 2025-06-24 ENCOUNTER — RESULTS FOLLOW-UP (OUTPATIENT)
Dept: MEDICAL GROUP | Facility: PHYSICIAN GROUP | Age: 75
End: 2025-06-24

## 2025-06-30 DIAGNOSIS — I25.10 CORONARY ARTERY DISEASE DUE TO LIPID RICH PLAQUE: ICD-10-CM

## 2025-06-30 DIAGNOSIS — I25.83 CORONARY ARTERY DISEASE DUE TO LIPID RICH PLAQUE: ICD-10-CM

## 2025-06-30 PROCEDURE — RXMED WILLOW AMBULATORY MEDICATION CHARGE

## 2025-06-30 RX ORDER — EMPAGLIFLOZIN 10 MG/1
10 TABLET, FILM COATED ORAL DAILY
Qty: 100 TABLET | Refills: 0 | Status: SHIPPED | OUTPATIENT
Start: 2025-06-30

## 2025-06-30 NOTE — TELEPHONE ENCOUNTER
Courtesy refill provided, patient will need follow up visit for future refills.    Scheduling, please contact patient to schedule a follow up visit, thank you

## 2025-06-30 NOTE — TELEPHONE ENCOUNTER
Is the patient due for a refill? Yes    Was the patient seen the last 15 months? Yes    Date of last office visit: 9/4/2024    Does the patient have an upcoming appointment?  No    Provider to refill:    Does the patient have [facility] and need 100-day supply? (This applies to ALL medications) Yes, quantity updated to 100 days

## 2025-07-01 ENCOUNTER — APPOINTMENT (OUTPATIENT)
Dept: MEDICAL GROUP | Facility: MEDICAL CENTER | Age: 75
End: 2025-07-01
Payer: MEDICARE

## 2025-07-10 DIAGNOSIS — E03.9 ACQUIRED HYPOTHYROIDISM: Primary | ICD-10-CM

## 2025-07-10 DIAGNOSIS — E11.9 DIABETES MELLITUS WITH COINCIDENT HYPERTENSION (HCC): ICD-10-CM

## 2025-07-10 DIAGNOSIS — I10 DIABETES MELLITUS WITH COINCIDENT HYPERTENSION (HCC): ICD-10-CM

## 2025-07-10 PROCEDURE — RXMED WILLOW AMBULATORY MEDICATION CHARGE: Performed by: FAMILY MEDICINE

## 2025-07-10 RX ORDER — LEVOTHYROXINE SODIUM 75 UG/1
75 TABLET ORAL
Qty: 100 TABLET | Refills: 3 | Status: SHIPPED | OUTPATIENT
Start: 2025-07-10

## 2025-07-10 NOTE — TELEPHONE ENCOUNTER
Received request via: Pharmacy    Was the patient seen in the last year in this department? Yes    Does the patient have an active prescription (recently filled or refills available) for medication(s) requested? No    Pharmacy Name:     [facility] [phone]       Does the patient have [facility] and need 100-day supply? (This applies to ALL medications) Yes, quantity updated to 100 days

## 2025-07-11 ENCOUNTER — PHARMACY VISIT (OUTPATIENT)
Dept: PHARMACY | Facility: MEDICAL CENTER | Age: 75
End: 2025-07-11
Payer: COMMERCIAL

## 2025-07-21 ENCOUNTER — ANTICOAGULATION VISIT (OUTPATIENT)
Dept: VASCULAR LAB | Facility: MEDICAL CENTER | Age: 75
End: 2025-07-21
Attending: INTERNAL MEDICINE
Payer: MEDICARE

## 2025-07-21 DIAGNOSIS — D68.69 SECONDARY HYPERCOAGULABLE STATE (HCC): Chronic | ICD-10-CM

## 2025-07-21 DIAGNOSIS — G45.9 TIA (TRANSIENT ISCHEMIC ATTACK): Primary | ICD-10-CM

## 2025-07-21 LAB — INR PPP: 2.1 (ref 2–3.5)

## 2025-07-21 PROCEDURE — 99213 OFFICE O/P EST LOW 20 MIN: CPT

## 2025-07-21 PROCEDURE — 85610 PROTHROMBIN TIME: CPT

## 2025-07-21 NOTE — PROGRESS NOTES
Anticoagulation Summary  As of 2025      INR goal:  2.0-3.0   TTR:  57.2% (10.1 y)   INR used for dosin.10 (2025)   Warfarin maintenance plan:  7.5 mg (7.5 mg x 1) every Sun, Tue, u; 3.75 mg (7.5 mg x 0.5) all other days   Weekly warfarin total:  37.5 mg   Plan last modified:  Kim Everett (2025)   Next INR check:  2025   Priority:  Acute   Target end date:  Indefinite    Indications    TIA (transient ischemic attack) [G45.9]  Pulmonary embolism [415.19] (Resolved) [I26.99]  History of DVT (deep vein thrombosis) (Resolved) [Z86.718]  Secondary hypercoagulable state (HCC) [D68.69]                 Anticoagulation Episode Summary       INR check location:  [facility]    Preferred lab:  [facility]    Send INR reminders to:  --    Comments:  ASA 81 mg for TIA, CAD hx - Lifelong per Dr. Wright 3/7/22          Anticoagulation Care Providers       Provider Role Specialty Phone number    Patricia Damon M.D. Referring Internal Medicine [phone]    Jona QuinnD Responsible      [facility] Responsible  [phone]        Refer to Patient Findings for HPI:  Patient Findings       Negatives:  Signs/symptoms of thrombosis, Signs/symptoms of bleeding, Laboratory test error suspected, Change in health, Change in alcohol use, Change in activity, Upcoming invasive procedure, Emergency department visit, Upcoming dental procedure, Missed doses, Extra doses, Change in medications, Change in diet/appetite, Hospital admission, Bruising, Other complaints          Clinical Outcomes       Negatives:  Major bleeding event, Thromboembolic event, Anticoagulation-related hospital admission, Anticoagulation-related ED visit, Anticoagulation-related fatality        Date Referral Placed: 24    Vitals:  There were no vitals filed for this visit.  Pt declined vitals    Verified current warfarin dosing schedule.    Medications reconciled.  Pt is on  antiplatelet therapy with ASA 81mg QD  for TIA per cards.      A/P   INR is therapeutic  Reason(s) for out of range INR today: N/A        Warfarin dosing recommendation: Continue regimen as listed above.    Pt educated to contact our clinic with any changes in medications or s/s of bleeding or thrombosis. Pt is aware to seek immediate medical attention for falls, head injury or deep cuts.    Request pt to return in 5 week(s). Pt agrees.    Lupe TenorioD

## 2025-07-21 NOTE — TELEPHONE ENCOUNTER
Received request via: Pharmacy    Was the patient seen in the last year in this department? Yes    Does the patient have an active prescription (recently filled or refills available) for medication(s) requested? No    Pharmacy Name: [facility] [place]     Does the patient have halfway Plus and need 100-day supply? (This applies to ALL medications) Yes, quantity updated to 100 days

## 2025-07-22 RX ORDER — DUTASTERIDE 0.5 MG/1
0.5 CAPSULE, LIQUID FILLED ORAL DAILY
Qty: 100 CAPSULE | Refills: 0 | Status: SHIPPED | OUTPATIENT
Start: 2025-07-22

## 2025-07-23 ENCOUNTER — PHARMACY VISIT (OUTPATIENT)
Dept: PHARMACY | Facility: MEDICAL CENTER | Age: 75
End: 2025-07-23
Payer: COMMERCIAL

## 2025-07-23 PROCEDURE — RXMED WILLOW AMBULATORY MEDICATION CHARGE: Performed by: INTERNAL MEDICINE

## 2025-08-07 ENCOUNTER — OFFICE VISIT (OUTPATIENT)
Dept: MEDICAL GROUP | Facility: PHYSICIAN GROUP | Age: 75
End: 2025-08-07
Payer: MEDICARE

## 2025-08-07 VITALS
OXYGEN SATURATION: 94 % | RESPIRATION RATE: 16 BRPM | DIASTOLIC BLOOD PRESSURE: 62 MMHG | WEIGHT: 182 LBS | TEMPERATURE: 97.8 F | BODY MASS INDEX: 28.56 KG/M2 | SYSTOLIC BLOOD PRESSURE: 116 MMHG | HEART RATE: 54 BPM | HEIGHT: 67 IN

## 2025-08-07 DIAGNOSIS — I10 DIABETES MELLITUS WITH COINCIDENT HYPERTENSION (HCC): ICD-10-CM

## 2025-08-07 DIAGNOSIS — E03.9 ACQUIRED HYPOTHYROIDISM: Primary | ICD-10-CM

## 2025-08-07 DIAGNOSIS — R35.1 BENIGN PROSTATIC HYPERPLASIA WITH NOCTURIA: ICD-10-CM

## 2025-08-07 DIAGNOSIS — I35.0 MILD AORTIC STENOSIS: ICD-10-CM

## 2025-08-07 DIAGNOSIS — E11.9 DIABETES MELLITUS WITH COINCIDENT HYPERTENSION (HCC): ICD-10-CM

## 2025-08-07 DIAGNOSIS — N40.1 BENIGN PROSTATIC HYPERPLASIA WITH NOCTURIA: ICD-10-CM

## 2025-08-07 PROCEDURE — RXMED WILLOW AMBULATORY MEDICATION CHARGE

## 2025-08-07 PROCEDURE — 99214 OFFICE O/P EST MOD 30 MIN: CPT | Performed by: FAMILY MEDICINE

## 2025-08-07 PROCEDURE — 3074F SYST BP LT 130 MM HG: CPT | Performed by: FAMILY MEDICINE

## 2025-08-07 PROCEDURE — 3078F DIAST BP <80 MM HG: CPT | Performed by: FAMILY MEDICINE

## 2025-08-07 ASSESSMENT — FIBROSIS 4 INDEX: FIB4 SCORE: 1.98

## 2025-08-08 ENCOUNTER — PHARMACY VISIT (OUTPATIENT)
Dept: PHARMACY | Facility: MEDICAL CENTER | Age: 75
End: 2025-08-08
Payer: COMMERCIAL

## 2025-08-14 ENCOUNTER — HOSPITAL ENCOUNTER (OUTPATIENT)
Dept: LAB | Facility: MEDICAL CENTER | Age: 75
End: 2025-08-14
Attending: FAMILY MEDICINE
Payer: MEDICARE

## 2025-08-14 DIAGNOSIS — E03.9 ACQUIRED HYPOTHYROIDISM: ICD-10-CM

## 2025-08-14 DIAGNOSIS — I10 DIABETES MELLITUS WITH COINCIDENT HYPERTENSION (HCC): ICD-10-CM

## 2025-08-14 DIAGNOSIS — E11.9 DIABETES MELLITUS WITH COINCIDENT HYPERTENSION (HCC): ICD-10-CM

## 2025-08-14 LAB
EST. AVERAGE GLUCOSE BLD GHB EST-MCNC: 128 MG/DL
HBA1C MFR BLD: 6.1 % (ref 4–5.6)
TSH SERPL DL<=0.005 MIU/L-ACNC: 2.17 UIU/ML (ref 0.38–5.33)

## 2025-08-14 PROCEDURE — 84443 ASSAY THYROID STIM HORMONE: CPT

## 2025-08-14 PROCEDURE — 83036 HEMOGLOBIN GLYCOSYLATED A1C: CPT

## 2025-08-14 PROCEDURE — 36415 COLL VENOUS BLD VENIPUNCTURE: CPT

## 2025-08-25 ENCOUNTER — ANTICOAGULATION VISIT (OUTPATIENT)
Dept: VASCULAR LAB | Facility: MEDICAL CENTER | Age: 75
End: 2025-08-25
Attending: INTERNAL MEDICINE
Payer: MEDICARE

## 2025-08-25 DIAGNOSIS — G45.9 TIA (TRANSIENT ISCHEMIC ATTACK): Primary | ICD-10-CM

## 2025-08-25 DIAGNOSIS — D68.69 SECONDARY HYPERCOAGULABLE STATE (HCC): Chronic | ICD-10-CM

## 2025-08-25 LAB — INR PPP: 2.1 (ref 2–3.5)

## 2025-08-25 PROCEDURE — 85610 PROTHROMBIN TIME: CPT

## 2025-08-25 PROCEDURE — 99213 OFFICE O/P EST LOW 20 MIN: CPT

## (undated) DEVICE — SENSOR OXIMETER ADULT SPO2 RD SET (20EA/BX)

## (undated) DEVICE — STOCKINET TUBULAR 6IN STERILE - 6 X 48YDS (25/CA)

## (undated) DEVICE — GLOVE BIOGEL SZ 6.5 SURGICAL PF LTX (50PR/BX 4BX/CA)

## (undated) DEVICE — HEAD HOLDER JUNIOR/ADULT

## (undated) DEVICE — TOWEL STOP TIMEOUT SAFETY FLAG (40EA/CA)

## (undated) DEVICE — TIP INTPLS HFLO ML ORFC BTRY - (12/CS)  FOR SURGILAV

## (undated) DEVICE — BLADE RECIPROCATING 12.7 X 78.7 X 1.0MM (1/EA)

## (undated) DEVICE — DRESSING TRANSPARENT FILM TEGADERM 4 X 4.75" (50EA/BX)"

## (undated) DEVICE — BLADE SAGITTAL SYSTEM 18MM

## (undated) DEVICE — MIXER BONE CEMENT REVOLUTION - W/FEMORAL PRESSURIZER (6/CA)

## (undated) DEVICE — DRESSING STERILE BURN ACTICOAT FLEX 7 (50EA/CA)

## (undated) DEVICE — SUCTION INSTRUMENT YANKAUER BULBOUS TIP W/O VENT (50EA/CA)

## (undated) DEVICE — GLOVE BIOGEL INDICATOR SZ 8 SURGICAL PF LTX - (50/BX 4BX/CA)

## (undated) DEVICE — GLOVE BIOGEL SZ 8 SURGICAL PF LTX - (50PR/BX 4BX/CA)

## (undated) DEVICE — GLOVE SZ 7.5 BIOGEL PI MICRO - PF LF (50PR/BX)

## (undated) DEVICE — LACTATED RINGERS INJ 1000 ML - (14EA/CA 60CA/PF)

## (undated) DEVICE — GLOVE BIOGEL PI INDICATOR SZ 8.0 SURGICAL PF LF -(50/BX 4BX/CA)

## (undated) DEVICE — SODIUM CHL. IRRIGATION 0.9% 3000ML (4EA/CA 65CA/PF)

## (undated) DEVICE — PACK TOTAL KNEE  (1/CA)

## (undated) DEVICE — GOWN WARMING STANDARD FLEX - (30/CA)

## (undated) DEVICE — BLADE SAGITTAL 6 SYSTEM 25MM

## (undated) DEVICE — BAG SPONGE COUNT 10.25 X 32 - BLUE (250/CA)

## (undated) DEVICE — CANISTER SUCTION RIGID RED 1500CC (40EA/CA)

## (undated) DEVICE — GLOVE BIOGEL PI INDICATOR SZ 7.0 SURGICAL PF LF - (50/BX 4BX/CA)

## (undated) DEVICE — GLOVE, LITE (PAIR)

## (undated) DEVICE — GLOVE BIOGEL INDICATOR SZ 7SURGICAL PF LTX - (50/BX 4BX/CA)

## (undated) DEVICE — DEVICE SKIN CLOSURE SURGICAL ZIP 24 (5EA/PK)

## (undated) DEVICE — GLOVE BIOGEL PI ULTRATOUCH SZ 7.5 SURGICAL PF LF -(50/BX 4BX/CA)

## (undated) DEVICE — PROTECTOR ULNA NERVE - (36PR/CA)

## (undated) DEVICE — HANDPIECE 10FT INTPLS SCT PLS IRRIGATION HAND CONTROL SET (6/PK)

## (undated) DEVICE — CHLORAPREP 26 ML APPLICATOR - ORANGE TINT(25/CA)

## (undated) DEVICE — GLOVE BIOGEL PI INDICATOR SZ 7.5 SURGICAL PF LF -(50/BX 4BX/CA)

## (undated) DEVICE — HUMID-VENT HEAT AND MOISTURE EXCHANGE- (50/BX)

## (undated) DEVICE — LENS/HOOD FOR SPACESUIT - (32/PK) PEEL AWAY FACE

## (undated) DEVICE — SUTURE 2-0 MONOCRYL PLUS UNDYED CT-1 1 X 36 (36EA/BX)"

## (undated) DEVICE — SUTURE 1 VICRYL PLUS CTX - 8 X 18 INCH (12/BX)

## (undated) DEVICE — SUTURE GENERAL

## (undated) DEVICE — GLOVE BIOGEL SZ 7 SURGICAL PF LTX - (50PR/BX 4BX/CA)

## (undated) DEVICE — SET EXTENSION WITH 2 PORTS (48EA/CA) ***PART #2C8610 IS A SUBSTITUTE*****

## (undated) DEVICE — GLOVE BIOGEL SZ 7.5 SURGICAL PF LTX - (50PR/BX 4BX/CA)

## (undated) DEVICE — TUBING CLEARLINK DUO-VENT - C-FLO (48EA/CA)

## (undated) DEVICE — KIT ANESTHESIA W/CIRCUIT & 3/LT BAG W/FILTER (20EA/CA)

## (undated) DEVICE — DRESSING, 4X4 VIGILON STERILE

## (undated) DEVICE — COVER LIGHT HANDLE FLEXIBLE - SOFT (2EA/PK 80PK/CA)

## (undated) DEVICE — CONTAINER SPECIMEN BAG OR - STERILE 4 OZ W/LID (100EA/CA)

## (undated) DEVICE — Device

## (undated) DEVICE — ELECTRODE DUAL RETURN W/ CORD - (50/PK)

## (undated) DEVICE — SODIUM CHL IRRIGATION 0.9% 1000ML (12EA/CA)

## (undated) DEVICE — TUBE CONNECTING SUCTION - CLEAR PLASTIC STERILE 72 IN (50EA/CA)

## (undated) DEVICE — ELECTRODE 850 FOAM ADHESIVE - HYDROGEL RADIOTRNSPRNT (50/PK)

## (undated) DEVICE — PIN

## (undated) DEVICE — SUTURE 3-0 MONOCRYL PLUS PS-1 - 27 INCH (36/BX)

## (undated) DEVICE — MASK AIRWAY FLEXIBLE SINGLE-USE SIZE 4 ADULTS (10EA/BX)

## (undated) DEVICE — MASK ANESTHESIA ADULT  - (100/CA)

## (undated) DEVICE — WATER IRRIGATION STERILE 1000ML (12EA/CA)

## (undated) DEVICE — BLADE SAW RECIPROCATING DOUBLE SIDED 70MM X 12.5MM X 0.64MM STERILE (1EA)

## (undated) DEVICE — NEEDLE SPINAL NON-SAFETY 18 GA X 3 IN (25EA/BX)